# Patient Record
Sex: FEMALE | Race: WHITE | Employment: PART TIME | URBAN - METROPOLITAN AREA
[De-identification: names, ages, dates, MRNs, and addresses within clinical notes are randomized per-mention and may not be internally consistent; named-entity substitution may affect disease eponyms.]

---

## 2018-05-22 ENCOUNTER — HOSPITAL ENCOUNTER (OUTPATIENT)
Dept: CT IMAGING | Age: 53
Discharge: HOME | End: 2018-05-24
Payer: COMMERCIAL

## 2018-05-22 DIAGNOSIS — K43.9 VENTRAL HERNIA WITHOUT OBSTRUCTION OR GANGRENE: ICD-10-CM

## 2018-05-22 DIAGNOSIS — K59.00 CONSTIPATION, UNSPECIFIED CONSTIPATION TYPE: ICD-10-CM

## 2018-05-22 PROCEDURE — 6360000004 HC RX CONTRAST MEDICATION: Performed by: RADIOLOGY

## 2018-05-22 PROCEDURE — 74177 CT ABD & PELVIS W/CONTRAST: CPT

## 2018-05-22 RX ADMIN — IOPAMIDOL 110 ML: 755 INJECTION, SOLUTION INTRAVENOUS at 12:12

## 2018-05-22 RX ADMIN — IOHEXOL 50 ML: 240 INJECTION, SOLUTION INTRATHECAL; INTRAVASCULAR; INTRAVENOUS; ORAL at 12:12

## 2018-08-06 ENCOUNTER — APPOINTMENT (OUTPATIENT)
Dept: GENERAL RADIOLOGY | Age: 53
End: 2018-08-06
Payer: COMMERCIAL

## 2018-08-06 LAB
ALBUMIN SERPL-MCNC: 4.7 G/DL (ref 3.5–5.2)
ALP BLD-CCNC: 76 U/L (ref 35–104)
ALT SERPL-CCNC: 12 U/L (ref 0–32)
ANION GAP SERPL CALCULATED.3IONS-SCNC: 14 MMOL/L (ref 7–16)
AST SERPL-CCNC: 20 U/L (ref 0–31)
BASOPHILS ABSOLUTE: 0.04 E9/L (ref 0–0.2)
BASOPHILS RELATIVE PERCENT: 0.6 % (ref 0–2)
BILIRUB SERPL-MCNC: <0.2 MG/DL (ref 0–1.2)
BUN BLDV-MCNC: 13 MG/DL (ref 6–20)
CALCIUM SERPL-MCNC: 9.4 MG/DL (ref 8.6–10.2)
CHLORIDE BLD-SCNC: 105 MMOL/L (ref 98–107)
CO2: 22 MMOL/L (ref 22–29)
CREAT SERPL-MCNC: 1 MG/DL (ref 0.5–1)
D DIMER: <200 NG/ML DDU
EOSINOPHILS ABSOLUTE: 0.19 E9/L (ref 0.05–0.5)
EOSINOPHILS RELATIVE PERCENT: 2.9 % (ref 0–6)
GFR AFRICAN AMERICAN: >60
GFR NON-AFRICAN AMERICAN: 58 ML/MIN/1.73
GLUCOSE BLD-MCNC: 73 MG/DL (ref 74–109)
HCT VFR BLD CALC: 43.5 % (ref 34–48)
HEMOGLOBIN: 14.7 G/DL (ref 11.5–15.5)
IMMATURE GRANULOCYTES #: 0.02 E9/L
IMMATURE GRANULOCYTES %: 0.3 % (ref 0–5)
LYMPHOCYTES ABSOLUTE: 2.64 E9/L (ref 1.5–4)
LYMPHOCYTES RELATIVE PERCENT: 40.4 % (ref 20–42)
MCH RBC QN AUTO: 32.3 PG (ref 26–35)
MCHC RBC AUTO-ENTMCNC: 33.8 % (ref 32–34.5)
MCV RBC AUTO: 95.6 FL (ref 80–99.9)
MONOCYTES ABSOLUTE: 0.49 E9/L (ref 0.1–0.95)
MONOCYTES RELATIVE PERCENT: 7.5 % (ref 2–12)
NEUTROPHILS ABSOLUTE: 3.15 E9/L (ref 1.8–7.3)
NEUTROPHILS RELATIVE PERCENT: 48.3 % (ref 43–80)
PDW BLD-RTO: 11.6 FL (ref 11.5–15)
PLATELET # BLD: 304 E9/L (ref 130–450)
PMV BLD AUTO: 10.4 FL (ref 7–12)
POTASSIUM SERPL-SCNC: 3.6 MMOL/L (ref 3.5–5)
PRO-BNP: 45 PG/ML (ref 0–125)
RBC # BLD: 4.55 E12/L (ref 3.5–5.5)
SODIUM BLD-SCNC: 141 MMOL/L (ref 132–146)
TOTAL PROTEIN: 8.1 G/DL (ref 6.4–8.3)
TROPONIN: <0.01 NG/ML (ref 0–0.03)
WBC # BLD: 6.5 E9/L (ref 4.5–11.5)

## 2018-08-06 PROCEDURE — 85378 FIBRIN DEGRADE SEMIQUANT: CPT

## 2018-08-06 PROCEDURE — 80053 COMPREHEN METABOLIC PANEL: CPT

## 2018-08-06 PROCEDURE — 85025 COMPLETE CBC W/AUTO DIFF WBC: CPT

## 2018-08-06 PROCEDURE — 83880 ASSAY OF NATRIURETIC PEPTIDE: CPT

## 2018-08-06 PROCEDURE — 84484 ASSAY OF TROPONIN QUANT: CPT

## 2018-08-06 PROCEDURE — 36415 COLL VENOUS BLD VENIPUNCTURE: CPT

## 2018-08-06 PROCEDURE — 71046 X-RAY EXAM CHEST 2 VIEWS: CPT

## 2018-08-06 ASSESSMENT — PAIN DESCRIPTION - DESCRIPTORS: DESCRIPTORS: ACHING

## 2018-08-06 ASSESSMENT — PAIN DESCRIPTION - LOCATION: LOCATION: CHEST

## 2018-08-06 ASSESSMENT — PAIN SCALES - GENERAL: PAINLEVEL_OUTOF10: 10

## 2018-08-06 ASSESSMENT — PAIN DESCRIPTION - PAIN TYPE: TYPE: ACUTE PAIN

## 2018-08-06 ASSESSMENT — PAIN DESCRIPTION - ORIENTATION: ORIENTATION: RIGHT

## 2018-08-07 ENCOUNTER — HOSPITAL ENCOUNTER (EMERGENCY)
Age: 53
Discharge: HOME | End: 2018-08-07
Attending: EMERGENCY MEDICINE
Payer: COMMERCIAL

## 2018-08-07 VITALS
HEART RATE: 64 BPM | HEIGHT: 65 IN | DIASTOLIC BLOOD PRESSURE: 77 MMHG | TEMPERATURE: 97.9 F | SYSTOLIC BLOOD PRESSURE: 137 MMHG | RESPIRATION RATE: 16 BRPM | WEIGHT: 115 LBS | BODY MASS INDEX: 19.16 KG/M2 | OXYGEN SATURATION: 98 %

## 2018-08-07 DIAGNOSIS — R07.89 CHEST WALL PAIN: Primary | ICD-10-CM

## 2018-08-07 LAB — TROPONIN: <0.01 NG/ML (ref 0–0.03)

## 2018-08-07 PROCEDURE — 6360000002 HC RX W HCPCS: Performed by: EMERGENCY MEDICINE

## 2018-08-07 PROCEDURE — 36415 COLL VENOUS BLD VENIPUNCTURE: CPT

## 2018-08-07 PROCEDURE — 99285 EMERGENCY DEPT VISIT HI MDM: CPT

## 2018-08-07 PROCEDURE — 96372 THER/PROPH/DIAG INJ SC/IM: CPT

## 2018-08-07 PROCEDURE — 84484 ASSAY OF TROPONIN QUANT: CPT

## 2018-08-07 PROCEDURE — 6370000000 HC RX 637 (ALT 250 FOR IP): Performed by: EMERGENCY MEDICINE

## 2018-08-07 RX ORDER — PANTOPRAZOLE SODIUM 40 MG/1
40 TABLET, DELAYED RELEASE ORAL DAILY
Qty: 10 TABLET | Refills: 0 | Status: SHIPPED | OUTPATIENT
Start: 2018-08-07 | End: 2019-05-25

## 2018-08-07 RX ORDER — SUCRALFATE 1 G/1
1 TABLET ORAL 4 TIMES DAILY
Qty: 120 TABLET | Refills: 3 | Status: SHIPPED | OUTPATIENT
Start: 2018-08-07 | End: 2019-05-25

## 2018-08-07 RX ORDER — KETOROLAC TROMETHAMINE 30 MG/ML
30 INJECTION, SOLUTION INTRAMUSCULAR; INTRAVENOUS ONCE
Status: COMPLETED | OUTPATIENT
Start: 2018-08-07 | End: 2018-08-07

## 2018-08-07 RX ADMIN — KETOROLAC TROMETHAMINE 30 MG: 30 INJECTION, SOLUTION INTRAMUSCULAR; INTRAVENOUS at 01:38

## 2018-08-07 RX ADMIN — LIDOCAINE HYDROCHLORIDE: 20 SOLUTION ORAL; TOPICAL at 03:51

## 2018-08-07 ASSESSMENT — PAIN SCALES - GENERAL
PAINLEVEL_OUTOF10: 9
PAINLEVEL_OUTOF10: 8

## 2018-08-07 ASSESSMENT — PAIN DESCRIPTION - LOCATION: LOCATION: ABDOMEN

## 2018-08-07 ASSESSMENT — PAIN DESCRIPTION - PAIN TYPE: TYPE: ACUTE PAIN

## 2018-08-07 NOTE — ED PROVIDER NOTES
HPI:  8/7/18,   Time: 4:08 AM       Maikel Ash is a 48 y.o. female presenting to the ED for chest pain. Patient states she developed a right-sided chest pain earlier this evening. She says after she ate dinner. She says its worse with movement along with touch, does not radiate anywhere else. She says she was short of breath with this. She denies any cardiac risk factors. She denies any PE risk factors. She denies any fever, chills, nausea, vomiting, rash, cough, sputum, or any other symptoms or complaints. Review of Systems:   Pertinent positives and negatives are stated within HPI, all other systems reviewed and are negative.          --------------------------------------------- PAST HISTORY ---------------------------------------------  Past Medical History:  has a past medical history of Seizures ([facility] [address] Past Surgical History:  has a past surgical history that includes Hysterectomy; Breast surgery; and cervical fusion. Social History:  reports that she has quit smoking. She has a 0.12 pack-year smoking history. She has never used smokeless tobacco. She reports that she does not drink alcohol or use drugs. Family History: family history is not on file. The patients home medications have been reviewed. Allergies: Fish-derived products and Bee venom        ---------------------------------------------------PHYSICAL EXAM--------------------------------------    Constitutional/General: Alert and oriented x3, well appearing, non toxic in NAD  Head: Normocephalic and atraumatic  Eyes: PERRL, EOMI, conjunctive normal, sclera non icteric  Mouth: Oropharynx clear, handling secretions, no trismus, no asymmetry of the posterior oropharynx or uvular edema  Neck: Supple, full ROM, non tender to palpation in the midline, no stridor, no crepitus, no meningeal signs  Respiratory: Lungs clear to auscultation bilaterally, no wheezes, rales, or rhonchi.  Not in respiratory distress  Cardiovascular: Regular rate. Regular rhythm. No murmurs, gallops, or rubs. 2+ distal pulses  Chest: Right chest wall tenderness, no crepitus  GI:  Abdomen Soft, Non tender, Non distended. +BS. No organomegaly, no palpable masses,  No rebound, guarding, or rigidity. Musculoskeletal: Moves all extremities x 4. Warm and well perfused, no clubbing, cyanosis, or edema. Capillary refill <3 seconds  Integument: skin warm and dry. No rashes. Lymphatic: no lymphadenopathy noted  Neurologic: GCS 15, no focal deficits, symmetric strength 5/5 in the upper and lower extremities bilaterally  Psychiatric: Normal Affect    -------------------------------------------------- RESULTS -------------------------------------------------  I have personally reviewed all laboratory and imaging results for this patient. Results are listed below.      LABS:  Results for orders placed or performed during the hospital encounter of 08/07/18   CBC Auto Differential   Result Value Ref Range    WBC 6.5 4.5 - 11.5 E9/L    RBC 4.55 3.50 - 5.50 E12/L    Hemoglobin 14.7 11.5 - 15.5 g/dL    Hematocrit 43.5 34.0 - 48.0 %    MCV 95.6 80.0 - 99.9 fL    MCH 32.3 26.0 - 35.0 pg    MCHC 33.8 32.0 - 34.5 %    RDW 11.6 11.5 - 15.0 fL    Platelets 068 163 - 059 E9/L    MPV 10.4 7.0 - 12.0 fL    Neutrophils % 48.3 43.0 - 80.0 %    Immature Granulocytes % 0.3 0.0 - 5.0 %    Lymphocytes % 40.4 20.0 - 42.0 %    Monocytes % 7.5 2.0 - 12.0 %    Eosinophils % 2.9 0.0 - 6.0 %    Basophils % 0.6 0.0 - 2.0 %    Neutrophils # 3.15 1.80 - 7.30 E9/L    Immature Granulocytes # 0.02 E9/L    Lymphocytes # 2.64 1.50 - 4.00 E9/L    Monocytes # 0.49 0.10 - 0.95 E9/L    Eosinophils # 0.19 0.05 - 0.50 E9/L    Basophils # 0.04 0.00 - 0.20 E9/L   Comprehensive Metabolic Panel   Result Value Ref Range    Sodium 141 132 - 146 mmol/L    Potassium 3.6 3.5 - 5.0 mmol/L    Chloride 105 98 - 107 mmol/L    CO2 22 22 - 29 mmol/L    Anion Gap 14 7 - 16 mmol/L    Glucose 73 (L) 74 - 109 mg/dL    BUN 13 6 - 20 mg/dL    CREATININE 1.0 0.5 - 1.0 mg/dL    GFR Non-African American 58 >=60 mL/min/1.73    GFR African American >60     Calcium 9.4 8.6 - 10.2 mg/dL    Total Protein 8.1 6.4 - 8.3 g/dL    Alb 4.7 3.5 - 5.2 g/dL    Total Bilirubin <0.2 0.0 - 1.2 mg/dL    Alkaline Phosphatase 76 35 - 104 U/L    ALT 12 0 - 32 U/L    AST 20 0 - 31 U/L   Troponin   Result Value Ref Range    Troponin <0.01 0.00 - 0.03 ng/mL   Brain Natriuretic Peptide   Result Value Ref Range    Pro-BNP 45 0 - 125 pg/mL   D-Dimer, Quantitative   Result Value Ref Range    D-Dimer, Quant <200 ng/mL DDU   Troponin   Result Value Ref Range    Troponin <0.01 0.00 - 0.03 ng/mL   EKG 12 Lead   Result Value Ref Range    Ventricular Rate 80 BPM    Atrial Rate 80 BPM    P-R Interval 140 ms    QRS Duration 66 ms    Q-T Interval 378 ms    QTc Calculation (Bazett) 435 ms    P Axis 70 degrees    R Axis 75 degrees    T Axis 4 degrees       RADIOLOGY:  Interpreted by Radiologist.  XR CHEST STANDARD (2 VW)   Final Result   No acute cardiopulmonary process. EKG: This EKG is signed and interpreted by the EP. Sinus rhythm, rate 80, no STEMI, no ischemic change       ------------------------- NURSING NOTES AND VITALS REVIEWED ---------------------------   The nursing notes within the ED encounter and vital signs as below have been reviewed by myself. /77   Pulse 64   Temp 97.9 °F (36.6 °C)   Resp 16   Ht 5' 5\" (1.651 m)   Wt 115 lb (52.2 kg)   SpO2 98%   BMI 19.14 kg/m²   Oxygen Saturation Interpretation: Normal    The patients available past medical records and past encounters were reviewed.         ------------------------------ ED COURSE/MEDICAL DECISION MAKING----------------------  Medications   ketorolac (TORADOL) injection 30 mg (30 mg Intramuscular Given 8/7/18 0138)   aluminum & magnesium hydroxide-simethicone (MAALOX) 30 mL, lidocaine viscous (XYLOCAINE) 5 mL (GI COCKTAIL) ( Oral Given 8/7/18 6651)         ED COURSE:       Medical

## 2018-08-11 LAB
EKG ATRIAL RATE: 80 BPM
EKG P AXIS: 70 DEGREES
EKG P-R INTERVAL: 140 MS
EKG Q-T INTERVAL: 378 MS
EKG QRS DURATION: 66 MS
EKG QTC CALCULATION (BAZETT): 435 MS
EKG R AXIS: 75 DEGREES
EKG T AXIS: 4 DEGREES
EKG VENTRICULAR RATE: 80 BPM

## 2018-09-26 LAB
CHOLESTEROL, TOTAL: NORMAL MG/DL
CHOLESTEROL/HDL RATIO: NORMAL
HDLC SERPL-MCNC: NORMAL MG/DL (ref 35–70)
LDL CHOLESTEROL CALCULATED: NORMAL MG/DL (ref 0–160)
TRIGL SERPL-MCNC: NORMAL MG/DL
VLDLC SERPL CALC-MCNC: NORMAL MG/DL

## 2019-05-25 ENCOUNTER — OFFICE VISIT (OUTPATIENT)
Dept: FAMILY MEDICINE CLINIC | Age: 54
End: 2019-05-25

## 2019-05-25 VITALS
HEIGHT: 64 IN | SYSTOLIC BLOOD PRESSURE: 124 MMHG | DIASTOLIC BLOOD PRESSURE: 76 MMHG | TEMPERATURE: 97.3 F | OXYGEN SATURATION: 99 % | WEIGHT: 122.6 LBS | BODY MASS INDEX: 20.93 KG/M2 | HEART RATE: 68 BPM

## 2019-05-25 DIAGNOSIS — M54.2 NECK PAIN: Primary | ICD-10-CM

## 2019-05-25 PROCEDURE — 99213 OFFICE O/P EST LOW 20 MIN: CPT | Performed by: FAMILY MEDICINE

## 2019-05-25 RX ORDER — LAMOTRIGINE 100 MG/1
TABLET ORAL
Refills: 1 | COMMUNITY
Start: 2019-03-19 | End: 2019-09-12 | Stop reason: SDUPTHER

## 2019-05-25 RX ORDER — ZOLPIDEM TARTRATE 12.5 MG/1
TABLET, FILM COATED, EXTENDED RELEASE ORAL
Refills: 0 | COMMUNITY
Start: 2019-04-28 | End: 2019-06-21 | Stop reason: SDUPTHER

## 2019-05-25 RX ORDER — HYDROCODONE BITARTRATE AND ACETAMINOPHEN 10; 325 MG/1; MG/1
TABLET ORAL
Refills: 0 | COMMUNITY
Start: 2019-03-19 | End: 2019-09-12 | Stop reason: SDUPTHER

## 2019-05-25 RX ORDER — METHYLPREDNISOLONE 4 MG/1
TABLET ORAL
Qty: 21 TABLET | Refills: 0 | Status: SHIPPED | OUTPATIENT
Start: 2019-05-25 | End: 2019-09-03

## 2019-05-25 RX ORDER — ESTRADIOL 0.1 MG/D
FILM, EXTENDED RELEASE TRANSDERMAL
Refills: 11 | COMMUNITY
Start: 2019-05-15 | End: 2019-09-12 | Stop reason: SDUPTHER

## 2019-05-25 RX ORDER — CARISOPRODOL 350 MG/1
TABLET ORAL
Refills: 0 | COMMUNITY
Start: 2019-03-19 | End: 2019-07-01 | Stop reason: SDUPTHER

## 2019-05-25 ASSESSMENT — ENCOUNTER SYMPTOMS
RESPIRATORY NEGATIVE: 1
GASTROINTESTINAL NEGATIVE: 1
EYES NEGATIVE: 1
ALLERGIC/IMMUNOLOGIC NEGATIVE: 1

## 2019-05-25 NOTE — PROGRESS NOTES
19     Azul Mariee    : 1965 Sex: female   Age: 48 y.o. Chief Complaint   Patient presents with    Back Pain       Prior to Admission medications    Medication Sig Start Date End Date Taking? Authorizing Provider   carisoprodol (SOMA) 350 MG tablet TK 1 T PO QID PRN 3/19/19  Yes Historical Provider, MD   estradiol (Orellana Carleen) 0.1 MG/24HR HOWARD 1 PA EXT TO THE SKIN 2 TIMES A WK 5/15/19  Yes Historical Provider, MD   HYDROcodone-acetaminophen (NORCO)  MG per tablet TK 1 T PO QID PRN 3/19/19  Yes Historical Provider, MD   lamoTRIgine (LAMICTAL) 100 MG tablet TK 1 T PO QHS 3/19/19  Yes Historical Provider, MD   zolpidem (AMBIEN CR) 12.5 MG extended release tablet TK 1 T PO QHS PRN 19  Yes Historical Provider, MD   QUEtiapine Fumarate (SEROQUEL PO) Take 100 mg by mouth 2 times daily. Yes Historical Provider, MD   Sertraline HCl (ZOLOFT PO) Take 50 mg by mouth daily. Yes Historical Provider, MD   LamoTRIgine (LAMICTAL PO) Take 100 mg by mouth 2 times daily. Yes Historical Provider, MD   ALPRAZOLAM Take 0.5 mg by mouth as needed. Yes Historical Provider, MD   Topiramate (TOPAMAX PO) Take 25 mg by mouth 2 times daily. Yes Historical Provider, MD   diclofenac (VOLTAREN) 75 MG EC tablet Take 1 tablet by mouth 2 times daily for 7 days 10/28/17 11/4/17  DMEETRIS Narvaez        HPI: Patient presents today problems with neck discomfort. Known history of severe degenerative disc disease. Admits to cervical fusion. Started this past week with tenderness back of the neck and mild radicular pain into the arms. We will treat with a Medrol Dosepak. Encourage follow-up with primary medical doctor in regards to further neurosurgical evaluation if necessary. Denies any recent trauma          Review of Systems   Constitutional: Negative. HENT: Negative. Eyes: Negative. Respiratory: Negative. Gastrointestinal: Negative. Endocrine: Negative. Genitourinary: Negative. Musculoskeletal: Negative. Current complaints of neck pain as noted with radicular pain into the arms. History of cervical fusion. History of degenerative disc disease. Skin: Negative. Allergic/Immunologic: Negative. Neurological: Negative. Hematological: Negative. Psychiatric/Behavioral: Negative. Current Outpatient Medications:     carisoprodol (SOMA) 350 MG tablet, TK 1 T PO QID PRN, Disp: , Rfl: 0    estradiol (VIVELLE) 0.1 MG/24HR, HOWARD 1 PA EXT TO THE SKIN 2 TIMES A WK, Disp: , Rfl: 11    HYDROcodone-acetaminophen (NORCO)  MG per tablet, TK 1 T PO QID PRN, Disp: , Rfl: 0    lamoTRIgine (LAMICTAL) 100 MG tablet, TK 1 T PO QHS, Disp: , Rfl: 1    zolpidem (AMBIEN CR) 12.5 MG extended release tablet, TK 1 T PO QHS PRN, Disp: , Rfl: 0    QUEtiapine Fumarate (SEROQUEL PO), Take 100 mg by mouth 2 times daily. , Disp: , Rfl:     Sertraline HCl (ZOLOFT PO), Take 50 mg by mouth daily. , Disp: , Rfl:     LamoTRIgine (LAMICTAL PO), Take 100 mg by mouth 2 times daily. , Disp: , Rfl:     ALPRAZOLAM, Take 0.5 mg by mouth as needed. , Disp: , Rfl:     Topiramate (TOPAMAX PO), Take 25 mg by mouth 2 times daily. , Disp: , Rfl:     diclofenac (VOLTAREN) 75 MG EC tablet, Take 1 tablet by mouth 2 times daily for 7 days, Disp: 14 tablet, Rfl: 0    Allergies   Allergen Reactions    Fish-Derived Products Anaphylaxis and Swelling     Swordfish allergy    Bee Venom        Social History     Tobacco Use    Smoking status: Former Smoker     Packs/day: 0.25     Years: 0.50     Pack years: 0.12    Smokeless tobacco: Never Used   Substance Use Topics    Alcohol use: No    Drug use: No      Past Surgical History:   Procedure Laterality Date    BREAST SURGERY      CERVICAL FUSION      c2-c7    HYSTERECTOMY       No family history on file.   Past Medical History:   Diagnosis Date    [facility]        Vitals:    05/25/19 1152   BP: 124/76   Pulse: 68   Temp: 97.3 °F (36.3 °C)   SpO2:

## 2019-06-06 RX ORDER — ALPRAZOLAM 0.5 MG/1
TABLET ORAL
Qty: 180 TABLET | Refills: 0 | OUTPATIENT
Start: 2019-06-06

## 2019-06-17 ENCOUNTER — TELEPHONE (OUTPATIENT)
Dept: FAMILY MEDICINE CLINIC | Age: 54
End: 2019-06-17

## 2019-06-18 RX ORDER — ZOLPIDEM TARTRATE 12.5 MG/1
12.5 TABLET, FILM COATED, EXTENDED RELEASE ORAL NIGHTLY PRN
Qty: 30 TABLET | Refills: 0 | OUTPATIENT
Start: 2019-06-18 | End: 2019-07-18

## 2019-06-19 ENCOUNTER — TELEPHONE (OUTPATIENT)
Dept: FAMILY MEDICINE CLINIC | Age: 54
End: 2019-06-19

## 2019-06-19 RX ORDER — ZOLPIDEM TARTRATE 12.5 MG/1
12.5 TABLET, FILM COATED, EXTENDED RELEASE ORAL NIGHTLY PRN
Qty: 15 TABLET | Refills: 0 | Status: CANCELLED | OUTPATIENT
Start: 2019-06-19 | End: 2019-07-04

## 2019-06-19 NOTE — TELEPHONE ENCOUNTER
She is out of Ambien and has an appt on the 27th. I have pended a 15 day supply ot go to [place].

## 2019-06-21 ENCOUNTER — OFFICE VISIT (OUTPATIENT)
Dept: FAMILY MEDICINE CLINIC | Age: 54
End: 2019-06-21

## 2019-06-21 VITALS
BODY MASS INDEX: 20.94 KG/M2 | TEMPERATURE: 97.3 F | WEIGHT: 122 LBS | OXYGEN SATURATION: 99 % | HEART RATE: 76 BPM | DIASTOLIC BLOOD PRESSURE: 60 MMHG | SYSTOLIC BLOOD PRESSURE: 104 MMHG

## 2019-06-21 DIAGNOSIS — F41.9 ANXIETY: ICD-10-CM

## 2019-06-21 DIAGNOSIS — G47.00 INSOMNIA, UNSPECIFIED TYPE: Primary | ICD-10-CM

## 2019-06-21 DIAGNOSIS — M25.50 ARTHRALGIA, UNSPECIFIED JOINT: ICD-10-CM

## 2019-06-21 PROCEDURE — 99213 OFFICE O/P EST LOW 20 MIN: CPT | Performed by: FAMILY MEDICINE

## 2019-06-21 PROCEDURE — 96372 THER/PROPH/DIAG INJ SC/IM: CPT | Performed by: FAMILY MEDICINE

## 2019-06-21 RX ORDER — ALPRAZOLAM 0.5 MG/1
0.5 TABLET ORAL 2 TIMES DAILY
Qty: 180 TABLET | Refills: 0 | Status: SHIPPED | OUTPATIENT
Start: 2019-06-21 | End: 2019-09-12 | Stop reason: SDUPTHER

## 2019-06-21 RX ORDER — METHYLPREDNISOLONE ACETATE 40 MG/ML
80 INJECTION, SUSPENSION INTRA-ARTICULAR; INTRALESIONAL; INTRAMUSCULAR; SOFT TISSUE ONCE
Status: COMPLETED | OUTPATIENT
Start: 2019-06-21 | End: 2019-06-21

## 2019-06-21 RX ORDER — ALPRAZOLAM 0.5 MG/1
TABLET ORAL NIGHTLY PRN
COMMUNITY
End: 2019-06-21 | Stop reason: SDUPTHER

## 2019-06-21 RX ORDER — DICLOFENAC SODIUM 75 MG/1
75 TABLET, DELAYED RELEASE ORAL 2 TIMES DAILY
COMMUNITY
End: 2019-09-03

## 2019-06-21 RX ORDER — ZOLPIDEM TARTRATE 12.5 MG/1
12.5 TABLET, FILM COATED, EXTENDED RELEASE ORAL NIGHTLY PRN
Qty: 90 TABLET | Refills: 0 | Status: SHIPPED | OUTPATIENT
Start: 2019-06-21 | End: 2019-09-12 | Stop reason: SDUPTHER

## 2019-06-21 RX ORDER — ALPRAZOLAM 0.5 MG/1
0.5 TABLET ORAL NIGHTLY PRN
Qty: 180 TABLET | Refills: 0 | Status: SHIPPED | OUTPATIENT
Start: 2019-06-21 | End: 2019-06-21 | Stop reason: SDUPTHER

## 2019-06-21 RX ADMIN — METHYLPREDNISOLONE ACETATE 80 MG: 40 INJECTION, SUSPENSION INTRA-ARTICULAR; INTRALESIONAL; INTRAMUSCULAR; SOFT TISSUE at 15:16

## 2019-06-21 ASSESSMENT — PATIENT HEALTH QUESTIONNAIRE - PHQ9
SUM OF ALL RESPONSES TO PHQ QUESTIONS 1-9: 0
1. LITTLE INTEREST OR PLEASURE IN DOING THINGS: 0
SUM OF ALL RESPONSES TO PHQ QUESTIONS 1-9: 0
2. FEELING DOWN, DEPRESSED OR HOPELESS: 0
SUM OF ALL RESPONSES TO PHQ9 QUESTIONS 1 & 2: 0
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
## 2019-07-01 ENCOUNTER — OFFICE VISIT (OUTPATIENT)
Dept: FAMILY MEDICINE CLINIC | Age: 54
End: 2019-07-01

## 2019-07-01 VITALS
DIASTOLIC BLOOD PRESSURE: 70 MMHG | TEMPERATURE: 97.8 F | HEART RATE: 70 BPM | OXYGEN SATURATION: 97 % | SYSTOLIC BLOOD PRESSURE: 114 MMHG | BODY MASS INDEX: 20.77 KG/M2 | WEIGHT: 121 LBS

## 2019-07-01 DIAGNOSIS — M54.12 CERVICAL SPONDYLITIS WITH RADICULITIS (HCC): Primary | ICD-10-CM

## 2019-07-01 DIAGNOSIS — M46.92 CERVICAL SPONDYLITIS WITH RADICULITIS (HCC): Primary | ICD-10-CM

## 2019-07-01 PROCEDURE — 96372 THER/PROPH/DIAG INJ SC/IM: CPT | Performed by: FAMILY MEDICINE

## 2019-07-01 PROCEDURE — 99213 OFFICE O/P EST LOW 20 MIN: CPT | Performed by: FAMILY MEDICINE

## 2019-07-01 RX ORDER — CARISOPRODOL 350 MG/1
TABLET ORAL
Qty: 120 TABLET | Refills: 0 | Status: SHIPPED | OUTPATIENT
Start: 2019-07-01 | End: 2019-09-12 | Stop reason: SDUPTHER

## 2019-07-01 RX ORDER — METHYLPREDNISOLONE ACETATE 40 MG/ML
80 INJECTION, SUSPENSION INTRA-ARTICULAR; INTRALESIONAL; INTRAMUSCULAR; SOFT TISSUE ONCE
Status: COMPLETED | OUTPATIENT
Start: 2019-07-01 | End: 2019-07-01

## 2019-07-01 RX ADMIN — METHYLPREDNISOLONE ACETATE 80 MG: 40 INJECTION, SUSPENSION INTRA-ARTICULAR; INTRALESIONAL; INTRAMUSCULAR; SOFT TISSUE at 16:47

## 2019-09-03 ENCOUNTER — OFFICE VISIT (OUTPATIENT)
Dept: FAMILY MEDICINE CLINIC | Age: 54
End: 2019-09-03

## 2019-09-03 ENCOUNTER — HOSPITAL ENCOUNTER (OUTPATIENT)
Age: 54
Discharge: HOME | End: 2019-09-05

## 2019-09-03 VITALS
OXYGEN SATURATION: 97 % | BODY MASS INDEX: 21.28 KG/M2 | WEIGHT: 124 LBS | SYSTOLIC BLOOD PRESSURE: 120 MMHG | HEART RATE: 78 BPM | DIASTOLIC BLOOD PRESSURE: 76 MMHG | TEMPERATURE: 97.6 F

## 2019-09-03 DIAGNOSIS — R10.11 RIGHT UPPER QUADRANT ABDOMINAL PAIN: ICD-10-CM

## 2019-09-03 DIAGNOSIS — R10.11 RIGHT UPPER QUADRANT ABDOMINAL PAIN: Primary | ICD-10-CM

## 2019-09-03 LAB
ALBUMIN SERPL-MCNC: 4.6 G/DL (ref 3.5–5.2)
ALP BLD-CCNC: 79 U/L (ref 35–104)
ALT SERPL-CCNC: 18 U/L (ref 0–32)
AMYLASE: 66 U/L (ref 20–100)
ANION GAP SERPL CALCULATED.3IONS-SCNC: 14 MMOL/L (ref 7–16)
AST SERPL-CCNC: 25 U/L (ref 0–31)
BASOPHILS ABSOLUTE: 0.05 E9/L (ref 0–0.2)
BASOPHILS RELATIVE PERCENT: 0.8 % (ref 0–2)
BILIRUB SERPL-MCNC: 0.3 MG/DL (ref 0–1.2)
BUN BLDV-MCNC: 8 MG/DL (ref 6–20)
CALCIUM SERPL-MCNC: 9.8 MG/DL (ref 8.6–10.2)
CHLORIDE BLD-SCNC: 106 MMOL/L (ref 98–107)
CO2: 21 MMOL/L (ref 22–29)
CREAT SERPL-MCNC: 1.1 MG/DL (ref 0.5–1)
EOSINOPHILS ABSOLUTE: 0.06 E9/L (ref 0.05–0.5)
EOSINOPHILS RELATIVE PERCENT: 0.9 % (ref 0–6)
GFR AFRICAN AMERICAN: >60
GFR NON-AFRICAN AMERICAN: 52 ML/MIN/1.73
GLUCOSE BLD-MCNC: 102 MG/DL (ref 74–99)
HCT VFR BLD CALC: 47.1 % (ref 34–48)
HEMOGLOBIN: 14.2 G/DL (ref 11.5–15.5)
IMMATURE GRANULOCYTES #: 0.03 E9/L
IMMATURE GRANULOCYTES %: 0.5 % (ref 0–5)
LIPASE: 47 U/L (ref 13–60)
LYMPHOCYTES ABSOLUTE: 1.28 E9/L (ref 1.5–4)
LYMPHOCYTES RELATIVE PERCENT: 19.4 % (ref 20–42)
MCH RBC QN AUTO: 32.6 PG (ref 26–35)
MCHC RBC AUTO-ENTMCNC: 30.1 % (ref 32–34.5)
MCV RBC AUTO: 108.3 FL (ref 80–99.9)
MONOCYTES ABSOLUTE: 0.49 E9/L (ref 0.1–0.95)
MONOCYTES RELATIVE PERCENT: 7.4 % (ref 2–12)
NEUTROPHILS ABSOLUTE: 4.7 E9/L (ref 1.8–7.3)
NEUTROPHILS RELATIVE PERCENT: 71 % (ref 43–80)
PDW BLD-RTO: 12.2 FL (ref 11.5–15)
PLATELET # BLD: 294 E9/L (ref 130–450)
PMV BLD AUTO: 11.8 FL (ref 7–12)
POTASSIUM SERPL-SCNC: 4.3 MMOL/L (ref 3.5–5)
RBC # BLD: 4.35 E12/L (ref 3.5–5.5)
SODIUM BLD-SCNC: 141 MMOL/L (ref 132–146)
TOTAL PROTEIN: 8.8 G/DL (ref 6.4–8.3)
WBC # BLD: 6.6 E9/L (ref 4.5–11.5)

## 2019-09-03 PROCEDURE — 82150 ASSAY OF AMYLASE: CPT

## 2019-09-03 PROCEDURE — 83690 ASSAY OF LIPASE: CPT

## 2019-09-03 PROCEDURE — 85025 COMPLETE CBC W/AUTO DIFF WBC: CPT

## 2019-09-03 PROCEDURE — 80053 COMPREHEN METABOLIC PANEL: CPT

## 2019-09-03 PROCEDURE — 36415 COLL VENOUS BLD VENIPUNCTURE: CPT

## 2019-09-03 PROCEDURE — 99213 OFFICE O/P EST LOW 20 MIN: CPT | Performed by: FAMILY MEDICINE

## 2019-09-03 RX ORDER — DICYCLOMINE HCL 20 MG
20 TABLET ORAL EVERY 6 HOURS
Qty: 20 TABLET | Refills: 1 | Status: SHIPPED | OUTPATIENT
Start: 2019-09-03 | End: 2019-12-05 | Stop reason: ALTCHOICE

## 2019-09-03 ASSESSMENT — ENCOUNTER SYMPTOMS
SORE THROAT: 0
EYE PAIN: 0
BACK PAIN: 1
SHORTNESS OF BREATH: 0
CONSTIPATION: 0
SINUS PAIN: 0
ABDOMINAL PAIN: 1
COUGH: 0
DIARRHEA: 1

## 2019-09-12 ENCOUNTER — OFFICE VISIT (OUTPATIENT)
Dept: FAMILY MEDICINE CLINIC | Age: 54
End: 2019-09-12

## 2019-09-12 VITALS
TEMPERATURE: 98.1 F | WEIGHT: 121 LBS | DIASTOLIC BLOOD PRESSURE: 76 MMHG | HEART RATE: 90 BPM | OXYGEN SATURATION: 98 % | SYSTOLIC BLOOD PRESSURE: 116 MMHG | BODY MASS INDEX: 20.77 KG/M2

## 2019-09-12 DIAGNOSIS — F41.9 ANXIETY: ICD-10-CM

## 2019-09-12 DIAGNOSIS — M50.90 CERVICAL DISC DISEASE: ICD-10-CM

## 2019-09-12 DIAGNOSIS — R51.9 NONINTRACTABLE HEADACHE, UNSPECIFIED CHRONICITY PATTERN, UNSPECIFIED HEADACHE TYPE: Primary | ICD-10-CM

## 2019-09-12 DIAGNOSIS — M54.12 CERVICAL SPONDYLITIS WITH RADICULITIS (HCC): ICD-10-CM

## 2019-09-12 DIAGNOSIS — M46.92 CERVICAL SPONDYLITIS WITH RADICULITIS (HCC): ICD-10-CM

## 2019-09-12 DIAGNOSIS — R51.9 NONINTRACTABLE HEADACHE, UNSPECIFIED CHRONICITY PATTERN, UNSPECIFIED HEADACHE TYPE: ICD-10-CM

## 2019-09-12 DIAGNOSIS — G47.00 INSOMNIA, UNSPECIFIED TYPE: ICD-10-CM

## 2019-09-12 PROCEDURE — 99213 OFFICE O/P EST LOW 20 MIN: CPT | Performed by: FAMILY MEDICINE

## 2019-09-12 PROCEDURE — 96372 THER/PROPH/DIAG INJ SC/IM: CPT | Performed by: FAMILY MEDICINE

## 2019-09-12 RX ORDER — LAMOTRIGINE 100 MG/1
100 TABLET ORAL 2 TIMES DAILY
Qty: 60 TABLET | Refills: 5 | Status: SHIPPED | OUTPATIENT
Start: 2019-09-12 | End: 2019-09-12 | Stop reason: SDUPTHER

## 2019-09-12 RX ORDER — QUETIAPINE FUMARATE 100 MG/1
100 TABLET, FILM COATED ORAL 2 TIMES DAILY
Qty: 60 TABLET | Refills: 5 | Status: SHIPPED | OUTPATIENT
Start: 2019-09-12 | End: 2019-09-12 | Stop reason: SDUPTHER

## 2019-09-12 RX ORDER — TOPIRAMATE 25 MG/1
TABLET ORAL
Qty: 180 TABLET | Refills: 5 | Status: SHIPPED
Start: 2019-09-12 | End: 2020-08-25 | Stop reason: SDUPTHER

## 2019-09-12 RX ORDER — CARISOPRODOL 350 MG/1
TABLET ORAL
Qty: 360 TABLET | Refills: 0 | Status: SHIPPED | OUTPATIENT
Start: 2019-09-12 | End: 2019-10-12

## 2019-09-12 RX ORDER — ZOLPIDEM TARTRATE 12.5 MG/1
12.5 TABLET, FILM COATED, EXTENDED RELEASE ORAL NIGHTLY PRN
Qty: 90 TABLET | Refills: 0 | Status: SHIPPED | OUTPATIENT
Start: 2019-09-12 | End: 2019-12-05 | Stop reason: SDUPTHER

## 2019-09-12 RX ORDER — ESTRADIOL 0.1 MG/D
1 FILM, EXTENDED RELEASE TRANSDERMAL
Qty: 8 PATCH | Refills: 5 | Status: SHIPPED | OUTPATIENT
Start: 2019-09-12 | End: 2019-12-05 | Stop reason: SDUPTHER

## 2019-09-12 RX ORDER — QUETIAPINE FUMARATE 100 MG/1
TABLET, FILM COATED ORAL
Qty: 180 TABLET | Refills: 5 | Status: SHIPPED
Start: 2019-09-12 | End: 2020-03-02 | Stop reason: SDUPTHER

## 2019-09-12 RX ORDER — ALPRAZOLAM 0.5 MG/1
0.5 TABLET ORAL 2 TIMES DAILY
Qty: 180 TABLET | Refills: 0 | Status: SHIPPED | OUTPATIENT
Start: 2019-09-12 | End: 2019-12-05 | Stop reason: SDUPTHER

## 2019-09-12 RX ORDER — LAMOTRIGINE 100 MG/1
TABLET ORAL
Qty: 180 TABLET | Refills: 5 | Status: SHIPPED
Start: 2019-09-12 | End: 2020-03-02 | Stop reason: SDUPTHER

## 2019-09-12 RX ORDER — TOPIRAMATE 25 MG/1
25 TABLET ORAL 2 TIMES DAILY
Qty: 60 TABLET | Refills: 5 | Status: SHIPPED | OUTPATIENT
Start: 2019-09-12 | End: 2019-09-12 | Stop reason: SDUPTHER

## 2019-09-12 RX ORDER — HYDROCODONE BITARTRATE AND ACETAMINOPHEN 10; 325 MG/1; MG/1
1 TABLET ORAL EVERY 6 HOURS PRN
Qty: 120 TABLET | Refills: 0 | Status: SHIPPED | OUTPATIENT
Start: 2019-09-12 | End: 2019-10-12

## 2019-09-12 RX ORDER — METHYLPREDNISOLONE ACETATE 40 MG/ML
80 INJECTION, SUSPENSION INTRA-ARTICULAR; INTRALESIONAL; INTRAMUSCULAR; SOFT TISSUE ONCE
Status: COMPLETED | OUTPATIENT
Start: 2019-09-12 | End: 2019-09-12

## 2019-09-12 RX ADMIN — METHYLPREDNISOLONE ACETATE 80 MG: 40 INJECTION, SUSPENSION INTRA-ARTICULAR; INTRALESIONAL; INTRAMUSCULAR; SOFT TISSUE at 15:42

## 2019-09-12 ASSESSMENT — ENCOUNTER SYMPTOMS
COUGH: 0
SHORTNESS OF BREATH: 0
SINUS PAIN: 0
CONSTIPATION: 0
BACK PAIN: 1
SORE THROAT: 0
EYE PAIN: 0

## 2019-09-12 NOTE — PROGRESS NOTES
Attends [employer] service: Not on file     Active member of club or organization: Not on file     Attends meetings of clubs or organizations: Not on file     Relationship status: Not on file    Intimate partner violence:     Fear of current or ex partner: Not on file     Emotionally abused: Not on file     Physically abused: Not on file     Forced sexual activity: Not on file   Other Topics Concern    Not on file   Social History Narrative    Not on file       Vitals:    09/12/19 1511   BP: 116/76   Pulse: 90   Temp: 98.1 °F (36.7 °C)   SpO2: 98%   Weight: 121 lb (54.9 kg)       Physical Exam   Constitutional: She is oriented to person, place, and time. She appears well-developed and well-nourished. HENT:   Head: Normocephalic. Eyes: Pupils are equal, round, and reactive to light. EOM are normal.   Neck: Neck supple. Cardiovascular: Regular rhythm and normal heart sounds. Pulmonary/Chest: Effort normal and breath sounds normal.   Abdominal: Soft. Bowel sounds are normal.   Musculoskeletal: She exhibits no tenderness. Right sided posterior thoracic/flank/rib tenderness. Neurological: She is alert and oriented to person, place, and time. Skin: Skin is warm. Psychiatric: She has a normal mood and affect. POCT Orders   No Active POCT       Assessment and Plan:  Hortensia Smith was seen today for anxiety, depression and back pain. Diagnoses and all orders for this visit:    Nonintractable headache, unspecified chronicity pattern, unspecified headache type  -     topiramate (TOPAMAX) 25 MG tablet; Take 1 tablet by mouth 2 times daily    Anxiety  -     ALPRAZolam (XANAX) 0.5 MG tablet; Take 1 tablet by mouth 2 times daily for 90 days. Insomnia, unspecified type  -     zolpidem (AMBIEN CR) 12.5 MG extended release tablet; Take 1 tablet by mouth nightly as needed for Sleep for up to 90 days. Cervical disc disease  -     HYDROcodone-acetaminophen (NORCO)  MG per tablet;  Take 1 tablet by mouth

## 2019-10-14 RX ORDER — EPINEPHRINE 0.3 MG/.3ML
0.3 INJECTION SUBCUTANEOUS PRN
COMMUNITY
End: 2020-08-25 | Stop reason: SDUPTHER

## 2019-10-14 RX ORDER — HYDROCODONE BITARTRATE AND ACETAMINOPHEN 10; 325 MG/1; MG/1
1 TABLET ORAL EVERY 6 HOURS PRN
COMMUNITY
End: 2019-12-05 | Stop reason: SDUPTHER

## 2019-10-14 RX ORDER — LIDOCAINE 50 MG/G
1 PATCH TOPICAL EVERY 12 HOURS
COMMUNITY
End: 2020-05-28

## 2019-10-14 RX ORDER — FLUCONAZOLE 150 MG/1
150 TABLET ORAL WEEKLY
COMMUNITY
End: 2019-12-05 | Stop reason: ALTCHOICE

## 2019-10-14 RX ORDER — CARISOPRODOL 350 MG/1
350 TABLET ORAL 4 TIMES DAILY PRN
COMMUNITY
End: 2019-12-05 | Stop reason: SDUPTHER

## 2019-12-05 ENCOUNTER — HOSPITAL ENCOUNTER (OUTPATIENT)
Age: 54
Discharge: HOME | End: 2019-12-07

## 2019-12-05 ENCOUNTER — OFFICE VISIT (OUTPATIENT)
Dept: FAMILY MEDICINE CLINIC | Age: 54
End: 2019-12-05

## 2019-12-05 VITALS
HEIGHT: 64 IN | TEMPERATURE: 97.9 F | DIASTOLIC BLOOD PRESSURE: 86 MMHG | SYSTOLIC BLOOD PRESSURE: 126 MMHG | WEIGHT: 125 LBS | BODY MASS INDEX: 21.34 KG/M2 | HEART RATE: 67 BPM | OXYGEN SATURATION: 98 %

## 2019-12-05 DIAGNOSIS — M50.30 DEGENERATION, INTERVERTEBRAL DISC, CERVICAL: ICD-10-CM

## 2019-12-05 DIAGNOSIS — F41.9 ANXIETY: ICD-10-CM

## 2019-12-05 DIAGNOSIS — R30.0 DYSURIA: ICD-10-CM

## 2019-12-05 DIAGNOSIS — G47.00 INSOMNIA, UNSPECIFIED TYPE: ICD-10-CM

## 2019-12-05 DIAGNOSIS — R30.0 DYSURIA: Primary | ICD-10-CM

## 2019-12-05 DIAGNOSIS — I49.9 IRREGULAR HEART BEAT: ICD-10-CM

## 2019-12-05 LAB
APPEARANCE FLUID: NORMAL
BILIRUBIN, POC: NORMAL
BLOOD URINE, POC: NORMAL
CLARITY, POC: CLEAR
COLOR, POC: NORMAL
GLUCOSE URINE, POC: NORMAL
KETONES, POC: NORMAL
LEUKOCYTE EST, POC: NORMAL
NITRITE, POC: NORMAL
PH, POC: 6.5
PROTEIN, POC: NORMAL
SPECIFIC GRAVITY, POC: 1.01
UROBILINOGEN, POC: 0.2

## 2019-12-05 PROCEDURE — 99213 OFFICE O/P EST LOW 20 MIN: CPT | Performed by: FAMILY MEDICINE

## 2019-12-05 PROCEDURE — 93000 ELECTROCARDIOGRAM COMPLETE: CPT | Performed by: FAMILY MEDICINE

## 2019-12-05 PROCEDURE — 81002 URINALYSIS NONAUTO W/O SCOPE: CPT | Performed by: FAMILY MEDICINE

## 2019-12-05 PROCEDURE — 87088 URINE BACTERIA CULTURE: CPT

## 2019-12-05 RX ORDER — ALPRAZOLAM 0.5 MG/1
0.5 TABLET ORAL 2 TIMES DAILY
Qty: 180 TABLET | Refills: 0 | Status: SHIPPED | OUTPATIENT
Start: 2019-12-05 | End: 2020-03-02 | Stop reason: SDUPTHER

## 2019-12-05 RX ORDER — CEPHALEXIN 500 MG/1
500 CAPSULE ORAL 3 TIMES DAILY
Qty: 21 CAPSULE | Refills: 1 | Status: SHIPPED
Start: 2019-12-05 | End: 2020-05-28

## 2019-12-05 RX ORDER — HYDROCODONE BITARTRATE AND ACETAMINOPHEN 10; 325 MG/1; MG/1
1 TABLET ORAL EVERY 6 HOURS PRN
Qty: 120 TABLET | Refills: 0 | Status: SHIPPED | OUTPATIENT
Start: 2019-12-05 | End: 2020-01-04

## 2019-12-05 RX ORDER — ESTRADIOL 0.1 MG/D
1 FILM, EXTENDED RELEASE TRANSDERMAL
Qty: 8 PATCH | Refills: 5 | Status: SHIPPED | OUTPATIENT
Start: 2019-12-05 | End: 2019-12-05 | Stop reason: SDUPTHER

## 2019-12-05 RX ORDER — CARISOPRODOL 350 MG/1
350 TABLET ORAL 4 TIMES DAILY PRN
Qty: 360 TABLET | Refills: 0 | Status: SHIPPED | OUTPATIENT
Start: 2019-12-05 | End: 2020-03-02 | Stop reason: SDUPTHER

## 2019-12-05 RX ORDER — METOPROLOL TARTRATE 50 MG/1
50 TABLET, FILM COATED ORAL 2 TIMES DAILY
Qty: 30 TABLET | Refills: 0 | Status: SHIPPED
Start: 2019-12-05 | End: 2020-08-25 | Stop reason: SDUPTHER

## 2019-12-05 RX ORDER — ZOLPIDEM TARTRATE 12.5 MG/1
12.5 TABLET, FILM COATED, EXTENDED RELEASE ORAL NIGHTLY PRN
Qty: 90 TABLET | Refills: 0 | Status: SHIPPED | OUTPATIENT
Start: 2019-12-05 | End: 2020-03-02 | Stop reason: SDUPTHER

## 2019-12-05 ASSESSMENT — ENCOUNTER SYMPTOMS
EYE PAIN: 0
BACK PAIN: 1
SINUS PAIN: 0
SHORTNESS OF BREATH: 0
SORE THROAT: 0
COUGH: 0
CONSTIPATION: 0

## 2019-12-06 RX ORDER — ESTRADIOL 0.1 MG/D
FILM, EXTENDED RELEASE TRANSDERMAL
Qty: 26 PATCH | Refills: 5 | Status: SHIPPED
Start: 2019-12-06 | End: 2020-08-25 | Stop reason: SDUPTHER

## 2019-12-08 LAB — URINE CULTURE, ROUTINE: NORMAL

## 2020-02-11 ENCOUNTER — OFFICE VISIT (OUTPATIENT)
Dept: FAMILY MEDICINE CLINIC | Age: 55
End: 2020-02-11

## 2020-02-11 VITALS
WEIGHT: 125 LBS | HEIGHT: 65 IN | TEMPERATURE: 98.1 F | SYSTOLIC BLOOD PRESSURE: 132 MMHG | OXYGEN SATURATION: 98 % | BODY MASS INDEX: 20.83 KG/M2 | HEART RATE: 73 BPM | DIASTOLIC BLOOD PRESSURE: 80 MMHG

## 2020-02-11 PROCEDURE — 99213 OFFICE O/P EST LOW 20 MIN: CPT | Performed by: FAMILY MEDICINE

## 2020-02-11 PROCEDURE — 96372 THER/PROPH/DIAG INJ SC/IM: CPT | Performed by: FAMILY MEDICINE

## 2020-02-11 RX ORDER — METHYLPREDNISOLONE ACETATE 40 MG/ML
80 INJECTION, SUSPENSION INTRA-ARTICULAR; INTRALESIONAL; INTRAMUSCULAR; SOFT TISSUE ONCE
Status: COMPLETED | OUTPATIENT
Start: 2020-02-11 | End: 2020-02-11

## 2020-02-11 RX ADMIN — METHYLPREDNISOLONE ACETATE 80 MG: 40 INJECTION, SUSPENSION INTRA-ARTICULAR; INTRALESIONAL; INTRAMUSCULAR; SOFT TISSUE at 12:08

## 2020-02-11 ASSESSMENT — ENCOUNTER SYMPTOMS
BACK PAIN: 1
ABDOMINAL PAIN: 0
SHORTNESS OF BREATH: 0
SORE THROAT: 0
SINUS PAIN: 0
DIARRHEA: 0
EYE PAIN: 0
CONSTIPATION: 0
COUGH: 0

## 2020-02-18 ENCOUNTER — OFFICE VISIT (OUTPATIENT)
Dept: FAMILY MEDICINE CLINIC | Age: 55
End: 2020-02-18

## 2020-02-18 VITALS
WEIGHT: 125 LBS | OXYGEN SATURATION: 98 % | BODY MASS INDEX: 20.83 KG/M2 | TEMPERATURE: 96.7 F | DIASTOLIC BLOOD PRESSURE: 82 MMHG | SYSTOLIC BLOOD PRESSURE: 120 MMHG | HEART RATE: 77 BPM | HEIGHT: 65 IN

## 2020-02-18 PROCEDURE — 96372 THER/PROPH/DIAG INJ SC/IM: CPT | Performed by: FAMILY MEDICINE

## 2020-02-18 PROCEDURE — 99213 OFFICE O/P EST LOW 20 MIN: CPT | Performed by: FAMILY MEDICINE

## 2020-02-18 RX ORDER — KETOROLAC TROMETHAMINE 30 MG/ML
60 INJECTION, SOLUTION INTRAMUSCULAR; INTRAVENOUS ONCE
Status: COMPLETED | OUTPATIENT
Start: 2020-02-18 | End: 2020-02-18

## 2020-02-18 RX ORDER — KETOROLAC TROMETHAMINE 30 MG/ML
60 INJECTION, SOLUTION INTRAMUSCULAR; INTRAVENOUS ONCE
Qty: 2 ML | Refills: 0
Start: 2020-02-18 | End: 2020-02-18 | Stop reason: CLARIF

## 2020-02-18 RX ORDER — HYDROCODONE BITARTRATE AND ACETAMINOPHEN 10; 325 MG/1; MG/1
1 TABLET ORAL EVERY 6 HOURS PRN
Qty: 120 TABLET | Refills: 0 | Status: SHIPPED | OUTPATIENT
Start: 2020-02-18 | End: 2020-03-19

## 2020-02-18 RX ORDER — HYDROCODONE BITARTRATE AND ACETAMINOPHEN 7.5; 325 MG/1; MG/1
1 TABLET ORAL EVERY 6 HOURS PRN
Qty: 120 TABLET | Refills: 0 | Status: SHIPPED | OUTPATIENT
Start: 2020-02-18 | End: 2020-03-19

## 2020-02-18 RX ADMIN — KETOROLAC TROMETHAMINE 60 MG: 30 INJECTION, SOLUTION INTRAMUSCULAR; INTRAVENOUS at 14:34

## 2020-02-18 ASSESSMENT — ENCOUNTER SYMPTOMS
SORE THROAT: 0
BACK PAIN: 1
EYE PAIN: 0
COUGH: 0
DIARRHEA: 0
ABDOMINAL PAIN: 0
SHORTNESS OF BREATH: 0
SINUS PAIN: 0
CONSTIPATION: 0

## 2020-02-18 NOTE — PROGRESS NOTES
20  Maria Elena Vora : 1965 Sex: female  Age: 48 y.o. Chief Complaint   Patient presents with    Hip Pain       HPI:  48 y.o. female here with persist back/hip pain. Depo/decadron only helped a little. Has been taking pain meds that help for about 4 hours. Wants xrays. Review of Systems   Constitutional: Negative for diaphoresis and fever. HENT: Negative for congestion, ear pain, sinus pain and sore throat. Eyes: Negative for pain and visual disturbance. Respiratory: Negative for cough and shortness of breath. Cardiovascular: Negative for chest pain and palpitations. Gastrointestinal: Negative for abdominal pain, constipation and diarrhea. Endocrine: Negative for polydipsia. Genitourinary: Negative for dysuria, frequency and vaginal discharge. Musculoskeletal: Positive for arthralgias, back pain, myalgias and neck pain. Skin: Negative for rash. Allergic/Immunologic: Negative for environmental allergies. Neurological: Negative for dizziness, seizures, syncope and headaches. Hematological: Negative for adenopathy. Psychiatric/Behavioral: Negative for confusion. The patient is not nervous/anxious. Current Outpatient Medications:     HYDROcodone-acetaminophen (NORCO) 7.5-325 MG per tablet, Take 1 tablet by mouth every 6 hours as needed for Pain for up to 30 days. Intended supply: 30 days, Disp: 120 tablet, Rfl: 0    HYDROcodone-acetaminophen (NORCO)  MG per tablet, Take 1 tablet by mouth every 6 hours as needed for Pain for up to 30 days. Intended supply: 30 days, Disp: 120 tablet, Rfl: 0    estradiol (VIVELLE) 0.1 MG/24HR, APPLY 1 PATCH EXTERNALLY TO THE SKIN 2 TIMES A WEEK, Disp: 26 patch, Rfl: 5    ALPRAZolam (XANAX) 0.5 MG tablet, Take 1 tablet by mouth 2 times daily for 90 days. , Disp: 180 tablet, Rfl: 0    sertraline (ZOLOFT) 50 MG tablet, Take 1 tablet by mouth daily, Disp: 90 tablet, Rfl: 0    zolpidem (AMBIEN CR) 12.5 MG extended release tablet, Take 1 tablet by mouth nightly as needed for Sleep for up to 90 days. , Disp: 90 tablet, Rfl: 0    carisoprodol (SOMA) 350 MG tablet, Take 1 tablet by mouth 4 times daily as needed for Muscle spasms for up to 90 days. , Disp: 360 tablet, Rfl: 0    metoprolol tartrate (LOPRESSOR) 50 MG tablet, Take 1 tablet by mouth 2 times daily, Disp: 30 tablet, Rfl: 0    cephALEXin (KEFLEX) 500 MG capsule, Take 1 capsule by mouth 3 times daily, Disp: 21 capsule, Rfl: 1    EPINEPHrine (EPIPEN 2-QUANG) 0.3 MG/0.3ML SOAJ injection, Inject 0.3 mg into the muscle as needed Use as directed for allergic reaction, Disp: , Rfl:     lidocaine (LIDODERM) 5 %, 1 patch by Other route every 12 hours Apply patch 12 hours on and 12 hours off, Disp: , Rfl:     lamoTRIgine (LAMICTAL) 100 MG tablet, TAKE 1 TABLET BY MOUTH TWICE DAILY, Disp: 180 tablet, Rfl: 5    QUEtiapine (SEROQUEL) 100 MG tablet, TAKE 1 TABLET BY MOUTH TWICE DAILY, Disp: 180 tablet, Rfl: 5    topiramate (TOPAMAX) 25 MG tablet, TAKE 1 TABLET BY MOUTH TWICE DAILY, Disp: 180 tablet, Rfl: 5  Allergies   Allergen Reactions    Fish-Derived Products Anaphylaxis and Swelling     Swordfish allergy    Bee Venom      Wasps    Fish Allergy     Hornet Venom        Past Medical History:   Diagnosis Date    Anxiety     Bipolar disorder (HCC)     Bipolar 1 disorder    Cervical disc disease     Cervical spondylitis with radiculitis (HCC)     C2, C7    Facet hypertrophy     L4/5 - S1    Headache     Migraine    Headache     Nonintractable eadav=toan, Unspecified chronicity pattern, unspecified type    Lumbar disc disease     Osteoarthritis     DJD/DDD C-Spine S/P Fusion CHEERLEADER DROPPED ON HEAD/NECK    Panic disorder without agoraphobia     Counselor Destiny Marroquin. [facility])      Past Surgical History:   Procedure Laterality Date    BREAST REDUCTION SURGERY Bilateral     Implants Bilaterally    BREAST SURGERY      CERVICAL FUSION hours as needed for Pain for up to 30 days. Intended supply: 30 days    Other orders  -     Discontinue: ketorolac (TORADOL) 60 MG/2ML injection; Inject 2 mLs into the muscle once for 1 dose  -     ketorolac (TORADOL) injection 60 mg    Reviewed Pmhx, meds, xrays, Oarrs. Toradol given. Steroids only minimal help. Norco 10 written. Recheck one week. Return in about 1 week (around 2/25/2020).      Seen By:  Fabio Pederson DO

## 2020-03-02 ENCOUNTER — OFFICE VISIT (OUTPATIENT)
Dept: FAMILY MEDICINE CLINIC | Age: 55
End: 2020-03-02

## 2020-03-02 VITALS
WEIGHT: 126 LBS | OXYGEN SATURATION: 97 % | DIASTOLIC BLOOD PRESSURE: 80 MMHG | BODY MASS INDEX: 20.97 KG/M2 | TEMPERATURE: 98.7 F | HEART RATE: 78 BPM | SYSTOLIC BLOOD PRESSURE: 130 MMHG

## 2020-03-02 PROCEDURE — 99213 OFFICE O/P EST LOW 20 MIN: CPT | Performed by: FAMILY MEDICINE

## 2020-03-02 PROCEDURE — 96372 THER/PROPH/DIAG INJ SC/IM: CPT | Performed by: FAMILY MEDICINE

## 2020-03-02 RX ORDER — LAMOTRIGINE 100 MG/1
TABLET ORAL
Qty: 180 TABLET | Refills: 5 | Status: SHIPPED
Start: 2020-03-02 | End: 2021-04-01 | Stop reason: SDUPTHER

## 2020-03-02 RX ORDER — ZOLPIDEM TARTRATE 12.5 MG/1
12.5 TABLET, FILM COATED, EXTENDED RELEASE ORAL NIGHTLY PRN
Qty: 90 TABLET | Refills: 0 | Status: SHIPPED
Start: 2020-03-02 | End: 2020-05-28 | Stop reason: SDUPTHER

## 2020-03-02 RX ORDER — CARISOPRODOL 350 MG/1
350 TABLET ORAL 4 TIMES DAILY PRN
Qty: 360 TABLET | Refills: 0 | Status: SHIPPED
Start: 2020-03-02 | End: 2020-05-28 | Stop reason: SDUPTHER

## 2020-03-02 RX ORDER — ALPRAZOLAM 0.5 MG/1
0.5 TABLET ORAL 2 TIMES DAILY
Qty: 180 TABLET | Refills: 0 | Status: SHIPPED
Start: 2020-03-02 | End: 2020-03-02 | Stop reason: SDUPTHER

## 2020-03-02 RX ORDER — QUETIAPINE FUMARATE 100 MG/1
TABLET, FILM COATED ORAL
Qty: 180 TABLET | Refills: 5 | Status: SHIPPED
Start: 2020-03-02 | End: 2021-03-15 | Stop reason: SDUPTHER

## 2020-03-02 RX ORDER — ALPRAZOLAM 0.5 MG/1
0.5 TABLET ORAL 3 TIMES DAILY PRN
Qty: 270 TABLET | Refills: 0 | Status: SHIPPED
Start: 2020-03-02 | End: 2020-05-28 | Stop reason: SDUPTHER

## 2020-03-02 RX ORDER — METHYLPREDNISOLONE ACETATE 40 MG/ML
80 INJECTION, SUSPENSION INTRA-ARTICULAR; INTRALESIONAL; INTRAMUSCULAR; SOFT TISSUE ONCE
Status: COMPLETED | OUTPATIENT
Start: 2020-03-02 | End: 2020-03-02

## 2020-03-02 RX ADMIN — METHYLPREDNISOLONE ACETATE 80 MG: 40 INJECTION, SUSPENSION INTRA-ARTICULAR; INTRALESIONAL; INTRAMUSCULAR; SOFT TISSUE at 11:17

## 2020-03-02 ASSESSMENT — ENCOUNTER SYMPTOMS
SHORTNESS OF BREATH: 0
EYE PAIN: 0
SORE THROAT: 0
BACK PAIN: 1
SINUS PAIN: 0
COUGH: 0
ABDOMINAL PAIN: 0
DIARRHEA: 0
CONSTIPATION: 0

## 2020-03-02 ASSESSMENT — PATIENT HEALTH QUESTIONNAIRE - PHQ9: DEPRESSION UNABLE TO ASSESS: PT REFUSES

## 2020-03-02 NOTE — PROGRESS NOTES
3/2/20  [place] [facility] 1965 Sex: female  Age: 48 y.o. Chief Complaint   Patient presents with    Medication Refill     question about xanax wants increased    Back Pain       HPI:  48 y.o. female here with persist back/hip pain. Review of Systems   Constitutional: Negative for diaphoresis and fever. HENT: Negative for congestion, ear pain, sinus pain and sore throat. Eyes: Negative for pain and visual disturbance. Respiratory: Negative for cough and shortness of breath. Cardiovascular: Negative for chest pain and palpitations. Gastrointestinal: Negative for abdominal pain, constipation and diarrhea. Endocrine: Negative for polydipsia. Genitourinary: Negative for dysuria, frequency and vaginal discharge. Musculoskeletal: Positive for arthralgias, back pain, myalgias and neck pain. Skin: Negative for rash. Allergic/Immunologic: Negative for environmental allergies. Neurological: Negative for dizziness, seizures, syncope and headaches. Hematological: Negative for adenopathy. Psychiatric/Behavioral: Negative for confusion. The patient is not nervous/anxious. Current Outpatient Medications:     lamoTRIgine (LAMICTAL) 100 MG tablet, TAKE 1 TABLET BY MOUTH TWICE DAILY, Disp: 180 tablet, Rfl: 5    carisoprodol (SOMA) 350 MG tablet, Take 1 tablet by mouth 4 times daily as needed for Muscle spasms for up to 90 days. , Disp: 360 tablet, Rfl: 0    QUEtiapine (SEROQUEL) 100 MG tablet, TAKE 1 TABLET BY MOUTH TWICE DAILY, Disp: 180 tablet, Rfl: 5    sertraline (ZOLOFT) 50 MG tablet, Take 1 tablet by mouth daily, Disp: 90 tablet, Rfl: 0    zolpidem (AMBIEN CR) 12.5 MG extended release tablet, Take 1 tablet by mouth nightly as needed for Sleep for up to 90 days. , Disp: 90 tablet, Rfl: 0    ALPRAZolam (XANAX) 0.5 MG tablet, Take 1 tablet by mouth 3 times daily as needed for Sleep for up to 90 days. , Disp: 270 tablet, Rfl: 0    HYDROcodone-acetaminophen (NORCO) 7.5-325 MG per tablet, Take 1 tablet by mouth every 6 hours as needed for Pain for up to 30 days. Intended supply: 30 days, Disp: 120 tablet, Rfl: 0    HYDROcodone-acetaminophen (NORCO)  MG per tablet, Take 1 tablet by mouth every 6 hours as needed for Pain for up to 30 days.  Intended supply: 30 days, Disp: 120 tablet, Rfl: 0    estradiol (VIVELLE) 0.1 MG/24HR, APPLY 1 PATCH EXTERNALLY TO THE SKIN 2 TIMES A WEEK, Disp: 26 patch, Rfl: 5    EPINEPHrine (EPIPEN 2-QUANG) 0.3 MG/0.3ML SOAJ injection, Inject 0.3 mg into the muscle as needed Use as directed for allergic reaction, Disp: , Rfl:     topiramate (TOPAMAX) 25 MG tablet, TAKE 1 TABLET BY MOUTH TWICE DAILY, Disp: 180 tablet, Rfl: 5    metoprolol tartrate (LOPRESSOR) 50 MG tablet, Take 1 tablet by mouth 2 times daily (Patient not taking: Reported on 3/2/2020), Disp: 30 tablet, Rfl: 0    cephALEXin (KEFLEX) 500 MG capsule, Take 1 capsule by mouth 3 times daily (Patient not taking: Reported on 3/2/2020), Disp: 21 capsule, Rfl: 1    lidocaine (LIDODERM) 5 %, 1 patch by Other route every 12 hours Apply patch 12 hours on and 12 hours off, Disp: , Rfl:   Allergies   Allergen Reactions    Fish-Derived Products Anaphylaxis and Swelling     Swordfish allergy    Bee Venom      Wasps    Fish Allergy     Hornet Venom        Past Medical History:   Diagnosis Date    Anxiety     Bipolar disorder ([facility] [address]     Bipolar 1 disorder    Cervical disc disease     Cervical spondylitis with radiculitis (HCC)     C2, C7    Facet hypertrophy     L4/5 - S1    Headache     Migraine    Headache     Nonintractable eadav=toan, Unspecified chronicity pattern, unspecified type    Lumbar disc disease     Osteoarthritis     DJD/DDD C-Spine S/P Fusion CHEERLEADER DROPPED ON HEAD/NECK    Panic disorder without agoraphobia     Counselor Destiny Marroquin. [facility])      Past Surgical History:   Procedure Laterality Date    BREAST REDUCTION SURGERY Bilateral to 90 days. Insomnia, unspecified type  -     zolpidem (AMBIEN CR) 12.5 MG extended release tablet; Take 1 tablet by mouth nightly as needed for Sleep for up to 90 days. Spinal stenosis of lumbar region with neurogenic claudication    Other orders  -     lamoTRIgine (LAMICTAL) 100 MG tablet; TAKE 1 TABLET BY MOUTH TWICE DAILY  -     QUEtiapine (SEROQUEL) 100 MG tablet; TAKE 1 TABLET BY MOUTH TWICE DAILY  -     sertraline (ZOLOFT) 50 MG tablet; Take 1 tablet by mouth daily  -     methylPREDNISolone acetate (DEPO-MEDROL) injection 80 mg    Reviewed Pmhx, meds, xrays, Oarrs. Depo given. Will see if this help more than toradol. Return if symptoms worsen or fail to improve.      Seen By:  Marcelo Kang, DO

## 2020-03-23 RX ORDER — HYDROCODONE BITARTRATE AND ACETAMINOPHEN 7.5; 325 MG/1; MG/1
1 TABLET ORAL EVERY 6 HOURS PRN
Qty: 60 TABLET | Refills: 0 | Status: SHIPPED
Start: 2020-03-23 | End: 2020-03-24 | Stop reason: SDUPTHER

## 2020-03-23 NOTE — TELEPHONE ENCOUNTER
Last Appointment:  3/2/2020  No future appointments. Patient calling in requesting refill on pended medication, please advise.

## 2020-03-24 RX ORDER — HYDROCODONE BITARTRATE AND ACETAMINOPHEN 7.5; 325 MG/1; MG/1
1 TABLET ORAL EVERY 6 HOURS PRN
Qty: 60 TABLET | Refills: 0 | Status: SHIPPED
Start: 2020-03-24 | End: 2020-05-28 | Stop reason: SDUPTHER

## 2020-03-24 NOTE — TELEPHONE ENCOUNTER
Last Appointment:  3/2/2020  No future appointments. Patient called back the script needs to go to [facility] in [address] [place] [address] as to the [facility] one is shut down, she stated the [facility] in [facility] could not see it I did call them myself and pharmacist stated they did not receive this please advise.

## 2020-03-27 RX ORDER — HYDROCODONE BITARTRATE AND ACETAMINOPHEN 7.5; 325 MG/1; MG/1
1 TABLET ORAL EVERY 6 HOURS PRN
Qty: 60 TABLET | Refills: 0 | OUTPATIENT
Start: 2020-03-27 | End: 2020-04-26

## 2020-05-26 ENCOUNTER — TELEPHONE (OUTPATIENT)
Dept: ADMINISTRATIVE | Age: 55
End: 2020-05-26

## 2020-05-26 RX ORDER — CARISOPRODOL 350 MG/1
350 TABLET ORAL 4 TIMES DAILY PRN
Qty: 360 TABLET | Refills: 0 | Status: CANCELLED | OUTPATIENT
Start: 2020-05-26 | End: 2020-08-24

## 2020-05-26 RX ORDER — ALPRAZOLAM 0.5 MG/1
0.5 TABLET ORAL 3 TIMES DAILY PRN
Qty: 270 TABLET | Refills: 0 | Status: CANCELLED | OUTPATIENT
Start: 2020-05-26 | End: 2020-08-24

## 2020-05-26 RX ORDER — HYDROCODONE BITARTRATE AND ACETAMINOPHEN 7.5; 325 MG/1; MG/1
1 TABLET ORAL EVERY 6 HOURS PRN
Qty: 60 TABLET | Refills: 0 | Status: CANCELLED | OUTPATIENT
Start: 2020-05-26 | End: 2020-06-25

## 2020-05-26 RX ORDER — ZOLPIDEM TARTRATE 12.5 MG/1
12.5 TABLET, FILM COATED, EXTENDED RELEASE ORAL NIGHTLY PRN
Qty: 90 TABLET | Refills: 0 | Status: CANCELLED | OUTPATIENT
Start: 2020-05-26 | End: 2020-08-24

## 2020-05-28 ENCOUNTER — VIRTUAL VISIT (OUTPATIENT)
Dept: PRIMARY CARE CLINIC | Age: 55
End: 2020-05-28

## 2020-05-28 PROCEDURE — 99214 OFFICE O/P EST MOD 30 MIN: CPT | Performed by: FAMILY MEDICINE

## 2020-05-28 RX ORDER — CARISOPRODOL 350 MG/1
350 TABLET ORAL 4 TIMES DAILY PRN
Qty: 360 TABLET | Refills: 0 | Status: SHIPPED
Start: 2020-05-28 | End: 2020-08-25 | Stop reason: SDUPTHER

## 2020-05-28 RX ORDER — HYDROCODONE BITARTRATE AND ACETAMINOPHEN 7.5; 325 MG/1; MG/1
1 TABLET ORAL 2 TIMES DAILY
Qty: 60 TABLET | Refills: 0 | Status: SHIPPED | OUTPATIENT
Start: 2020-06-28 | End: 2020-07-28

## 2020-05-28 RX ORDER — HYDROCODONE BITARTRATE AND ACETAMINOPHEN 7.5; 325 MG/1; MG/1
1 TABLET ORAL 2 TIMES DAILY
Qty: 60 TABLET | Refills: 0 | Status: SHIPPED
Start: 2020-07-28 | End: 2020-08-25 | Stop reason: SDUPTHER

## 2020-05-28 RX ORDER — HYDROCODONE BITARTRATE AND ACETAMINOPHEN 7.5; 325 MG/1; MG/1
1 TABLET ORAL EVERY 12 HOURS PRN
Qty: 60 TABLET | Refills: 0 | Status: SHIPPED | OUTPATIENT
Start: 2020-05-28 | End: 2020-06-27

## 2020-05-28 RX ORDER — ALPRAZOLAM 0.5 MG/1
0.5 TABLET ORAL 3 TIMES DAILY PRN
Qty: 270 TABLET | Refills: 0 | Status: SHIPPED
Start: 2020-05-28 | End: 2020-08-25 | Stop reason: SDUPTHER

## 2020-05-28 RX ORDER — ZOLPIDEM TARTRATE 12.5 MG/1
12.5 TABLET, FILM COATED, EXTENDED RELEASE ORAL NIGHTLY PRN
Qty: 90 TABLET | Refills: 0 | Status: SHIPPED
Start: 2020-05-28 | End: 2020-08-25 | Stop reason: SDUPTHER

## 2020-05-28 ASSESSMENT — PATIENT HEALTH QUESTIONNAIRE - PHQ9
2. FEELING DOWN, DEPRESSED OR HOPELESS: 0
SUM OF ALL RESPONSES TO PHQ QUESTIONS 1-9: 1
SUM OF ALL RESPONSES TO PHQ9 QUESTIONS 1 & 2: 1
SUM OF ALL RESPONSES TO PHQ QUESTIONS 1-9: 1
1. LITTLE INTEREST OR PLEASURE IN DOING THINGS: 1

## 2020-06-02 ASSESSMENT — ENCOUNTER SYMPTOMS
NAUSEA: 0
CONSTIPATION: 0
SHORTNESS OF BREATH: 0
VOMITING: 0
ABDOMINAL PAIN: 0
WHEEZING: 0
COUGH: 0
DIARRHEA: 0
BACK PAIN: 1

## 2020-06-03 NOTE — PROGRESS NOTES
20  Karely Torrez : 1965 Sex: female  Age: 48 y.o. Chief Complaint   Patient presents with    Medication Refill     HPI:  48 y.o. female patient of Dr. Villalpando Persons presents today for 3 month(s) follow up for chronic medication refills. Patient's chart, medical, surgical and medication history all reviewed. TeleMedicine Patient Consent    This visit was performed as a virtual video visit using a synchronous, two-way, audio-video telehealth technology platform. Patient identification was verified at the start of the visit, including the patient's telephone number and physical location. I discussed with the patient the nature of our telehealth visits, that:     1. Due to the nature of an audio- video modality, the only components of a physical exam that could be done are the elements supported by direct observation. 2. I would evaluate the patient and recommend diagnostics and treatments based on my assessment. 3. If it was felt that the patient should be evaluated in clinic or an emergency room setting, then they would be directed there. 4. Our sessions are not being recorded and that personal health information is protected. 5. Our team would provide follow up care in person if/when the patient needs it. Patient does agree to proceed with telemedicine consultation. Patient's location: home address in [facility]. Physician location: other address in [facility]. Other people involved in call:  Sebastian Dennison, [address] [place] [address]. Time spent: Greater than Not billed by time    This visit was completed virtually using Doxy. me    Chronic pain  Patient has long standing history of spinal stenosis. She takes Norco and Soma appropriately. Current dosing is effective for patient. Anxiety  Patient complains of evaluation of anxiety disorder and post traumatic stress  disorder. She has the following anxiety symptoms: difficulty concentrating, feelings of losing control, irritable, racing thoughts.  Onset of 12.2 09/03/2019    SEGSPCT 53 09/24/2011    LYMPHOPCT 19.4 09/03/2019    MONOPCT 7.4 09/03/2019    BASOPCT 0.8 09/03/2019    MONOSABS 0.49 09/03/2019    LYMPHSABS 1.28 09/03/2019    EOSABS 0.06 09/03/2019    BASOSABS 0.05 09/03/2019     CMP:    Lab Results   Component Value Date     09/03/2019    K 4.3 09/03/2019     09/03/2019    CO2 21 09/03/2019    BUN 8 09/03/2019    CREATININE 1.1 09/03/2019    GFRAA >60 09/03/2019    LABGLOM 52 09/03/2019    GLUCOSE 102 09/03/2019    GLUCOSE 88 09/24/2011    PROT 8.8 09/03/2019    LABALBU 4.6 09/03/2019    LABALBU 4.1 09/23/2011    CALCIUM 9.8 09/03/2019    BILITOT 0.3 09/03/2019    ALKPHOS 79 09/03/2019    AST 25 09/03/2019    ALT 18 09/03/2019     HgBA1c:  No results found for: LABA1C  FLP:  No results found for: TRIG, HDL, LDLCALC, LDLDIRECT, LABVLDL  TSH:  No results found for: TSH       Assessment and Plan:  Brittney Malave was seen today for medication refill. Diagnoses and all orders for this visit:    Spinal stenosis of lumbar region with neurogenic claudication  -     carisoprodol (SOMA) 350 MG tablet; Take 1 tablet by mouth 4 times daily as needed for Muscle spasms for up to 90 days.  -     HYDROcodone-acetaminophen (NORCO) 7.5-325 MG per tablet; Take 1 tablet by mouth every 12 hours as needed for Pain for up to 30 days. Intended supply: 30 days  -     HYDROcodone-acetaminophen (NORCO) 7.5-325 MG per tablet; Take 1 tablet by mouth 2 times daily for 30 days. Intended supply: 30 days  -     HYDROcodone-acetaminophen (NORCO) 7.5-325 MG per tablet; Take 1 tablet by mouth 2 times daily for 30 days. Intended supply: 30 days    Cervical disc disease  -     HYDROcodone-acetaminophen (NORCO) 7.5-325 MG per tablet; Take 1 tablet by mouth every 12 hours as needed for Pain for up to 30 days. Intended supply: 30 days  -     HYDROcodone-acetaminophen (NORCO) 7.5-325 MG per tablet; Take 1 tablet by mouth 2 times daily for 30 days.  Intended supply: 30 days  -

## 2020-08-11 ENCOUNTER — TELEPHONE (OUTPATIENT)
Dept: ADMINISTRATIVE | Age: 55
End: 2020-08-11

## 2020-08-13 ENCOUNTER — OFFICE VISIT (OUTPATIENT)
Dept: FAMILY MEDICINE CLINIC | Age: 55
End: 2020-08-13

## 2020-08-13 VITALS
WEIGHT: 125 LBS | RESPIRATION RATE: 16 BRPM | SYSTOLIC BLOOD PRESSURE: 124 MMHG | BODY MASS INDEX: 20.8 KG/M2 | TEMPERATURE: 97.5 F | HEART RATE: 84 BPM | DIASTOLIC BLOOD PRESSURE: 70 MMHG | OXYGEN SATURATION: 99 %

## 2020-08-13 LAB
BILIRUBIN, POC: NORMAL
BLOOD URINE, POC: NORMAL
CLARITY, POC: CLEAR
COLOR, POC: YELLOW
GLUCOSE URINE, POC: NORMAL
KETONES, POC: NORMAL
LEUKOCYTE EST, POC: NORMAL
NITRITE, POC: NORMAL
PH, POC: 6
PROTEIN, POC: NORMAL
SPECIFIC GRAVITY, POC: <1.005
UROBILINOGEN, POC: 0.2

## 2020-08-13 PROCEDURE — 99212 OFFICE O/P EST SF 10 MIN: CPT | Performed by: INTERNAL MEDICINE

## 2020-08-13 PROCEDURE — 81002 URINALYSIS NONAUTO W/O SCOPE: CPT | Performed by: INTERNAL MEDICINE

## 2020-08-13 RX ORDER — SULFAMETHOXAZOLE AND TRIMETHOPRIM 800; 160 MG/1; MG/1
1 TABLET ORAL 2 TIMES DAILY
Qty: 14 TABLET | Refills: 0 | Status: SHIPPED | OUTPATIENT
Start: 2020-08-13 | End: 2020-08-20

## 2020-08-13 RX ORDER — FLUCONAZOLE 100 MG/1
100 TABLET ORAL EVERY OTHER DAY
Qty: 6 TABLET | Refills: 0 | Status: SHIPPED | OUTPATIENT
Start: 2020-08-13 | End: 2020-08-24

## 2020-08-13 NOTE — PROGRESS NOTES
2020   Express care    Viktor Partida (:  1965) is a 50 y.o. female, presents to express care with complaints of burning on urination that occurs prior during and after urination. It started with back pain 4 days ago. Last 2 days she is also had chills. Burning on urination started 2 days ago. Reviewed her medications with her today. She also has questions about her insomnia. I told her this would need to be addressed by her primary physician and this involves  Her controlled substances which will not be addressed through express care. Chief Complaint   Patient presents with    Urinary Frequency    Dysuria         Review of Systems    Prior to Visit Medications    Medication Sig Taking? Authorizing Provider   sertraline (ZOLOFT) 50 MG tablet Take 1 tablet by mouth daily Yes Ana Ferris DO   ALPRAZolam (XANAX) 0.5 MG tablet Take 1 tablet by mouth 3 times daily as needed for Sleep for up to 90 days. Yes Ana Ferris DO   zolpidem (AMBIEN CR) 12.5 MG extended release tablet Take 1 tablet by mouth nightly as needed for Sleep for up to 90 days. Yes Ana Ferris DO   lamoTRIgine (LAMICTAL) 100 MG tablet TAKE 1 TABLET BY MOUTH TWICE DAILY Yes Kian Ferris DO   QUEtiapine (SEROQUEL) 100 MG tablet TAKE 1 TABLET BY MOUTH TWICE DAILY Yes Kian Ferris DO   estradiol (VIVELLE) 0.1 MG/24HR APPLY 1 PATCH EXTERNALLY TO THE SKIN 2 TIMES A WEEK Yes Kian Ferris DO   topiramate (TOPAMAX) 25 MG tablet TAKE 1 TABLET BY MOUTH TWICE DAILY Yes Rachna Ferris DO   carisoprodol (SOMA) 350 MG tablet Take 1 tablet by mouth 4 times daily as needed for Muscle spasms for up to 90 days. Patient not taking: Reported on 2020  Michael Will, DO   HYDROcodone-acetaminophen (NORCO) 7.5-325 MG per tablet Take 1 tablet by mouth 2 times daily for 30 days.  Intended supply: 30 days  Patient not taking: Reported on 2020  Ana Ferris DO   metoprolol tartrate (LOPRESSOR) 50 MG tablet Take 1 tablet by mouth 2 times daily  Patient not taking: Reported on 2020  Alex Swift DO   EPINEPHrine (EPIPEN 2-QUANG) 0.3 MG/0.3ML SOAJ injection Inject 0.3 mg into the muscle as needed Use as directed for allergic reaction  Historical Provider, MD      Allergies   Allergen Reactions    Fish-Derived Products Anaphylaxis and Swelling     Swordfish allergy    Bee Venom      Wasps    Hornet Venom        Past Medical History:   Diagnosis Date    Anxiety     Bipolar disorder ([address]     Bipolar 1 disorder    Cervical disc disease     Cervical spondylitis with radiculitis (HCC)     C2, C7    Facet hypertrophy     L4/5 - S1    Headache     Migraine    Headache     Nonintractable eadav=toan, Unspecified chronicity pattern, unspecified type    Lumbar disc disease     Osteoarthritis     DJD/DDD C-Spine S/P Fusion CHEERLEADER DROPPED ON HEAD/NECK    Panic disorder without agoraphobia     Counselor Destiny Marroquin. [place]    [facility])      Past Surgical History:   Procedure Laterality Date    BREAST REDUCTION SURGERY Bilateral     Implants Bilaterally    BREAST SURGERY      CERVICAL FUSION      c2-c7 Bone Kevin Grafting 2-6    HYSTERECTOMY      LAPAROSCOPY      X4    LAPAROTOMY      X5 Ovarian Cysts, ZACARIAS-BSO (Non Cancerous)    OTHER SURGICAL HISTORY      Cervical Epidurals    OTHER SURGICAL HISTORY      Lumbar Epidural - [address], 17, 17      Social History     Tobacco Use    Smoking status: Former Smoker     Packs/day: 0.50     Years: 1.00     Pack years: 0.50     Last attempt to quit:      Years since quittin.6    Smokeless tobacco: Never Used   Substance Use Topics    Alcohol use: No     Comment: Consumes rarely        Vitals:    20 0932   BP: 124/70   Pulse: 84   Resp: 16   Temp: 97.5 °F (36.4 °C)   SpO2: 99%   Weight: 125 lb (56.7 kg)     Estimated body mass index is 20.8 kg/m² as calculated from the following:    Height as of 20: 5' 5\" (1.651 m).    Weight as of this encounter: 125 lb (56.7 kg). Physical Exam   Abdomen is soft with good bowel sounds and no tenderness. Tenderness is actually noted just within the pelvis above the pelvic brim. Vinny's is bilaterally negative although she has some lumbosacral discomfort on palpation. She denies any rash of the skin and I am unable to evaluate this today because of her clothing. If she develops a rash she is to give me a call. UA: Positive for trace lysed blood but negative nitrates and negative leukocytes. ASSESSMENT/PLAN:  Chapis Adler was seen today for urinary frequency and dysuria. Diagnoses and all orders for this visit:    Dysuria  -     POCT Urinalysis no Micro    Frequency of urination  -     POCT Urinalysis no Micro         Due to cost she would appreciate not having a culture performed. Due to her symptoms were going to go ahead and treat her with Bactrim DS  1 p.o. twice daily for 7 days. This is cost effective and she has been warned to stay out of the sun as she can have a photosensitivity reaction with a sulfa product and UV light. Drink plenty of water. She understands of her symptoms do not improve then she will require further work-up and of course more expense. An electronic signature was used to authenticate this note.     --Vicente Roy DO on 8/13/2020 at 9:48 AM

## 2020-08-25 ENCOUNTER — OFFICE VISIT (OUTPATIENT)
Dept: PRIMARY CARE CLINIC | Age: 55
End: 2020-08-25

## 2020-08-25 VITALS
HEART RATE: 73 BPM | OXYGEN SATURATION: 99 % | BODY MASS INDEX: 20.63 KG/M2 | TEMPERATURE: 97.4 F | WEIGHT: 124 LBS | SYSTOLIC BLOOD PRESSURE: 122 MMHG | DIASTOLIC BLOOD PRESSURE: 58 MMHG

## 2020-08-25 PROCEDURE — 99214 OFFICE O/P EST MOD 30 MIN: CPT | Performed by: FAMILY MEDICINE

## 2020-08-25 RX ORDER — ALPRAZOLAM 0.5 MG/1
0.5 TABLET ORAL 3 TIMES DAILY PRN
Qty: 270 TABLET | Refills: 0 | Status: SHIPPED
Start: 2020-08-25 | End: 2020-11-24 | Stop reason: SDUPTHER

## 2020-08-25 RX ORDER — ESTRADIOL 0.1 MG/D
1 FILM, EXTENDED RELEASE TRANSDERMAL
Qty: 24 PATCH | Refills: 3 | Status: SHIPPED
Start: 2020-08-27 | End: 2020-12-09

## 2020-08-25 RX ORDER — CARISOPRODOL 350 MG/1
350 TABLET ORAL 4 TIMES DAILY PRN
Qty: 360 TABLET | Refills: 0 | Status: SHIPPED
Start: 2020-08-25 | End: 2020-11-24 | Stop reason: SDUPTHER

## 2020-08-25 RX ORDER — TOPIRAMATE 25 MG/1
25 TABLET ORAL 2 TIMES DAILY
Qty: 180 TABLET | Refills: 3 | Status: SHIPPED
Start: 2020-08-25 | End: 2020-12-09

## 2020-08-25 RX ORDER — METOPROLOL TARTRATE 50 MG/1
50 TABLET, FILM COATED ORAL 2 TIMES DAILY
Qty: 30 TABLET | Refills: 0 | Status: SHIPPED
Start: 2020-08-25 | End: 2021-02-25

## 2020-08-25 RX ORDER — EPINEPHRINE 0.3 MG/.3ML
0.3 INJECTION SUBCUTANEOUS ONCE
Qty: 0.3 ML | Refills: 5 | Status: SHIPPED
Start: 2020-08-25 | End: 2020-12-09

## 2020-08-25 RX ORDER — LAMOTRIGINE 100 MG/1
TABLET ORAL
Qty: 180 TABLET | Refills: 5 | Status: CANCELLED | OUTPATIENT
Start: 2020-08-25

## 2020-08-25 RX ORDER — ZOLPIDEM TARTRATE 12.5 MG/1
12.5 TABLET, FILM COATED, EXTENDED RELEASE ORAL NIGHTLY PRN
Qty: 90 TABLET | Refills: 0 | Status: SHIPPED
Start: 2020-08-25 | End: 2020-11-24 | Stop reason: SDUPTHER

## 2020-08-25 RX ORDER — QUETIAPINE FUMARATE 100 MG/1
TABLET, FILM COATED ORAL
Qty: 180 TABLET | Refills: 5 | Status: CANCELLED | OUTPATIENT
Start: 2020-08-25

## 2020-08-25 RX ORDER — HYDROCODONE BITARTRATE AND ACETAMINOPHEN 7.5; 325 MG/1; MG/1
1 TABLET ORAL 2 TIMES DAILY
Qty: 60 TABLET | Refills: 0 | Status: SHIPPED
Start: 2020-08-25 | End: 2020-11-24 | Stop reason: SDUPTHER

## 2020-08-25 ASSESSMENT — ENCOUNTER SYMPTOMS
CONSTIPATION: 0
WHEEZING: 0
DIARRHEA: 0
NAUSEA: 0
ABDOMINAL PAIN: 0
VOMITING: 0
BACK PAIN: 1
COUGH: 0
SHORTNESS OF BREATH: 0

## 2020-08-25 NOTE — PROGRESS NOTES
No    Sexual activity: Not on file   Lifestyle    Physical activity     Days per week: Not on file     Minutes per session: Not on file    Stress: Not on file   Relationships    Social connections     Talks on phone: Not on file     Gets together: Not on file     Attends Religion service: Not on file     Active member of club or organization: Not on file     Attends meetings of clubs or organizations: Not on file     Relationship status: Not on file    Intimate partner violence     Fear of current or ex partner: Not on file     Emotionally abused: Not on file     Physically abused: Not on file     Forced sexual activity: Not on file   Other Topics Concern    Not on file   Social History Narrative    Not on file       Vitals:    08/25/20 1018   BP: (!) 122/58   Pulse: 73   Temp: 97.4 °F (36.3 °C)   SpO2: 99%   Weight: 124 lb (56.2 kg)       Physical Exam:  Physical Exam  Vitals signs and nursing note reviewed. Constitutional:       General: She is not in acute distress. Appearance: Normal appearance. She is well-developed and normal weight. She is not ill-appearing. HENT:      Head: Normocephalic and atraumatic. Right Ear: Hearing and external ear normal.      Left Ear: Hearing and external ear normal.      Nose:      Comments: Wearing mask  Eyes:      General: Lids are normal. No scleral icterus. Extraocular Movements: Extraocular movements intact. Conjunctiva/sclera: Conjunctivae normal.   Neck:      Musculoskeletal: Normal range of motion and neck supple. Thyroid: No thyromegaly. Cardiovascular:      Rate and Rhythm: Normal rate and regular rhythm. Heart sounds: Normal heart sounds. No murmur. Pulmonary:      Effort: Pulmonary effort is normal. No respiratory distress. Breath sounds: Normal breath sounds. No wheezing. Musculoskeletal: Normal range of motion. General: No tenderness or deformity. Right lower leg: No edema. Left lower leg: No edema. Lymphadenopathy:      Cervical: No cervical adenopathy. Skin:     General: Skin is warm and dry. Findings: No rash. Neurological:      General: No focal deficit present. Mental Status: She is alert and oriented to person, place, and time. Gait: Gait normal.   Psychiatric:         Mood and Affect: Mood and affect normal.         Speech: Speech normal.         Behavior: Behavior normal.         Thought Content: Thought content normal.         Labs:  CBC with Differential:    Lab Results   Component Value Date    WBC 6.6 09/03/2019    RBC 4.35 09/03/2019    HGB 14.2 09/03/2019    HCT 47.1 09/03/2019     09/03/2019    .3 09/03/2019    MCH 32.6 09/03/2019    MCHC 30.1 09/03/2019    RDW 12.2 09/03/2019    SEGSPCT 53 09/24/2011    LYMPHOPCT 19.4 09/03/2019    MONOPCT 7.4 09/03/2019    BASOPCT 0.8 09/03/2019    MONOSABS 0.49 09/03/2019    LYMPHSABS 1.28 09/03/2019    EOSABS 0.06 09/03/2019    BASOSABS 0.05 09/03/2019     CMP:    Lab Results   Component Value Date     09/03/2019    K 4.3 09/03/2019     09/03/2019    CO2 21 09/03/2019    BUN 8 09/03/2019    CREATININE 1.1 09/03/2019    GFRAA >60 09/03/2019    LABGLOM 52 09/03/2019    GLUCOSE 102 09/03/2019    GLUCOSE 88 09/24/2011    PROT 8.8 09/03/2019    LABALBU 4.6 09/03/2019    LABALBU 4.1 09/23/2011    CALCIUM 9.8 09/03/2019    BILITOT 0.3 09/03/2019    ALKPHOS 79 09/03/2019    AST 25 09/03/2019    ALT 18 09/03/2019     HgBA1c:  No results found for: LABA1C  FLP:  No results found for: TRIG, HDL, LDLCALC, LDLDIRECT, LABVLDL  TSH:  No results found for: TSH       Assessment and Plan:  Fozia Campbell was seen today for medication refill. Diagnoses and all orders for this visit:    Spinal stenosis of lumbar region with neurogenic claudication  -     carisoprodol (SOMA) 350 MG tablet;  Take 1 tablet by mouth 4 times daily as needed for Muscle spasms for up to 90 days.  -     HYDROcodone-acetaminophen (NORCO) 7.5-325 MG per tablet; Take 1 tablet by mouth 2 times daily for 30 days. Intended supply: 30 days    Cervical disc disease  -     HYDROcodone-acetaminophen (NORCO) 7.5-325 MG per tablet; Take 1 tablet by mouth 2 times daily for 30 days. Intended supply: 30 days    Panic disorder without agoraphobia  -     ALPRAZolam (XANAX) 0.5 MG tablet; Take 1 tablet by mouth 3 times daily as needed for Sleep for up to 90 days. -     sertraline (ZOLOFT) 50 MG tablet; Take 1 tablet by mouth daily    Chronic migraine without aura without status migrainosus, not intractable  -     topiramate (TOPAMAX) 25 MG tablet; Take 1 tablet by mouth 2 times daily    Primary insomnia  -     zolpidem (AMBIEN CR) 12.5 MG extended release tablet; Take 1 tablet by mouth nightly as needed for Sleep for up to 90 days. Essential hypertension  -     metoprolol tartrate (LOPRESSOR) 50 MG tablet; Take 1 tablet by mouth 2 times daily  -     CBC Auto Differential; Future  -     Comprehensive Metabolic Panel; Future  -     Hemoglobin A1C; Future  -     Lipid Panel; Future  -     TSH without Reflex; Future  -     Vitamin D 25 Hydroxy; Future  -     Urinalysis; Future    Vitamin D insufficiency  -     Vitamin D 25 Hydroxy; Future    Impaired fasting glucose  -     Hemoglobin A1C; Future    Other orders  -     estradiol (VIVELLE) 0.1 MG/24HR; Place 1 patch onto the skin Twice a Week  -     EPINEPHrine (EPIPEN 2-QUANG) 0.3 MG/0.3ML SOAJ injection; Inject 0.3 mLs into the muscle once for 1 dose Use as directed for allergic reaction      [place] reviewed and is appropriate. No signs of abuse or diversion. Patient will be getting insurance back at the end of the month. Would like to get labs and screening exams once she has insurance again. Return in about 3 months (around 11/25/2020) for Recheck, Chronic pain medication refills.       Seen By:  Ayaz Chatterjee DO

## 2020-10-28 ENCOUNTER — TELEPHONE (OUTPATIENT)
Dept: ADMINISTRATIVE | Age: 55
End: 2020-10-28

## 2020-10-28 NOTE — TELEPHONE ENCOUNTER
I would be happy to see her, but she is overdue for a mammogram anyways.   Would she like me to order this first?

## 2020-10-28 NOTE — TELEPHONE ENCOUNTER
Patient found 2 lumps on breast, she thought they would go away. She would like to see Dr Kiran Bullock please call her and advise.  Thanks

## 2020-11-04 ENCOUNTER — OFFICE VISIT (OUTPATIENT)
Dept: PRIMARY CARE CLINIC | Age: 55
End: 2020-11-04
Payer: COMMERCIAL

## 2020-11-04 VITALS
HEART RATE: 65 BPM | WEIGHT: 125 LBS | DIASTOLIC BLOOD PRESSURE: 74 MMHG | RESPIRATION RATE: 16 BRPM | BODY MASS INDEX: 20.83 KG/M2 | SYSTOLIC BLOOD PRESSURE: 118 MMHG | OXYGEN SATURATION: 99 % | HEIGHT: 65 IN | TEMPERATURE: 98 F

## 2020-11-04 DIAGNOSIS — J40 SINOBRONCHITIS: ICD-10-CM

## 2020-11-04 DIAGNOSIS — J32.9 SINOBRONCHITIS: ICD-10-CM

## 2020-11-04 DIAGNOSIS — Z20.822 EXPOSURE TO COVID-19 VIRUS: ICD-10-CM

## 2020-11-04 PROCEDURE — 1036F TOBACCO NON-USER: CPT | Performed by: PHYSICIAN ASSISTANT

## 2020-11-04 PROCEDURE — G8482 FLU IMMUNIZE ORDER/ADMIN: HCPCS | Performed by: PHYSICIAN ASSISTANT

## 2020-11-04 PROCEDURE — 3017F COLORECTAL CA SCREEN DOC REV: CPT | Performed by: PHYSICIAN ASSISTANT

## 2020-11-04 PROCEDURE — G8420 CALC BMI NORM PARAMETERS: HCPCS | Performed by: PHYSICIAN ASSISTANT

## 2020-11-04 PROCEDURE — G8427 DOCREV CUR MEDS BY ELIG CLIN: HCPCS | Performed by: PHYSICIAN ASSISTANT

## 2020-11-04 PROCEDURE — 99213 OFFICE O/P EST LOW 20 MIN: CPT | Performed by: PHYSICIAN ASSISTANT

## 2020-11-04 RX ORDER — DOXYCYCLINE HYCLATE 100 MG
100 TABLET ORAL 2 TIMES DAILY
Qty: 20 TABLET | Refills: 0 | Status: SHIPPED | OUTPATIENT
Start: 2020-11-04 | End: 2020-11-14

## 2020-11-04 RX ORDER — DEXTROMETHORPHAN HYDROBROMIDE AND PROMETHAZINE HYDROCHLORIDE 15; 6.25 MG/5ML; MG/5ML
5 SYRUP ORAL EVERY 6 HOURS PRN
Qty: 120 ML | Refills: 0 | Status: SHIPPED
Start: 2020-11-04 | End: 2020-12-09

## 2020-11-04 NOTE — PROGRESS NOTES
Chief Complaint   Cough; Nausea; Diarrhea; Chest Congestion; Generalized Body Aches; and Nasal Congestion (sinus pressure)      History of Present Illness   Source of history provided by: patient. Cheryle Brave is a 50 y.o. old female who has a past medical history of:   Past Medical History:   Diagnosis Date    Anxiety     Bipolar disorder ([facility] [address]     Bipolar 1 disorder    Cervical disc disease     Cervical spondylitis with radiculitis (HCC)     C2, C7    Facet hypertrophy     L4/5 - S1    Headache     Migraine    Lumbar disc disease     Osteoarthritis     DJD/DDD C-Spine S/P Fusion CHEERLEADER DROPPED ON HEAD/NECK    Panic disorder without agoraphobia     Counselor Destiny Marroquin. [facility])     Presents to the flu clinic for evaluation of sinus pressure, nasal congestion, body aches, chest congestion, productive cough with yellow sputum, mild SOB with coughing fits, nausea, fatigue, and diarrhea x 6 days. States symptoms have been persistent since onset. Denies any fever, chills, loss of taste/smell, CP, dyspnea, LE edema, abdominal pain, vomiting, rash, or lethargy. Has been taking ibuprofen without symptomatic relief. Last dose of ibuprofen was 3 hours PTA. Denies history of international travel in the past 14 days. Pt is mostly concerned because she is a  for [employer]. She had a student on the bus who is currently in quarantine as his father tested positive. Denies any hx of asthma or COPD. Pt is a previous smoker. ROS   Pertinent positives and negatives are stated within HPI, all other systems reviewed and are negative. Surgical History:  has a past surgical history that includes Hysterectomy; Breast surgery; cervical fusion; laparoscopy; laparotomy; Breast reduction surgery (Bilateral); other surgical history; and other surgical history. Social History:  reports that she quit smoking about 5 years ago.  She has a 0.50 pack-year smoking mouth every 6 hours as needed for Cough  -     COVID-19 Ambulatory; Future    Exposure to COVID-19 virus  -     COVID-19 Ambulatory; Future      COVID-19 swab obtained and pending, will call with results once available. Advised cautionary self-quarantine at home in the interim. Pt should remain out of work for at least 7 days from the start of symptoms. Pt should also be fever free for 24 hours and symptoms should be improved overall prior to returning to work. Work excuse provided to patient today. Scripts written for doxycyline and promethazine DM cough syrup, side effects discussed. Increase fluids and rest. Symptomatic relief discussed including Tylenol prn pain/fever. Schedule virtual f/u with PCP in 7-10 days if symptoms persist. ED sooner if symptoms worsen or change. ED immediately with high or refractory fever, progressive SOB, dyspnea, CP, calf pain/swelling, shaking chills, vomiting, abdominal pain, lethargy, flank pain, or decreased urinary output. Pt verbalizes understanding and is in agreement with plan of care. All questions answered. Iman Edmond PA-C    This visit was provided as a focused evaluation during the COVID -19 pandemic/national emergency. A comprehensive review of all previous patient history and testing was not conducted. Pertinent findings were elicited during the visit.

## 2020-11-04 NOTE — LETTER
[address] [place] [address]  [place], [address]   Phone: [phone]  Fax: [phone]    Lyn Maldonado. Amadou Granados PA-C      11/4/2020     Patient: Esther Contreras   YOB: 1965       To Whom It May Concern: It is my medical opinion that Esther Contreras should remain out of work while acutely ill and awaiting COVID-19 test results. Return to work with no retesting should be followed if test is negative AND meets these 3 criteria as outlined by CDC/ODH:     a. No fever without the use of fever reducers for 24 hours  b. Improvement in symptoms  c. At least 7 days since the onset of symptoms (11/5). If tests positive for COVID-19, needs minimum of 10 days strict quarantine, improvement of symptoms and 24 hours fever free without fever reducing medications. If you have any questions or concerns, please don't hesitate to call. Sincerely,        Lyn Maldonado.  CRISTINE Edmond

## 2020-11-06 LAB
SARS-COV-2: NOT DETECTED
SOURCE: NORMAL

## 2020-11-19 DIAGNOSIS — I10 ESSENTIAL HYPERTENSION: ICD-10-CM

## 2020-11-19 DIAGNOSIS — R73.01 IMPAIRED FASTING GLUCOSE: ICD-10-CM

## 2020-11-19 DIAGNOSIS — E55.9 VITAMIN D INSUFFICIENCY: ICD-10-CM

## 2020-11-19 LAB
ALBUMIN SERPL-MCNC: 4.4 G/DL (ref 3.5–5.2)
ALP BLD-CCNC: 70 U/L (ref 35–104)
ALT SERPL-CCNC: 14 U/L (ref 0–32)
ANION GAP SERPL CALCULATED.3IONS-SCNC: 17 MMOL/L (ref 7–16)
AST SERPL-CCNC: 24 U/L (ref 0–31)
BACTERIA: ABNORMAL /HPF
BASOPHILS ABSOLUTE: 0.02 E9/L (ref 0–0.2)
BASOPHILS RELATIVE PERCENT: 0.4 % (ref 0–2)
BILIRUB SERPL-MCNC: 0.4 MG/DL (ref 0–1.2)
BILIRUBIN URINE: NEGATIVE
BLOOD, URINE: ABNORMAL
BUN BLDV-MCNC: 18 MG/DL (ref 6–20)
CALCIUM SERPL-MCNC: 9.5 MG/DL (ref 8.6–10.2)
CHLORIDE BLD-SCNC: 100 MMOL/L (ref 98–107)
CHOLESTEROL, TOTAL: 200 MG/DL (ref 0–199)
CLARITY: CLEAR
CO2: 18 MMOL/L (ref 22–29)
COLOR: YELLOW
CREAT SERPL-MCNC: 1.1 MG/DL (ref 0.5–1)
EOSINOPHILS ABSOLUTE: 0.09 E9/L (ref 0.05–0.5)
EOSINOPHILS RELATIVE PERCENT: 1.8 % (ref 0–6)
EPITHELIAL CELLS, UA: ABNORMAL /HPF
GFR AFRICAN AMERICAN: >60
GFR NON-AFRICAN AMERICAN: 52 ML/MIN/1.73
GLUCOSE BLD-MCNC: 74 MG/DL (ref 74–99)
GLUCOSE URINE: NEGATIVE MG/DL
HBA1C MFR BLD: 5 % (ref 4–5.6)
HCT VFR BLD CALC: 42.2 % (ref 34–48)
HDLC SERPL-MCNC: 91 MG/DL
HEMOGLOBIN: 13.6 G/DL (ref 11.5–15.5)
IMMATURE GRANULOCYTES #: 0.01 E9/L
IMMATURE GRANULOCYTES %: 0.2 % (ref 0–5)
KETONES, URINE: NEGATIVE MG/DL
LDL CHOLESTEROL CALCULATED: 90 MG/DL (ref 0–99)
LEUKOCYTE ESTERASE, URINE: ABNORMAL
LYMPHOCYTES ABSOLUTE: 1.57 E9/L (ref 1.5–4)
LYMPHOCYTES RELATIVE PERCENT: 31.7 % (ref 20–42)
MCH RBC QN AUTO: 32.2 PG (ref 26–35)
MCHC RBC AUTO-ENTMCNC: 32.2 % (ref 32–34.5)
MCV RBC AUTO: 99.8 FL (ref 80–99.9)
MONOCYTES ABSOLUTE: 0.46 E9/L (ref 0.1–0.95)
MONOCYTES RELATIVE PERCENT: 9.3 % (ref 2–12)
NEUTROPHILS ABSOLUTE: 2.8 E9/L (ref 1.8–7.3)
NEUTROPHILS RELATIVE PERCENT: 56.6 % (ref 43–80)
NITRITE, URINE: NEGATIVE
PDW BLD-RTO: 12.4 FL (ref 11.5–15)
PH UA: 6.5 (ref 5–9)
PLATELET # BLD: 247 E9/L (ref 130–450)
PMV BLD AUTO: 11.7 FL (ref 7–12)
POTASSIUM SERPL-SCNC: 4.4 MMOL/L (ref 3.5–5)
PROTEIN UA: NEGATIVE MG/DL
RBC # BLD: 4.23 E12/L (ref 3.5–5.5)
RBC UA: ABNORMAL /HPF (ref 0–2)
SODIUM BLD-SCNC: 135 MMOL/L (ref 132–146)
SPECIFIC GRAVITY UA: <=1.005 (ref 1–1.03)
TOTAL PROTEIN: 7.4 G/DL (ref 6.4–8.3)
TRIGL SERPL-MCNC: 93 MG/DL (ref 0–149)
TSH SERPL DL<=0.05 MIU/L-ACNC: 1.65 UIU/ML (ref 0.27–4.2)
UROBILINOGEN, URINE: 0.2 E.U./DL
VITAMIN D 25-HYDROXY: 69 NG/ML (ref 30–100)
VLDLC SERPL CALC-MCNC: 19 MG/DL
WBC # BLD: 5 E9/L (ref 4.5–11.5)
WBC UA: ABNORMAL /HPF (ref 0–5)

## 2020-11-23 ENCOUNTER — TELEPHONE (OUTPATIENT)
Dept: PRIMARY CARE CLINIC | Age: 55
End: 2020-11-23

## 2020-11-23 NOTE — TELEPHONE ENCOUNTER
Patient had appt tomorrow with Dr Rebecca Pope, she needs all her meds, she states is totally out.   Please call her thanks

## 2020-11-24 RX ORDER — CARISOPRODOL 350 MG/1
350 TABLET ORAL 4 TIMES DAILY PRN
Qty: 120 TABLET | Refills: 0 | Status: SHIPPED
Start: 2020-11-24 | End: 2020-11-24 | Stop reason: SDUPTHER

## 2020-11-24 RX ORDER — ZOLPIDEM TARTRATE 12.5 MG/1
12.5 TABLET, FILM COATED, EXTENDED RELEASE ORAL NIGHTLY PRN
Qty: 30 TABLET | Refills: 0 | Status: SHIPPED
Start: 2020-11-24 | End: 2020-12-09 | Stop reason: SDUPTHER

## 2020-11-24 RX ORDER — HYDROCODONE BITARTRATE AND ACETAMINOPHEN 7.5; 325 MG/1; MG/1
1 TABLET ORAL 2 TIMES DAILY
Qty: 60 TABLET | Refills: 0 | Status: SHIPPED
Start: 2020-11-24 | End: 2020-11-24 | Stop reason: SDUPTHER

## 2020-11-24 RX ORDER — ZOLPIDEM TARTRATE 12.5 MG/1
12.5 TABLET, FILM COATED, EXTENDED RELEASE ORAL NIGHTLY PRN
Qty: 30 TABLET | Refills: 0 | Status: CANCELLED | OUTPATIENT
Start: 2020-11-24 | End: 2020-12-24

## 2020-11-24 RX ORDER — HYDROCODONE BITARTRATE AND ACETAMINOPHEN 7.5; 325 MG/1; MG/1
1 TABLET ORAL 2 TIMES DAILY
Qty: 60 TABLET | Refills: 0 | Status: SHIPPED
Start: 2020-11-24 | End: 2020-12-09 | Stop reason: SDUPTHER

## 2020-11-24 RX ORDER — CARISOPRODOL 350 MG/1
350 TABLET ORAL 4 TIMES DAILY PRN
Qty: 120 TABLET | Refills: 0 | Status: SHIPPED
Start: 2020-11-24 | End: 2020-12-18

## 2020-11-24 RX ORDER — ALPRAZOLAM 0.5 MG/1
0.5 TABLET ORAL 3 TIMES DAILY PRN
Qty: 90 TABLET | Refills: 0 | Status: SHIPPED
Start: 2020-11-24 | End: 2020-12-09 | Stop reason: SDUPTHER

## 2020-11-24 RX ORDER — ZOLPIDEM TARTRATE 12.5 MG/1
12.5 TABLET, FILM COATED, EXTENDED RELEASE ORAL NIGHTLY PRN
Qty: 30 TABLET | Refills: 0 | Status: SHIPPED
Start: 2020-11-24 | End: 2020-11-24 | Stop reason: SDUPTHER

## 2020-11-24 RX ORDER — ALPRAZOLAM 0.5 MG/1
0.5 TABLET ORAL 3 TIMES DAILY PRN
Qty: 90 TABLET | Refills: 0 | Status: CANCELLED | OUTPATIENT
Start: 2020-11-24 | End: 2020-12-24

## 2020-11-24 RX ORDER — ALPRAZOLAM 0.5 MG/1
0.5 TABLET ORAL 3 TIMES DAILY PRN
Qty: 90 TABLET | Refills: 0 | Status: SHIPPED
Start: 2020-11-24 | End: 2020-11-24 | Stop reason: SDUPTHER

## 2020-12-09 ENCOUNTER — OFFICE VISIT (OUTPATIENT)
Dept: PRIMARY CARE CLINIC | Age: 55
End: 2020-12-09
Payer: COMMERCIAL

## 2020-12-09 VITALS
DIASTOLIC BLOOD PRESSURE: 76 MMHG | SYSTOLIC BLOOD PRESSURE: 120 MMHG | HEART RATE: 86 BPM | WEIGHT: 128 LBS | TEMPERATURE: 97.3 F | HEIGHT: 65 IN | OXYGEN SATURATION: 98 % | BODY MASS INDEX: 21.33 KG/M2

## 2020-12-09 PROCEDURE — G8420 CALC BMI NORM PARAMETERS: HCPCS | Performed by: FAMILY MEDICINE

## 2020-12-09 PROCEDURE — G8482 FLU IMMUNIZE ORDER/ADMIN: HCPCS | Performed by: FAMILY MEDICINE

## 2020-12-09 PROCEDURE — G8427 DOCREV CUR MEDS BY ELIG CLIN: HCPCS | Performed by: FAMILY MEDICINE

## 2020-12-09 PROCEDURE — 4004F PT TOBACCO SCREEN RCVD TLK: CPT | Performed by: FAMILY MEDICINE

## 2020-12-09 PROCEDURE — 99214 OFFICE O/P EST MOD 30 MIN: CPT | Performed by: FAMILY MEDICINE

## 2020-12-09 PROCEDURE — 3017F COLORECTAL CA SCREEN DOC REV: CPT | Performed by: FAMILY MEDICINE

## 2020-12-09 RX ORDER — ALPRAZOLAM 0.5 MG/1
0.5 TABLET ORAL 3 TIMES DAILY PRN
Qty: 270 TABLET | Refills: 0 | Status: SHIPPED | OUTPATIENT
Start: 2020-12-24 | End: 2021-01-23

## 2020-12-09 RX ORDER — ESTRADIOL 0.1 MG/D
1 FILM, EXTENDED RELEASE TRANSDERMAL
COMMUNITY
End: 2021-08-02

## 2020-12-09 RX ORDER — HYDROCODONE BITARTRATE AND ACETAMINOPHEN 7.5; 325 MG/1; MG/1
1 TABLET ORAL EVERY 6 HOURS PRN
Qty: 60 TABLET | Refills: 0 | Status: SHIPPED
Start: 2021-02-24 | End: 2021-03-10 | Stop reason: SDUPTHER

## 2020-12-09 RX ORDER — TOPIRAMATE 25 MG/1
25 TABLET ORAL NIGHTLY
COMMUNITY
End: 2021-06-09 | Stop reason: SDUPTHER

## 2020-12-09 RX ORDER — HYDROCODONE BITARTRATE AND ACETAMINOPHEN 7.5; 325 MG/1; MG/1
1 TABLET ORAL 2 TIMES DAILY
Qty: 60 TABLET | Refills: 0 | Status: SHIPPED
Start: 2021-01-24 | End: 2021-03-10 | Stop reason: SDUPTHER

## 2020-12-09 RX ORDER — HYDROCODONE BITARTRATE AND ACETAMINOPHEN 7.5; 325 MG/1; MG/1
1 TABLET ORAL 2 TIMES DAILY
Qty: 60 TABLET | Refills: 0 | Status: SHIPPED
Start: 2020-12-24 | End: 2021-03-10 | Stop reason: SDUPTHER

## 2020-12-09 RX ORDER — ZOLPIDEM TARTRATE 12.5 MG/1
12.5 TABLET, FILM COATED, EXTENDED RELEASE ORAL NIGHTLY PRN
Qty: 90 TABLET | Refills: 0 | Status: SHIPPED | OUTPATIENT
Start: 2020-12-09 | End: 2021-01-08

## 2020-12-09 ASSESSMENT — ENCOUNTER SYMPTOMS
DIARRHEA: 0
NAUSEA: 0
WHEEZING: 0
VOMITING: 0
SHORTNESS OF BREATH: 0
COUGH: 0
ABDOMINAL PAIN: 0
BACK PAIN: 1
CONSTIPATION: 0

## 2020-12-10 NOTE — PROGRESS NOTES
20  Amilcar Schwartz : 1965 Sex: female  Age: 50 y.o. Chief Complaint   Patient presents with    Medication Refill     HPI:  50 y.o. female patient presents today for 3 month(s) follow up for chronic medical conditions, medication refills and review FBW. Patient's chart, medical, surgical and medication history all reviewed. Chronic pain  Patient has long standing history of spinal stenosis. She has a history of cervical spine DDD after accident while cheerleading. Previously had multi-level fusion of cervical spine- now having worsening pain in upper thoracics. She takes Norco and Soma appropriately. Current dosing is effective for patient. Anxiety  Patient complains of evaluation of anxiety disorder and post traumatic stress  disorder. She has the following anxiety symptoms: difficulty concentrating, feelings of losing control, irritable, racing thoughts. Onset of symptoms was approximately several years ago, stable since that time. She denies current suicidal and homicidal ideation. Previous treatment includes Ativan and no therapy. She complains of the following side effects from the treatment: none. ROS:  Review of Systems   Constitutional: Negative for chills, fatigue and fever. Respiratory: Negative for cough, shortness of breath and wheezing. Cardiovascular: Negative for chest pain and palpitations. Gastrointestinal: Negative for abdominal pain, constipation, diarrhea, nausea and vomiting. Musculoskeletal: Positive for arthralgias, back pain and gait problem. Skin: Negative for rash. Neurological: Negative for dizziness and headaches. Psychiatric/Behavioral: Negative for dysphoric mood. The patient is nervous/anxious. All other systems reviewed and are negative.      Current Outpatient Medications on File Prior to Visit   Medication Sig Dispense Refill    estradiol (VIVELLE) 0.1 MG/24HR Place 1 patch onto the skin Twice a Week      topiramate (TOPAMAX) 25 MG tablet Take 25 mg by mouth 2 times daily      carisoprodol (SOMA) 350 MG tablet Take 1 tablet by mouth 4 times daily as needed for Muscle spasms for up to 30 days. 120 tablet 0    metoprolol tartrate (LOPRESSOR) 50 MG tablet Take 1 tablet by mouth 2 times daily 30 tablet 0    sertraline (ZOLOFT) 50 MG tablet Take 1 tablet by mouth daily 90 tablet 1    lamoTRIgine (LAMICTAL) 100 MG tablet TAKE 1 TABLET BY MOUTH TWICE DAILY 180 tablet 5    QUEtiapine (SEROQUEL) 100 MG tablet TAKE 1 TABLET BY MOUTH TWICE DAILY 180 tablet 5     No current facility-administered medications on file prior to visit.         Allergies   Allergen Reactions    Fish-Derived Products Anaphylaxis and Swelling     Swordfish allergy    Bee Venom      Wasps    Hornet Venom        Past Medical History:   Diagnosis Date    Anxiety     Bipolar disorder ([facility] [address]     Bipolar 1 disorder    Cervical disc disease     Cervical spondylitis with radiculitis (HCC)     C2, C7    Facet hypertrophy     L4/5 - S1    Headache     Migraine    Lumbar disc disease     Osteoarthritis     DJD/DDD C-Spine S/P Fusion CHEERLEADER DROPPED ON HEAD/NECK    Panic disorder without agoraphobia     Counselor Destiny Marroquin. [place]    Seizures [facility])      Past Surgical History:   Procedure Laterality Date    BREAST REDUCTION SURGERY Bilateral     Implants Bilaterally    BREAST SURGERY      CERVICAL FUSION      c2-c7 Bone Kevin Grafting 2-6    HYSTERECTOMY      LAPAROSCOPY      X4    LAPAROTOMY      X5 Ovarian Cysts, ZACARIAS-BSO (Non Cancerous)    OTHER SURGICAL HISTORY      Cervical Epidurals    OTHER SURGICAL HISTORY      Lumbar Epidural - Donatelli 6/13/17, 6/27/17, 7/25/17     Family History   Problem Relation Age of Onset    Diabetes Mother     Other Mother         End Stage Cirrhosis 1/2017    Cancer Father         Prostate - Rebekah Maynard     Social History     Socioeconomic History    Marital status: Single     Spouse name: Not on file Comments: Wearing mask  Eyes:      General: Lids are normal. No scleral icterus. Extraocular Movements: Extraocular movements intact. Conjunctiva/sclera: Conjunctivae normal.   Neck:      Musculoskeletal: Normal range of motion and neck supple. Thyroid: No thyromegaly. Cardiovascular:      Rate and Rhythm: Normal rate and regular rhythm. Heart sounds: Normal heart sounds. No murmur. Pulmonary:      Effort: Pulmonary effort is normal. No respiratory distress. Breath sounds: Normal breath sounds. No wheezing. Musculoskeletal: Normal range of motion. General: No tenderness or deformity. Right lower leg: No edema. Left lower leg: No edema. Lymphadenopathy:      Cervical: No cervical adenopathy. Skin:     General: Skin is warm and dry. Findings: No rash. Neurological:      General: No focal deficit present. Mental Status: She is alert and oriented to person, place, and time. Gait: Gait normal.   Psychiatric:         Mood and Affect: Mood and affect normal.         Speech: Speech normal.         Behavior: Behavior normal.         Thought Content:  Thought content normal.         Labs:  CBC with Differential:    Lab Results   Component Value Date    WBC 5.0 11/19/2020    RBC 4.23 11/19/2020    HGB 13.6 11/19/2020    HCT 42.2 11/19/2020     11/19/2020    MCV 99.8 11/19/2020    MCH 32.2 11/19/2020    MCHC 32.2 11/19/2020    RDW 12.4 11/19/2020    SEGSPCT 53 09/24/2011    LYMPHOPCT 31.7 11/19/2020    MONOPCT 9.3 11/19/2020    BASOPCT 0.4 11/19/2020    MONOSABS 0.46 11/19/2020    LYMPHSABS 1.57 11/19/2020    EOSABS 0.09 11/19/2020    BASOSABS 0.02 11/19/2020     CMP:    Lab Results   Component Value Date     11/19/2020    K 4.4 11/19/2020     11/19/2020    CO2 18 11/19/2020    BUN 18 11/19/2020    CREATININE 1.1 11/19/2020    GFRAA >60 11/19/2020    LABGLOM 52 11/19/2020    GLUCOSE 74 11/19/2020    GLUCOSE 88 09/24/2011    PROT 7.4 11/19/2020    LABALBU 4.4 11/19/2020    LABALBU 4.1 09/23/2011    CALCIUM 9.5 11/19/2020    BILITOT 0.4 11/19/2020    ALKPHOS 70 11/19/2020    AST 24 11/19/2020    ALT 14 11/19/2020     HgBA1c:    Lab Results   Component Value Date    LABA1C 5.0 11/19/2020     FLP:    Lab Results   Component Value Date    TRIG 93 11/19/2020    HDL 91 11/19/2020    LDLCALC 90 11/19/2020    LABVLDL 19 11/19/2020     TSH:    Lab Results   Component Value Date    TSH 1.650 11/19/2020          Assessment and Plan:  Sol Garcia was seen today for medication refill. Diagnoses and all orders for this visit:    Spinal stenosis of lumbar region with neurogenic claudication  -     HYDROcodone-acetaminophen (NORCO) 7.5-325 MG per tablet; Take 1 tablet by mouth 2 times daily for 30 days. Intended supply: 30 days  -     HYDROcodone-acetaminophen (NORCO) 7.5-325 MG per tablet; Take 1 tablet by mouth 2 times daily for 30 days. Intended supply: 30 days  -     HYDROcodone-acetaminophen (NORCO) 7.5-325 MG per tablet; Take 1 tablet by mouth every 6 hours as needed for Pain for up to 30 days. Intended supply: 30 days  OARRS reviewed and is appropriate. 3 months of medication. Cervical disc disease  -     HYDROcodone-acetaminophen (NORCO) 7.5-325 MG per tablet; Take 1 tablet by mouth 2 times daily for 30 days. Intended supply: 30 days  -     HYDROcodone-acetaminophen (NORCO) 7.5-325 MG per tablet; Take 1 tablet by mouth 2 times daily for 30 days. Intended supply: 30 days  -     HYDROcodone-acetaminophen (NORCO) 7.5-325 MG per tablet; Take 1 tablet by mouth every 6 hours as needed for Pain for up to 30 days. Intended supply: 30 days  -     External Referral To Orthopedic Surgery  Patient is ready to see another spine surgeon at this time. Cervical fusion done in [place] many years ago. Primary insomnia  -     zolpidem (AMBIEN CR) 12.5 MG extended release tablet; Take 1 tablet by mouth nightly as needed for Sleep for up to 30 days.     Panic disorder without agoraphobia  -     ALPRAZolam (XANAX) 0.5 MG tablet; Take 1 tablet by mouth 3 times daily as needed for Sleep for up to 30 days. Screening for colon cancer  -     Ambulatory referral to General Surgery    Family history of colon cancer  -     Ambulatory referral to General Surgery  Maternal grandfather had colon cancer. Patient has not had colonoscopy previously. Return in about 3 months (around 3/9/2021) for Chronic pain medication refills.       Seen By:  Medhat Flores,

## 2021-01-20 ENCOUNTER — OFFICE VISIT (OUTPATIENT)
Dept: PRIMARY CARE CLINIC | Age: 56
End: 2021-01-20
Payer: COMMERCIAL

## 2021-01-20 VITALS
HEART RATE: 74 BPM | WEIGHT: 120 LBS | OXYGEN SATURATION: 95 % | RESPIRATION RATE: 18 BRPM | TEMPERATURE: 97.5 F | BODY MASS INDEX: 19.99 KG/M2 | HEIGHT: 65 IN

## 2021-01-20 DIAGNOSIS — Z20.822 ENCOUNTER FOR SCREENING LABORATORY TESTING FOR COVID-19 VIRUS: ICD-10-CM

## 2021-01-20 DIAGNOSIS — Z20.822 ENCOUNTER FOR SCREENING LABORATORY TESTING FOR COVID-19 VIRUS: Primary | ICD-10-CM

## 2021-01-20 DIAGNOSIS — J06.9 UPPER RESPIRATORY TRACT INFECTION, UNSPECIFIED TYPE: ICD-10-CM

## 2021-01-20 LAB
Lab: NORMAL
QC PASS/FAIL: NORMAL
SARS-COV-2, POC: NORMAL

## 2021-01-20 PROCEDURE — 3017F COLORECTAL CA SCREEN DOC REV: CPT | Performed by: FAMILY MEDICINE

## 2021-01-20 PROCEDURE — 99213 OFFICE O/P EST LOW 20 MIN: CPT | Performed by: FAMILY MEDICINE

## 2021-01-20 PROCEDURE — G8482 FLU IMMUNIZE ORDER/ADMIN: HCPCS | Performed by: FAMILY MEDICINE

## 2021-01-20 PROCEDURE — 4004F PT TOBACCO SCREEN RCVD TLK: CPT | Performed by: FAMILY MEDICINE

## 2021-01-20 PROCEDURE — G8427 DOCREV CUR MEDS BY ELIG CLIN: HCPCS | Performed by: FAMILY MEDICINE

## 2021-01-20 PROCEDURE — 87426 SARSCOV CORONAVIRUS AG IA: CPT | Performed by: FAMILY MEDICINE

## 2021-01-20 PROCEDURE — G8420 CALC BMI NORM PARAMETERS: HCPCS | Performed by: FAMILY MEDICINE

## 2021-01-20 RX ORDER — AZITHROMYCIN 250 MG/1
250 TABLET, FILM COATED ORAL SEE ADMIN INSTRUCTIONS
Qty: 6 TABLET | Refills: 0 | Status: SHIPPED | OUTPATIENT
Start: 2021-01-20 | End: 2021-01-25

## 2021-01-20 ASSESSMENT — ENCOUNTER SYMPTOMS
CONSTIPATION: 0
EYE PAIN: 0
BLOOD IN STOOL: 0
NAUSEA: 0
DIARRHEA: 0
EYE REDNESS: 0
COLOR CHANGE: 0
WHEEZING: 0
BACK PAIN: 0
SINUS PRESSURE: 0
APNEA: 0
ABDOMINAL PAIN: 0
EYE ITCHING: 0
SORE THROAT: 0
VOMITING: 0
CHEST TIGHTNESS: 0
SHORTNESS OF BREATH: 0
COUGH: 1
RHINORRHEA: 0

## 2021-01-20 NOTE — PROGRESS NOTES
Chief Complaint:     Chief Complaint   Patient presents with    Generalized Body Aches     sx for last 3 days.  Headache    Fatigue    Nausea    Chills         Generalized Body Aches  This is a new problem. The current episode started in the past 7 days. The problem occurs daily. The problem has been unchanged. Associated symptoms include arthralgias, congestion, coughing, fatigue, headaches and myalgias. Pertinent negatives include no abdominal pain, chest pain, fever, nausea, neck pain, numbness, rash, sore throat, vomiting or weakness. Nothing aggravates the symptoms. She has tried nothing for the symptoms. The treatment provided no relief. Fatigue  This is a new problem. The current episode started in the past 7 days. The problem occurs daily. The problem has been unchanged. Associated symptoms include arthralgias, congestion, coughing, fatigue, headaches and myalgias. Pertinent negatives include no abdominal pain, chest pain, fever, nausea, neck pain, numbness, rash, sore throat, vomiting or weakness. Nothing aggravates the symptoms. She has tried nothing for the symptoms. The treatment provided no relief.        Patient Active Problem List   Diagnosis    Convulsions/seizures ([address]    Bipolar I disorder ([address]    Panic disorder without agoraphobia       Past Medical History:   Diagnosis Date    Anxiety     Bipolar disorder ([address]     Bipolar 1 disorder    Cervical disc disease     Cervical spondylitis with radiculitis ([address]     C2, C7    Facet hypertrophy     L4/5 - S1    Headache     Migraine    Lumbar disc disease     Osteoarthritis     DJD/DDD C-Spine S/P Fusion CHEERLEADER DROPPED ON HEAD/NECK    Panic disorder without agoraphobia     Counselor Destiny Marroquin. [place]    Seizures [facility])        Past Surgical History:   Procedure Laterality Date    BREAST REDUCTION SURGERY Bilateral     Implants Bilaterally    BREAST SURGERY      CERVICAL FUSION      c2-c7 Bone Kevin Grafting 2-6    HYSTERECTOMY      LAPAROSCOPY      X4    LAPAROTOMY      X5 Ovarian Cysts, ZACARIAS-BSO (Non Cancerous)    OTHER SURGICAL HISTORY      Cervical Epidurals    OTHER SURGICAL HISTORY      Lumbar Epidural - Donatelli 6/13/17, 6/27/17, 7/25/17       Current Outpatient Medications   Medication Sig Dispense Refill    azithromycin (ZITHROMAX) 250 MG tablet Take 1 tablet by mouth See Admin Instructions for 5 days 500mg on day 1 followed by 250mg on days 2 - 5 6 tablet 0    estradiol (VIVELLE) 0.1 MG/24HR Place 1 patch onto the skin Twice a Week      topiramate (TOPAMAX) 25 MG tablet Take 25 mg by mouth 2 times daily      HYDROcodone-acetaminophen (NORCO) 7.5-325 MG per tablet Take 1 tablet by mouth 2 times daily for 30 days. Intended supply: 30 days 60 tablet 0    ALPRAZolam (XANAX) 0.5 MG tablet Take 1 tablet by mouth 3 times daily as needed for Sleep for up to 30 days. 270 tablet 0    [START ON 1/24/2021] HYDROcodone-acetaminophen (NORCO) 7.5-325 MG per tablet Take 1 tablet by mouth 2 times daily for 30 days. Intended supply: 30 days 60 tablet 0    [START ON 2/24/2021] HYDROcodone-acetaminophen (NORCO) 7.5-325 MG per tablet Take 1 tablet by mouth every 6 hours as needed for Pain for up to 30 days. Intended supply: 30 days 60 tablet 0    metoprolol tartrate (LOPRESSOR) 50 MG tablet Take 1 tablet by mouth 2 times daily 30 tablet 0    sertraline (ZOLOFT) 50 MG tablet Take 1 tablet by mouth daily 90 tablet 1    lamoTRIgine (LAMICTAL) 100 MG tablet TAKE 1 TABLET BY MOUTH TWICE DAILY 180 tablet 5    QUEtiapine (SEROQUEL) 100 MG tablet TAKE 1 TABLET BY MOUTH TWICE DAILY 180 tablet 5     No current facility-administered medications for this visit.         Allergies   Allergen Reactions    Fish-Derived Products Anaphylaxis and Swelling     Swordfish allergy    Bee Venom      Wasps    Hornet Venom        Social History     Socioeconomic History    Marital status: Single     Spouse name: None    Number of children: None    Years of education: None    Highest education level: None   Occupational History    None   Social Needs    Financial resource strain: None    Food insecurity     Worry: None     Inability: None    Transportation needs     Medical: None     Non-medical: None   Tobacco Use    Smoking status: Light Tobacco Smoker     Packs/day: 0.50     Years: 1.00     Pack years: 0.50     Last attempt to quit:      Years since quittin.0    Smokeless tobacco: Never Used   Substance and Sexual Activity    Alcohol use: No     Comment: Consumes rarely    Drug use: No    Sexual activity: None   Lifestyle    Physical activity     Days per week: None     Minutes per session: None    Stress: None   Relationships    Social connections     Talks on phone: None     Gets together: None     Attends Congregation service: None     Active member of club or organization: None     Attends meetings of clubs or organizations: None     Relationship status: None    Intimate partner violence     Fear of current or ex partner: None     Emotionally abused: None     Physically abused: None     Forced sexual activity: None   Other Topics Concern    None   Social History Narrative    None       Family History   Problem Relation Age of Onset    Diabetes Mother    Laura Sit Other Mother         End Stage Cirrhosis 2017    Cancer Father         Prostate - Chiokavita Maynard          Review of Systems   Constitutional: Positive for fatigue. Negative for activity change, appetite change and fever. HENT: Positive for congestion. Negative for ear pain, hearing loss, nosebleeds, rhinorrhea, sinus pressure and sore throat. Eyes: Negative for pain, redness, itching and visual disturbance. Respiratory: Positive for cough. Negative for apnea, chest tightness, shortness of breath and wheezing. Cardiovascular: Negative for chest pain, palpitations and leg swelling.    Gastrointestinal: Negative for abdominal pain, blood in stool, constipation, diarrhea, nausea and vomiting. Endocrine: Negative. Genitourinary: Negative for decreased urine volume, difficulty urinating, dysuria, frequency, hematuria and urgency. Musculoskeletal: Positive for arthralgias and myalgias. Negative for back pain, gait problem and neck pain. Skin: Negative for color change and rash. Allergic/Immunologic: Negative for environmental allergies and food allergies. Neurological: Positive for headaches. Negative for dizziness, weakness, light-headedness and numbness. Hematological: Negative for adenopathy. Does not bruise/bleed easily. Psychiatric/Behavioral: Negative for behavioral problems, dysphoric mood and sleep disturbance. The patient is not nervous/anxious and is not hyperactive. All other systems reviewed and are negative. Pulse 74   Temp 97.5 °F (36.4 °C)   Resp 18   Ht 5' 5\" (1.651 m)   Wt 120 lb (54.4 kg)   SpO2 95%   BMI 19.97 kg/m²     Physical Exam  Vitals signs and nursing note reviewed. Constitutional:       Appearance: She is well-developed. She is not toxic-appearing. HENT:      Head: Normocephalic and atraumatic. Right Ear: Ear canal normal. A middle ear effusion is present. Tympanic membrane is bulging. Left Ear: Ear canal normal. A middle ear effusion is present. Tympanic membrane is bulging. Nose: Mucosal edema and congestion present. No nasal deformity, septal deviation or rhinorrhea. Right Turbinates: Enlarged and swollen. Left Turbinates: Enlarged and swollen. Right Sinus: Maxillary sinus tenderness present. Left Sinus: Maxillary sinus tenderness present. Mouth/Throat:      Mouth: Mucous membranes are not pale and not dry. Pharynx: Uvula midline. No oropharyngeal exudate or posterior oropharyngeal erythema. Tonsils: No tonsillar abscesses. Eyes:      General: Lids are normal.         Right eye: No discharge. Left eye: No discharge.       Extraocular Movements: Extraocular movements intact. Right eye: Normal extraocular motion. Left eye: Normal extraocular motion. Conjunctiva/sclera: Conjunctivae normal.      Right eye: Right conjunctiva is not injected. Left eye: Left conjunctiva is not injected. Neck:      Musculoskeletal: Normal range of motion and neck supple. Cardiovascular:      Rate and Rhythm: Normal rate and regular rhythm. Heart sounds: Normal heart sounds. No murmur. Pulmonary:      Effort: Pulmonary effort is normal. No respiratory distress. Breath sounds: No wheezing or rales. Chest:      Chest wall: No tenderness. Abdominal:      General: Bowel sounds are normal.      Palpations: Abdomen is soft. Tenderness: There is no abdominal tenderness. Musculoskeletal: Normal range of motion. Lymphadenopathy:      Cervical: Cervical adenopathy present. Skin:     General: Skin is warm and dry. Findings: No erythema or rash. Neurological:      General: No focal deficit present. Mental Status: She is alert. Psychiatric:         Mood and Affect: Mood normal.                                 ASSESSMENT/PLAN:    Patient Active Problem List   Diagnosis    Convulsions/seizures ([facility] [address]    Bipolar I disorder ([facility] [address]    Panic disorder without agoraphobia       Glenn Haas was seen today for generalized body aches, headache, fatigue, nausea and chills. Diagnoses and all orders for this visit:    Encounter for screening laboratory testing for COVID-19 virus  -     POCT COVID-19, Antigen  -     COVID-19 Ambulatory; Future    Upper respiratory tract infection, unspecified type  -     azithromycin (ZITHROMAX) 250 MG tablet; Take 1 tablet by mouth See Admin Instructions for 5 days 500mg on day 1 followed by 250mg on days 2 - 5      OTC meds as needed    Return if symptoms worsen or fail to improve. I spent 15 minutes with this patient. I spent greater than 50% of the time counseling this patient. Joon Freire DO  1/20/2021  11:21 AM

## 2021-01-22 LAB
SARS-COV-2: NOT DETECTED
SOURCE: NORMAL

## 2021-02-02 ENCOUNTER — OFFICE VISIT (OUTPATIENT)
Dept: SURGERY | Age: 56
End: 2021-02-02
Payer: COMMERCIAL

## 2021-02-02 VITALS
DIASTOLIC BLOOD PRESSURE: 82 MMHG | SYSTOLIC BLOOD PRESSURE: 146 MMHG | HEIGHT: 64 IN | BODY MASS INDEX: 20.66 KG/M2 | RESPIRATION RATE: 14 BRPM | HEART RATE: 76 BPM | WEIGHT: 121 LBS | TEMPERATURE: 97.6 F

## 2021-02-02 DIAGNOSIS — Z12.11 ENCOUNTER FOR SCREENING COLONOSCOPY: ICD-10-CM

## 2021-02-02 DIAGNOSIS — R19.03 RLQ ABDOMINAL MASS: Primary | ICD-10-CM

## 2021-02-02 PROCEDURE — 99203 OFFICE O/P NEW LOW 30 MIN: CPT | Performed by: SURGERY

## 2021-02-02 PROCEDURE — 1036F TOBACCO NON-USER: CPT | Performed by: SURGERY

## 2021-02-02 PROCEDURE — G8427 DOCREV CUR MEDS BY ELIG CLIN: HCPCS | Performed by: SURGERY

## 2021-02-02 PROCEDURE — G8482 FLU IMMUNIZE ORDER/ADMIN: HCPCS | Performed by: SURGERY

## 2021-02-02 PROCEDURE — G8420 CALC BMI NORM PARAMETERS: HCPCS | Performed by: SURGERY

## 2021-02-02 PROCEDURE — 3017F COLORECTAL CA SCREEN DOC REV: CPT | Performed by: SURGERY

## 2021-02-02 NOTE — PROGRESS NOTES
[facility] Note    Assessment/Plan:      Diagnosis Orders   1. RLQ abdominal mass  US ABDOMEN LIMITED    I thought this is likely a lipoma. Although she was told it was a hernia in the past.  We will check with an ultrasound prior. 2. Encounter for screening colonoscopy      Plan for colonoscopy. Return for Colonoscopy. Chief Complaint   Patient presents with    Colonoscopy     screening        PCP: Griffin Cheney DO    HPI: Allison Gee is a 50 y.o. female who presents in consultation for colonoscopy. She does have some intermittent diarrhea and constipation based on her diet. There is no melena or hematochezia. She does have a history of ulcers and a remote EGD she states. Stools may be slightly thinner occasionally pending when she eats. Her maternal grandfather had colon cancer at age of 59 and passed at the age of 67 from this. There is no family history of inflammatory bowel disease. She also complains of right lower abdominal lump. She was told previously this was probably a hernia.         Past Medical History:   Diagnosis Date    Anxiety     Bipolar disorder ([facility] [address]     Bipolar 1 disorder    Cervical disc disease     Cervical spondylitis with radiculitis (HCC)     C2, C7    Facet hypertrophy     L4/5 - S1    Headache     Migraine    History of rectal sphincterotomy     Lumbar disc disease     Osteoarthritis     DJD/DDD C-Spine S/P Fusion CHEERLEADER DROPPED ON HEAD/NECK    Panic disorder without agoraphobia     Counselor Destiny Marroquin. [place]    Seizures [facility])        Past Surgical History:   Procedure Laterality Date    BREAST REDUCTION SURGERY Bilateral     Implants Bilaterally    BREAST SURGERY      CERVICAL FUSION      c2-c7 Bone Kevin Grafting 2-6    HYSTERECTOMY      LAPAROSCOPY      X4    LAPAROTOMY      X5 Ovarian Cysts, ZACARIAS-BSO (Non Cancerous)    OTHER SURGICAL HISTORY      Cervical Epidurals    OTHER SURGICAL HISTORY Lumbar Epidural - [address] 17, 17, 17       Prior to Admission medications    Medication Sig Start Date End Date Taking? Authorizing Provider   estradiol (VIVELLE) 0.1 MG/24HR Place 1 patch onto the skin Twice a Week   Yes Historical Provider, MD   topiramate (TOPAMAX) 25 MG tablet Take 25 mg by mouth 2 times daily   Yes Historical Provider, MD   HYDROcodone-acetaminophen (NORCO) 7.5-325 MG per tablet Take 1 tablet by mouth 2 times daily for 30 days. Intended supply: 30 days 21 Yes Ana Ferris DO   HYDROcodone-acetaminophen (NORCO) 7.5-325 MG per tablet Take 1 tablet by mouth every 6 hours as needed for Pain for up to 30 days. Intended supply: 30 days 2/24/21 3/26/21 Yes Verito Spencer DO   metoprolol tartrate (LOPRESSOR) 50 MG tablet Take 1 tablet by mouth 2 times daily 20  Yes Verito Spencer DO   sertraline (ZOLOFT) 50 MG tablet Take 1 tablet by mouth daily 20  Yes Verito Spencer DO   lamoTRIgine (LAMICTAL) 100 MG tablet TAKE 1 TABLET BY MOUTH TWICE DAILY 3/2/20  Yes Kian Ferris DO   QUEtiapine (SEROQUEL) 100 MG tablet TAKE 1 TABLET BY MOUTH TWICE DAILY 3/2/20  Yes Raul Grass Barrington, DO       Allergies   Allergen Reactions    Fish-Derived Products Anaphylaxis and Swelling     Swordfish allergy    Bee Venom      Wasps    Hornet Venom        Social History     Socioeconomic History    Marital status: Single     Spouse name: None    Number of children: None    Years of education: None    Highest education level: None   Occupational History    None   Social Needs    Financial resource strain: None    Food insecurity     Worry: None     Inability: None    Transportation needs     Medical: None     Non-medical: None   Tobacco Use    Smoking status: Former Smoker     Packs/day: 0.50     Years: 1.00     Pack years: 0.50     Quit date:      Years since quittin.0    Smokeless tobacco: Never Used   Substance and Sexual Activity    Alcohol use:  No Comment: Consumes rarely    Drug use: No    Sexual activity: None   Lifestyle    Physical activity     Days per week: None     Minutes per session: None    Stress: None   Relationships    Social connections     Talks on phone: None     Gets together: None     Attends Druze service: None     Active member of club or organization: None     Attends meetings of clubs or organizations: None     Relationship status: None    Intimate partner violence     Fear of current or ex partner: None     Emotionally abused: None     Physically abused: None     Forced sexual activity: None   Other Topics Concern    None   Social History Narrative    None       Family History   Problem Relation Age of Onset    Diabetes Mother    Irene Elias Other Mother         End Stage Cirrhosis 1/2017    Cancer Father         Prostate - Bradley Maynard       Review of Systems   All other systems reviewed and are negative. Objective:  Vitals:    02/02/21 1519   BP: (!) 146/82   Site: Right Upper Arm   Position: Sitting   Cuff Size: Small Adult   Pulse: 76   Resp: 14   Temp: 97.6 °F (36.4 °C)   TempSrc: Temporal   Weight: 121 lb (54.9 kg)   Height: 5' 4\" (1.626 m)          Physical Exam  HENT:      Head: Normocephalic and atraumatic. Eyes:      General:         Right eye: No discharge. Left eye: No discharge. Neck:      Trachea: No tracheal deviation. Cardiovascular:      Rate and Rhythm: Normal rate. Pulmonary:      Effort: Pulmonary effort is normal. No respiratory distress. Abdominal:      General: There is no distension. Palpations: Abdomen is soft. Tenderness: There is no abdominal tenderness. There is no guarding or rebound. Comments: In the right lower quadrant there is approximately 4 cm nonreducible somewhat mobile subcutaneous mass. More consistent with lipoma than hernia. Skin:     General: Skin is warm and dry.    Neurological:      Mental Status: She is alert and oriented to person, place, and time. Charles Hansen MD  2/4/2021    NOTE: This report, in part or full,may have been transcribed using voice recognition software. Every effort was made to ensure accuracy; however, inadvertent computerized transcription errors may be present. Please excuse any transcriptional grammatical or spelling errors that may have escaped my editorial review.     CC: Anette Ontiveros, DO

## 2021-02-03 ENCOUNTER — TELEPHONE (OUTPATIENT)
Dept: SURGERY | Age: 56
End: 2021-02-03

## 2021-02-03 NOTE — TELEPHONE ENCOUNTER
Prior Authorization Form:      DEMOGRAPHICS:                     Patient Name:  Teena Mcbride  Patient :  1965            Insurance:  Payor: [employer] / Plan: [employer] [address] / Product Type: *No Product type* /   Insurance ID Number:    Payor/Plan Subscr  Sex Relation Sub. Ins. ID Effective Group Num   1.  TRAVIS OLIVA 1965 Female Self [id number] 20 [id number]                                   [address]         DIAGNOSIS & PROCEDURE:                       Procedure/Operation: Colonoscopy           CPT Code: 78012    Diagnosis:  Family history of colon cancer    ICD10 Code: K62.5    Location:  [facility]    Surgeon:  Dr Cynthia Suarez INFORMATION:                          Date: 21    Time: 11:00 am              Anesthesia:  [facility]                                                       Status:  Outpatient        Special Comments:         Electronically signed by Dana Ly MA on 2/3/2021 at 3:17 PM

## 2021-02-17 ENCOUNTER — OFFICE VISIT (OUTPATIENT)
Dept: FAMILY MEDICINE CLINIC | Age: 56
End: 2021-02-17
Payer: COMMERCIAL

## 2021-02-17 VITALS
OXYGEN SATURATION: 98 % | DIASTOLIC BLOOD PRESSURE: 78 MMHG | BODY MASS INDEX: 20.83 KG/M2 | TEMPERATURE: 98 F | SYSTOLIC BLOOD PRESSURE: 124 MMHG | HEIGHT: 65 IN | WEIGHT: 125 LBS | HEART RATE: 74 BPM

## 2021-02-17 DIAGNOSIS — R51.9 NONINTRACTABLE HEADACHE, UNSPECIFIED CHRONICITY PATTERN, UNSPECIFIED HEADACHE TYPE: ICD-10-CM

## 2021-02-17 DIAGNOSIS — Z20.822 EXPOSURE TO COVID-19 VIRUS: Primary | ICD-10-CM

## 2021-02-17 LAB
Lab: NORMAL
QC PASS/FAIL: NORMAL
SARS-COV-2, POC: NORMAL

## 2021-02-17 PROCEDURE — 1036F TOBACCO NON-USER: CPT | Performed by: FAMILY MEDICINE

## 2021-02-17 PROCEDURE — 99213 OFFICE O/P EST LOW 20 MIN: CPT | Performed by: FAMILY MEDICINE

## 2021-02-17 PROCEDURE — G8420 CALC BMI NORM PARAMETERS: HCPCS | Performed by: FAMILY MEDICINE

## 2021-02-17 PROCEDURE — 87426 SARSCOV CORONAVIRUS AG IA: CPT | Performed by: FAMILY MEDICINE

## 2021-02-17 PROCEDURE — G8482 FLU IMMUNIZE ORDER/ADMIN: HCPCS | Performed by: FAMILY MEDICINE

## 2021-02-17 PROCEDURE — G8427 DOCREV CUR MEDS BY ELIG CLIN: HCPCS | Performed by: FAMILY MEDICINE

## 2021-02-17 PROCEDURE — 3017F COLORECTAL CA SCREEN DOC REV: CPT | Performed by: FAMILY MEDICINE

## 2021-02-17 RX ORDER — METHYLPREDNISOLONE 4 MG/1
TABLET ORAL
Qty: 1 KIT | Refills: 0 | Status: SHIPPED
Start: 2021-02-17 | End: 2021-02-25

## 2021-02-17 NOTE — PROGRESS NOTES
Chief Complaint   Headache      History of Present Illness   Source of history provided by: patient. Steph Becker is a 50 y.o. old female who has a past medical history of:   Past Medical History:   Diagnosis Date    Anxiety     Bipolar disorder ([facility] [address]     Bipolar 1 disorder    Cervical disc disease     Cervical spondylitis with radiculitis (HCC)     C2, C7    Facet hypertrophy     L4/5 - S1    Headache     Migraine    History of rectal sphincterotomy     Lumbar disc disease     Osteoarthritis     DJD/DDD C-Spine S/P Fusion CHEERLEADER DROPPED ON HEAD/NECK    Panic disorder without agoraphobia     Counselor Destiny Marroquin. [facility])     Presents to the flu clinic for evaluation of patient headache x 2-3 days. States symptoms have been persistent and worsening since onset. Denies any CP, dyspnea, LE edema, abdominal pain, vomiting, rash, or lethargy. Has been taking over-the-counter medications without symptomatic relief. No history of international travel in the past 14 days. Positive contact with individuals with known COVID-19 infection or under investigation for COVID-19 infection. Patient's brother tested +3 days ago and they were exposed on that day. Denies any hx of asthma or COPD. Prior hx of tobacco use. ROS   Pertinent positives and negatives are stated within HPI, all other systems reviewed and are negative. Surgical History:  has a past surgical history that includes Hysterectomy; Breast surgery; cervical fusion; laparoscopy; laparotomy; Breast reduction surgery (Bilateral); other surgical history; and other surgical history. Social History:  reports that she quit smoking about 6 years ago. She has a 0.50 pack-year smoking history. She has never used smokeless tobacco. She reports that she does not drink alcohol or use drugs. Family History: family history includes Cancer in her father; Diabetes in her mother; Other in her mother.   Allergies: Fish-derived products, Bee venom, and Hornet venom    Physical Exam      VS:  /78   Pulse 74   Temp 98 °F (36.7 °C)   Ht 5' 5\" (1.651 m)   Wt 125 lb (56.7 kg)   SpO2 98%   BMI 20.80 kg/m²    Oxygen Saturation Interpretation: Normal.    Constitutional:  Alert, development consistent with age. NAD. Head:  NC/NT. Airway patent. Nose: turbinates with mild erythema, no lesions. Mouth: Posterior pharynx with mild erythema and clear postnasal drip. no tonsillar hypertrophy or exudate. Neck:  Normal ROM. Supple. no anterior cervical adenopathy noted. Lungs: CTAB without wheezes, rales, or rhonchi. CV:  Regular rate and rhythm, normal heart sounds, without pathological murmurs, ectopy, gallops, or rubs. Abdomen: soft, nontender, NABS x 4, no firm or pulsatile masses, no organomegaly, no rebound or guarding. Skin:  Normal turgor. Warm, dry, without visible rash. Lymphatic: No lymphangitis or adenopathy noted unless otherwise specified. Neurological:  Oriented. Motor functions intact. Lab / Imaging Results   (All laboratory and radiology results have been personally reviewed by myself)  Labs:  No results found for this visit on 02/17/21. Imaging: All Radiology results interpreted by Radiologist unless otherwise noted. No results found. Medical Decision Making   Pt non-toxic, in no apparent distress and stable at time of discharge. Assessment/Plan   Kelley Curran was seen today for headache. Diagnoses and all orders for this visit:    Exposure to COVID-19 virus  -     COVID-19 Ambulatory  -     methylPREDNISolone (MEDROL DOSEPACK) 4 MG tablet; Take by mouth. Nonintractable headache, unspecified chronicity pattern, unspecified headache type  -     methylPREDNISolone (MEDROL DOSEPACK) 4 MG tablet; Take by mouth. COVID-19 swab obtained and pending, will call with results once available. Advised cautionary self-quarantine at home in the interim.  Pt should remain out of work for at least 10-14 days from the start of symptoms. Pt should also be fever free for 24 hours and symptoms should be improved overall prior to returning to work. Work excuse provided to patient today. Scripts written for as above, side effects discussed. Increase fluids and rest. Symptomatic relief discussed including Tylenol prn pain/fever. Schedule virtual f/u with PCP in 7-10 days if symptoms persist. ED sooner if symptoms worsen or change. ED immediately with high or refractory fever, progressive SOB, dyspnea, CP, calf pain/swelling, shaking chills, vomiting, abdominal pain, lethargy, flank pain, or decreased urinary output. Pt verbalizes understanding and is in agreement with plan of care. All questions answered. Taurus العراقي, DO    This visit was provided as a focused evaluation during the COVID -19 pandemic/national emergency. A comprehensive review of all previous patient history and testing was not conducted. Pertinent findings were elicited during the visit.

## 2021-02-19 DIAGNOSIS — F41.0 PANIC DISORDER WITHOUT AGORAPHOBIA: ICD-10-CM

## 2021-02-19 LAB
SARS-COV-2: NOT DETECTED
SOURCE: NORMAL

## 2021-02-25 ENCOUNTER — HOSPITAL ENCOUNTER (OUTPATIENT)
Age: 56
Discharge: HOME | End: 2021-02-27
Payer: COMMERCIAL

## 2021-02-25 DIAGNOSIS — Z01.818 PREOP TESTING: ICD-10-CM

## 2021-02-25 PROCEDURE — U0003 INFECTIOUS AGENT DETECTION BY NUCLEIC ACID (DNA OR RNA); SEVERE ACUTE RESPIRATORY SYNDROME CORONAVIRUS 2 (SARS-COV-2) (CORONAVIRUS DISEASE [COVID-19]), AMPLIFIED PROBE TECHNIQUE, MAKING USE OF HIGH THROUGHPUT TECHNOLOGIES AS DESCRIBED BY CMS-2020-01-R: HCPCS

## 2021-02-25 NOTE — PROGRESS NOTES
Have you been tested for COVID  Yes           Have you been told you were positive for COVID No  Have you had any known exposure to someone that is positive for COVID No  Do you have a cough                   No              Do you have shortness of breath No                 Do you have a sore throat            No                Are you having chills                    No                Are you having muscle aches. No                    Please come to the hospital wearing a mask and have your significant other wear a mask as well. Both of you should check your temperature before leaving to come here,  if it is 100 or higher please call [phone] for instruction. Rajat PRE-ADMISSION TESTING INSTRUCTIONS    The Preadmission Testing patient is instructed accordingly using the following criteria (check applicable):    ARRIVAL INSTRUCTIONS:  [x] Parking the day of Surgery is located in the Main Entrance lot. Upon entering the door, make an immediate right to the surgery reception desk    [x] Bring photo ID and insurance card    [] Bring in a copy of Living will or Durable Power of  papers.     [x] Please be sure to arrange for responsible adult to provide transportation to and from the hospital    [x] Please arrange for responsible adult to be with you for the 24 hour period post procedure due to having anesthesia      GENERAL INSTRUCTIONS:    [x] Nothing by mouth after midnight, including gum, candy, mints or water    [x] You may brush your teeth, but do not swallow any water    [] Take medications as instructed with 1-2 oz of water    [x] Stop herbal supplements and vitamins 5 days prior to procedure    [] Follow preop dosing of blood thinners per physician instructions    [] Take 1/2 dose of evening insulin, but no insulin after midnight    [] No oral diabetic medications after midnight [] If diabetic and have low blood sugar or feel symptomatic, take 1-2oz apple juice only    [] Bring inhalers day of surgery    [] Bring C-PAP/ Bi-Pap day of surgery    [] Bring urine specimen day of surgery    [x] Shower or bath with soap, lather and rinse well, AM of Surgery, no lotion, powders or creams to surgical site    [x] Follow bowel prep as instructed per surgeon    [x] No tobacco products within 24 hours of surgery     [x] No alcohol or illegal drug use within 24 hours of surgery.     [x] Jewelry, body piercing's, eyeglasses, contact lenses and dentures are not permitted into surgery (bring cases)      [x] Please do not wear any nail polish, make up or hair products on the day of surgery    [x] You can expect a call the business day prior to procedure to notify you if your arrival time changes    [x] If you receive a survey after surgery we would greatly appreciate your comments    [] Parent/guardian of a minor must accompany their child and remain on the premises  the entire time they are under our care     [] Pediatric patients may bring favorite toy, blanket or comfort item with them    [] A caregiver or family member must remain with the patient during their stay if they are mentally handicapped, have dementia, disoriented or unable to use a call light or would be a safety concern if left unattended    [x] Please notify surgeon if you develop any illness between now and time of surgery (cold, cough, sore throat, fever, nausea, vomiting) or any signs of infections  including skin, wounds, and dental.    []  The Outpatient Pharmacy is available to fill your prescription here on your day of surgery, ask your preop nurse for details    [] Other instructions    EDUCATIONAL MATERIALS PROVIDED:    [] PAT Preoperative Education Packet/Booklet     [] Medication List    [] Transfusion bracelet applied with instructions [] Shower with soap, lather and rinse well, and use CHG wipes provided the evening before surgery as instructed    [] Incentive spirometer with instructions

## 2021-02-27 LAB
SARS-COV-2: NOT DETECTED
SOURCE: NORMAL

## 2021-03-03 ENCOUNTER — HOSPITAL ENCOUNTER (OUTPATIENT)
Age: 56
Setting detail: OUTPATIENT SURGERY
Discharge: HOME | End: 2021-03-03
Attending: SURGERY | Admitting: SURGERY
Payer: COMMERCIAL

## 2021-03-03 ENCOUNTER — ANESTHESIA EVENT (OUTPATIENT)
Dept: ENDOSCOPY | Age: 56
End: 2021-03-03
Payer: COMMERCIAL

## 2021-03-03 ENCOUNTER — ANESTHESIA (OUTPATIENT)
Dept: ENDOSCOPY | Age: 56
End: 2021-03-03
Payer: COMMERCIAL

## 2021-03-03 VITALS
DIASTOLIC BLOOD PRESSURE: 75 MMHG | OXYGEN SATURATION: 100 % | SYSTOLIC BLOOD PRESSURE: 128 MMHG | RESPIRATION RATE: 9 BRPM

## 2021-03-03 VITALS
RESPIRATION RATE: 16 BRPM | DIASTOLIC BLOOD PRESSURE: 57 MMHG | SYSTOLIC BLOOD PRESSURE: 112 MMHG | HEIGHT: 65 IN | OXYGEN SATURATION: 100 % | HEART RATE: 72 BPM | TEMPERATURE: 97.8 F | BODY MASS INDEX: 20.83 KG/M2 | WEIGHT: 125 LBS

## 2021-03-03 DIAGNOSIS — Z01.818 PREOP TESTING: Primary | ICD-10-CM

## 2021-03-03 PROCEDURE — 2580000003 HC RX 258: Performed by: NURSE ANESTHETIST, CERTIFIED REGISTERED

## 2021-03-03 PROCEDURE — 3700000000 HC ANESTHESIA ATTENDED CARE: Performed by: SURGERY

## 2021-03-03 PROCEDURE — 3609027000 HC COLONOSCOPY: Performed by: SURGERY

## 2021-03-03 PROCEDURE — 45378 DIAGNOSTIC COLONOSCOPY: CPT | Performed by: SURGERY

## 2021-03-03 PROCEDURE — 3700000001 HC ADD 15 MINUTES (ANESTHESIA): Performed by: SURGERY

## 2021-03-03 PROCEDURE — 2709999900 HC NON-CHARGEABLE SUPPLY: Performed by: SURGERY

## 2021-03-03 PROCEDURE — 7100000011 HC PHASE II RECOVERY - ADDTL 15 MIN: Performed by: SURGERY

## 2021-03-03 PROCEDURE — 6360000002 HC RX W HCPCS: Performed by: NURSE ANESTHETIST, CERTIFIED REGISTERED

## 2021-03-03 PROCEDURE — 7100000010 HC PHASE II RECOVERY - FIRST 15 MIN: Performed by: SURGERY

## 2021-03-03 RX ORDER — SODIUM CHLORIDE 9 MG/ML
INJECTION, SOLUTION INTRAVENOUS CONTINUOUS PRN
Status: DISCONTINUED | OUTPATIENT
Start: 2021-03-03 | End: 2021-03-03 | Stop reason: SDUPTHER

## 2021-03-03 RX ORDER — PROPOFOL 10 MG/ML
INJECTION, EMULSION INTRAVENOUS PRN
Status: DISCONTINUED | OUTPATIENT
Start: 2021-03-03 | End: 2021-03-03 | Stop reason: SDUPTHER

## 2021-03-03 RX ORDER — MIDAZOLAM HYDROCHLORIDE 1 MG/ML
INJECTION INTRAMUSCULAR; INTRAVENOUS PRN
Status: DISCONTINUED | OUTPATIENT
Start: 2021-03-03 | End: 2021-03-03 | Stop reason: SDUPTHER

## 2021-03-03 RX ADMIN — PROPOFOL 350 MG: 10 INJECTION, EMULSION INTRAVENOUS at 12:55

## 2021-03-03 RX ADMIN — SODIUM CHLORIDE: 9 INJECTION, SOLUTION INTRAVENOUS at 12:51

## 2021-03-03 RX ADMIN — MIDAZOLAM 2 MG: 1 INJECTION INTRAMUSCULAR; INTRAVENOUS at 12:55

## 2021-03-03 ASSESSMENT — PAIN - FUNCTIONAL ASSESSMENT: PAIN_FUNCTIONAL_ASSESSMENT: 0-10

## 2021-03-03 ASSESSMENT — PAIN DESCRIPTION - PAIN TYPE
TYPE: SURGICAL PAIN
TYPE: SURGICAL PAIN

## 2021-03-03 ASSESSMENT — PAIN SCALES - GENERAL
PAINLEVEL_OUTOF10: 0
PAINLEVEL_OUTOF10: 0

## 2021-03-03 ASSESSMENT — LIFESTYLE VARIABLES: SMOKING_STATUS: 1

## 2021-03-03 NOTE — OP NOTE
Colonoscopy Op Note    DATE OF PROCEDURE: 3/3/2021    SURGEON: Familia Walters MD    PREOPERATIVE DIAGNOSIS: Screening colonoscopy with familial history    POSTOPERATIVE DIAGNOSIS: Same, few shallow scattered diverticula, grade 1 hemorrhoids    OPERATION: Procedure(s):  COLORECTAL CANCER SCREENING, HIGH RISK    ANESTHESIA: Local monitored anesthesia. ESTIMATED BLOOD LOSS: nil     COMPLICATIONS: None. SPECIMENS:   * No specimens in log *    HISTORY: The patient is a 50y.o. year old female with history of above preop diagnosis. I recommended colonoscopy with possible biopsy or polypectomy and I explained the risk, benefits, expected outcome, and alternatives to the procedure. Risks included but are not limited to bleeding, infection, respiratory distress, hypotension, and perforation of the colon. The patient understands and is in agreement. PROCEDURE: The patient was given IV conscious sedation per anesthesia. The patient was given supplemental oxygen by nasal cannula. The colonoscope was inserted per rectum and advanced under direct vision to the cecum without difficulty, identified by appendiceal orifice and ileocecal valve. The prep was good so exam was adequate. FINDINGS:    MOLLY: Hemorrhoids    Terminal Ileum: not examined    Cecum/Ascending colon: normal    Transverse colon: normal    Descending/Sigmoid colon: Few shallow scattered diverticula    Rectum/Anus: examined in normal and retroflexed positions and was grade 1 hemorrhoids    The colon was decompressed and the scope was removed. The withdraw time was approximately 10 minutes. The patient tolerated the procedure well. ASSESSMENT/PLAN:   1. Fiber diet  2. Colorectal Cancer Screening - recommend repeat colonoscopy in 5 years (may change pending biopsy results). Sooner if issues/concerns.     Familia Walters MD  03/03/21  1:18 PM

## 2021-03-03 NOTE — ANESTHESIA PRE PROCEDURE
Department of Anesthesiology  Preprocedure Note       Name:  Lakshmi Sullivan   Age:  50 y.o.  :  1965                                          MRN:  [id number]         Date:  3/3/2021      Surgeon: Sae Phipps):  Sabrina Ureña MD    Procedure: Procedure(s):  COLORECTAL CANCER SCREENING, HIGH RISK    Medications prior to admission:   Prior to Admission medications    Medication Sig Start Date End Date Taking? Authorizing Provider   COLLAGEN PO Take by mouth   Yes Historical Provider, MD   Cholecalciferol (VITAMIN D3) 125 MCG (5000 UT) TABS Take by mouth   Yes Historical Provider, MD   sertraline (ZOLOFT) 50 MG tablet TAKE 1 TABLET BY MOUTH DAILY  Patient taking differently: Take 50 mg by mouth nightly  21  Yes Toña Crawford DO   estradiol (VIVELLE) 0.1 MG/24HR Place 1 patch onto the skin Twice a Week   Yes Historical Provider, MD   topiramate (TOPAMAX) 25 MG tablet Take 25 mg by mouth nightly    Yes Historical Provider, MD   HYDROcodone-acetaminophen (NORCO) 7.5-325 MG per tablet Take 1 tablet by mouth every 6 hours as needed for Pain for up to 30 days. Intended supply: 30 days 2/24/21 3/26/21 Yes Toña Crawford DO   lamoTRIgine (LAMICTAL) 100 MG tablet TAKE 1 TABLET BY MOUTH TWICE DAILY  Patient taking differently: nightly TAKE 1 TABLET BY MOUTH TWICE DAILY 3/2/20  Yes Kian Ferris DO   QUEtiapine (SEROQUEL) 100 MG tablet TAKE 1 TABLET BY MOUTH TWICE DAILY  Patient taking differently: 50 mg nightly TAKE 1 TABLET BY MOUTH TWICE DAILY 3/2/20  Yes Carey Martinez DO       Current medications:    No current facility-administered medications for this encounter. Allergies:     Allergies   Allergen Reactions    Bee Venom Anaphylaxis     Wasps    Fish-Derived Products Anaphylaxis and Swelling     Swordfish allergy    Hornet Venom Anaphylaxis       Problem List:    Patient Active Problem List   Diagnosis Code    Convulsions/seizures ([facility] [address] R56.9    [facility] F31.9  Panic disorder without agoraphobia F41.0    Exposure to COVID-19 virus Z20.822       Past Medical History:        Diagnosis Date    Anxiety     Bipolar disorder ([address]     Bipolar 1 disorder    Cervical disc disease     Cervical spondylitis with radiculitis (HCC)     C2, C7    Colon cancer screening     Facet hypertrophy     L4/5 - S1    Headache     Migraine    History of rectal sphincterotomy     Lumbar disc disease     Osteoarthritis     DJD/DDD C-Spine S/P Fusion CHEERLEADER DROPPED ON HEAD/NECK    Panic disorder without agoraphobia     Counselor Dsetiny Paulino    [facility])        Past Surgical History:        Procedure Laterality Date    APPENDECTOMY      BREAST REDUCTION SURGERY Bilateral     Implants Bilaterally    BREAST SURGERY      CERVICAL FUSION      c2-c7 Bone Kevin Grafting 2-6    HYSTERECTOMY      LAPAROSCOPY      X4    LAPAROTOMY      X5 Ovarian Cysts, ZACARIAS-BSO (Non Cancerous)    OTHER SURGICAL HISTORY      Cervical Epidurals    OTHER SURGICAL HISTORY      Lumbar Epidural - [address]elli 17, 17, 17       Social History:    Social History     Tobacco Use    Smoking status: Current Some Day Smoker     Packs/day: 0.25     Years: 1.00     Pack years: 0.25     Last attempt to quit:      Years since quittin.1    Smokeless tobacco: Never Used   Substance Use Topics    Alcohol use:  No                                Ready to quit: Not Answered  Counseling given: Not Answered      Vital Signs (Current):   Vitals:    21 1018   Weight: 125 lb (56.7 kg)   Height: 5' 4.5\" (1.638 m)                                              BP Readings from Last 3 Encounters:   21 124/78   21 (!) 146/82   20 120/76       NPO Status:                                                                                 BMI:   Wt Readings from Last 3 Encounters:   21 125 lb (56.7 kg)   21 125 lb (56.7 kg)   21 121 lb (54.9 kg) Body mass index is 21.12 kg/m². CBC:   Lab Results   Component Value Date    WBC 5.0 11/19/2020    RBC 4.23 11/19/2020    HGB 13.6 11/19/2020    HCT 42.2 11/19/2020    MCV 99.8 11/19/2020    RDW 12.4 11/19/2020     11/19/2020       CMP:   Lab Results   Component Value Date     11/19/2020    K 4.4 11/19/2020     11/19/2020    CO2 18 11/19/2020    BUN 18 11/19/2020    CREATININE 1.1 11/19/2020    GFRAA >60 11/19/2020    LABGLOM 52 11/19/2020    GLUCOSE 74 11/19/2020    GLUCOSE 88 09/24/2011    PROT 7.4 11/19/2020    CALCIUM 9.5 11/19/2020    BILITOT 0.4 11/19/2020    ALKPHOS 70 11/19/2020    AST 24 11/19/2020    ALT 14 11/19/2020       POC Tests: No results for input(s): POCGLU, POCNA, POCK, POCCL, POCBUN, POCHEMO, POCHCT in the last 72 hours.     Coags:   Lab Results   Component Value Date    PROTIME 10.9 09/23/2011    INR 1.1 09/23/2011    APTT 31.0 09/23/2011       HCG (If Applicable):   Lab Results   Component Value Date    PREGSERUM NEGATIVE 09/23/2011        ABGs:   Lab Results   Component Value Date    A2FCTUMZ 99.7 09/23/2011        Type & Screen (If Applicable):  Lab Results   Component Value Date    LABABO O 09/23/2011    [address] POSITIVE 09/23/2011       Drug/Infectious Status (If Applicable):  No results found for: HIV, HEPCAB    COVID-19 Screening (If Applicable):   Lab Results   Component Value Date    COVID19 Not Detected 02/25/2021         Anesthesia Evaluation  Patient summary reviewed no history of anesthetic complications:   Airway: Mallampati: II  TM distance: >3 FB   Neck ROM: full  Mouth opening: > = 3 FB Dental: normal exam         Pulmonary: breath sounds clear to auscultation  (+) current smoker                           Cardiovascular:Negative CV ROS          ECG reviewed  Rhythm: regular  Rate: normal                    Neuro/Psych:   (+) seizures: well controlled, neuromuscular disease:, headaches: migraine headaches, psychiatric history:            GI/Hepatic/Renal: (-) liver disease and no renal disease       Endo/Other:    (+) : arthritis: OA., .    (-) diabetes mellitus, hypothyroidism               Abdominal:         (-) obese     Vascular:                                        Anesthesia Plan      MAC     ASA 3       Induction: intravenous. MIPS: Prophylactic antiemetics administered. Anesthetic plan and risks discussed with patient. Plan discussed with CRNA.                   Danielle Lang MD   3/3/2021

## 2021-03-03 NOTE — ANESTHESIA POSTPROCEDURE EVALUATION
Department of Anesthesiology  Postprocedure Note    Patient: Natividad Logan  MRN: [id number]  YOB: 1965  Date of evaluation: 3/3/2021  Time:  3:55 PM     Procedure Summary     Date: 03/03/21 Room / Location: [facility] 02 / [facility]    Anesthesia Start: 8688 Anesthesia Stop: 3960    Procedure: COLORECTAL CANCER SCREENING, HIGH RISK (N/A ) Diagnosis: (FAMILY HISTORY OF [place])    Surgeons: Juan Francisco Coley MD Responsible Provider: Alexa Leigh MD    Anesthesia Type: MAC ASA Status: 3          Anesthesia Type: MAC    Adelaida Phase I: Adelaida Score: 10    Adelaida Phase II: Adelaida Score: 10    Last vitals: Reviewed and per EMR flowsheets.        Anesthesia Post Evaluation    Patient location during evaluation: PACU  Patient participation: complete - patient participated  Level of consciousness: awake and alert  Airway patency: patent  Nausea & Vomiting: no vomiting and no nausea  Complications: no  Cardiovascular status: hemodynamically stable  Respiratory status: acceptable  Hydration status: stable

## 2021-03-03 NOTE — H&P
[facility] Note     Assessment/Plan:        Diagnosis Orders   1. RLQ abdominal mass  US ABDOMEN LIMITED     I thought this is likely a lipoma. Although she was told it was a hernia in the past.  We will check with an ultrasound prior. 2. Encounter for screening colonoscopy        Plan for colonoscopy.            Return for Colonoscopy.             Chief Complaint   Patient presents with    Colonoscopy       screening          PCP: Tran Aguirre DO     HPI: Esther Contreras is a 50 y.o. female who presents in consultation for colonoscopy. She does have some intermittent diarrhea and constipation based on her diet. There is no melena or hematochezia. She does have a history of ulcers and a remote EGD she states. Stools may be slightly thinner occasionally pending when she eats. Her maternal grandfather had colon cancer at age of 59 and passed at the age of 67 from this. There is no family history of inflammatory bowel disease. She also complains of right lower abdominal lump.   She was told previously this was probably a hernia.           Past Medical History        Past Medical History:   Diagnosis Date    Anxiety      [facility]       Bipolar 1 disorder    Cervical disc disease      Cervical spondylitis with radiculitis ([facility])       C2, C7    Facet hypertrophy       L4/5 - S1    Headache       Migraine    History of rectal sphincterotomy      Lumbar disc disease      Osteoarthritis       DJD/DDD C-Spine S/P Fusion CHEERLEADER DROPPED ON HEAD/NECK    Panic disorder without agoraphobia       Counselor Destiny Marroquin. Lora    [facility])              Past Surgical History         Past Surgical History:   Procedure Laterality Date    BREAST REDUCTION SURGERY Bilateral       Implants Bilaterally    BREAST SURGERY        CERVICAL FUSION         c2-c7 Bone Kevin Grafting 2-6    HYSTERECTOMY        LAPAROSCOPY         X4    LAPAROTOMY      Smoking status: Former Smoker       Packs/day: 0.50       Years: 1.00       Pack years: 0.50       Quit date:        Years since quittin.0    Smokeless tobacco: Never Used   Substance and Sexual Activity    Alcohol use: No       Comment: Consumes rarely    Drug use: No    Sexual activity: None   Lifestyle    Physical activity       Days per week: None       Minutes per session: None    Stress: None   Relationships    Social connections       Talks on phone: None       Gets together: None       Attends Gnosticist service: None       Active member of club or organization: None       Attends meetings of clubs or organizations: None       Relationship status: None    Intimate partner violence       Fear of current or ex partner: None       Emotionally abused: None       Physically abused: None       Forced sexual activity: None   Other Topics Concern    None   Social History Narrative    None            Family History         Family History   Problem Relation Age of Onset    Diabetes Mother      Other Mother           End Stage Cirrhosis 2017    Cancer Father           Prostate - Garrett Maynard            Review of Systems   All other systems reviewed and are negative.                  Objective:  Vitals       Vitals:     21 1519   BP: (!) 146/82   Site: Right Upper Arm   Position: Sitting   Cuff Size: Small Adult   Pulse: 76   Resp: 14   Temp: 97.6 °F (36.4 °C)   TempSrc: Temporal   Weight: 121 lb (54.9 kg)   Height: 5' 4\" (1.626 m)            Physical Exam  HENT:      Head: Normocephalic and atraumatic. Eyes:      General:         Right eye: No discharge. Left eye: No discharge. Neck:      Trachea: No tracheal deviation. Cardiovascular:      Rate and Rhythm: Normal rate. Pulmonary:      Effort: Pulmonary effort is normal. No respiratory distress. Abdominal:      General: There is no distension. Palpations: Abdomen is soft. Tenderness: There is no abdominal tenderness. There is no guarding or rebound. Comments: In the right lower quadrant there is approximately 4 cm nonreducible somewhat mobile subcutaneous mass. More consistent with lipoma than hernia. Skin:     General: Skin is warm and dry. Neurological:      Mental Status: She is alert and oriented to person, place, and time.                      Jocelyne John MD       NOTE: This report, in part or full,may have been transcribed using voice recognition software. Every effort was made to ensure accuracy; however, inadvertent computerized transcription errors may be present. Please excuse any transcriptional grammatical or spelling errors that may have escaped my editorial review.     CC:  Martinez Rosario, DO

## 2021-03-10 ENCOUNTER — OFFICE VISIT (OUTPATIENT)
Dept: PRIMARY CARE CLINIC | Age: 56
End: 2021-03-10
Payer: COMMERCIAL

## 2021-03-10 VITALS
TEMPERATURE: 97.9 F | BODY MASS INDEX: 21.33 KG/M2 | HEIGHT: 65 IN | DIASTOLIC BLOOD PRESSURE: 84 MMHG | OXYGEN SATURATION: 97 % | WEIGHT: 128 LBS | SYSTOLIC BLOOD PRESSURE: 122 MMHG | HEART RATE: 76 BPM

## 2021-03-10 DIAGNOSIS — M50.90 CERVICAL DISC DISEASE: ICD-10-CM

## 2021-03-10 DIAGNOSIS — M48.062 SPINAL STENOSIS OF LUMBAR REGION WITH NEUROGENIC CLAUDICATION: ICD-10-CM

## 2021-03-10 PROCEDURE — 99213 OFFICE O/P EST LOW 20 MIN: CPT | Performed by: FAMILY MEDICINE

## 2021-03-10 PROCEDURE — 4004F PT TOBACCO SCREEN RCVD TLK: CPT | Performed by: FAMILY MEDICINE

## 2021-03-10 PROCEDURE — G8420 CALC BMI NORM PARAMETERS: HCPCS | Performed by: FAMILY MEDICINE

## 2021-03-10 PROCEDURE — G8427 DOCREV CUR MEDS BY ELIG CLIN: HCPCS | Performed by: FAMILY MEDICINE

## 2021-03-10 PROCEDURE — 3017F COLORECTAL CA SCREEN DOC REV: CPT | Performed by: FAMILY MEDICINE

## 2021-03-10 PROCEDURE — G8482 FLU IMMUNIZE ORDER/ADMIN: HCPCS | Performed by: FAMILY MEDICINE

## 2021-03-10 RX ORDER — HYDROCODONE BITARTRATE AND ACETAMINOPHEN 7.5; 325 MG/1; MG/1
1 TABLET ORAL 2 TIMES DAILY
Qty: 60 TABLET | Refills: 0 | Status: SHIPPED | OUTPATIENT
Start: 2021-04-07 | End: 2021-05-07

## 2021-03-10 RX ORDER — CARISOPRODOL 350 MG/1
TABLET ORAL
COMMUNITY
Start: 2021-02-14 | End: 2021-03-11

## 2021-03-10 RX ORDER — ZOLPIDEM TARTRATE 12.5 MG/1
TABLET, FILM COATED, EXTENDED RELEASE ORAL
COMMUNITY
Start: 2021-02-17 | End: 2021-03-16 | Stop reason: SDUPTHER

## 2021-03-10 RX ORDER — HYDROCODONE BITARTRATE AND ACETAMINOPHEN 7.5; 325 MG/1; MG/1
1 TABLET ORAL EVERY 6 HOURS PRN
Qty: 60 TABLET | Refills: 0 | Status: SHIPPED | OUTPATIENT
Start: 2021-03-10 | End: 2021-04-09

## 2021-03-10 RX ORDER — HYDROCODONE BITARTRATE AND ACETAMINOPHEN 7.5; 325 MG/1; MG/1
1 TABLET ORAL 2 TIMES DAILY
Qty: 60 TABLET | Refills: 0 | Status: SHIPPED | OUTPATIENT
Start: 2021-05-05 | End: 2021-06-04

## 2021-03-10 RX ORDER — ALPRAZOLAM 0.5 MG/1
TABLET ORAL
COMMUNITY
Start: 2021-02-17 | End: 2021-03-16 | Stop reason: SDUPTHER

## 2021-03-10 ASSESSMENT — ENCOUNTER SYMPTOMS
VOMITING: 0
COUGH: 0
CONSTIPATION: 0
ABDOMINAL PAIN: 0
BACK PAIN: 1
WHEEZING: 0
DIARRHEA: 0
SHORTNESS OF BREATH: 0
NAUSEA: 0

## 2021-03-10 NOTE — PROGRESS NOTES
3/10/21  Laverne Duckworth : 1965 Sex: female  Age: 50 y.o. Chief Complaint   Patient presents with    Medication Refill     HPI:  50 y.o. female patient presents today for 3 month(s) follow up for chronic medical conditions, medication refills. Patient's chart, medical, surgical and medication history all reviewed. Chronic pain  Patient has long standing history of spinal stenosis. She has a history of cervical spine DDD after accident while cheerleading. Previously had multi-level fusion of cervical spine- now having worsening pain in upper thoracics. She takes Norco and Soma appropriately. Current dosing is effective for patient. She is planning a breast reduction to also try and help with pain. Anxiety  Patient complains of evaluation of anxiety disorder and post traumatic stress  disorder. She has the following anxiety symptoms: difficulty concentrating, feelings of losing control, irritable, racing thoughts. Onset of symptoms was approximately several years ago, stable since that time. She denies current suicidal and homicidal ideation. Previous treatment includes Ativan and no therapy. She complains of the following side effects from the treatment: none. ROS:  Review of Systems   Constitutional: Negative for chills, fatigue and fever. Respiratory: Negative for cough, shortness of breath and wheezing. Cardiovascular: Negative for chest pain and palpitations. Gastrointestinal: Negative for abdominal pain, constipation, diarrhea, nausea and vomiting. Musculoskeletal: Positive for arthralgias, back pain and gait problem. Skin: Negative for rash. Neurological: Negative for dizziness and headaches. Psychiatric/Behavioral: Negative for dysphoric mood. The patient is nervous/anxious. All other systems reviewed and are negative.      Current Outpatient Medications on File Prior to Visit   Medication Sig Dispense Refill    zolpidem (AMBIEN CR) 12.5 MG extended release tablet TAKE 1 TABLET BY MOUTH EVERY NIGHT AS NEEDED FOR SLEEP      carisoprodol (SOMA) 350 MG tablet TAKE 1 TABLET BY MOUTH FOUR TIMES DAILY AS NEEDED FOR MUSCLE SPASMS      ALPRAZolam (XANAX) 0.5 MG tablet TAKE 1 TABLET BY MOUTH THREE TIMES DAILY AS NEEDED FOR SLEEP      COLLAGEN PO Take by mouth      Cholecalciferol (VITAMIN D3) 125 MCG (5000 UT) TABS Take by mouth      sertraline (ZOLOFT) 50 MG tablet TAKE 1 TABLET BY MOUTH DAILY (Patient taking differently: Take 50 mg by mouth nightly ) 90 tablet 1    estradiol (VIVELLE) 0.1 MG/24HR Place 1 patch onto the skin Twice a Week      topiramate (TOPAMAX) 25 MG tablet Take 25 mg by mouth nightly       lamoTRIgine (LAMICTAL) 100 MG tablet TAKE 1 TABLET BY MOUTH TWICE DAILY (Patient taking differently: nightly TAKE 1 TABLET BY MOUTH TWICE DAILY) 180 tablet 5    QUEtiapine (SEROQUEL) 100 MG tablet TAKE 1 TABLET BY MOUTH TWICE DAILY (Patient taking differently: 50 mg nightly TAKE 1 TABLET BY MOUTH TWICE DAILY) 180 tablet 5     No current facility-administered medications on file prior to visit.         Allergies   Allergen Reactions    Bee Venom Anaphylaxis     Wasps    Fish-Derived Products Anaphylaxis and Swelling     Swordfish allergy    Hornet Venom Anaphylaxis       Past Medical History:   Diagnosis Date    Anxiety     Bipolar disorder ([facility] [address]     Bipolar 1 disorder    Cervical disc disease     Cervical spondylitis with radiculitis (HCC)     C2, C7    Colon cancer screening     Facet hypertrophy     L4/5 - S1    Headache     Migraine    History of rectal sphincterotomy     Lumbar disc disease     Osteoarthritis     DJD/DDD C-Spine S/P Fusion CHEERLEADER DROPPED ON HEAD/NECK    Panic disorder without agoraphobia     Counselor Destiny Marroquin. [place]    Seizures [facility])      Past Surgical History:   Procedure Laterality Date    APPENDECTOMY      BREAST REDUCTION SURGERY Bilateral     Implants Bilaterally    BREAST SURGERY      CERVICAL FUSION      c2-c7 Bone Kevin Grafting 2-6    COLONOSCOPY N/A 3/3/2021    COLORECTAL CANCER SCREENING, HIGH RISK performed by Maria C Orozco MD at [address]      X4    LAPAROTOMY      X5 Ovarian Cysts, ZACARIAS-BSO (Non Cancerous)    OTHER SURGICAL HISTORY      Cervical Epidurals    OTHER SURGICAL HISTORY      Lumbar Epidural - [address] 17, 17, 17     Family History   Problem Relation Age of Onset    Diabetes Mother     Other Mother         End Stage Cirrhosis 2017    Cancer Father         Prostate - Luke Maynard     Social History     Socioeconomic History    Marital status: Single     Spouse name: Not on file    Number of children: Not on file    Years of education: Not on file    Highest education level: Not on file   Occupational History    Not on file   Social Needs    Financial resource strain: Not on file    Food insecurity     Worry: Not on file     Inability: Not on file    Transportation needs     Medical: Not on file     Non-medical: Not on file   Tobacco Use    Smoking status: Current Some Day Smoker     Packs/day: 0.25     Years: 1.00     Pack years: 0.25     Last attempt to quit:      Years since quittin.1    Smokeless tobacco: Never Used   Substance and Sexual Activity    Alcohol use: No    Drug use: No    Sexual activity: Not on file   Lifestyle    Physical activity     Days per week: Not on file     Minutes per session: Not on file    Stress: Not on file   Relationships    Social connections     Talks on phone: Not on file     Gets together: Not on file     Attends Yazdanism service: Not on file     Active member of club or organization: Not on file     Attends meetings of clubs or organizations: Not on file     Relationship status: Not on file    Intimate partner violence     Fear of current or ex partner: Not on file     Emotionally abused: Not on file     Physically abused: Not on file     Forced sexual activity: Not on file   Other Topics Concern    Not on file   Social History Narrative    Not on file       Vitals:    03/10/21 1048   BP: 122/84   Pulse: 76   Temp: 97.9 °F (36.6 °C)   SpO2: 97%   Weight: 128 lb (58.1 kg)   Height: 5' 4.5\" (1.638 m)       Physical Exam:  Physical Exam  Vitals signs and nursing note reviewed. Constitutional:       General: She is not in acute distress. Appearance: Normal appearance. She is well-developed and normal weight. She is not ill-appearing. HENT:      Head: Normocephalic and atraumatic. Right Ear: Hearing and external ear normal.      Left Ear: Hearing and external ear normal.      Nose:      Comments: Wearing mask  Eyes:      General: Lids are normal. No scleral icterus. Extraocular Movements: Extraocular movements intact. Conjunctiva/sclera: Conjunctivae normal.   Neck:      Musculoskeletal: Normal range of motion and neck supple. Thyroid: No thyromegaly. Cardiovascular:      Rate and Rhythm: Normal rate and regular rhythm. Heart sounds: Normal heart sounds. No murmur. Pulmonary:      Effort: Pulmonary effort is normal. No respiratory distress. Breath sounds: Normal breath sounds. No wheezing. Musculoskeletal: Normal range of motion. General: No tenderness or deformity. Right lower leg: No edema. Left lower leg: No edema. Lymphadenopathy:      Cervical: No cervical adenopathy. Skin:     General: Skin is warm and dry. Findings: No rash. Neurological:      General: No focal deficit present. Mental Status: She is alert and oriented to person, place, and time. Gait: Gait normal.   Psychiatric:         Mood and Affect: Mood and affect normal.         Speech: Speech normal.         Behavior: Behavior normal.         Thought Content:  Thought content normal.         Labs:  CBC with Differential:    Lab Results   Component Value Date    WBC 5.0 11/19/2020    RBC 4.23 11/19/2020    HGB 13.6 11/19/2020    HCT 42.2 11/19/2020     11/19/2020    MCV 99.8 11/19/2020    MCH 32.2 11/19/2020    MCHC 32.2 11/19/2020    RDW 12.4 11/19/2020    SEGSPCT 53 09/24/2011    LYMPHOPCT 31.7 11/19/2020    MONOPCT 9.3 11/19/2020    BASOPCT 0.4 11/19/2020    MONOSABS 0.46 11/19/2020    LYMPHSABS 1.57 11/19/2020    EOSABS 0.09 11/19/2020    BASOSABS 0.02 11/19/2020     CMP:    Lab Results   Component Value Date     11/19/2020    K 4.4 11/19/2020     11/19/2020    CO2 18 11/19/2020    BUN 18 11/19/2020    CREATININE 1.1 11/19/2020    GFRAA >60 11/19/2020    LABGLOM 52 11/19/2020    GLUCOSE 74 11/19/2020    GLUCOSE 88 09/24/2011    PROT 7.4 11/19/2020    LABALBU 4.4 11/19/2020    LABALBU 4.1 09/23/2011    CALCIUM 9.5 11/19/2020    BILITOT 0.4 11/19/2020    ALKPHOS 70 11/19/2020    AST 24 11/19/2020    ALT 14 11/19/2020     HgBA1c:    Lab Results   Component Value Date    LABA1C 5.0 11/19/2020     FLP:    Lab Results   Component Value Date    TRIG 93 11/19/2020    HDL 91 11/19/2020    LDLCALC 90 11/19/2020    LABVLDL 19 11/19/2020     TSH:    Lab Results   Component Value Date    TSH 1.650 11/19/2020          Assessment and Plan:  Monique Baez was seen today for medication refill. Diagnoses and all orders for this visit:    Spinal stenosis of lumbar region with neurogenic claudication  -     HYDROcodone-acetaminophen (NORCO) 7.5-325 MG per tablet; Take 1 tablet by mouth every 6 hours as needed for Pain for up to 30 days. Intended supply: 30 days  -     HYDROcodone-acetaminophen (NORCO) 7.5-325 MG per tablet; Take 1 tablet by mouth 2 times daily for 30 days. Intended supply: 30 days  -     HYDROcodone-acetaminophen (NORCO) 7.5-325 MG per tablet; Take 1 tablet by mouth 2 times daily for 30 days. Intended supply: 30 days    Cervical disc disease  -     HYDROcodone-acetaminophen (NORCO) 7.5-325 MG per tablet; Take 1 tablet by mouth every 6 hours as needed for Pain for up to 30 days.  Intended supply: 30 days  - HYDROcodone-acetaminophen (NORCO) 7.5-325 MG per tablet; Take 1 tablet by mouth 2 times daily for 30 days. Intended supply: 30 days  -     HYDROcodone-acetaminophen (NORCO) 7.5-325 MG per tablet; Take 1 tablet by mouth 2 times daily for 30 days. Intended supply: 30 days    OARRS reviewed and is appropriate. Doing well overall. Return in about 3 months (around 6/10/2021) for Chronic pain medication refills.       Seen By:  Marguarite Schirmer, DO

## 2021-03-15 RX ORDER — QUETIAPINE FUMARATE 100 MG/1
TABLET, FILM COATED ORAL
Qty: 180 TABLET | Refills: 1 | Status: SHIPPED
Start: 2021-03-15 | End: 2021-06-09 | Stop reason: SDUPTHER

## 2021-03-16 ENCOUNTER — OFFICE VISIT (OUTPATIENT)
Dept: FAMILY MEDICINE CLINIC | Age: 56
End: 2021-03-16
Payer: COMMERCIAL

## 2021-03-16 ENCOUNTER — TELEPHONE (OUTPATIENT)
Dept: SURGERY | Age: 56
End: 2021-03-16

## 2021-03-16 VITALS
SYSTOLIC BLOOD PRESSURE: 126 MMHG | HEIGHT: 65 IN | WEIGHT: 125 LBS | TEMPERATURE: 97.3 F | RESPIRATION RATE: 18 BRPM | OXYGEN SATURATION: 97 % | BODY MASS INDEX: 20.83 KG/M2 | HEART RATE: 72 BPM | DIASTOLIC BLOOD PRESSURE: 78 MMHG

## 2021-03-16 DIAGNOSIS — F41.9 ANXIETY: ICD-10-CM

## 2021-03-16 DIAGNOSIS — F51.01 PRIMARY INSOMNIA: Primary | ICD-10-CM

## 2021-03-16 DIAGNOSIS — M54.2 NECK PAIN: Primary | ICD-10-CM

## 2021-03-16 DIAGNOSIS — M54.50 LUMBAR PAIN: ICD-10-CM

## 2021-03-16 PROCEDURE — 3017F COLORECTAL CA SCREEN DOC REV: CPT | Performed by: PHYSICIAN ASSISTANT

## 2021-03-16 PROCEDURE — G8420 CALC BMI NORM PARAMETERS: HCPCS | Performed by: PHYSICIAN ASSISTANT

## 2021-03-16 PROCEDURE — 4004F PT TOBACCO SCREEN RCVD TLK: CPT | Performed by: PHYSICIAN ASSISTANT

## 2021-03-16 PROCEDURE — 99214 OFFICE O/P EST MOD 30 MIN: CPT | Performed by: PHYSICIAN ASSISTANT

## 2021-03-16 PROCEDURE — G8482 FLU IMMUNIZE ORDER/ADMIN: HCPCS | Performed by: PHYSICIAN ASSISTANT

## 2021-03-16 PROCEDURE — 96372 THER/PROPH/DIAG INJ SC/IM: CPT | Performed by: PHYSICIAN ASSISTANT

## 2021-03-16 PROCEDURE — G8427 DOCREV CUR MEDS BY ELIG CLIN: HCPCS | Performed by: PHYSICIAN ASSISTANT

## 2021-03-16 RX ORDER — ALPRAZOLAM 0.5 MG/1
0.5 TABLET ORAL 3 TIMES DAILY PRN
Qty: 270 TABLET | Refills: 0 | Status: SHIPPED
Start: 2021-03-16 | End: 2021-06-09 | Stop reason: SDUPTHER

## 2021-03-16 RX ORDER — ZOLPIDEM TARTRATE 12.5 MG/1
TABLET, FILM COATED, EXTENDED RELEASE ORAL
Qty: 90 TABLET | OUTPATIENT
Start: 2021-03-16

## 2021-03-16 RX ORDER — METHYLPREDNISOLONE ACETATE 40 MG/ML
40 INJECTION, SUSPENSION INTRA-ARTICULAR; INTRALESIONAL; INTRAMUSCULAR; SOFT TISSUE ONCE
Status: COMPLETED | OUTPATIENT
Start: 2021-03-16 | End: 2021-03-16

## 2021-03-16 RX ORDER — KETOROLAC TROMETHAMINE 30 MG/ML
30 INJECTION, SOLUTION INTRAMUSCULAR; INTRAVENOUS ONCE
Status: COMPLETED | OUTPATIENT
Start: 2021-03-16 | End: 2021-03-16

## 2021-03-16 RX ORDER — ZOLPIDEM TARTRATE 12.5 MG/1
12.5 TABLET, FILM COATED, EXTENDED RELEASE ORAL NIGHTLY PRN
Qty: 90 TABLET | Refills: 0 | Status: SHIPPED
Start: 2021-03-16 | End: 2021-06-09 | Stop reason: SDUPTHER

## 2021-03-16 RX ORDER — PREDNISONE 10 MG/1
TABLET ORAL
Qty: 18 TABLET | Refills: 0 | Status: ON HOLD
Start: 2021-03-16 | End: 2021-04-19 | Stop reason: ALTCHOICE

## 2021-03-16 RX ORDER — ALPRAZOLAM 0.5 MG/1
TABLET ORAL
Qty: 270 TABLET | OUTPATIENT
Start: 2021-03-16

## 2021-03-16 RX ADMIN — METHYLPREDNISOLONE ACETATE 40 MG: 40 INJECTION, SUSPENSION INTRA-ARTICULAR; INTRALESIONAL; INTRAMUSCULAR; SOFT TISSUE at 09:17

## 2021-03-16 RX ADMIN — KETOROLAC TROMETHAMINE 30 MG: 30 INJECTION, SOLUTION INTRAMUSCULAR; INTRAVENOUS at 09:18
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
## 2021-03-16 NOTE — TELEPHONE ENCOUNTER
The pt is calling about the refusals and wanting to know why they were refused.  I see that it say Request already responded to by other means (e.g. phone or fax) was this sent from you because I was trying to find it in the chart to advise the pt but I don't see it

## 2021-03-16 NOTE — TELEPHONE ENCOUNTER
Because controlled substances need to be requested by the patient.   I don't typically refill the ones that come in from [facility]

## 2021-03-16 NOTE — TELEPHONE ENCOUNTER
Herber Lacey is scheduled for excision lipoma abdomen with Dr Mirela Aguirre on 04-19-21 at [facility] at 8:00 am. Patient was told to arrive at 6:00 am. Patient needs to be NPO after midnight the night before procedure. All surgery instructions were explained to the patient and a surgery letter was also mailed out. MA informed patient that PAT will also be calling to review pre-op instructions and medications. Patient verbalized understanding.   Electronically signed by Dianna Mckenna MA on 3/16/2021 at 11:14 AM

## 2021-03-16 NOTE — TELEPHONE ENCOUNTER
Prior Authorization Form:      DEMOGRAPHICS:                     Patient Name:  Issa Rasmussen  Patient :  1965            Insurance:  Payor: [employer] / Plan: [employer] PO BOX  / Product Type: *No Product type* /   Insurance ID Number:    Payor/Plan Subscr  Sex Relation Sub. Ins. ID Effective Group Num   1.  [employer]* PAPA HARMON 1965 Female Self [id number] 20 [id number]                                   [address]         DIAGNOSIS & PROCEDURE:                       Procedure/Operation: Excision lipoma abdomen           CPT Code: 09121    Diagnosis:  Abdominal lipoma    ICD10 Code: R19.3    Location:  [facility]    Surgeon:  Dr Mae Villalpando INFORMATION:                          Date: 21    Time: 7:30 am              Anesthesia:  [facility]                                                       Status:  Outpatient        Special Comments:         Electronically signed by Felecia Sarah MA on 3/16/2021 at 11:14 AM

## 2021-04-01 RX ORDER — LAMOTRIGINE 100 MG/1
TABLET ORAL
Qty: 180 TABLET | Refills: 1 | Status: SHIPPED
Start: 2021-04-01 | End: 2021-06-09 | Stop reason: SDUPTHER

## 2021-04-14 ENCOUNTER — HOSPITAL ENCOUNTER (OUTPATIENT)
Age: 56
Discharge: HOME | End: 2021-04-16
Payer: COMMERCIAL

## 2021-04-14 DIAGNOSIS — Z01.818 PREOP TESTING: ICD-10-CM

## 2021-04-14 PROCEDURE — U0005 INFEC AGEN DETEC AMPLI PROBE: HCPCS

## 2021-04-14 PROCEDURE — U0003 INFECTIOUS AGENT DETECTION BY NUCLEIC ACID (DNA OR RNA); SEVERE ACUTE RESPIRATORY SYNDROME CORONAVIRUS 2 (SARS-COV-2) (CORONAVIRUS DISEASE [COVID-19]), AMPLIFIED PROBE TECHNIQUE, MAKING USE OF HIGH THROUGHPUT TECHNOLOGIES AS DESCRIBED BY CMS-2020-01-R: HCPCS

## 2021-04-15 LAB
SARS-COV-2: NOT DETECTED
SOURCE: NORMAL

## 2021-04-16 ENCOUNTER — ANESTHESIA EVENT (OUTPATIENT)
Dept: OPERATING ROOM | Age: 56
End: 2021-04-16
Payer: COMMERCIAL

## 2021-04-16 NOTE — PROGRESS NOTES
[facility] PRE-ADMISSION TESTING INSTRUCTIONS    The Preadmission Testing patient is instructed accordingly using the following criteria (check applicable):    ARRIVAL INSTRUCTIONS:  [x] Parking the day of Surgery is located in the Main Entrance lot. Upon entering the door, make an immediate right to the surgery reception desk    [x] Bring photo ID and insurance card    [] Bring in a copy of Living will or Durable Power of  papers. [x] Please be sure to arrange transportation to and from the hospital    [x] Please arrange for someone to be with you the remainder of the day due to having anesthesia      GENERAL INSTRUCTIONS:    [x] Nothing by mouth after midnight, including gum, candy, mints or water    [x] You may brush your teeth, but do not swallow any water    [x] Take medications as instructed with 1-2 oz of water    [x] Stop herbal supplements and vitamins 5 days prior to procedure LD 4/16/21    [x] Follow preop dosing of blood thinners per physician instructions    [] Do not take insulin or oral diabetic medications    [] If diabetic and have low blood sugar or feel symptomatic, take 1-2oz apple juice or glucose tablets    [] Bring inhalers day of surgery    [] Bring C-PAP/ Bi-Pap day of surgery    [] Bring urine specimen day of surgery    [x] Antibacterial Soap shower or bath AM of Surgery, no lotion, powders or creams to surgical site    [] Follow bowel prep as instructed per surgeon    [x] No tobacco products within 24 hours of surgery     [x] No alcohol or illegal drug use within 24 hours of surgery.     [x] Jewelry, body piercing's, eyeglasses, contact lenses and dentures are not permitted into surgery (bring cases)      [x] Please do not wear any nail polish or make up on the day of surgery    [x] If not already done, you can expect a call from registration    [x] If surgeon requests a time change you will be notified the day prior to surgery    [] If you receive a survey after surgery we would greatly appreciate your comments    [] Parent/guardian of a minor must accompany their child and remain on the premises  the entire time they are under our care     [] Pediatric patients may bring favorite toy, blanket or comfort item with them    [] A caregiver or family member must remain with the patient during their stay if they are mentally handicapped, have dementia, disoriented or unable to use a call light or would be a safety concern if left unattended    [x] Please notify surgeon if you develop any illness between now and time of surgery (cold, cough, sore throat, fever, nausea, vomiting) or any signs of infections  including skin, wounds, and dental.    [] Other instructions    EDUCATIONAL MATERIALS PROVIDED:    [] PAT Preoperative Education Packet/Booklet     [] Medication List    [] Fluoroscopy Information Pamphlet    [] Transfusion bracelet applied with instructions    [] Joint replacement video reviewed    [] Shower with antibacterial soap and use CHG wipes provided the evening before surgery as instructed

## 2021-04-16 NOTE — PROGRESS NOTES
Have you been tested for COVID  Yes           Have you been told you were positive for COVID No  Have you had any known exposure to someone that is positive for COVID No  Do you have a cough                   No              Do you have shortness of breath No                 Do you have a sore throat            No                Are you having chills                    No                Are you having muscle aches. No                    Please come to the hospital wearing a mask and have your significant other wear a mask as well. Both of you should check your temperature before leaving to come here,  if it is 100 or higher please call [phone] for instruction.

## 2021-04-18 ASSESSMENT — LIFESTYLE VARIABLES: SMOKING_STATUS: 1

## 2021-04-18 NOTE — ANESTHESIA PRE PROCEDURE
extended release tablet Take 1 tablet by mouth nightly as needed for Sleep for up to 90 days. 3/16/21 6/14/21  Ana Ferris DO   HYDROcodone-acetaminophen (NORCO) 7.5-325 MG per tablet Take 1 tablet by mouth 2 times daily for 30 days. Intended supply: 30 days  Patient not taking: Reported on 4/16/2021 5/5/21 6/4/21  Sania Porter DO       Current medications:    No current facility-administered medications for this encounter. Current Outpatient Medications   Medication Sig Dispense Refill    lamoTRIgine (LAMICTAL) 100 MG tablet TAKE 1 TABLET BY MOUTH TWICE DAILY 180 tablet 1    ALPRAZolam (XANAX) 0.5 MG tablet Take 1 tablet by mouth 3 times daily as needed for Anxiety for up to 90 days. 270 tablet 0    QUEtiapine (SEROQUEL) 100 MG tablet TAKE 1 TABLET BY MOUTH TWICE DAILY (Patient taking differently: nightly TAKE 1 TABLET BY MOUTH TWICE DAILY) 180 tablet 1    carisoprodol (SOMA) 350 MG tablet TAKE 1 TABLET BY MOUTH FOUR TIMES DAILY AS NEEDED FOR MUSCLE SPASMS 360 tablet 1    HYDROcodone-acetaminophen (NORCO) 7.5-325 MG per tablet Take 1 tablet by mouth 2 times daily for 30 days. Intended supply: 30 days 60 tablet 0    COLLAGEN PO Take by mouth daily       Cholecalciferol (VITAMIN D3) 125 MCG (5000 UT) TABS Take by mouth      sertraline (ZOLOFT) 50 MG tablet TAKE 1 TABLET BY MOUTH DAILY (Patient taking differently: Take 50 mg by mouth nightly ) 90 tablet 1    estradiol (VIVELLE) 0.1 MG/24HR Place 1 patch onto the skin Twice a Week      topiramate (TOPAMAX) 25 MG tablet Take 25 mg by mouth nightly       predniSONE (DELTASONE) 10 MG tablet 3 tabs once daily for 3 days, 2 tabs once daily for 3 days, 1 tab once daily for 3 days (Patient not taking: Reported on 4/16/2021) 18 tablet 0    zolpidem (AMBIEN CR) 12.5 MG extended release tablet Take 1 tablet by mouth nightly as needed for Sleep for up to 90 days.  90 tablet 0    [START ON 5/5/2021] HYDROcodone-acetaminophen (NORCO) 7.5-325 MG per tablet Take 1 tablet by mouth 2 times daily for 30 days. Intended supply: 30 days (Patient not taking: Reported on 2021) 60 tablet 0       Allergies:     Allergies   Allergen Reactions    Bee Venom Anaphylaxis     Wasps    Fish-Derived Products Anaphylaxis and Swelling     Swordfish allergy    Hornet Venom Anaphylaxis    Iodine        Problem List:    Patient Active Problem List   Diagnosis Code    Convulsions/seizures ([facility]) R56.9    Bipolar I disorder ([address] F31.9    Panic disorder without agoraphobia F41.0    Exposure to COVID-19 virus Z20.822       Past Medical History:        Diagnosis Date    Anxiety     Bipolar disorder ([address]     Bipolar 1 disorder    Cervical disc disease     Cervical spondylitis with radiculitis ([facility] [address]     C2, C7    Facet hypertrophy     L4/5 - S1    Headache     Migraine    History of rectal sphincterotomy     Lumbar disc disease     Osteoarthritis     DJD/DDD C-Spine S/P Fusion CHEERLEADER DROPPED ON HEAD/NECK    Panic disorder without agoraphobia     Counselor Destiny Marroquin. Lora    Seizures ([facility] [address]     NONE SINCE        Past Surgical History:        Procedure Laterality Date    APPENDECTOMY      BREAST REDUCTION SURGERY Bilateral     Implants Bilaterally    BREAST SURGERY      CERVICAL FUSION      c2-c7 Bone Kevin Grafting 2-6    COLONOSCOPY N/A 3/3/2021    COLORECTAL CANCER SCREENING, HIGH RISK performed by Aren Vargas MD at [address]      X4    LAPAROTOMY      X5 Ovarian Cysts, ZACARIAS-BSO (Non Cancerous)    OTHER SURGICAL HISTORY      Cervical Epidurals    OTHER SURGICAL HISTORY      Lumbar Epidural - Donatelli 17, 17, 17       Social History:    Social History     Tobacco Use    Smoking status: Current Some Day Smoker     Packs/day: 0.25     Years: 1.00     Pack years: 0.25     Last attempt to quit:      Years since quittin.2    Smokeless tobacco: Never Used   Substance Use Topics    Alcohol use: No                                Ready to quit: Not Answered  Counseling given: Not Answered      Vital Signs (Current): There were no vitals filed for this visit. BP Readings from Last 3 Encounters:   03/16/21 126/78   03/10/21 122/84   03/03/21 (!) 112/57       NPO Status:                                                                                 BMI:   Wt Readings from Last 3 Encounters:   03/16/21 125 lb (56.7 kg)   03/10/21 128 lb (58.1 kg)   03/03/21 125 lb (56.7 kg)     There is no height or weight on file to calculate BMI.    CBC:   Lab Results   Component Value Date    WBC 5.0 11/19/2020    RBC 4.23 11/19/2020    HGB 13.6 11/19/2020    HCT 42.2 11/19/2020    MCV 99.8 11/19/2020    RDW 12.4 11/19/2020     11/19/2020       CMP:   Lab Results   Component Value Date     11/19/2020    K 4.4 11/19/2020     11/19/2020    CO2 18 11/19/2020    BUN 18 11/19/2020    CREATININE 1.1 11/19/2020    GFRAA >60 11/19/2020    LABGLOM 52 11/19/2020    GLUCOSE 74 11/19/2020    GLUCOSE 88 09/24/2011    PROT 7.4 11/19/2020    CALCIUM 9.5 11/19/2020    BILITOT 0.4 11/19/2020    ALKPHOS 70 11/19/2020    AST 24 11/19/2020    ALT 14 11/19/2020       POC Tests: No results for input(s): POCGLU, POCNA, POCK, POCCL, POCBUN, POCHEMO, POCHCT in the last 72 hours.     Coags:   Lab Results   Component Value Date    PROTIME 10.9 09/23/2011    INR 1.1 09/23/2011    APTT 31.0 09/23/2011       HCG (If Applicable):   Lab Results   Component Value Date    PREGSERUM NEGATIVE 09/23/2011        ABGs:   Lab Results   Component Value Date    P8KGLDGG 99.7 09/23/2011        Type & Screen (If Applicable):  Lab Results   Component Value Date    LABABO O 09/23/2011    [address] POSITIVE 09/23/2011       Drug/Infectious Status (If Applicable):  No results found for: HIV, HEPCAB    COVID-19 Screening (If Applicable):   Lab Results   Component Value Date    COVID19 Not Detected 04/14/2021           Anesthesia Evaluation  Patient summary reviewed no history of anesthetic complications:   Airway: Mallampati: II  TM distance: >3 FB   Neck ROM: full  Mouth opening: > = 3 FB Dental:          Pulmonary: breath sounds clear to auscultation  (+) current smoker                           Cardiovascular:Negative CV ROS          ECG reviewed  Rhythm: regular  Rate: normal                    Neuro/Psych:   (+) seizures:, headaches:, psychiatric history:depression/anxiety  (Bipolar.)             ROS comment: S/P cervical fusion. Lumbar disc disease. GI/Hepatic/Renal: Neg GI/Hepatic/Renal ROS            Endo/Other: Negative Endo/Other ROS                    Abdominal:           Vascular: negative vascular ROS. Anesthesia Plan      MAC     ASA 3     (Pt agrees to [facility]THENS and IV sedation. She agrees to GA if necessary.)  Induction: intravenous. Anesthetic plan and risks discussed with patient. Plan discussed with CRNA.                 Marley Maldonado MD   4/18/2021

## 2021-04-19 ENCOUNTER — ANESTHESIA (OUTPATIENT)
Dept: OPERATING ROOM | Age: 56
End: 2021-04-19
Payer: COMMERCIAL

## 2021-04-19 ENCOUNTER — HOSPITAL ENCOUNTER (OUTPATIENT)
Age: 56
Setting detail: OUTPATIENT SURGERY
Discharge: HOME | End: 2021-04-19
Attending: SURGERY | Admitting: SURGERY
Payer: COMMERCIAL

## 2021-04-19 VITALS
WEIGHT: 125 LBS | HEIGHT: 65 IN | HEART RATE: 64 BPM | TEMPERATURE: 96.3 F | SYSTOLIC BLOOD PRESSURE: 104 MMHG | OXYGEN SATURATION: 96 % | RESPIRATION RATE: 16 BRPM | BODY MASS INDEX: 20.83 KG/M2 | DIASTOLIC BLOOD PRESSURE: 56 MMHG

## 2021-04-19 VITALS — OXYGEN SATURATION: 99 % | DIASTOLIC BLOOD PRESSURE: 65 MMHG | SYSTOLIC BLOOD PRESSURE: 120 MMHG

## 2021-04-19 DIAGNOSIS — Z01.818 PREOP TESTING: Primary | ICD-10-CM

## 2021-04-19 PROCEDURE — 2580000003 HC RX 258: Performed by: SURGERY

## 2021-04-19 PROCEDURE — 3600000012 HC SURGERY LEVEL 2 ADDTL 15MIN: Performed by: SURGERY

## 2021-04-19 PROCEDURE — 6360000002 HC RX W HCPCS: Performed by: SURGERY

## 2021-04-19 PROCEDURE — 6360000002 HC RX W HCPCS

## 2021-04-19 PROCEDURE — 3700000000 HC ANESTHESIA ATTENDED CARE: Performed by: SURGERY

## 2021-04-19 PROCEDURE — 2500000003 HC RX 250 WO HCPCS

## 2021-04-19 PROCEDURE — 21552 EXC NECK LES SC 3 CM/>: CPT | Performed by: SURGERY

## 2021-04-19 PROCEDURE — 3700000001 HC ADD 15 MINUTES (ANESTHESIA): Performed by: SURGERY

## 2021-04-19 PROCEDURE — 7100000011 HC PHASE II RECOVERY - ADDTL 15 MIN: Performed by: SURGERY

## 2021-04-19 PROCEDURE — 2500000003 HC RX 250 WO HCPCS: Performed by: SURGERY

## 2021-04-19 PROCEDURE — 88304 TISSUE EXAM BY PATHOLOGIST: CPT

## 2021-04-19 PROCEDURE — 3600000002 HC SURGERY LEVEL 2 BASE: Performed by: SURGERY

## 2021-04-19 PROCEDURE — 2709999900 HC NON-CHARGEABLE SUPPLY: Performed by: SURGERY

## 2021-04-19 PROCEDURE — 7100000010 HC PHASE II RECOVERY - FIRST 15 MIN: Performed by: SURGERY

## 2021-04-19 RX ORDER — MIDAZOLAM HYDROCHLORIDE 1 MG/ML
INJECTION INTRAMUSCULAR; INTRAVENOUS PRN
Status: DISCONTINUED | OUTPATIENT
Start: 2021-04-19 | End: 2021-04-19 | Stop reason: SDUPTHER

## 2021-04-19 RX ORDER — BUPIVACAINE HYDROCHLORIDE AND EPINEPHRINE 2.5; 5 MG/ML; UG/ML
INJECTION, SOLUTION EPIDURAL; INFILTRATION; INTRACAUDAL; PERINEURAL PRN
Status: DISCONTINUED | OUTPATIENT
Start: 2021-04-19 | End: 2021-04-19 | Stop reason: ALTCHOICE

## 2021-04-19 RX ORDER — LIDOCAINE HYDROCHLORIDE AND EPINEPHRINE 10; 10 MG/ML; UG/ML
INJECTION, SOLUTION INFILTRATION; PERINEURAL PRN
Status: DISCONTINUED | OUTPATIENT
Start: 2021-04-19 | End: 2021-04-19 | Stop reason: ALTCHOICE

## 2021-04-19 RX ORDER — PROPOFOL 10 MG/ML
INJECTION, EMULSION INTRAVENOUS CONTINUOUS PRN
Status: DISCONTINUED | OUTPATIENT
Start: 2021-04-19 | End: 2021-04-19 | Stop reason: SDUPTHER

## 2021-04-19 RX ORDER — SODIUM CHLORIDE 9 MG/ML
25 INJECTION, SOLUTION INTRAVENOUS PRN
Status: DISCONTINUED | OUTPATIENT
Start: 2021-04-19 | End: 2021-04-19 | Stop reason: HOSPADM

## 2021-04-19 RX ORDER — SODIUM CHLORIDE 0.9 % (FLUSH) 0.9 %
10 SYRINGE (ML) INJECTION EVERY 12 HOURS SCHEDULED
Status: DISCONTINUED | OUTPATIENT
Start: 2021-04-19 | End: 2021-04-19 | Stop reason: HOSPADM

## 2021-04-19 RX ORDER — FENTANYL CITRATE 50 UG/ML
INJECTION, SOLUTION INTRAMUSCULAR; INTRAVENOUS PRN
Status: DISCONTINUED | OUTPATIENT
Start: 2021-04-19 | End: 2021-04-19 | Stop reason: SDUPTHER

## 2021-04-19 RX ORDER — SODIUM CHLORIDE 0.9 % (FLUSH) 0.9 %
10 SYRINGE (ML) INJECTION PRN
Status: DISCONTINUED | OUTPATIENT
Start: 2021-04-19 | End: 2021-04-19 | Stop reason: HOSPADM

## 2021-04-19 RX ORDER — LIDOCAINE HYDROCHLORIDE 20 MG/ML
INJECTION, SOLUTION EPIDURAL; INFILTRATION; INTRACAUDAL; PERINEURAL PRN
Status: DISCONTINUED | OUTPATIENT
Start: 2021-04-19 | End: 2021-04-19 | Stop reason: SDUPTHER

## 2021-04-19 RX ADMIN — PROPOFOL 150 MCG/KG/MIN: 10 INJECTION, EMULSION INTRAVENOUS at 07:28

## 2021-04-19 RX ADMIN — MIDAZOLAM 2 MG: 1 INJECTION INTRAMUSCULAR; INTRAVENOUS at 07:08

## 2021-04-19 RX ADMIN — SODIUM CHLORIDE: 9 INJECTION, SOLUTION INTRAVENOUS at 06:01

## 2021-04-19 RX ADMIN — Medication 2000 MG: at 07:23

## 2021-04-19 RX ADMIN — PROPOFOL 100 MCG/KG/MIN: 10 INJECTION, EMULSION INTRAVENOUS at 07:23

## 2021-04-19 RX ADMIN — FENTANYL CITRATE 50 MCG: 50 INJECTION, SOLUTION INTRAMUSCULAR; INTRAVENOUS at 07:25

## 2021-04-19 RX ADMIN — LIDOCAINE HYDROCHLORIDE 40 MG: 20 INJECTION, SOLUTION EPIDURAL; INFILTRATION; INTRACAUDAL; PERINEURAL at 07:23

## 2021-04-19 ASSESSMENT — PAIN DESCRIPTION - DESCRIPTORS: DESCRIPTORS: DISCOMFORT

## 2021-04-19 NOTE — H&P
111 [facility] Note     Assessment/Plan:        Diagnosis Orders   1. RLQ abdominal mass  US ABDOMEN LIMITED     I thought this is likely a lipoma.  Although she was told it was a hernia in the past. Duarte Munson will check with an ultrasound prior. 2. Encounter for screening colonoscopy        Plan for colonoscopy.            Return for Colonoscopy.                Chief Complaint   Patient presents with    Colonoscopy       screening          PCP: Ana Ferris DO     HPI: Kayleen Rizvi a 50 y. o. female who presents in consultation for colonoscopy.  She does have some intermittent diarrhea and constipation based on her diet. Khris Gabriel is no melena or hematochezia.  She does have a history of ulcers and a remote EGD she states.  Stools may be slightly thinner occasionally pending when she eats.  Her maternal grandfather had colon cancer at age of 59 and passed at the age of 67 from this. Khris Gabriel is no family history of inflammatory bowel disease.  She also complains of right lower abdominal lump. Kvng Sanchez was told previously this was probably a hernia.           Past Medical History           Past Medical History:   Diagnosis Date    Anxiety      Bipolar disorder (HCC)       Bipolar 1 disorder    Cervical disc disease      Cervical spondylitis with radiculitis (HCC)       C2, C7    Facet hypertrophy       L4/5 - S1    Headache       Migraine    History of rectal sphincterotomy      Lumbar disc disease      Osteoarthritis       DJD/DDD C-Spine S/P Fusion CHEERLEADER DROPPED ON HEAD/NECK    Panic disorder without agoraphobia       Counselor Destiny Marroquin. Lora Devries [facility])              Past Surgical History             Past Surgical History:   Procedure Laterality Date    BREAST REDUCTION SURGERY Bilateral       Implants Bilaterally    BREAST SURGERY        CERVICAL FUSION         c2-c7 Bone Kevin Grafting 2-6    HYSTERECTOMY        LAPAROSCOPY         X4    LAPAROTOMY     Tobacco Use    Smoking status: Former Smoker       Packs/day: 0.50       Years: 1.00       Pack years: 0.50       Quit date:        Years since quittin.0    Smokeless tobacco: Never Used   Substance and Sexual Activity    Alcohol use: No       Comment: Consumes rarely    Drug use: No    Sexual activity: None   Lifestyle    Physical activity       Days per week: None       Minutes per session: None    Stress: None   Relationships    Social connections       Talks on phone: None       Gets together: None       Attends Hindu service: None       Active member of club or organization: None       Attends meetings of clubs or organizations: None       Relationship status: None    Intimate partner violence       Fear of current or ex partner: None       Emotionally abused: None       Physically abused: None       Forced sexual activity: None   Other Topics Concern    None   Social History Narrative    None            Family History             Family History   Problem Relation Age of Onset    Diabetes Mother      Other Mother           End Stage Cirrhosis 2017    Cancer Father           Prostate - Angelo Maynard            Review of Systems   All other systems reviewed and are negative.                  Objective:  Vitals         Vitals:     21 1519   BP: (!) 146/82   Site: Right Upper Arm   Position: Sitting   Cuff Size: Small Adult   Pulse: 76   Resp: 14   Temp: 97.6 °F (36.4 °C)   TempSrc: Temporal   Weight: 121 lb (54.9 kg)   Height: 5' 4\" (1.626 m)            Physical Exam  HENT:      Head: Normocephalic and atraumatic. Eyes:      General:         Right eye: No discharge.         Left eye: No discharge. Neck:      Trachea: No tracheal deviation. Cardiovascular:      Rate and Rhythm: Normal rate. Pulmonary:      Effort: Pulmonary effort is normal. No respiratory distress. Abdominal:      General: There is no distension.      Palpations: Abdomen is soft.      Tenderness:  There is no abdominal tenderness. There is no guarding or rebound.      Comments: In the right lower quadrant there is approximately 4 cm nonreducible somewhat mobile subcutaneous mass.  More consistent with lipoma than hernia.   Skin:     General: Skin is warm and dry. Neurological:      Mental Status: She is alert and oriented to person, place, and time.                      West Felipe MD        NOTE: This report, in part or full,may have been transcribed using voice recognition software. Every effort was made to ensure accuracy; however, inadvertent computerized transcription errors may be present. Please excuse any transcriptional grammatical or spelling errors that may have escaped my editorial review.     CC: Ana Ferris DO        Addendum: Likely lipoma, as shown on US. Will plan for removal. If indeed hernia found, also discussed repair.

## 2021-04-19 NOTE — ANESTHESIA POSTPROCEDURE EVALUATION
Department of Anesthesiology  Postprocedure Note    Patient: Augusto Garvin  MRN: [id number]  YOB: 1965  Date of evaluation: 4/19/2021  Time:  1:09 PM     Procedure Summary     Date: 04/19/21 Room / Location: [facility] OR 03 / [facility]    Anesthesia Start: 6109 Anesthesia Stop: 7412    Procedure: EXCISION LIPOMA ABDOMEN   +++IODINE ALLERGY+++ (N/A ) Diagnosis: (LIPOMA ABDOMEN)    Surgeons: Glory Barillas MD Responsible Provider: Finesse Bell MD    Anesthesia Type: MAC ASA Status: 3          Anesthesia Type: MAC    Adelaida Phase I: Adelaida Score: 10    Adelaida Phase II: Adelaida Score: 10    Last vitals: Reviewed and per EMR flowsheets.        Anesthesia Post Evaluation    Patient location during evaluation: PACU  Level of consciousness: awake  Airway patency: patent  Nausea & Vomiting: no nausea and no vomiting  Complications: no  Cardiovascular status: hemodynamically stable  Respiratory status: acceptable

## 2021-04-19 NOTE — OP NOTE
Operative Note    Jenny Rodriguez  YOB: 1965  [id number]    Pre-operative Diagnosis: LIPOMA ABDOMEN    Post-operative Diagnosis: LIPOMA ABDOMEN    Procedure(s):  EXCISION LIPOMA ABDOMEN   +++IODINE ALLERGY+++    * No implants in log *      Surgeon:   Primary: Cathren Meigs, MD    Staff:  Scrub Person First: Itzel Sen    Anesthesia:   Monitor Anesthesia Care    Estimated Blood Loss: less than 50     Complications: None    Specimens:   ID Type Source Tests Collected by Time Destination   A : ABDOMINAL WALL LIPOMA Tissue Tissue SURGICAL PATHOLOGY Cathren Meigs, MD 4/19/2021 0732        Findings: 3.5 x 3 cm lipoma      Indications: Patient is a 50 y.o. female who was diagnosed with lipoma of the right lower quadrant. Risks/Benefits/Alternatives were discussed with the patient, including bleeding, infection, iatrogenic injury to other structures. The patient agreed to undergo the procedure and informed consent was obtained. Procedure: After informed consent, the patient was brought to the operating room and placed supine. MAC anesthesia was then induced which the patient tolerated well. Time out was performed to identify the correct patient and procedure. They received appropriate perioperative antibiotics. The abdomen was prepped and draped in the usual sterile fashion. Quarter percent Marcaine with epinephrine was infiltrated around the lesion. Transverse incision was made. Cautery was used down through subcutaneous tissue. The lesion was identified. It appeared to be lipomatous. It was easily dissected out from surrounding tissue. Once this was done it was removed. Measurements were as above. Wound bed was inspected. There is no connection down to the fascia and was limited to the subcutaneous tissue. Hemostasis was ensured with cautery. The wound was irrigated. 3-0 Vicryl was used to close the subcutaneous tissue and the dermal layers. Skin was closed with 4-0 Monocryl.   Skin glue was applied. Counts were correct at the end of the case. Tolerated procedure well    I was present and scrubbed for the procedure.     Elzbieta Rasmussen MD  04/19/21  7:50 AM

## 2021-05-05 ENCOUNTER — TELEPHONE (OUTPATIENT)
Dept: SURGERY | Age: 56
End: 2021-05-05

## 2021-05-10 ENCOUNTER — TELEPHONE (OUTPATIENT)
Dept: SURGERY | Age: 56
End: 2021-05-10

## 2021-05-10 NOTE — TELEPHONE ENCOUNTER
MA called and left a message for the patient to call the office back to reschedule her post op appointment from 5/4/21.     Electronically signed by Iraida Blackwell on 5/10/21 at 2:36 PM EDT

## 2021-05-14 ENCOUNTER — TELEPHONE (OUTPATIENT)
Dept: SURGERY | Age: 56
End: 2021-05-14

## 2021-05-14 NOTE — TELEPHONE ENCOUNTER
----- Message from Young Parker MD sent at 5/14/2021 11:05 AM EDT -----  Regarding: RE: Colonoscopy from 3/3/21  I have nothing in my notes or op note about diverticulitis and my diagnosis and billing code was entered as screening with preventative services. Nothing further can be done on my end. That is an issue with the billing department and coding.  ----- Message -----  From: Ronda Arauz  Sent: 5/14/2021  10:00 AM EDT  To: Young Parker MD  Subject: Colonoscopy from 3/3/21                          MA got a call from the patient stating she got a bill from her colonoscopy on 3/3/21. Patient stated she went in for a screening and family history and the colonoscopy got coded for diverticulitis. Patient was wondering if it was coded wrong? Patient talked to the billing department and they told her that you would have to fix the coding on that. I saw on the note that there was a few shallow scattered diverticula found. Can you double check this for me?

## 2021-06-01 ENCOUNTER — OFFICE VISIT (OUTPATIENT)
Dept: SURGERY | Age: 56
End: 2021-06-01

## 2021-06-01 VITALS
TEMPERATURE: 98.2 F | HEIGHT: 65 IN | RESPIRATION RATE: 16 BRPM | BODY MASS INDEX: 20.83 KG/M2 | SYSTOLIC BLOOD PRESSURE: 114 MMHG | WEIGHT: 125 LBS | HEART RATE: 77 BPM | DIASTOLIC BLOOD PRESSURE: 71 MMHG | OXYGEN SATURATION: 97 %

## 2021-06-01 DIAGNOSIS — Z86.018 S/P EXCISION OF LIPOMA: Primary | ICD-10-CM

## 2021-06-01 DIAGNOSIS — Z98.890 S/P EXCISION OF LIPOMA: Primary | ICD-10-CM

## 2021-06-01 PROCEDURE — 99024 POSTOP FOLLOW-UP VISIT: CPT | Performed by: SURGERY

## 2021-06-02 NOTE — PROGRESS NOTES
111 [facility] Surgery Clinic Note    Assessment/Plan:     Diagnosis Orders   1. S/P excision of lipoma      Doing well. No issues. Return if symptoms worsen or fail to improve. Chief Complaint   Patient presents with    Post-Op Check     doing good, no issues. PCP: Jacobo Boss DO    HPI: Lucy Maldonado is a 50 y.o. female here follow-up of lipoma excision. She is doing well. She has no pain or discomfort at the surgical site. There is no swelling or redness. There is no drainage. Pathology was reviewed and consistent with lipoma    Review of Systems   All other systems reviewed and are negative. The remainder of the past medical, past surgical, family, and psychosocial history, as well as medication and allergy review, were completed and are as documented elsewhere in the chart. Objective:  Vitals:    06/01/21 1539   BP: 114/71   Pulse: 77   Resp: 16   Temp: 98.2 °F (36.8 °C)   TempSrc: Temporal   SpO2: 97%   Weight: 125 lb (56.7 kg)   Height: 5' 4.5\" (1.638 m)          Physical Exam  Constitutional:       General: She is not in acute distress. Appearance: She is not diaphoretic. Cardiovascular:      Rate and Rhythm: Normal rate. Pulmonary:      Effort: Pulmonary effort is normal. No respiratory distress. Abdominal:      General: There is no distension. Palpations: Abdomen is soft. Tenderness: There is no abdominal tenderness. There is no guarding or rebound. Comments: Incision clean dry and intact               Diandra Puckett MD  6/2/2021    NOTE: This report, in part or full, may have been transcribed using voice recognition software. Every effort was made to ensure accuracy; however, inadvertent computerized transcription errors may be present. Please excuse any transcriptional grammatical or spelling errors that may have escaped my editorial review.       CC: Jacobo Boss DO

## 2021-06-08 PROBLEM — Z20.822 EXPOSURE TO COVID-19 VIRUS: Status: RESOLVED | Noted: 2021-02-17 | Resolved: 2021-06-08

## 2021-06-08 ASSESSMENT — ENCOUNTER SYMPTOMS
BACK PAIN: 1
COUGH: 0
ABDOMINAL PAIN: 0
SHORTNESS OF BREATH: 0
DIARRHEA: 0
VOMITING: 0
WHEEZING: 0
NAUSEA: 0
CONSTIPATION: 0

## 2021-06-09 ENCOUNTER — OFFICE VISIT (OUTPATIENT)
Dept: PRIMARY CARE CLINIC | Age: 56
End: 2021-06-09
Payer: COMMERCIAL

## 2021-06-09 VITALS
OXYGEN SATURATION: 98 % | TEMPERATURE: 97.9 F | HEART RATE: 85 BPM | WEIGHT: 128 LBS | HEIGHT: 65 IN | DIASTOLIC BLOOD PRESSURE: 80 MMHG | SYSTOLIC BLOOD PRESSURE: 124 MMHG | BODY MASS INDEX: 21.33 KG/M2

## 2021-06-09 DIAGNOSIS — F41.9 ANXIETY: ICD-10-CM

## 2021-06-09 DIAGNOSIS — F51.01 PRIMARY INSOMNIA: ICD-10-CM

## 2021-06-09 DIAGNOSIS — R20.0 NUMBNESS AND TINGLING IN BOTH HANDS: ICD-10-CM

## 2021-06-09 DIAGNOSIS — F41.0 PANIC DISORDER WITHOUT AGORAPHOBIA: ICD-10-CM

## 2021-06-09 DIAGNOSIS — M50.90 CERVICAL DISC DISEASE: ICD-10-CM

## 2021-06-09 DIAGNOSIS — G43.709 CHRONIC MIGRAINE WITHOUT AURA WITHOUT STATUS MIGRAINOSUS, NOT INTRACTABLE: ICD-10-CM

## 2021-06-09 DIAGNOSIS — F31.9 BIPOLAR I DISORDER (HCC): ICD-10-CM

## 2021-06-09 DIAGNOSIS — M48.062 SPINAL STENOSIS OF LUMBAR REGION WITH NEUROGENIC CLAUDICATION: Primary | ICD-10-CM

## 2021-06-09 DIAGNOSIS — R56.9 CONVULSIONS, UNSPECIFIED CONVULSION TYPE (HCC): Chronic | ICD-10-CM

## 2021-06-09 DIAGNOSIS — R20.2 NUMBNESS AND TINGLING IN BOTH HANDS: ICD-10-CM

## 2021-06-09 PROCEDURE — 99214 OFFICE O/P EST MOD 30 MIN: CPT | Performed by: FAMILY MEDICINE

## 2021-06-09 RX ORDER — LAMOTRIGINE 100 MG/1
TABLET ORAL
Qty: 180 TABLET | Refills: 1 | Status: SHIPPED
Start: 2021-06-09 | End: 2021-12-03

## 2021-06-09 RX ORDER — ZOLPIDEM TARTRATE 12.5 MG/1
12.5 TABLET, FILM COATED, EXTENDED RELEASE ORAL NIGHTLY PRN
Qty: 90 TABLET | Refills: 0 | Status: SHIPPED | OUTPATIENT
Start: 2021-06-09 | End: 2021-09-07

## 2021-06-09 RX ORDER — HYDROCODONE BITARTRATE AND ACETAMINOPHEN 7.5; 325 MG/1; MG/1
1 TABLET ORAL EVERY 12 HOURS PRN
Qty: 60 TABLET | Refills: 0 | Status: SHIPPED
Start: 2021-06-09 | End: 2021-09-08 | Stop reason: SDUPTHER

## 2021-06-09 RX ORDER — HYDROCODONE BITARTRATE AND ACETAMINOPHEN 7.5; 325 MG/1; MG/1
1 TABLET ORAL EVERY 12 HOURS PRN
Qty: 60 TABLET | Refills: 0 | Status: SHIPPED
Start: 2021-07-07 | End: 2021-09-08 | Stop reason: SDUPTHER

## 2021-06-09 RX ORDER — ALPRAZOLAM 0.5 MG/1
0.5 TABLET ORAL 3 TIMES DAILY PRN
Qty: 270 TABLET | Refills: 0 | Status: SHIPPED
Start: 2021-06-09 | End: 2021-09-08 | Stop reason: SDUPTHER

## 2021-06-09 RX ORDER — CARISOPRODOL 350 MG/1
TABLET ORAL
Qty: 360 TABLET | Refills: 1 | Status: SHIPPED
Start: 2021-06-09 | End: 2021-09-09 | Stop reason: SDUPTHER

## 2021-06-09 RX ORDER — QUETIAPINE FUMARATE 100 MG/1
TABLET, FILM COATED ORAL
Qty: 180 TABLET | Refills: 1 | Status: SHIPPED
Start: 2021-06-09 | End: 2021-12-03

## 2021-06-09 RX ORDER — HYDROCODONE BITARTRATE AND ACETAMINOPHEN 7.5; 325 MG/1; MG/1
1 TABLET ORAL EVERY 12 HOURS PRN
Qty: 60 TABLET | Refills: 0 | Status: SHIPPED
Start: 2021-08-04 | End: 2021-09-08 | Stop reason: SDUPTHER

## 2021-06-09 RX ORDER — TOPIRAMATE 25 MG/1
50 TABLET ORAL NIGHTLY
Qty: 180 TABLET | Refills: 1 | Status: SHIPPED
Start: 2021-06-09 | End: 2021-09-08

## 2021-06-15 ENCOUNTER — OFFICE VISIT (OUTPATIENT)
Dept: NEUROLOGY | Age: 56
End: 2021-06-15
Payer: COMMERCIAL

## 2021-06-15 VITALS
HEIGHT: 65 IN | WEIGHT: 125 LBS | RESPIRATION RATE: 16 BRPM | SYSTOLIC BLOOD PRESSURE: 126 MMHG | DIASTOLIC BLOOD PRESSURE: 76 MMHG | BODY MASS INDEX: 20.83 KG/M2 | TEMPERATURE: 97.7 F | OXYGEN SATURATION: 99 % | HEART RATE: 69 BPM

## 2021-06-15 DIAGNOSIS — R20.2 NUMBNESS AND TINGLING IN BOTH HANDS: ICD-10-CM

## 2021-06-15 DIAGNOSIS — R20.0 NUMBNESS AND TINGLING IN BOTH HANDS: ICD-10-CM

## 2021-06-15 DIAGNOSIS — M50.90 CERVICAL DISC DISEASE: ICD-10-CM

## 2021-06-15 PROCEDURE — 95886 MUSC TEST DONE W/N TEST COMP: CPT | Performed by: PSYCHIATRY & NEUROLOGY

## 2021-06-15 PROCEDURE — 95910 NRV CNDJ TEST 7-8 STUDIES: CPT | Performed by: PSYCHIATRY & NEUROLOGY

## 2021-06-15 NOTE — PROGRESS NOTES
[address] [facility]  Electrodiagnostic Laboratory  *Accredited by the [facility] with exemplary status  [address] [facility]  Phone: [phone]  Fax: [phone]    Referring Provider: Ruthie Hernandez DO  Primary Care Physician: Yokasta Cabrera DO  Patient Name: Augusto Garvin  Patient YOB: 1965  Gender: female  BMI: Body mass index is 20.8 kg/m². Blood pressure 126/76, pulse 69, temperature 97.7 °F (36.5 °C), temperature source Temporal, resp. rate 16, height 5' 5\" (1.651 m), weight 125 lb (56.7 kg), SpO2 99 %, not currently breastfeeding. 6/15/2021    Description of clinical problem: The patient is coming in with numbness tingling and pain in bilateral hands she has a past medical history of cervical spine fracture status post multilevel fusion. Chief Complaint   Patient presents with    Procedure     EMG BUE     Pain Yes   ; Numbness/tingling  Yes; Weakness  No       Brief physical exam:   Sensory deficit No; Weakness No; Atrophy  No; Reflex abnormality No  Motor NCS Upper      Nerve / Sites Onset Amp. 1-2 Dist Roge Temp. Amp. 1-2 d Lat. ms mV cm m/s °C % ms   R MEDIAN - APB      Wrist 4.11 8.4 8  32.1 100 4.11      Elbow 7.76 7.2 21 57.6 32.2 85.3 3.65   L MEDIAN - APB      Wrist 3.70 7.7 8  32.1 100 3.70      Elbow 7.81 6.5 21 51.0 32.1 84.3 4.11   R ULNAR - ADM      Wrist 3.13 14.7 8  32.4 100 3.13      B. Elbow 6.61 12.6 21 60.2 32.4 85.4 3.49      A. Elbow 8.13 11.5 10 66.2 32.2 77.9 1.51     Sensory NCS Upper      Nerve / Sites Onset Peak PP Amp Dist Roge Temp. Amp. 1-2    ms ms µV cm m/s °C %   R MEDIAN - Dig II      Mid Plam 1.41 2.24 36.4 7 49.8 32.2 100      Wrist 2.92 3.80 27.3 14 48.0 32.2 75.7   L MEDIAN - Dig II      Mid Plam 1.51 2.40 74.6 7 46.3 32.1 100      Wrist 3.13 4.01 53.6 14 44.8 32.1 67.4   R ULNAR - Dig V      Wrist 2.92 3.80 41.7 14 48.0 32.2 100   R RADIAL - Thumb      Forearm 1.88 2.50 39.1 10 53.3 32.4 100     F  Wave      Nerve Fmin % F ms %   R MEDIAN 26.98 0   R ULNAR 27.45 0   L MEDIAN 27.08 0       Needle EMG      EMG Summary Table     Spontaneous MUAP Recruitment    IA Fib PSW Fasc H.F. Amp Dur. PPP Pattern   R. FIRST D INTEROSS N None None None None N N N N   R. PRON TERES N None None None None N N N N   R. BICEPS N None None None None N N N N   R. TRICEPS N None None None None N N N N   R. DELTOID N None None None None N N N N   L. FIRST D INTEROSS N None None None None N N N N   L. PRON TERES N None None None None N N N N   L. BICEPS N None None None None N N N N   L. TRICEPS N None None None None N N N N   L. DELTOID N None None None None N N N N       Study Limitations:  None    Summary of Findings:   Nerve conduction studies:   · The following nerve conduction studies were abnormal:   · Mild ischemic velocity was seen in the bilateral median sensory nerves. · All other nerve conduction studies listed in the table above were normal in latency, amplitude and conduction velocity. Needle EMG:   · Needle EMG was performed using a concentric needle.  All the muscles tested, as listed in the table above demonstrated normal amplitude, duration, phases and recruitment and no active denervation signs were seen. Diagnostic Interpretation: This study was abnormal.     Electrodiagnosis: The electrical final study reveals evidence of demyelinating sensory median neuropathy at the bilateral wrist consistent with mild bilateral carpal tunnel syndrome. There is no evidence of an underlying radiculopathy or large fiber peripheral neuropathy. Previous Study: NA      Follow up EMG is recommended if clinically indicated. Technologist: SC    Physician: Ruddy Avalos MD    Nerve conduction studies and electromyography were performed according to our laboratory policies and procedures which can be provided upon request. All abnormal values are identified in the table.  Laboratory normal values can also be provided upon request. Cc: Phong Hunter, DO Xiong Backers, DO

## 2021-07-06 ENCOUNTER — TELEPHONE (OUTPATIENT)
Dept: PRIMARY CARE CLINIC | Age: 56
End: 2021-07-06

## 2021-08-02 RX ORDER — ESTRADIOL 0.1 MG/D
FILM, EXTENDED RELEASE TRANSDERMAL
Qty: 24 PATCH | Refills: 1 | Status: SHIPPED
Start: 2021-08-02 | End: 2021-09-03

## 2021-09-03 RX ORDER — ESTRADIOL 0.1 MG/D
FILM, EXTENDED RELEASE TRANSDERMAL
Qty: 24 PATCH | Refills: 1 | Status: SHIPPED
Start: 2021-09-03 | End: 2022-01-30

## 2021-09-08 ENCOUNTER — OFFICE VISIT (OUTPATIENT)
Dept: PRIMARY CARE CLINIC | Age: 56
End: 2021-09-08
Payer: COMMERCIAL

## 2021-09-08 VITALS
DIASTOLIC BLOOD PRESSURE: 70 MMHG | HEART RATE: 76 BPM | WEIGHT: 129 LBS | OXYGEN SATURATION: 99 % | TEMPERATURE: 96 F | BODY MASS INDEX: 22.02 KG/M2 | SYSTOLIC BLOOD PRESSURE: 100 MMHG | HEIGHT: 64 IN

## 2021-09-08 DIAGNOSIS — M48.062 SPINAL STENOSIS OF LUMBAR REGION WITH NEUROGENIC CLAUDICATION: Primary | ICD-10-CM

## 2021-09-08 DIAGNOSIS — Z13.6 SCREENING FOR CARDIOVASCULAR CONDITION: ICD-10-CM

## 2021-09-08 DIAGNOSIS — R30.0 DYSURIA: ICD-10-CM

## 2021-09-08 DIAGNOSIS — R73.01 IMPAIRED FASTING GLUCOSE: ICD-10-CM

## 2021-09-08 DIAGNOSIS — M50.90 CERVICAL DISC DISEASE: ICD-10-CM

## 2021-09-08 DIAGNOSIS — F41.1 GAD (GENERALIZED ANXIETY DISORDER): ICD-10-CM

## 2021-09-08 DIAGNOSIS — E55.9 VITAMIN D INSUFFICIENCY: ICD-10-CM

## 2021-09-08 LAB
BILIRUBIN, POC: NORMAL
BLOOD URINE, POC: NORMAL
CLARITY, POC: CLEAR
COLOR, POC: YELLOW
GLUCOSE URINE, POC: NORMAL
KETONES, POC: NORMAL
LEUKOCYTE EST, POC: NORMAL
NITRITE, POC: NORMAL
PH, POC: 5
PROTEIN, POC: NORMAL
SPECIFIC GRAVITY, POC: <=1.005
UROBILINOGEN, POC: 0.2

## 2021-09-08 PROCEDURE — 99214 OFFICE O/P EST MOD 30 MIN: CPT | Performed by: FAMILY MEDICINE

## 2021-09-08 PROCEDURE — 81003 URINALYSIS AUTO W/O SCOPE: CPT | Performed by: FAMILY MEDICINE

## 2021-09-08 PROCEDURE — 96372 THER/PROPH/DIAG INJ SC/IM: CPT | Performed by: FAMILY MEDICINE

## 2021-09-08 RX ORDER — HYDROCODONE BITARTRATE AND ACETAMINOPHEN 7.5; 325 MG/1; MG/1
1 TABLET ORAL EVERY 12 HOURS PRN
Qty: 60 TABLET | Refills: 0 | Status: SHIPPED
Start: 2021-09-08 | End: 2021-12-07 | Stop reason: SDUPTHER

## 2021-09-08 RX ORDER — METHYLPREDNISOLONE ACETATE 80 MG/ML
80 INJECTION, SUSPENSION INTRA-ARTICULAR; INTRALESIONAL; INTRAMUSCULAR; SOFT TISSUE ONCE
Status: COMPLETED | OUTPATIENT
Start: 2021-09-08 | End: 2021-09-08

## 2021-09-08 RX ORDER — HYDROCODONE BITARTRATE AND ACETAMINOPHEN 7.5; 325 MG/1; MG/1
1 TABLET ORAL EVERY 12 HOURS PRN
COMMUNITY
End: 2021-12-10

## 2021-09-08 RX ORDER — ALPRAZOLAM 0.5 MG/1
0.5 TABLET ORAL 3 TIMES DAILY PRN
COMMUNITY
End: 2021-12-10

## 2021-09-08 RX ORDER — HYDROCODONE BITARTRATE AND ACETAMINOPHEN 7.5; 325 MG/1; MG/1
1 TABLET ORAL EVERY 12 HOURS PRN
Qty: 60 TABLET | Refills: 0 | Status: SHIPPED
Start: 2021-10-06 | End: 2021-12-07 | Stop reason: SDUPTHER

## 2021-09-08 RX ORDER — HYDROCODONE BITARTRATE AND ACETAMINOPHEN 7.5; 325 MG/1; MG/1
1 TABLET ORAL EVERY 12 HOURS PRN
Qty: 60 TABLET | Refills: 0 | Status: SHIPPED
Start: 2021-11-03 | End: 2021-12-07 | Stop reason: SDUPTHER

## 2021-09-08 RX ORDER — TOPIRAMATE 25 MG/1
25 TABLET ORAL 2 TIMES DAILY
COMMUNITY
End: 2021-12-03

## 2021-09-08 RX ORDER — ALPRAZOLAM 0.5 MG/1
0.5 TABLET ORAL 3 TIMES DAILY PRN
Qty: 270 TABLET | Refills: 0 | Status: SHIPPED
Start: 2021-09-08 | End: 2021-12-07 | Stop reason: SDUPTHER

## 2021-09-08 RX ADMIN — METHYLPREDNISOLONE ACETATE 80 MG: 80 INJECTION, SUSPENSION INTRA-ARTICULAR; INTRALESIONAL; INTRAMUSCULAR; SOFT TISSUE at 10:16

## 2021-09-08 ASSESSMENT — ENCOUNTER SYMPTOMS
BACK PAIN: 1
DIARRHEA: 0
NAUSEA: 0
ABDOMINAL PAIN: 0
WHEEZING: 0
VOMITING: 0
COUGH: 0
CONSTIPATION: 0
SHORTNESS OF BREATH: 0

## 2021-09-08 NOTE — PROGRESS NOTES
21  Robert Joseph : 1965 Sex: female  Age: 49 y.o. Chief Complaint   Patient presents with    Arthritis     has got really bad in her hands and hips, alot of aching. when she was seeing rehumatology they would give her a cortisone shot     Urinary Tract Infection     x1 week - burning  back pain and frequency     HPI:  49 y.o. female patient presents today for 3 month(s) follow up for chronic medical conditions, medication refills and FBW. Patient's chart, medical, surgical and medication history all reviewed. Chronic pain  Patient has long standing history of spinal stenosis. She has a history of cervical spine DDD after accident while cheerleading. Previously had multi-level fusion of cervical spine- now having worsening pain in upper thoracics. She takes Norco and Soma appropriately. Current dosing is effective for patient. Now having worsening numbness/tingling/pain in upper extremities and hands. Pain at elbows- worse in the winter. Patient has been having increasing pain in hips and hands with change in weather. Anxiety  Patient complains of evaluation of anxiety disorder and post traumatic stress  disorder. She has the following anxiety symptoms: difficulty concentrating, feelings of losing control, irritable, racing thoughts. Onset of symptoms was approximately several years ago, stable since that time. She denies current suicidal and homicidal ideation. Previous treatment includes Ativan and no therapy. She complains of the following side effects from the treatment: none. ROS:  Review of Systems   Constitutional: Negative for chills, fatigue and fever. Respiratory: Negative for cough, shortness of breath and wheezing. Cardiovascular: Negative for chest pain and palpitations. Gastrointestinal: Negative for abdominal pain, constipation, diarrhea, nausea and vomiting. Musculoskeletal: Positive for arthralgias, back pain and gait problem.    Skin: Negative for SINCE      Past Surgical History:   Procedure Laterality Date    ABDOMEN SURGERY N/A 2021    EXCISION LIPOMA ABDOMEN performed by Jo-Ann Chowdhury MD at [address] Bilateral     Implants Bilaterally    BREAST SURGERY      CERVICAL FUSION      c2-c7 Bone Kevin Grafting 2-6    COLONOSCOPY N/A 3/3/2021    COLORECTAL CANCER SCREENING, HIGH RISK performed by Jo-Ann Chowdhury MD at [address]      X4    LAPAROTOMY      X5 Ovarian Cysts, ZACARIAS-BSO (Non Cancerous)    OTHER SURGICAL HISTORY      Cervical Epidurals    OTHER SURGICAL HISTORY      Lumbar Epidural - [address], 17, 17     Family History   Problem Relation Age of Onset    Diabetes Mother     Other Mother         End Stage Cirrhosis 2017    Cancer Father         Prostate - Laura Maynard     Social History     Socioeconomic History    Marital status: Single     Spouse name: Not on file    Number of children: Not on file    Years of education: Not on file    Highest education level: Not on file   Occupational History    Not on file   Tobacco Use    Smoking status: Current Some Day Smoker     Packs/day: 0.25     Years: 1.00     Pack years: 0.25     Last attempt to quit:      Years since quittin.6    Smokeless tobacco: Never Used   Vaping Use    Vaping Use: Never used   Substance and Sexual Activity    Alcohol use: No    Drug use: No    Sexual activity: Not on file   Other Topics Concern    Not on file   Social History Narrative    Not on file     Social Determinants of Health     Financial Resource Strain:     Difficulty of Paying Living Expenses:    Food Insecurity:     Worried About Running Out of Food in the Last Year:     Ran Out of Food in the Last Year:    Transportation Needs:     Lack of Transportation (Medical):      Lack of Transportation (Non-Medical):    Physical Activity:     Days of Exercise per Week:     Minutes of Exercise per Session:    Stress:     Feeling of Stress :    Social Connections:     Frequency of Communication with Friends and Family:     Frequency of Social Gatherings with Friends and Family:     Attends L[employer] Services:     Active Member of Clubs or Organizations:     Attends Club or Organization Meetings:     Marital Status:    Intimate Partner Violence:     Fear of Current or Ex-Partner:     Emotionally Abused:     Physically Abused:     Sexually Abused:        Vitals:    09/08/21 0931   BP: 100/70   Pulse: 76   Temp: 96 °F (35.6 °C)   SpO2: 99%   Weight: 129 lb (58.5 kg)   Height: 5' 4\" (1.626 m)       Physical Exam:  Physical Exam  Vitals and nursing note reviewed. Constitutional:       General: She is not in acute distress. Appearance: Normal appearance. She is well-developed and normal weight. She is not ill-appearing. HENT:      Head: Normocephalic and atraumatic. Right Ear: Hearing and external ear normal.      Left Ear: Hearing and external ear normal.      Nose:      Comments: Wearing mask  Eyes:      General: Lids are normal. No scleral icterus. Extraocular Movements: Extraocular movements intact. Conjunctiva/sclera: Conjunctivae normal.   Neck:      Thyroid: No thyromegaly. Cardiovascular:      Rate and Rhythm: Normal rate and regular rhythm. Heart sounds: Normal heart sounds. No murmur heard. Pulmonary:      Effort: Pulmonary effort is normal. No respiratory distress. Breath sounds: Normal breath sounds. No wheezing. Musculoskeletal:         General: No tenderness. Normal range of motion. Right hand: Deformity (thickening of palmar aponeurosis at 4-5th MCP) present. Left hand: Deformity (thickening of palmar aponeurosis at 4-5th MCP) present. Cervical back: Normal range of motion and neck supple. Right lower leg: No edema. Left lower leg: No edema. Lymphadenopathy:      Cervical: No cervical adenopathy.    Skin: General: Skin is warm and dry. Findings: No rash. Neurological:      General: No focal deficit present. Mental Status: She is alert and oriented to person, place, and time. Gait: Gait normal.   Psychiatric:         Mood and Affect: Mood and affect normal.         Speech: Speech normal.         Behavior: Behavior normal.         Thought Content:  Thought content normal.         Labs:  CBC with Differential:    Lab Results   Component Value Date    WBC 5.0 11/19/2020    RBC 4.23 11/19/2020    HGB 13.6 11/19/2020    HCT 42.2 11/19/2020     11/19/2020    MCV 99.8 11/19/2020    MCH 32.2 11/19/2020    MCHC 32.2 11/19/2020    RDW 12.4 11/19/2020    SEGSPCT 53 09/24/2011    LYMPHOPCT 31.7 11/19/2020    MONOPCT 9.3 11/19/2020    BASOPCT 0.4 11/19/2020    MONOSABS 0.46 11/19/2020    LYMPHSABS 1.57 11/19/2020    EOSABS 0.09 11/19/2020    BASOSABS 0.02 11/19/2020     CMP:    Lab Results   Component Value Date     11/19/2020    K 4.4 11/19/2020     11/19/2020    CO2 18 11/19/2020    BUN 18 11/19/2020    CREATININE 1.1 11/19/2020    GFRAA >60 11/19/2020    LABGLOM 52 11/19/2020    GLUCOSE 74 11/19/2020    GLUCOSE 88 09/24/2011    PROT 7.4 11/19/2020    LABALBU 4.4 11/19/2020    LABALBU 4.1 09/23/2011    CALCIUM 9.5 11/19/2020    BILITOT 0.4 11/19/2020    ALKPHOS 70 11/19/2020    AST 24 11/19/2020    ALT 14 11/19/2020     HgBA1c:    Lab Results   Component Value Date    LABA1C 5.0 11/19/2020     FLP:    Lab Results   Component Value Date    TRIG 93 11/19/2020    HDL 91 11/19/2020    LDLCALC 90 11/19/2020    LABVLDL 19 11/19/2020     TSH:    Lab Results   Component Value Date    TSH 1.650 11/19/2020     Results for orders placed or performed in visit on 09/08/21   POCT Urinalysis No Micro (Auto)   Result Value Ref Range    Color, UA Yellow     Clarity, UA Clear     Glucose, UA POC neg     Bilirubin, UA neg     Ketones, UA neg     Spec Grav, UA <=1.005     Blood, UA POC neg     pH, UA 5.0 Protein, UA POC neg     Urobilinogen, UA 0.2     Leukocytes, UA neg     Nitrite, UA neg           Assessment and Plan:  Miranda Main was seen today for arthritis and urinary tract infection. Diagnoses and all orders for this visit:    Spinal stenosis of lumbar region with neurogenic claudication  -     HYDROcodone-acetaminophen (NORCO) 7.5-325 MG per tablet; Take 1 tablet by mouth every 12 hours as needed for Pain for up to 30 days. Intended supply: 30 days  -     HYDROcodone-acetaminophen (NORCO) 7.5-325 MG per tablet; Take 1 tablet by mouth every 12 hours as needed for Pain for up to 30 days. Intended supply: 30 days  -     HYDROcodone-acetaminophen (NORCO) 7.5-325 MG per tablet; Take 1 tablet by mouth every 12 hours as needed for Pain for up to 30 days. Intended supply: 30 days  -     methylPREDNISolone acetate (DEPO-MEDROL) injection 80 mg  OARRS appropriate. Refills provided- will give steroid injection today to try and calm flare. Cervical disc disease  -     HYDROcodone-acetaminophen (NORCO) 7.5-325 MG per tablet; Take 1 tablet by mouth every 12 hours as needed for Pain for up to 30 days. Intended supply: 30 days  -     HYDROcodone-acetaminophen (NORCO) 7.5-325 MG per tablet; Take 1 tablet by mouth every 12 hours as needed for Pain for up to 30 days. Intended supply: 30 days  -     HYDROcodone-acetaminophen (NORCO) 7.5-325 MG per tablet; Take 1 tablet by mouth every 12 hours as needed for Pain for up to 30 days. Intended supply: 30 days  -     methylPREDNISolone acetate (DEPO-MEDROL) injection 80 mg  Seeing Grace Miranda. Completed PT and will be getting possible injection. FELA (generalized anxiety disorder)  -     ALPRAZolam (XANAX) 0.5 MG tablet; Take 1 tablet by mouth 3 times daily as needed for Anxiety for up to 90 days. -     CBC Auto Differential; Future  -     Comprehensive Metabolic Panel; Future  -     TSH without Reflex; Future  -     Vitamin B12 & Folate;  Future  -     Uric Acid; Future  Stable. Due for labs. Dysuria  -     POCT Urinalysis No Micro (Auto)  -     Culture, Urine; Future  Normal UA. Will send for culture. Impaired fasting glucose  -     Hemoglobin A1C; Future    Vitamin D insufficiency  -     Vitamin D 25 Hydroxy; Future    Screening for cardiovascular condition  -     Lipid Panel; Future          Return in about 3 months (around 12/8/2021) for Chronic pain medication refills.       Seen By:  Tami Tripp DO

## 2021-09-09 ENCOUNTER — TELEPHONE (OUTPATIENT)
Dept: PRIMARY CARE CLINIC | Age: 56
End: 2021-09-09

## 2021-09-09 DIAGNOSIS — F51.01 PRIMARY INSOMNIA: Primary | ICD-10-CM

## 2021-09-09 DIAGNOSIS — M48.062 SPINAL STENOSIS OF LUMBAR REGION WITH NEUROGENIC CLAUDICATION: ICD-10-CM

## 2021-09-09 RX ORDER — ZOLPIDEM TARTRATE 12.5 MG/1
12.5 TABLET, FILM COATED, EXTENDED RELEASE ORAL NIGHTLY PRN
Qty: 90 TABLET | Refills: 1 | Status: SHIPPED
Start: 2021-09-09 | End: 2021-12-07 | Stop reason: SDUPTHER

## 2021-09-09 RX ORDER — CARISOPRODOL 350 MG/1
TABLET ORAL
Qty: 360 TABLET | Refills: 0 | Status: SHIPPED
Start: 2021-09-09 | End: 2021-12-07 | Stop reason: SDUPTHER

## 2021-09-09 RX ORDER — ZOLPIDEM TARTRATE 12.5 MG/1
12.5 TABLET, FILM COATED, EXTENDED RELEASE ORAL NIGHTLY PRN
COMMUNITY
End: 2021-09-09 | Stop reason: SDUPTHER

## 2021-09-09 NOTE — TELEPHONE ENCOUNTER
----- Message from Tolu Patrick sent at 9/8/2021  3:46 PM EDT -----  Subject: Refill Request    QUESTIONS  Name of Medication? zolpidem (AMBIEN CR) 12.5 MG extended release tablet  Patient-reported dosage and instructions? 12.5mg once per night  How many days do you have left? 0  [id number]  Pharmacy phone number (if available)? [phone]  Additional Information for Provider? Help! Patient was in for an OV   earlier today, and completely forgot to get this Rx renewed. She's   completely out of this medication and won't be sleeping without it.  ---------------------------------------------------------------------------  --------------  CALL BACK INFO  What is the best way for the office to contact you? OK to leave message on   voicemail  Preferred Call Back Phone Number?  [phone]

## 2021-09-10 ENCOUNTER — TELEPHONE (OUTPATIENT)
Dept: PRIMARY CARE CLINIC | Age: 56
End: 2021-09-10

## 2021-09-10 DIAGNOSIS — M48.062 SPINAL STENOSIS OF LUMBAR REGION WITH NEUROGENIC CLAUDICATION: ICD-10-CM

## 2021-09-10 RX ORDER — CARISOPRODOL 350 MG/1
TABLET ORAL
Qty: 360 TABLET | Refills: 0 | OUTPATIENT
Start: 2021-09-10 | End: 2021-12-11

## 2021-09-10 NOTE — TELEPHONE ENCOUNTER
----- Message from Marsloane Zayas sent at 9/9/2021 10:05 AM EDT -----  Subject: Medication Problem    QUESTIONS  Name of Medication? carisoprodol (SOMA) 350 MG tablet  Patient-reported dosage and instructions? 350mg  What question or problem do you have with the medication? Patient said   pharmacy said she needs a full prescription for it. She needs a 90 day   supply. Preferred Pharmacy? [facility] [address] [place] [address] [place] phone number (if available)? [phone]  Additional Information for Provider?   ---------------------------------------------------------------------------  --------------  CALL BACK INFO  What is the best way for the office to contact you? OK to leave message on   voicemail  Preferred Call Back Phone Number? [phone]  ---------------------------------------------------------------------------  --------------  SCRIPT ANSWERS  Relationship to Patient?  Self

## 2021-09-11 LAB — URINE CULTURE, ROUTINE: NORMAL

## 2021-10-18 ENCOUNTER — APPOINTMENT (OUTPATIENT)
Dept: CT IMAGING | Age: 56
End: 2021-10-18
Payer: COMMERCIAL

## 2021-10-18 ENCOUNTER — HOSPITAL ENCOUNTER (EMERGENCY)
Age: 56
Discharge: HOME | End: 2021-10-18
Attending: STUDENT IN AN ORGANIZED HEALTH CARE EDUCATION/TRAINING PROGRAM
Payer: COMMERCIAL

## 2021-10-18 VITALS
DIASTOLIC BLOOD PRESSURE: 74 MMHG | SYSTOLIC BLOOD PRESSURE: 132 MMHG | BODY MASS INDEX: 19.99 KG/M2 | RESPIRATION RATE: 14 BRPM | HEART RATE: 76 BPM | WEIGHT: 120 LBS | HEIGHT: 65 IN | OXYGEN SATURATION: 98 % | TEMPERATURE: 98.1 F

## 2021-10-18 DIAGNOSIS — K52.9 COLITIS: ICD-10-CM

## 2021-10-18 DIAGNOSIS — K62.5 HEMORRHAGE OF RECTUM AND ANUS: Primary | ICD-10-CM

## 2021-10-18 LAB
ALBUMIN SERPL-MCNC: 4.5 G/DL (ref 3.5–5.2)
ALP BLD-CCNC: 81 U/L (ref 35–104)
ALT SERPL-CCNC: 15 U/L (ref 0–32)
ANION GAP SERPL CALCULATED.3IONS-SCNC: 10 MMOL/L (ref 7–16)
AST SERPL-CCNC: 21 U/L (ref 0–31)
BACTERIA: NORMAL /HPF
BASOPHILS ABSOLUTE: 0.03 E9/L (ref 0–0.2)
BASOPHILS RELATIVE PERCENT: 0.5 % (ref 0–2)
BILIRUB SERPL-MCNC: 0.5 MG/DL (ref 0–1.2)
BILIRUBIN URINE: NEGATIVE
BLOOD, URINE: NORMAL
BUN BLDV-MCNC: 8 MG/DL (ref 6–20)
CALCIUM SERPL-MCNC: 9.4 MG/DL (ref 8.6–10.2)
CHLORIDE BLD-SCNC: 104 MMOL/L (ref 98–107)
CLARITY: CLEAR
CO2: 25 MMOL/L (ref 22–29)
COLOR: YELLOW
CREAT SERPL-MCNC: 1 MG/DL (ref 0.5–1)
EKG ATRIAL RATE: 71 BPM
EKG P AXIS: 61 DEGREES
EKG P-R INTERVAL: 146 MS
EKG Q-T INTERVAL: 316 MS
EKG QRS DURATION: 76 MS
EKG QTC CALCULATION (BAZETT): 343 MS
EKG R AXIS: 59 DEGREES
EKG T AXIS: -53 DEGREES
EKG VENTRICULAR RATE: 71 BPM
EOSINOPHILS ABSOLUTE: 0.13 E9/L (ref 0.05–0.5)
EOSINOPHILS RELATIVE PERCENT: 2 % (ref 0–6)
GFR AFRICAN AMERICAN: >60
GFR NON-AFRICAN AMERICAN: 58 ML/MIN/1.73
GLUCOSE BLD-MCNC: 103 MG/DL (ref 74–99)
GLUCOSE URINE: NEGATIVE MG/DL
HCT VFR BLD CALC: 43.5 % (ref 34–48)
HEMOGLOBIN: 14.2 G/DL (ref 11.5–15.5)
IMMATURE GRANULOCYTES #: 0.02 E9/L
IMMATURE GRANULOCYTES %: 0.3 % (ref 0–5)
KETONES, URINE: NEGATIVE MG/DL
LACTIC ACID: 0.8 MMOL/L (ref 0.5–2.2)
LEUKOCYTE ESTERASE, URINE: NEGATIVE
LYMPHOCYTES ABSOLUTE: 1.62 E9/L (ref 1.5–4)
LYMPHOCYTES RELATIVE PERCENT: 25.1 % (ref 20–42)
MCH RBC QN AUTO: 32.7 PG (ref 26–35)
MCHC RBC AUTO-ENTMCNC: 32.6 % (ref 32–34.5)
MCV RBC AUTO: 100.2 FL (ref 80–99.9)
MONOCYTES ABSOLUTE: 0.43 E9/L (ref 0.1–0.95)
MONOCYTES RELATIVE PERCENT: 6.7 % (ref 2–12)
NEUTROPHILS ABSOLUTE: 4.22 E9/L (ref 1.8–7.3)
NEUTROPHILS RELATIVE PERCENT: 65.4 % (ref 43–80)
NITRITE, URINE: NEGATIVE
PDW BLD-RTO: 11.9 FL (ref 11.5–15)
PH UA: 5.5 (ref 5–9)
PLATELET # BLD: 267 E9/L (ref 130–450)
PMV BLD AUTO: 10.4 FL (ref 7–12)
POTASSIUM REFLEX MAGNESIUM: 3.8 MMOL/L (ref 3.5–5)
PROTEIN UA: NEGATIVE MG/DL
RBC # BLD: 4.34 E12/L (ref 3.5–5.5)
RBC UA: NORMAL /HPF (ref 0–2)
SODIUM BLD-SCNC: 139 MMOL/L (ref 132–146)
SPECIFIC GRAVITY UA: <=1.005 (ref 1–1.03)
TOTAL PROTEIN: 7.6 G/DL (ref 6.4–8.3)
TROPONIN, HIGH SENSITIVITY: <6 NG/L (ref 0–9)
UROBILINOGEN, URINE: 0.2 E.U./DL
WBC # BLD: 6.5 E9/L (ref 4.5–11.5)
WBC UA: NORMAL /HPF (ref 0–5)

## 2021-10-18 PROCEDURE — 96375 TX/PRO/DX INJ NEW DRUG ADDON: CPT

## 2021-10-18 PROCEDURE — 36415 COLL VENOUS BLD VENIPUNCTURE: CPT

## 2021-10-18 PROCEDURE — 80053 COMPREHEN METABOLIC PANEL: CPT

## 2021-10-18 PROCEDURE — 6360000002 HC RX W HCPCS: Performed by: STUDENT IN AN ORGANIZED HEALTH CARE EDUCATION/TRAINING PROGRAM

## 2021-10-18 PROCEDURE — 74177 CT ABD & PELVIS W/CONTRAST: CPT

## 2021-10-18 PROCEDURE — 83605 ASSAY OF LACTIC ACID: CPT

## 2021-10-18 PROCEDURE — 85025 COMPLETE CBC W/AUTO DIFF WBC: CPT

## 2021-10-18 PROCEDURE — 84484 ASSAY OF TROPONIN QUANT: CPT

## 2021-10-18 PROCEDURE — 6360000004 HC RX CONTRAST MEDICATION: Performed by: RADIOLOGY

## 2021-10-18 PROCEDURE — 81001 URINALYSIS AUTO W/SCOPE: CPT

## 2021-10-18 PROCEDURE — 2500000003 HC RX 250 WO HCPCS: Performed by: STUDENT IN AN ORGANIZED HEALTH CARE EDUCATION/TRAINING PROGRAM

## 2021-10-18 PROCEDURE — 2580000003 HC RX 258: Performed by: STUDENT IN AN ORGANIZED HEALTH CARE EDUCATION/TRAINING PROGRAM

## 2021-10-18 PROCEDURE — 93005 ELECTROCARDIOGRAM TRACING: CPT | Performed by: STUDENT IN AN ORGANIZED HEALTH CARE EDUCATION/TRAINING PROGRAM

## 2021-10-18 PROCEDURE — 6370000000 HC RX 637 (ALT 250 FOR IP): Performed by: STUDENT IN AN ORGANIZED HEALTH CARE EDUCATION/TRAINING PROGRAM

## 2021-10-18 PROCEDURE — 96361 HYDRATE IV INFUSION ADD-ON: CPT

## 2021-10-18 PROCEDURE — 99285 EMERGENCY DEPT VISIT HI MDM: CPT

## 2021-10-18 PROCEDURE — 96365 THER/PROPH/DIAG IV INF INIT: CPT

## 2021-10-18 RX ORDER — PREDNISONE 20 MG/1
40 TABLET ORAL DAILY
Qty: 10 TABLET | Refills: 0 | Status: SHIPPED | OUTPATIENT
Start: 2021-10-18 | End: 2021-10-23

## 2021-10-18 RX ORDER — DIPHENHYDRAMINE HYDROCHLORIDE 50 MG/ML
25 INJECTION INTRAMUSCULAR; INTRAVENOUS ONCE
Status: COMPLETED | OUTPATIENT
Start: 2021-10-18 | End: 2021-10-18

## 2021-10-18 RX ORDER — 0.9 % SODIUM CHLORIDE 0.9 %
1000 INTRAVENOUS SOLUTION INTRAVENOUS ONCE
Status: COMPLETED | OUTPATIENT
Start: 2021-10-18 | End: 2021-10-18

## 2021-10-18 RX ORDER — CEFDINIR 300 MG/1
300 CAPSULE ORAL 2 TIMES DAILY
Qty: 14 CAPSULE | Refills: 0 | Status: SHIPPED | OUTPATIENT
Start: 2021-10-18 | End: 2021-10-25

## 2021-10-18 RX ORDER — MORPHINE SULFATE 2 MG/ML
4 INJECTION, SOLUTION INTRAMUSCULAR; INTRAVENOUS ONCE
Status: COMPLETED | OUTPATIENT
Start: 2021-10-18 | End: 2021-10-18

## 2021-10-18 RX ORDER — DICYCLOMINE HYDROCHLORIDE 10 MG/1
10 CAPSULE ORAL
Qty: 120 CAPSULE | Refills: 3 | Status: SHIPPED | OUTPATIENT
Start: 2021-10-18 | End: 2021-10-27 | Stop reason: SDUPTHER

## 2021-10-18 RX ORDER — PREDNISONE 20 MG/1
60 TABLET ORAL ONCE
Status: COMPLETED | OUTPATIENT
Start: 2021-10-18 | End: 2021-10-18

## 2021-10-18 RX ORDER — METHYLPREDNISOLONE SODIUM SUCCINATE 125 MG/2ML
125 INJECTION, POWDER, LYOPHILIZED, FOR SOLUTION INTRAMUSCULAR; INTRAVENOUS ONCE
Status: COMPLETED | OUTPATIENT
Start: 2021-10-18 | End: 2021-10-18

## 2021-10-18 RX ORDER — METRONIDAZOLE 500 MG/1
500 TABLET ORAL 2 TIMES DAILY
Qty: 14 TABLET | Refills: 0 | Status: SHIPPED | OUTPATIENT
Start: 2021-10-18 | End: 2021-10-25

## 2021-10-18 RX ORDER — ONDANSETRON 4 MG/1
4 TABLET, ORALLY DISINTEGRATING ORAL EVERY 8 HOURS PRN
Qty: 10 TABLET | Refills: 0 | Status: SHIPPED | OUTPATIENT
Start: 2021-10-18 | End: 2022-09-27

## 2021-10-18 RX ORDER — ONDANSETRON 2 MG/ML
4 INJECTION INTRAMUSCULAR; INTRAVENOUS ONCE
Status: COMPLETED | OUTPATIENT
Start: 2021-10-18 | End: 2021-10-18

## 2021-10-18 RX ORDER — FENTANYL CITRATE 50 UG/ML
50 INJECTION, SOLUTION INTRAMUSCULAR; INTRAVENOUS ONCE
Status: COMPLETED | OUTPATIENT
Start: 2021-10-18 | End: 2021-10-18

## 2021-10-18 RX ADMIN — PREDNISONE 60 MG: 20 TABLET ORAL at 21:24

## 2021-10-18 RX ADMIN — DIPHENHYDRAMINE HYDROCHLORIDE 25 MG: 50 INJECTION, SOLUTION INTRAMUSCULAR; INTRAVENOUS at 15:47

## 2021-10-18 RX ADMIN — METHYLPREDNISOLONE SODIUM SUCCINATE 125 MG: 125 INJECTION, POWDER, FOR SOLUTION INTRAMUSCULAR; INTRAVENOUS at 15:57

## 2021-10-18 RX ADMIN — LIDOCAINE HYDROCHLORIDE: 20 SOLUTION ORAL; TOPICAL at 15:13

## 2021-10-18 RX ADMIN — MORPHINE SULFATE 4 MG: 2 INJECTION, SOLUTION INTRAMUSCULAR; INTRAVENOUS at 18:56

## 2021-10-18 RX ADMIN — SODIUM CHLORIDE 1000 ML: 9 INJECTION, SOLUTION INTRAVENOUS at 15:12

## 2021-10-18 RX ADMIN — METRONIDAZOLE 500 MG: 500 INJECTION, SOLUTION INTRAVENOUS at 21:25

## 2021-10-18 RX ADMIN — FENTANYL CITRATE 50 MCG: 0.05 INJECTION, SOLUTION INTRAMUSCULAR; INTRAVENOUS at 16:17

## 2021-10-18 RX ADMIN — CEFTRIAXONE 1000 MG: 1 INJECTION, POWDER, FOR SOLUTION INTRAMUSCULAR; INTRAVENOUS at 21:25

## 2021-10-18 RX ADMIN — IOPAMIDOL 75 ML: 755 INJECTION, SOLUTION INTRAVENOUS at 16:41

## 2021-10-18 RX ADMIN — FAMOTIDINE 20 MG: 10 INJECTION, SOLUTION INTRAVENOUS at 15:12

## 2021-10-18 RX ADMIN — ONDANSETRON 4 MG: 2 INJECTION INTRAMUSCULAR; INTRAVENOUS at 15:12

## 2021-10-18 ASSESSMENT — PAIN DESCRIPTION - DESCRIPTORS: DESCRIPTORS: SPASM

## 2021-10-18 ASSESSMENT — PAIN SCALES - GENERAL
PAINLEVEL_OUTOF10: 7
PAINLEVEL_OUTOF10: 0
PAINLEVEL_OUTOF10: 10

## 2021-10-18 ASSESSMENT — PAIN DESCRIPTION - ONSET: ONSET: GRADUAL

## 2021-10-18 ASSESSMENT — PAIN DESCRIPTION - LOCATION: LOCATION: ABDOMEN

## 2021-10-18 ASSESSMENT — PAIN DESCRIPTION - FREQUENCY: FREQUENCY: INTERMITTENT

## 2021-10-18 ASSESSMENT — PAIN DESCRIPTION - PAIN TYPE: TYPE: ACUTE PAIN

## 2021-10-18 ASSESSMENT — PAIN DESCRIPTION - ORIENTATION: ORIENTATION: MID

## 2021-10-18 ASSESSMENT — PAIN DESCRIPTION - PROGRESSION: CLINICAL_PROGRESSION: GRADUALLY WORSENING

## 2021-10-18 NOTE — ED NOTES
FIRST PROVIDER CONTACT ASSESSMENT NOTE      Department of Emergency Medicine   10/18/21  12:13 PM EDT    Chief Complaint: Abdominal Pain (spasms) and Rectal Bleeding (x24 hours )      History of Present Illness:   Romel Acosta is a 49 y.o. female who presents to the ED for aminal cramping and spasms along with bright red rectal bleeding for the past 24 hours. Denies any anticoagulations    Medical History:  has a past medical history of Anxiety, Bipolar disorder ([address] Cervical disc disease, Cervical spondylitis with radiculitis (HCC), Facet hypertrophy, Headache, History of rectal sphincterotomy, Lumbar disc disease, Osteoarthritis, Panic disorder without agoraphobia, and Seizures ([address] Surgical History:  has a past surgical history that includes Hysterectomy; Breast surgery; cervical fusion; laparoscopy; laparotomy; Breast reduction surgery (Bilateral); other surgical history; other surgical history; Appendectomy; Colonoscopy (N/A, 3/3/2021); and Abdomen surgery (N/A, 4/19/2021). Social History:  reports that she has been smoking. She has a 0.25 pack-year smoking history. She has never used smokeless tobacco. She reports that she does not drink alcohol and does not use drugs. Family History: family history includes Cancer in her father; Diabetes in her mother; Other in her mother.     *ALLERGIES*     Bee venom, Fish-derived products, Hornet venom, and Iodine     Physical Exam:      VS:  /76   Pulse 72   Temp 98.4 °F (36.9 °C)   Resp 16   Ht 5' 5\" (1.651 m)   Wt 120 lb (54.4 kg)   SpO2 99%   BMI 19.97 kg/m²      Initial Plan of Care:  Initiate Treatment-Testing, Proceed toTreatment Area When Bed Available for ED Attending/MLP to Continue Care    -----------------END OF FIRST PROVIDER CONTACT ASSESSMENT NOTE--------------  Electronically signed by MADHU Her CNP   DD: 10/18/21             MADHU Her CNP  10/18/21 [id number]

## 2021-10-19 NOTE — ED PROVIDER NOTES
Romel Acosta is a 49 y.o. female with a PMHx significant for Bipolar, panic anxiety who presents for evaluation of abdominal pain, bright red blood per rectum, and hematemesis, beginning prior to arrival.  The complaint has been intermittent, moderate in severity, and worsened by nothing. The patient states that she has been having abdominal cramping and spasms over the last day or so. She began to develop bright red blood with her bowel movements and while waiting in the room started to have bright red blood in her emesis. Notes two episodes while in the department. Denies any history of this in the past. Patient denies any anticoagulation use. The history is provided by the patient and medical records. Review of Systems   Constitutional: Positive for fatigue. Negative for chills and fever. HENT: Negative for ear pain, sinus pressure and sore throat. Eyes: Negative for pain, discharge and redness. Respiratory: Negative for cough, shortness of breath and wheezing. Cardiovascular: Negative for chest pain. Gastrointestinal: Positive for abdominal pain, blood in stool, nausea and vomiting. Negative for abdominal distention and diarrhea. Genitourinary: Negative for dysuria and frequency. Musculoskeletal: Negative for arthralgias and back pain. Skin: Negative for rash and wound. Neurological: Negative for weakness and headaches. Hematological: Negative for adenopathy. All other systems reviewed and are negative. Physical Exam  Vitals and nursing note reviewed. Constitutional:       General: She is not in acute distress. Appearance: She is well-developed. She is not ill-appearing. HENT:      Head: Normocephalic and atraumatic. Eyes:      Conjunctiva/sclera: Conjunctivae normal.   Cardiovascular:      Rate and Rhythm: Normal rate and regular rhythm. Heart sounds: Normal heart sounds. No murmur heard.      Pulmonary:      Effort: Pulmonary effort is normal. No respiratory distress. Breath sounds: Normal breath sounds. No wheezing or rales. Abdominal:      General: Bowel sounds are normal. There is no distension. Palpations: Abdomen is soft. There is no mass. Tenderness: There is generalized abdominal tenderness. There is no guarding or rebound. Hernia: No hernia is present. Musculoskeletal:      Cervical back: Normal range of motion and neck supple. Skin:     General: Skin is warm and dry. Neurological:      General: No focal deficit present. Mental Status: She is alert and oriented to person, place, and time. Cranial Nerves: No cranial nerve deficit. Coordination: Coordination normal.          Procedures     MDM     ED Course as of Oct 22 1229   Mon Oct 18, 2021   1549 While there is documented allergy to iodine, the patient notes she is had a CT with IV contrast in the past.  She states she just gets heated denies any swelling. We will premedicate and get the study    [BB]   2059 Patient reevaluated, lying in bed no acute distress. States the morphine did help with her discomfort and pain. Discussed today's results and reasons to return. She will follow-up with GI. She has not had any recurrent symptoms here. [BB]      ED Course User Index  [BB] Amanda TashaDO Nilam johnston presents to the ED for evaluation of bright red blood per rectum. The patient also notes some hematemesis. Workup in the ED revealed labs near normal limits. Patient's Hgb stable compared to prior. She did have one episode in the department, but remained hemodynamically stable. She was never tachycardic or hypotensive. The patient's imaging did demonstrate long segment thickening of wall of the left colon which is concerning for infectious / inflammatory colitis. Patient was treated as so and given abx as well as steroids. She was recommended to follow up with Gastroenterology. Patient continues to be non-toxic on re-evaluation.  Findings were discussed with the patient and reasons to immediately return to the ED were articulated to them. They will follow-up with their PCP and gastroenterology. --------------------------------------------- PAST HISTORY ---------------------------------------------  Past Medical History:  has a past medical history of Anxiety, Bipolar disorder ([facility] [address] Cervical disc disease, Cervical spondylitis with radiculitis ([facility] [address] Facet hypertrophy, Headache, History of rectal sphincterotomy, Lumbar disc disease, Osteoarthritis, Panic disorder without agoraphobia, and Seizures ([facility] [address] Past Surgical History:  has a past surgical history that includes Hysterectomy; Breast surgery; cervical fusion; laparoscopy; laparotomy; Breast reduction surgery (Bilateral); other surgical history; other surgical history; Appendectomy; Colonoscopy (N/A, 3/3/2021); and Abdomen surgery (N/A, 4/19/2021). Social History:  reports that she has been smoking. She has a 0.25 pack-year smoking history. She has never used smokeless tobacco. She reports that she does not drink alcohol and does not use drugs. Family History: family history includes Cancer in her father; Diabetes in her mother; Other in her mother. The patients home medications have been reviewed.     Allergies: Bee venom, Fish-derived products, Hornet venom, and Iodine    -------------------------------------------------- RESULTS -------------------------------------------------  Labs:  Results for orders placed or performed during the hospital encounter of 10/18/21   CBC Auto Differential   Result Value Ref Range    WBC 6.5 4.5 - 11.5 E9/L    RBC 4.34 3.50 - 5.50 E12/L    Hemoglobin 14.2 11.5 - 15.5 g/dL    Hematocrit 43.5 34.0 - 48.0 %    .2 (H) 80.0 - 99.9 fL    MCH 32.7 26.0 - 35.0 pg    MCHC 32.6 32.0 - 34.5 %    RDW 11.9 11.5 - 15.0 fL    Platelets 310 682 - 511 E9/L    MPV 10.4 7.0 - 12.0 fL    Neutrophils % 65.4 43.0 - 80.0 %    Immature Granulocytes % 0.3 0.0 - 5.0 %    Lymphocytes % 25.1 20.0 - 42.0 %    Monocytes % 6.7 2.0 - 12.0 %    Eosinophils % 2.0 0.0 - 6.0 %    Basophils % 0.5 0.0 - 2.0 %    Neutrophils Absolute 4.22 1.80 - 7.30 E9/L    Immature Granulocytes # 0.02 E9/L    Lymphocytes Absolute 1.62 1.50 - 4.00 E9/L    Monocytes Absolute 0.43 0.10 - 0.95 E9/L    Eosinophils Absolute 0.13 0.05 - 0.50 E9/L    Basophils Absolute 0.03 0.00 - 0.20 E9/L   Comprehensive Metabolic Panel w/ Reflex to MG   Result Value Ref Range    Sodium 139 132 - 146 mmol/L    Potassium reflex Magnesium 3.8 3.5 - 5.0 mmol/L    Chloride 104 98 - 107 mmol/L    CO2 25 22 - 29 mmol/L    Anion Gap 10 7 - 16 mmol/L    Glucose 103 (H) 74 - 99 mg/dL    BUN 8 6 - 20 mg/dL    CREATININE 1.0 0.5 - 1.0 mg/dL    GFR Non-African American 58 >=60 mL/min/1.73    GFR African American >60     Calcium 9.4 8.6 - 10.2 mg/dL    Total Protein 7.6 6.4 - 8.3 g/dL    Albumin 4.5 3.5 - 5.2 g/dL    Total Bilirubin 0.5 0.0 - 1.2 mg/dL    Alkaline Phosphatase 81 35 - 104 U/L    ALT 15 0 - 32 U/L    AST 21 0 - 31 U/L   Lactic Acid, Plasma   Result Value Ref Range    Lactic Acid 0.8 0.5 - 2.2 mmol/L   Urinalysis, reflex to microscopic   Result Value Ref Range    Color, UA Yellow Straw/Yellow    Clarity, UA Clear Clear    Glucose, Ur Negative Negative mg/dL    Bilirubin Urine Negative Negative    Ketones, Urine Negative Negative mg/dL    Specific Gravity, UA <=1.005 1.005 - 1.030    Blood, Urine TRACE-INTACT Negative    pH, UA 5.5 5.0 - 9.0    Protein, UA Negative Negative mg/dL    Urobilinogen, Urine 0.2 <2.0 E.U./dL    Nitrite, Urine Negative Negative    Leukocyte Esterase, Urine Negative Negative   Troponin   Result Value Ref Range    Troponin, High Sensitivity <6 0 - 9 ng/L   Microscopic Urinalysis   Result Value Ref Range    WBC, UA NONE 0 - 5 /HPF    RBC, UA NONE 0 - 2 /HPF    Bacteria, UA NONE SEEN None Seen /HPF   EKG 12 Lead   Result Value Ref Range    Ventricular Rate 71 BPM    Atrial Rate 71 BPM    P-R Interval 146 ms    QRS Duration 76 ms    Q-T Interval 316 ms    QTc Calculation (Bazett) 343 ms    P Axis 61 degrees    R Axis 59 degrees    T Axis -53 degrees       Radiology:  CT ABDOMEN PELVIS W IV CONTRAST Additional Contrast? None   Final Result   1. Findings above suggest infectious or inflammatory colitis involving left   colon. 2.  Small nonspecific free fluid located in the pelvis.             ------------------------- NURSING NOTES AND VITALS REVIEWED ---------------------------  Date / Time Roomed:  10/18/2021  2:27 PM  ED Bed Assignment:  01/01    The nursing notes within the ED encounter and vital signs as below have been reviewed. /74   Pulse 76   Temp 98.1 °F (36.7 °C) (Oral)   Resp 14   Ht 5' 5\" (1.651 m)   Wt 120 lb (54.4 kg)   SpO2 98%   BMI 19.97 kg/m²   Oxygen Saturation Interpretation: Normal      ------------------------------------------ PROGRESS NOTES ------------------------------------------  12:34 PM EDT  I have spoken with the patient and discussed todays results, in addition to providing specific details for the plan of care and counseling regarding the diagnosis and prognosis. Their questions are answered at this time and they are agreeable with the plan. I discussed at length with them reasons for immediate return here for re evaluation. They will followup with their gastroenterologist and primary care physician by calling their office on Monday.      --------------------------------- ADDITIONAL PROVIDER NOTES ---------------------------------  At this time the patient is without objective evidence of an acute process requiring hospitalization or inpatient management. They have remained hemodynamically stable throughout their entire ED visit and are stable for discharge with outpatient follow-up. The plan has been discussed in detail and they are aware of the specific conditions for emergent return, as well as the importance of follow-up.       Discharge Medication List as of 10/18/2021  9:04 PM      START taking these medications    Details   cefdinir (OMNICEF) 300 MG capsule Take 1 capsule by mouth 2 times daily for 7 days, Disp-14 capsule, R-0Print      metroNIDAZOLE (FLAGYL) 500 MG tablet Take 1 tablet by mouth 2 times daily for 7 days, Disp-14 tablet, R-0Print      predniSONE (DELTASONE) 20 MG tablet Take 2 tablets by mouth daily for 5 days, Disp-10 tablet, R-0Print      ondansetron (ZOFRAN ODT) 4 MG disintegrating tablet Take 1 tablet by mouth every 8 hours as needed for Nausea or Vomiting, Disp-10 tablet, R-0Print      dicyclomine (BENTYL) 10 MG capsule Take 1 capsule by mouth 4 times daily (before meals and nightly), Disp-120 capsule, R-3Print             Diagnosis:  1. Hemorrhage of rectum and anus    2. Colitis        Disposition:  Patient's disposition: Discharge to home  Patient's condition is stable.          Libia Recio,   10/22/21 [id number]

## 2021-10-21 ENCOUNTER — TELEPHONE (OUTPATIENT)
Dept: PRIMARY CARE CLINIC | Age: 56
End: 2021-10-21

## 2021-10-21 DIAGNOSIS — K52.9 COLITIS: Primary | ICD-10-CM

## 2021-10-21 DIAGNOSIS — K62.5 RECTAL BLEEDING: ICD-10-CM

## 2021-10-21 NOTE — TELEPHONE ENCOUNTER
Patient stated she called in two days ago   regarding a referral to a G. I specialist and has not heard back . Patient   stated she is still experiencing her symptoms and painful stomach cramps . Patient would like a call back with referral information as soon as   possible to discuss further.

## 2021-10-21 NOTE — TELEPHONE ENCOUNTER
I can try Dr. Margarita Henriquez instead if she would like.   I don't know how far out he is booked though

## 2021-10-22 ENCOUNTER — TELEPHONE (OUTPATIENT)
Dept: GASTROENTEROLOGY | Age: 56
End: 2021-10-22

## 2021-10-22 ASSESSMENT — ENCOUNTER SYMPTOMS
SHORTNESS OF BREATH: 0
WHEEZING: 0
EYE DISCHARGE: 0
DIARRHEA: 0
COUGH: 0
ABDOMINAL PAIN: 1
EYE PAIN: 0
SORE THROAT: 0
NAUSEA: 1
SINUS PRESSURE: 0
ABDOMINAL DISTENTION: 0
VOMITING: 1
BLOOD IN STOOL: 1
BACK PAIN: 0
EYE REDNESS: 0

## 2021-10-27 ENCOUNTER — INITIAL CONSULT (OUTPATIENT)
Dept: GASTROENTEROLOGY | Age: 56
End: 2021-10-27
Payer: COMMERCIAL

## 2021-10-27 VITALS
HEIGHT: 65 IN | WEIGHT: 124.6 LBS | TEMPERATURE: 98.1 F | OXYGEN SATURATION: 100 % | RESPIRATION RATE: 18 BRPM | HEART RATE: 74 BPM | BODY MASS INDEX: 20.76 KG/M2 | DIASTOLIC BLOOD PRESSURE: 87 MMHG | SYSTOLIC BLOOD PRESSURE: 129 MMHG

## 2021-10-27 DIAGNOSIS — R93.5 ABNORMAL FINDINGS ON DIAGNOSTIC IMAGING OF ABDOMEN: Primary | ICD-10-CM

## 2021-10-27 DIAGNOSIS — R10.11 RUQ ABDOMINAL PAIN: ICD-10-CM

## 2021-10-27 DIAGNOSIS — K52.9 COLITIS: ICD-10-CM

## 2021-10-27 DIAGNOSIS — Z87.11 HISTORY OF GASTRIC ULCER: ICD-10-CM

## 2021-10-27 DIAGNOSIS — K57.90 DIVERTICULAR DISEASE: ICD-10-CM

## 2021-10-27 LAB
ALBUMIN SERPL-MCNC: 4.4 G/DL (ref 3.5–5.2)
ALP BLD-CCNC: 81 U/L (ref 35–104)
ALT SERPL-CCNC: 34 U/L (ref 0–32)
ANION GAP SERPL CALCULATED.3IONS-SCNC: 14 MMOL/L (ref 7–16)
AST SERPL-CCNC: 32 U/L (ref 0–31)
BILIRUB SERPL-MCNC: 0.3 MG/DL (ref 0–1.2)
BUN BLDV-MCNC: 17 MG/DL (ref 6–20)
CALCIUM SERPL-MCNC: 9.8 MG/DL (ref 8.6–10.2)
CHLORIDE BLD-SCNC: 102 MMOL/L (ref 98–107)
CO2: 21 MMOL/L (ref 22–29)
CREAT SERPL-MCNC: 1.1 MG/DL (ref 0.5–1)
GFR AFRICAN AMERICAN: >60
GFR NON-AFRICAN AMERICAN: 52 ML/MIN/1.73
GLUCOSE BLD-MCNC: 70 MG/DL (ref 74–99)
POTASSIUM SERPL-SCNC: 5.1 MMOL/L (ref 3.5–5)
SODIUM BLD-SCNC: 137 MMOL/L (ref 132–146)
TOTAL PROTEIN: 7.6 G/DL (ref 6.4–8.3)

## 2021-10-27 PROCEDURE — 99204 OFFICE O/P NEW MOD 45 MIN: CPT | Performed by: STUDENT IN AN ORGANIZED HEALTH CARE EDUCATION/TRAINING PROGRAM

## 2021-10-27 RX ORDER — SODIUM, POTASSIUM,MAG SULFATES 17.5-3.13G
1 SOLUTION, RECONSTITUTED, ORAL ORAL ONCE
Qty: 1 EACH | Refills: 0 | Status: SHIPPED | OUTPATIENT
Start: 2021-10-27 | End: 2021-10-27

## 2021-10-27 RX ORDER — DICYCLOMINE HYDROCHLORIDE 10 MG/1
10 CAPSULE ORAL PRN
Qty: 120 CAPSULE | Refills: 3 | Status: SHIPPED
Start: 2021-10-27 | End: 2022-03-10

## 2021-10-27 RX ORDER — OMEPRAZOLE 40 MG/1
40 CAPSULE, DELAYED RELEASE ORAL
Qty: 60 CAPSULE | Refills: 5 | Status: SHIPPED
Start: 2021-10-27 | End: 2021-10-28

## 2021-10-27 RX ORDER — SUCRALFATE 1 G/1
1 TABLET ORAL 4 TIMES DAILY
Qty: 28 TABLET | Refills: 0 | Status: SHIPPED
Start: 2021-10-27 | End: 2021-11-01

## 2021-10-27 NOTE — PATIENT INSTRUCTIONS
Patient Education        Learning About the Low FODMAP Diet for Irritable Bowel Syndrome (IBS)  What is the low-FODMAP diet? A low-FODMAP diet is a way to find out what foods give you digestion problems. You stop eating certain high-FODMAP foods for about 2 months. Then you add them back to see how your body reacts. This is called a \"challenge diet. \" A dietitian or doctor can help you follow this diet. FODMAPs are carbohydrates. They are in many types of foods. FODMAP stands for:  · F ermentable. · O ligosaccharides. · D isaccharides. · M onosaccharides. · A nd p olyols. If you have digestive problems, some of these foods can make your symptoms worse. When you are on this diet, you can still eat certain fruits and vegetables. You can also eat certain grains, meats, fish, and lactose-free milks. What is it used for? If you have irritable bowel syndrome (IBS), you can ease your symptoms by not eating some types of foods. Some people also use this diet for inflammatory bowel disease (IBD) or some food intolerances. High-FODMAP foods can be hard to digest. They pull more fluid into your intestines. They are also easily fermented. This can lead to bloating, belly pain, gas, and diarrhea. The low-FODMAP diet can help you figure out what foods to avoid. And it can help you find foods that are easier to digest.  This diet can help with symptoms of some digestive diseases. But it's not a cure. You will still need to manage your condition. How does it work? You will work with a doctor or dietitian when you start the diet. At first, you won't eat any high-FODMAP foods for a few weeks. Go to www.[employer]. [employer] to learn more about this diet. [employer] also find links to an damian for your phone or other device. You'll find low-FODMAP cookbooks there too. After 6 to 8 weeks, you will start to try high-FODMAP foods again. You will add those foods back to your diet, one group at a time.  Your doctor or dietitian will probably have you wait a few days before you add each new group of those foods. Keep a food diary. You can write down the foods you try and note how they make you feel. After a few weeks, you may have a better idea of what foods you should avoid and what foods make you feel your best.  What are the risks? There is some risk of not getting all of the vitamins and nutrients you need on the low-FODMAP diet. These include:  · Folate. · Thiamin. · Vitamin B6.  · Calcium. · Vitamin D. Your dietitian or doctor can help you find other sources of these if needed. This diet may limit your fiber intake. Try to plan your meals to include other sources of fiber. What foods are on the low-FODMAP diet? Here is a guide to foods that you can eat, plus the foods that you should avoid, when you are on the low-FODMAP diet. Grains  Okay to eat: Foods made from grains like arrowroot, buckwheat, corn, millet, and oats. You can also eat potato, quinoa, rice, sorghum, tapioca, and teff. Cereals, pasta, breads, corn tortillas and baked goods made from these grains are also okay. (These grains may be labeled \"gluten-free. \")  Avoid: Grains like wheat, barley, and rye. Avoid ingredients such as bulgur, couscous, durum, and semolina. And avoid cereals, breads, and pastas made from these grains. Avoid chickpea, lentil, and pea flour. Proteins  Okay to eat: Most meat, fish, and eggs without high-FODMAP sauces. You can have small amounts of almonds or hazelnuts (10 nuts). Macadamia nuts, peanuts, pecans, pine nuts, and walnuts are also okay. You can also eat jose carlos and pumpkin seeds, tofu, and tempeh. Avoid: Beans, chickpeas, lentils, and soybeans. Avoid pistachio and cashew nuts. And some sausages may have high-FODMAP ingredients. Dairy  Okay to eat: Lactose-free dairy milks. Rice milk and almond milk are okay. So are lactose-free yogurts, kefirs, ice creams, and sorbet from low-FODMAP fruits and sweeteners.  (These are often labeled \"lactose-free. \") You can have small amounts (2 Tbsp) of cottage, cream, or ricotta cheese. Hard cheeses like cheddar, [place], ROSS, and Swiss are okay. So are small amounts (1 oz) of aged or ripened cheeses like Brie, blue, and feta. Avoid: Milk, including cow, goat, and sheep. Avoid condensed or evaporated milk, buttermilk, custard, cream, sour cream, yogurt, and ice cream. Avoid soy milk. (Check sauces for dairy ingredients.)  Vegetables  Okay to eat: Bamboo shoots, bell peppers, bok skylar, up to ½ cup of broccoli or cabbage (red or white), and cucumbers. Eggplant, green beans, lettuce, olives, parsnips, and potatoes are okay to eat. So are pumpkin, rutabaga, seaweed, sprouts, Swiss chard, and spinach. You can eat scallions (green part only) and squash (not butternut). You can eat tomatoes, turnips, watercress, yams, and zucchini. You can also have small amounts of artichoke hearts (from can, 1 oz), carrots, corn (½ cob), and sweet potato (½ cup). Avoid: Artichokes, asparagus, [place] sprouts, syl cabbage, cauliflower, and celery. And avoid garlic, leeks, mushrooms, okra, onions, scallions (white part), shallots, and peas. Fruits  Okay to eat: Bananas, blueberries, cantaloupe, coconut, grapes, and honeydew. Kiwi, isaias, limes, oranges, passion fruit, papaya, and pineapple are also okay. You can eat plantain, raspberries, rhubarb, star fruit, strawberries, tangelo, and tangerine. You can also have small amounts of dried banana chips (up to 10 chips), dried cranberries (1 Tbsp), and shredded coconut (up to ¼ cup). Avoid: Apples, applesauce, apricots, avocados, blackberries, boysenberries, and cherries. Also avoid dates, figs, grapefruit, guava, lychee, and mangoes. Don't eat nectarines, peaches, pears, persimmon, plums, prunes, tamarillo, or watermelon. And limit most canned and dried fruits.   Oils, spices, condiments, and sweeteners  Okay to eat: Vegetable oils (including garlic infused), butter, ghee, lard, and margarine (no trans fat). You can have most fresh herbs like basil, chives, coriander, rayshawn, parsley, rosemary and thyme. You can have salt, jams made from low-FODMAP fruits, mayonnaise, and mustard. Soy sauce, hot sauce (no garlic), tamari, and vinegar are also okay. Sweeteners that are okay include sugar (sucrose), powdered (confectioner's) sugar, brown sugar, glucose, and maple syrup. You can also have some artificial sweeteners like aspartame, saccharine, and stevia. Avoid: Chutneys, hummus, jellies, garlic sauces, and gravies made with onion or garlic. Avoid pickles, relish, some salad dressings and soup stocks, salsa, and tomato paste. And avoid sauces and other foods with high fructose corn syrup, honey, molasses, and agave. Avoid artificial sweeteners (isomalt, mannitol, malitol, sorbitol, and xylitol). Avoid corn syrup solids, fructose, fruit juice concentrate, and polydextrose. Other foods and drinks  Okay to have: Water, soda water, tonic, soft drinks sweetened with sugar, ½ cup of low-FODMAP fruit juice, and most teas and alcohols. You can also eat foods made with baking powder and soda, cocoa, and gelatin. Avoid: Juices from high-FODMAP fruits and vegetables. And avoid fortified alfredo, chamomile and fennel teas, chicory-based drinks and coffee substitutes, and bouillon cubes. Follow-up care is a key part of your treatment and safety. Be sure to make and go to all appointments, and call your doctor if you are having problems. It's also a good idea to know your test results and keep a list of the medicines you take. Where can you learn more? Go to https://jose.[employer]. org and sign in to your [employer] account. Enter L235 in the [employer] box to learn more about \"Learning About the Low FODMAP Diet for Irritable Bowel Syndrome (IBS). \"     If you do not have an account, please click on the \"Sign Up Now\" link.   Current as of: December 17, 2020               Content Version: 13.0  © 3256-5534 Healthwise, Incorporated. Care instructions adapted under license by [facility] ([facility]). If you have questions about a medical condition or this instruction, always ask your healthcare professional. [address] any warranty or liability for your use of this information.        [facility]

## 2021-10-27 NOTE — PROGRESS NOTES
History and Physical      ASSESSMENT AND PLAN:    56y/F w/ history of persistent bloating, abdominal pain, diarrhea sometimes bloody, dyspepsia and GERD who presents for further evaluation and management. She also had a CT scan showing colonic wall thickening and inflammatory stranding possibly consistent with colitis. PLAN:  1. GERD/Dyspepsia:  -The patient mentions having a history of ulcers.  -I will schedule the patient for EGD. -The patient will begin PPI therapy daily. I will also start her on Bentyl and Carafate PRN for symptoms. 2. Diarrhea/Abnormal CT Imaging:  -I will schedule the patient for Colonoscopy for endoscopic assessment.   -She should also take Bentyl PRN     3. RUQ Discomfort, + Marti's Sign:  -I will schedule the patient to have a RUQ u/s for better assessment.   -I will also order a CMP to ensure LFTs stay wnl. I will see the patient in clinic following her endoscopic assessment. Thank you for including us in the care of this patient. Please do not hesitate to contact us with any additional questions or concerns. Edmundo Rea MD  Gastroenterology/Hepatology  [facility]        HISTORY OF PRESENT ILLNESS:      Ms. Linda Verdugo is a 56y/F who presents to clinic today for bloating, abdominal pain, and bloody diarrhea. The patient also mentions that she will have episodes of non-bloody, non-bilious emesis when she passes stool and these episodes tend to happen when is passing the bloody stool. The patient mentions that she has a history of gastric ulcers from a young age and feels that she has had a lifelong history of epigastric discomfort. The patient had a CT A/P performed in October 2021 due to these symptoms. I personally reviewed and interpreted these images. The final read mentions a long segment of left colonic wall thickening with subtle fat stranding suggestive of a non-specific colitis.  There is slight contrast enhancement of the walls of the distal colon, but otherwise largely unremarkable study. There was an overall normal stool burden. The patient mentions that she had a colonoscopy in March 2021 w/ Dr. Kei Grant. A few scattered diverticula were appreciated with internal hemorrhoids but the exam was otherwise normal.     The patient remains with persistent diarrhea though mentions that she is no longer seeing any blood. She reports 4-6 bowel movements daily which are mostly liquid in consistency. She also mentions that she has frequent GERD and dyspepsia. Past Medical History:        Diagnosis Date    Anxiety     Bipolar disorder ([address]     Bipolar 1 disorder    Cervical disc disease     Cervical spondylitis with radiculitis (HCC)     C2, C7    Facet hypertrophy     L4/5 - S1    Headache     Migraine    History of rectal sphincterotomy     Lumbar disc disease     Osteoarthritis     DJD/DDD C-Spine S/P Fusion CHEERLEADER DROPPED ON HEAD/NECK    Panic disorder without agoraphobia     Counselor Destiny Marroquin. Lora    Seizures ([facility] [address]     NONE SINCE 2018     Past Surgical History:        Procedure Laterality Date    ABDOMEN SURGERY N/A 4/19/2021    EXCISION LIPOMA ABDOMEN performed by Boaz Tripp MD at [address] Bilateral     Implants Bilaterally    BREAST SURGERY      CERVICAL FUSION      c2-c7 Bone Kevin Grafting 2-6    COLONOSCOPY N/A 3/3/2021    COLORECTAL CANCER SCREENING, HIGH RISK performed by Boaz Tripp MD at [address]      X4    LAPAROTOMY      X5 Ovarian Cysts, ZACARIAS-BSO (Non Cancerous)    OTHER SURGICAL HISTORY      Cervical Epidurals    OTHER SURGICAL HISTORY      Lumbar Epidural - Donatefrancesi 6/13/17, 6/27/17, 7/25/17     Social History:    TOBACCO:   reports that she has been smoking. She has a 0.25 pack-year smoking history. She has never used smokeless tobacco.  ETOH:   reports no history of alcohol use.   DRUGS:   reports no history of drug use. Family History:       Problem Relation Age of Onset    Diabetes Mother     Other Mother         End Stage Cirrhosis 1/2017    Cancer Father         Prostate - Nidhi Maynard         Current Outpatient Medications:     ondansetron (ZOFRAN ODT) 4 MG disintegrating tablet, Take 1 tablet by mouth every 8 hours as needed for Nausea or Vomiting, Disp: 10 tablet, Rfl: 0    dicyclomine (BENTYL) 10 MG capsule, Take 1 capsule by mouth 4 times daily (before meals and nightly), Disp: 120 capsule, Rfl: 3    zolpidem (AMBIEN CR) 12.5 MG extended release tablet, Take 1 tablet by mouth nightly as needed for Sleep for up to 90 days. , Disp: 90 tablet, Rfl: 1    carisoprodol (SOMA) 350 MG tablet, TAKE 1 TABLET BY MOUTH FOUR TIMES DAILY AS NEEDED FOR MUSCLE SPASMS, Disp: 360 tablet, Rfl: 0    topiramate (TOPAMAX) 25 MG tablet, Take 25 mg by mouth 2 times daily, Disp: , Rfl:     ALPRAZolam (XANAX) 0.5 MG tablet, Take 0.5 mg by mouth 3 times daily as needed for Sleep or Anxiety. , Disp: , Rfl:     HYDROcodone-acetaminophen (NORCO) 7.5-325 MG per tablet, Take 1 tablet by mouth every 12 hours as needed for Pain., Disp: , Rfl:     ALPRAZolam (XANAX) 0.5 MG tablet, Take 1 tablet by mouth 3 times daily as needed for Anxiety for up to 90 days. , Disp: 270 tablet, Rfl: 0    [START ON 11/3/2021] HYDROcodone-acetaminophen (NORCO) 7.5-325 MG per tablet, Take 1 tablet by mouth every 12 hours as needed for Pain for up to 30 days. Intended supply: 30 days, Disp: 60 tablet, Rfl: 0    HYDROcodone-acetaminophen (NORCO) 7.5-325 MG per tablet, Take 1 tablet by mouth every 12 hours as needed for Pain for up to 30 days.  Intended supply: 30 days, Disp: 60 tablet, Rfl: 0    estradiol (VIVELLE) 0.1 MG/24HR, APPLY 1 PATCH EXTERNALLY TO THE SKIN 2 TIMES A WEEK, Disp: 24 patch, Rfl: 1    lamoTRIgine (LAMICTAL) 100 MG tablet, TAKE 1 TABLET BY MOUTH TWICE DAILY, Disp: 180 tablet, Rfl: 1    QUEtiapine (SEROQUEL) 100 MG tablet, TAKE 1 TABLET BY MOUTH TWICE DAILY, Disp: 180 tablet, Rfl: 1    sertraline (ZOLOFT) 50 MG tablet, Take 1 tablet by mouth nightly, Disp: 90 tablet, Rfl: 1    COLLAGEN PO, Take by mouth daily , Disp: , Rfl:     Cholecalciferol (VITAMIN D3) 125 MCG (5000 UT) TABS, Take by mouth, Disp: , Rfl:      Allergies:  Bee venom, Fish-derived products, Hornet venom, and Iodine    ROS:  General: Patient denies f/c or weight loss. HEENT: Patient denies persistent postnasal drip, scleral icterus, drooling, persistent bleeding from nose/mouth. Resp: Patient denies SOB, wheezing, productive cough. Cards: Patient denies CP, palpitations, significant edema  GI: As above. Derm: Patient denies jaundice/rashes. Musc: Patient denies diffuse/irregular joint swelling or myalgias. PHYSICAL EXAM:  /87 (Site: Left Upper Arm, Position: Sitting, Cuff Size: Medium Adult)   Pulse 74   Temp 98.1 °F (36.7 °C) (Infrared)   Resp 18   Ht 5' 5\" (1.651 m)   Wt 124 lb 9.6 oz (56.5 kg)   SpO2 100%   BMI 20.73 kg/m²   Physical Exam:  General: Overall well-appearing, NAD  HEENT: PERRLA, EOMI, Anicteric sclera, MMM, no rhinorrhea  Cards: RRR, no LE edema  Resp: Breathing comfortably on room air, good air movement, no use of accessory muscles, no audible wheezing  Abdomen: Soft, ND. Tenderness in RUQ w/ associated Marti's sign. Extremities: Moves all extremities, no effusions or bruising.   Skin: No rashes or jaundice  Neuro: A&O x 3, CN grossly intact, non-focal exam          Electronically signed by Herman Negro MD on 10/27/2021 at 9:11 AM

## 2021-10-28 RX ORDER — OMEPRAZOLE 40 MG/1
CAPSULE, DELAYED RELEASE ORAL
Qty: 180 CAPSULE | Refills: 5 | Status: ON HOLD
Start: 2021-10-28 | End: 2021-12-13 | Stop reason: DRUGHIGH

## 2021-11-01 RX ORDER — SUCRALFATE 1 G/1
TABLET ORAL
Qty: 28 TABLET | Refills: 0 | Status: SHIPPED
Start: 2021-11-01 | End: 2021-11-15

## 2021-11-02 ENCOUNTER — TELEPHONE (OUTPATIENT)
Dept: GASTROENTEROLOGY | Age: 56
End: 2021-11-02

## 2021-11-02 NOTE — TELEPHONE ENCOUNTER
Called and spoke with patient's insurance Barry Wu to see if prior authorization is required for outpatient EGD/Colonoscopy. Spoke to [facility] and determined that no [place] is required for either procedure. Call ref # [id number].    Electronically signed by Mirela Park MA on 11/2/2021 at 4:02 PM

## 2021-11-15 ENCOUNTER — TELEPHONE (OUTPATIENT)
Dept: PRIMARY CARE CLINIC | Age: 56
End: 2021-11-15

## 2021-11-15 RX ORDER — SUCRALFATE 1 G/1
TABLET ORAL
Qty: 28 TABLET | Refills: 0 | Status: SHIPPED
Start: 2021-11-15 | End: 2022-04-21

## 2021-11-15 RX ORDER — SUCRALFATE 1 G/1
TABLET ORAL
Qty: 28 TABLET | Refills: 0 | Status: SHIPPED
Start: 2021-11-15 | End: 2021-12-07

## 2021-11-15 NOTE — TELEPHONE ENCOUNTER
Spoke to patient. She was in the hospital 4 weeks ago with dx of ulcerative colitis. She met with gastroenterologist who will be performing a scope after the holidays. She is having a hard time going to work because she cannot stop vomiting and states she is still experiencing rectal bleeding. She is requesting an appt.

## 2021-11-15 NOTE — TELEPHONE ENCOUNTER
----- Message from Shanelle Nickerson sent at 11/15/2021  9:02 AM EST -----  Subject: Appointment Request    Reason for Call: Urgent (Patient Request) Existing Condition Follow    QUESTIONS  Type of Appointment? Established Patient  Reason for appointment request? No appointments available during search  Additional Information for Provider? Patient needs to schedule an   appointment as soon as possible for her condition.   ---------------------------------------------------------------------------  --------------  CALL BACK INFO  What is the best way for the office to contact you? OK to leave message on   voicemail  Preferred Call Back Phone Number? [phone]  ---------------------------------------------------------------------------  --------------  SCRIPT ANSWERS  Relationship to Patient? Self  (Is the patient requesting to be seen urgently for their symptoms?)? Yes  Is this follow up request related to routine Diabetes Management? No  Are you having any new concerns about your existing condition? No  Have you been diagnosed with, awaiting test results for, or told that you   are suspected of having COVID-19 (Coronavirus)? (If patient has tested   negative or was tested as a requirement for work, school, or travel and   not based on symptoms, answer no)? No  Within the past two weeks have you developed any of the following symptoms   (answer no if symptoms have been present longer than 2 weeks or began   more than 2 weeks ago)? Fever or Chills, Cough, Shortness of breath or   difficulty breathing, Loss of taste or smell, Sore throat, Nasal   congestion, Sneezing or runny nose, Fatigue or generalized body aches   (answer no if pain is specific to a body part e.g. back pain), Diarrhea,   Headache? No  Have you had close contact with someone with COVID-19 in the last 14 days? No  (Service Expert  click yes below to proceed with [employer] As Usual   Scheduling)?  Yes

## 2021-11-22 ENCOUNTER — OFFICE VISIT (OUTPATIENT)
Dept: PRIMARY CARE CLINIC | Age: 56
End: 2021-11-22
Payer: COMMERCIAL

## 2021-11-22 VITALS
OXYGEN SATURATION: 98 % | BODY MASS INDEX: 21.16 KG/M2 | HEIGHT: 65 IN | HEART RATE: 70 BPM | DIASTOLIC BLOOD PRESSURE: 80 MMHG | SYSTOLIC BLOOD PRESSURE: 122 MMHG | WEIGHT: 127 LBS | TEMPERATURE: 97.2 F

## 2021-11-22 DIAGNOSIS — K62.5 RECTAL BLEEDING: ICD-10-CM

## 2021-11-22 DIAGNOSIS — K52.9 COLITIS: Primary | ICD-10-CM

## 2021-11-22 DIAGNOSIS — F51.01 PRIMARY INSOMNIA: ICD-10-CM

## 2021-11-22 PROCEDURE — 99214 OFFICE O/P EST MOD 30 MIN: CPT | Performed by: FAMILY MEDICINE

## 2021-11-22 RX ORDER — PREDNISONE 10 MG/1
TABLET ORAL
Qty: 30 TABLET | Refills: 0 | Status: SHIPPED | OUTPATIENT
Start: 2021-11-22 | End: 2021-12-04

## 2021-11-22 RX ORDER — ZALEPLON 5 MG/1
5 CAPSULE ORAL NIGHTLY
Qty: 30 CAPSULE | Refills: 0 | Status: SHIPPED
Start: 2021-11-22 | End: 2021-12-07

## 2021-11-22 RX ORDER — ONDANSETRON 4 MG/1
4 TABLET, FILM COATED ORAL EVERY 8 HOURS PRN
Qty: 90 TABLET | Refills: 0 | Status: SHIPPED
Start: 2021-11-22 | End: 2021-12-07

## 2021-11-22 ASSESSMENT — ENCOUNTER SYMPTOMS
WHEEZING: 0
COUGH: 0
SHORTNESS OF BREATH: 0
DIARRHEA: 1
BACK PAIN: 1
CONSTIPATION: 0
ABDOMINAL PAIN: 1
NAUSEA: 1
VOMITING: 0

## 2021-11-22 NOTE — PROGRESS NOTES
21  Daxa White : 1965 Sex: female  Age: 49 y.o. Chief Complaint   Patient presents with    Abdominal Pain     stomach cramping, rectal bleeding    ED Follow-up     ulcerative colitis, will have scope in December     HPI:  49 y.o. female presents today for ER follow up. Patient's chart, medical, surgical and medication history all reviewed. ER follow up  Date seen in the ER: 10/18/21  Symptoms: Vomiting, diarrhea, rectal bleeding  Labs/Imaging: CBC, CMP, LA, Trop, UA all normal; CT A/P demonstrating colitis  Diagnosis: Colitis, Hemorrhage of rectum and anus  Treatments: Omnicef, Flagyl, Prednisone, Zofran and Bentyl    Patient notes that she is feeling somewhat better, but bowels are still hyperactive. No further diarrhea. Using Bentyl/PPI and Carafate as directed. No bleeding. No fever. Saw GI in follow up. Planning scope in December. Patient not sleeping well, even with Ambien. ROS:  Review of Systems   Constitutional: Negative for chills, fatigue and fever. Respiratory: Negative for cough, shortness of breath and wheezing. Cardiovascular: Negative for chest pain and palpitations. Gastrointestinal: Positive for abdominal pain, diarrhea and nausea. Negative for constipation and vomiting. Musculoskeletal: Positive for arthralgias, back pain and gait problem. Skin: Negative for rash. Neurological: Negative for dizziness and headaches. Psychiatric/Behavioral: Negative for dysphoric mood. The patient is nervous/anxious. All other systems reviewed and are negative.      Current Outpatient Medications on File Prior to Visit   Medication Sig Dispense Refill    sucralfate (CARAFATE) 1 GM tablet TAKE 1 TABLET BY MOUTH FOUR TIMES DAILY 28 tablet 0    sucralfate (CARAFATE) 1 GM tablet TAKE 1 TABLET BY MOUTH FOUR TIMES DAILY 28 tablet 0    omeprazole (PRILOSEC) 40 MG delayed release capsule TAKE 1 CAPSULE BY MOUTH TWICE DAILY BEFORE MEALS 180 capsule 5    dicyclomine (BENTYL) 10 MG capsule Take 1 capsule by mouth as needed (abdominal pain/bloating) 120 capsule 3    ondansetron (ZOFRAN ODT) 4 MG disintegrating tablet Take 1 tablet by mouth every 8 hours as needed for Nausea or Vomiting 10 tablet 0    zolpidem (AMBIEN CR) 12.5 MG extended release tablet Take 1 tablet by mouth nightly as needed for Sleep for up to 90 days. 90 tablet 1    carisoprodol (SOMA) 350 MG tablet TAKE 1 TABLET BY MOUTH FOUR TIMES DAILY AS NEEDED FOR MUSCLE SPASMS 360 tablet 0    topiramate (TOPAMAX) 25 MG tablet Take 25 mg by mouth 2 times daily      ALPRAZolam (XANAX) 0.5 MG tablet Take 0.5 mg by mouth 3 times daily as needed for Sleep or Anxiety.  HYDROcodone-acetaminophen (NORCO) 7.5-325 MG per tablet Take 1 tablet by mouth every 12 hours as needed for Pain.  ALPRAZolam (XANAX) 0.5 MG tablet Take 1 tablet by mouth 3 times daily as needed for Anxiety for up to 90 days. 270 tablet 0    HYDROcodone-acetaminophen (NORCO) 7.5-325 MG per tablet Take 1 tablet by mouth every 12 hours as needed for Pain for up to 30 days. Intended supply: 30 days 60 tablet 0    estradiol (VIVELLE) 0.1 MG/24HR APPLY 1 PATCH EXTERNALLY TO THE SKIN 2 TIMES A WEEK 24 patch 1    lamoTRIgine (LAMICTAL) 100 MG tablet TAKE 1 TABLET BY MOUTH TWICE DAILY 180 tablet 1    QUEtiapine (SEROQUEL) 100 MG tablet TAKE 1 TABLET BY MOUTH TWICE DAILY 180 tablet 1    sertraline (ZOLOFT) 50 MG tablet Take 1 tablet by mouth nightly 90 tablet 1    COLLAGEN PO Take by mouth daily       Cholecalciferol (VITAMIN D3) 125 MCG (5000 UT) TABS Take by mouth       No current facility-administered medications on file prior to visit.        Allergies   Allergen Reactions    Bee Venom Anaphylaxis     Wasps    Fish-Derived Products Anaphylaxis and Swelling     Swordfish allergy    Hornet Venom Anaphylaxis    Iodine        Past Medical History:   Diagnosis Date    Anxiety     Bipolar disorder ([facility] [address]     Bipolar 1 disorder    Cervical disc disease     Cervical spondylitis with radiculitis (HCC)     C2, C7    Facet hypertrophy     L4/5 - S1    Headache     Migraine    History of rectal sphincterotomy     Lumbar disc disease     Osteoarthritis     DJD/DDD C-Spine S/P Fusion CHEERLEADER DROPPED ON HEAD/NECK    Panic disorder without agoraphobia     Counselor Destiny Devries [facility])     NONE SINCE      Past Surgical History:   Procedure Laterality Date    ABDOMEN SURGERY N/A 2021    EXCISION LIPOMA ABDOMEN performed by Fer Gallardo MD at [address] Bilateral     Implants Bilaterally    BREAST SURGERY      CERVICAL FUSION      c2-c7 Bone Kevin Grafting 2-6    COLONOSCOPY N/A 3/3/2021    COLORECTAL CANCER SCREENING, HIGH RISK performed by Fer Gallardo MD at [address]      X4    LAPAROTOMY      X5 Ovarian Cysts, ZACARIAS-BSO (Non Cancerous)    OTHER SURGICAL HISTORY      Cervical Epidurals    OTHER SURGICAL HISTORY      Lumbar Epidural - [address], 17, 17     Family History   Problem Relation Age of Onset    Diabetes Mother     Other Mother         End Stage Cirrhosis 2017    Cancer Father         Prostate - Shandra Maynard     Social History     Socioeconomic History    Marital status:      Spouse name: Not on file    Number of children: Not on file    Years of education: Not on file    Highest education level: Not on file   Occupational History    Not on file   Tobacco Use    Smoking status: Current Some Day Smoker     Packs/day: 0.25     Years: 1.00     Pack years: 0.25     Last attempt to quit:      Years since quittin.8    Smokeless tobacco: Never Used   Vaping Use    Vaping Use: Never used   Substance and Sexual Activity    Alcohol use: No    Drug use: No    Sexual activity: Not on file   Other Topics Concern    Not on file   Social History Narrative    Not on file     Social Determinants of Health     Financial Resource Strain:     Difficulty of Paying Living Expenses: Not on file   Food Insecurity:     Worried About Running Out of Food in the Last Year: Not on file    Chavo of Food in the Last Year: Not on file   Transportation Needs:     Lack of Transportation (Medical): Not on file    Lack of Transportation (Non-Medical): Not on file   Physical Activity:     Days of Exercise per Week: Not on file    Minutes of Exercise per Session: Not on file   Stress:     Feeling of Stress : Not on file   Social Connections:     Frequency of Communication with Friends and Family: Not on file    Frequency of Social Gatherings with Friends and Family: Not on file    Attends [employer] Services: Not on file    Active Member of [address] [place] [address] Historic Futures or Organizations: Not on file    Attends Club or Organization Meetings: Not on file    Marital Status: Not on file   Intimate Partner Violence:     Fear of Current or Ex-Partner: Not on file    Emotionally Abused: Not on file    Physically Abused: Not on file    Sexually Abused: Not on file   Housing Stability:     Unable to Pay for Housing in the Last Year: Not on file    Number of [place] in the Last Year: Not on file    Unstable Housing in the Last Year: Not on file       Vitals:    11/22/21 1258   BP: 122/80   Pulse: 70   Temp: 97.2 °F (36.2 °C)   SpO2: 98%   Weight: 127 lb (57.6 kg)   Height: 5' 5\" (1.651 m)       Physical Exam:  Physical Exam  Vitals and nursing note reviewed. Constitutional:       General: She is not in acute distress. Appearance: Normal appearance. She is well-developed and normal weight. She is not ill-appearing. HENT:      Head: Normocephalic and atraumatic. Right Ear: Hearing and external ear normal.      Left Ear: Hearing and external ear normal.      Nose:      Comments: Wearing mask  Eyes:      General: Lids are normal. No scleral icterus.      Extraocular Movements: Extraocular movements intact. Conjunctiva/sclera: Conjunctivae normal.   Neck:      Thyroid: No thyromegaly. Cardiovascular:      Rate and Rhythm: Normal rate and regular rhythm. Heart sounds: Normal heart sounds. No murmur heard. Pulmonary:      Effort: Pulmonary effort is normal. No respiratory distress. Breath sounds: Normal breath sounds. No wheezing. Abdominal:      General: Abdomen is flat. Bowel sounds are increased. There is no distension. Palpations: Abdomen is soft. There is no mass. Tenderness: There is no abdominal tenderness. Musculoskeletal:         General: No tenderness. Normal range of motion. Right hand: Deformity (thickening of palmar aponeurosis at 4-5th MCP) present. Left hand: Deformity (thickening of palmar aponeurosis at 4-5th MCP) present. Cervical back: Normal range of motion and neck supple. Right lower leg: No edema. Left lower leg: No edema. Lymphadenopathy:      Cervical: No cervical adenopathy. Skin:     General: Skin is warm and dry. Findings: No rash. Neurological:      General: No focal deficit present. Mental Status: She is alert and oriented to person, place, and time. Gait: Gait normal.   Psychiatric:         Mood and Affect: Mood and affect normal.         Speech: Speech normal.         Behavior: Behavior normal.         Thought Content:  Thought content normal.         Labs:  CBC with Differential:    Lab Results   Component Value Date    WBC 6.5 10/18/2021    RBC 4.34 10/18/2021    HGB 14.2 10/18/2021    HCT 43.5 10/18/2021     10/18/2021    .2 10/18/2021    MCH 32.7 10/18/2021    MCHC 32.6 10/18/2021    RDW 11.9 10/18/2021    SEGSPCT 53 09/24/2011    LYMPHOPCT 25.1 10/18/2021    MONOPCT 6.7 10/18/2021    BASOPCT 0.5 10/18/2021    MONOSABS 0.43 10/18/2021    LYMPHSABS 1.62 10/18/2021    EOSABS 0.13 10/18/2021    BASOSABS 0.03 10/18/2021     CMP:    Lab Results   Component Value Date     10/27/2021    K 5.1 10/27/2021    K 3.8 10/18/2021     10/27/2021    CO2 21 10/27/2021    BUN 17 10/27/2021    CREATININE 1.1 10/27/2021    GFRAA >60 10/27/2021    LABGLOM 52 10/27/2021    GLUCOSE 70 10/27/2021    GLUCOSE 88 09/24/2011    PROT 7.6 10/27/2021    LABALBU 4.4 10/27/2021    LABALBU 4.1 09/23/2011    CALCIUM 9.8 10/27/2021    BILITOT 0.3 10/27/2021    ALKPHOS 81 10/27/2021    AST 32 10/27/2021    ALT 34 10/27/2021     U/A:    Lab Results   Component Value Date    NITRITE neg 09/08/2021    COLORU Yellow 10/18/2021    PROTEINU Negative 10/18/2021    PHUR 5.5 10/18/2021    WBCUA NONE 10/18/2021    RBCUA NONE 10/18/2021    BACTERIA NONE SEEN 10/18/2021    CLARITYU Clear 10/18/2021    SPECGRAV <=1.005 10/18/2021    LEUKOCYTESUR Negative 10/18/2021    UROBILINOGEN 0.2 10/18/2021    BILIRUBINUR Negative 10/18/2021    BILIRUBINUR neg 09/08/2021    BLOODU TRACE-INTACT 10/18/2021    GLUCOSEU Negative 10/18/2021     HgBA1c:    Lab Results   Component Value Date    LABA1C 5.0 11/19/2020     FLP:    Lab Results   Component Value Date    TRIG 93 11/19/2020    HDL 91 11/19/2020    [address] 90 11/19/2020    LABVLDL 19 11/19/2020     TSH:    Lab Results   Component Value Date    TSH 1.650 11/19/2020        Assessment and Plan:  Eldon Winters was seen today for abdominal pain and ed follow-up. Diagnoses and all orders for this visit:    Colitis  -     predniSONE (DELTASONE) 10 MG tablet; Take 4 tablets by mouth daily for 3 days, THEN 3 tablets daily for 3 days, THEN 2 tablets daily for 3 days, THEN 1 tablet daily for 3 days. -     ondansetron (ZOFRAN) 4 MG tablet; Take 1 tablet by mouth every 8 hours as needed for Nausea or Vomiting    Rectal bleeding    Primary insomnia  -     zaleplon (SONATA) 5 MG capsule; Take 1 capsule by mouth nightly for 30 days. Reviewed hospital records and GI consults. Will repeat round of Prednisone and continue Zofran PRN. Change Ambien to Sonata and recheck in 2 weeks.        Return in about 2 weeks (around 12/6/2021) for Recheck.       Seen By:  Evy Kaur DO

## 2021-12-03 DIAGNOSIS — F41.0 PANIC DISORDER WITHOUT AGORAPHOBIA: ICD-10-CM

## 2021-12-03 DIAGNOSIS — F31.9 BIPOLAR I DISORDER (HCC): ICD-10-CM

## 2021-12-03 DIAGNOSIS — R56.9 CONVULSIONS, UNSPECIFIED CONVULSION TYPE (HCC): Chronic | ICD-10-CM

## 2021-12-03 RX ORDER — TOPIRAMATE 25 MG/1
TABLET ORAL
Qty: 180 TABLET | Refills: 1 | Status: SHIPPED
Start: 2021-12-03 | End: 2022-06-06

## 2021-12-03 RX ORDER — LAMOTRIGINE 100 MG/1
TABLET ORAL
Qty: 180 TABLET | Refills: 1 | Status: SHIPPED
Start: 2021-12-03 | End: 2022-06-06

## 2021-12-03 RX ORDER — QUETIAPINE FUMARATE 100 MG/1
TABLET, FILM COATED ORAL
Qty: 180 TABLET | Refills: 1 | Status: SHIPPED
Start: 2021-12-03 | End: 2022-06-06

## 2021-12-07 ENCOUNTER — OFFICE VISIT (OUTPATIENT)
Dept: PRIMARY CARE CLINIC | Age: 56
End: 2021-12-07
Payer: COMMERCIAL

## 2021-12-07 VITALS
HEIGHT: 65 IN | HEART RATE: 70 BPM | OXYGEN SATURATION: 98 % | TEMPERATURE: 97.4 F | WEIGHT: 129 LBS | BODY MASS INDEX: 21.49 KG/M2 | SYSTOLIC BLOOD PRESSURE: 126 MMHG | DIASTOLIC BLOOD PRESSURE: 82 MMHG

## 2021-12-07 DIAGNOSIS — M50.90 CERVICAL DISC DISEASE: ICD-10-CM

## 2021-12-07 DIAGNOSIS — F51.01 PRIMARY INSOMNIA: ICD-10-CM

## 2021-12-07 DIAGNOSIS — F31.9 BIPOLAR I DISORDER (HCC): ICD-10-CM

## 2021-12-07 DIAGNOSIS — M48.062 SPINAL STENOSIS OF LUMBAR REGION WITH NEUROGENIC CLAUDICATION: ICD-10-CM

## 2021-12-07 DIAGNOSIS — F41.1 GAD (GENERALIZED ANXIETY DISORDER): Primary | ICD-10-CM

## 2021-12-07 PROCEDURE — 99214 OFFICE O/P EST MOD 30 MIN: CPT | Performed by: FAMILY MEDICINE

## 2021-12-07 RX ORDER — ALPRAZOLAM 0.5 MG/1
0.5 TABLET ORAL 3 TIMES DAILY PRN
Qty: 270 TABLET | Refills: 0 | Status: SHIPPED
Start: 2021-12-07 | End: 2022-03-10 | Stop reason: SDUPTHER

## 2021-12-07 RX ORDER — CARISOPRODOL 350 MG/1
TABLET ORAL
Qty: 360 TABLET | Refills: 0 | Status: SHIPPED
Start: 2021-12-07 | End: 2022-03-10 | Stop reason: SDUPTHER

## 2021-12-07 RX ORDER — HYDROCODONE BITARTRATE AND ACETAMINOPHEN 7.5; 325 MG/1; MG/1
1 TABLET ORAL EVERY 12 HOURS PRN
Qty: 60 TABLET | Refills: 0 | Status: SHIPPED
Start: 2021-12-07 | End: 2021-12-10

## 2021-12-07 RX ORDER — ZOLPIDEM TARTRATE 12.5 MG/1
12.5 TABLET, FILM COATED, EXTENDED RELEASE ORAL NIGHTLY PRN
Qty: 90 TABLET | Refills: 1 | Status: SHIPPED
Start: 2021-12-07 | End: 2022-03-10 | Stop reason: SDUPTHER

## 2021-12-07 RX ORDER — HYDROCODONE BITARTRATE AND ACETAMINOPHEN 7.5; 325 MG/1; MG/1
1 TABLET ORAL EVERY 12 HOURS PRN
Qty: 60 TABLET | Refills: 0 | Status: SHIPPED
Start: 2022-02-01 | End: 2022-03-10 | Stop reason: SDUPTHER

## 2021-12-07 RX ORDER — HYDROCODONE BITARTRATE AND ACETAMINOPHEN 7.5; 325 MG/1; MG/1
1 TABLET ORAL EVERY 12 HOURS PRN
Qty: 60 TABLET | Refills: 0 | Status: SHIPPED
Start: 2022-01-04 | End: 2021-12-10

## 2021-12-07 ASSESSMENT — ENCOUNTER SYMPTOMS
BACK PAIN: 1
WHEEZING: 0
NAUSEA: 0
SHORTNESS OF BREATH: 0
DIARRHEA: 1
CONSTIPATION: 0
ABDOMINAL PAIN: 0
VOMITING: 0
COUGH: 0

## 2021-12-07 NOTE — PROGRESS NOTES
21  Nancy Chand : 1965 Sex: female  Age: 49 y.o. Chief Complaint   Patient presents with    Medication Refill     HPI:  49 y.o. female patient presents today for 3 month(s) follow up for chronic medical conditions, medication refills and FBW. Patient's chart, medical, surgical and medication history all reviewed. Chronic pain  Patient has long standing history of spinal stenosis. She has a history of cervical spine DDD after accident while cheerleading. Previously had multi-level fusion of cervical spine- now having worsening pain in upper thoracics. She takes Norco and Soma appropriately. Current dosing is effective for patient. Patient has been doing well overall. Pain is stable currently. Anxiety  Patient complains of evaluation of anxiety disorder and post traumatic stress  disorder. She has the following anxiety symptoms: difficulty concentrating, feelings of losing control, irritable, racing thoughts. Onset of symptoms was approximately several years ago, stable since that time. She denies current suicidal and homicidal ideation. Previous treatment includes Ativan and no therapy. She complains of the following side effects from the treatment: none. ROS:  Review of Systems   Constitutional: Negative for chills, fatigue and fever. Respiratory: Negative for cough, shortness of breath and wheezing. Cardiovascular: Negative for chest pain and palpitations. Gastrointestinal: Positive for diarrhea. Negative for abdominal pain, constipation, nausea and vomiting. Musculoskeletal: Positive for arthralgias, back pain and gait problem. Skin: Negative for rash. Neurological: Negative for dizziness and headaches. Psychiatric/Behavioral: Negative for dysphoric mood. The patient is nervous/anxious. All other systems reviewed and are negative.      Current Outpatient Medications on File Prior to Visit   Medication Sig Dispense Refill    QUEtiapine (SEROQUEL) 100 MG tablet TAKE 1 TABLET BY MOUTH TWICE DAILY 180 tablet 1    sertraline (ZOLOFT) 50 MG tablet TAKE 1 TABLET BY MOUTH EVERY NIGHT 90 tablet 1    lamoTRIgine (LAMICTAL) 100 MG tablet TAKE 1 TABLET BY MOUTH TWICE DAILY 180 tablet 1    topiramate (TOPAMAX) 25 MG tablet TAKE 2 TABLETS BY MOUTH EVERY NIGHT 180 tablet 1    sucralfate (CARAFATE) 1 GM tablet TAKE 1 TABLET BY MOUTH FOUR TIMES DAILY 28 tablet 0    omeprazole (PRILOSEC) 40 MG delayed release capsule TAKE 1 CAPSULE BY MOUTH TWICE DAILY BEFORE MEALS 180 capsule 5    dicyclomine (BENTYL) 10 MG capsule Take 1 capsule by mouth as needed (abdominal pain/bloating) 120 capsule 3    ondansetron (ZOFRAN ODT) 4 MG disintegrating tablet Take 1 tablet by mouth every 8 hours as needed for Nausea or Vomiting 10 tablet 0    ALPRAZolam (XANAX) 0.5 MG tablet Take 0.5 mg by mouth 3 times daily as needed for Sleep or Anxiety.  HYDROcodone-acetaminophen (NORCO) 7.5-325 MG per tablet Take 1 tablet by mouth every 12 hours as needed for Pain.  estradiol (VIVELLE) 0.1 MG/24HR APPLY 1 PATCH EXTERNALLY TO THE SKIN 2 TIMES A WEEK 24 patch 1    COLLAGEN PO Take by mouth daily       Cholecalciferol (VITAMIN D3) 125 MCG (5000 UT) TABS Take by mouth       No current facility-administered medications on file prior to visit.        Allergies   Allergen Reactions    Bee Venom Anaphylaxis     Wasps    Fish-Derived Products Anaphylaxis and Swelling     Swordfish allergy    Hornet Venom Anaphylaxis    Iodine        Past Medical History:   Diagnosis Date    Anxiety     Bipolar disorder ([facility]     Bipolar 1 disorder    Cervical disc disease     Cervical spondylitis with radiculitis (HCC)     C2, C7    Facet hypertrophy     L4/5 - S1    Headache     Migraine    History of rectal sphincterotomy     Lumbar disc disease     Osteoarthritis     DJD/DDD C-Spine S/P Fusion CHEERLEADER DROPPED ON HEAD/NECK    Panic disorder without agoraphobia     Counselor [facility] [place]    Seizures ([facility] [address]     NONE SINCE 2018     Past Surgical History:   Procedure Laterality Date    ABDOMEN SURGERY N/A 2021    EXCISION LIPOMA ABDOMEN performed by Shaun Delgado MD at [address] Bilateral     Implants Bilaterally    BREAST SURGERY      CERVICAL FUSION      c2-c7 Bone Kevin Grafting 2-6    COLONOSCOPY N/A 3/3/2021    COLORECTAL CANCER SCREENING, HIGH RISK performed by Shaun Delgado MD at [address]      X4    LAPAROTOMY      X5 Ovarian Cysts, ZACARIAS-BSO (Non Cancerous)    OTHER SURGICAL HISTORY      Cervical Epidurals    OTHER SURGICAL HISTORY      Lumbar Epidural - [address], 17, 17     Family History   Problem Relation Age of Onset    Diabetes Mother     Other Mother         End Stage Cirrhosis 2017    Cancer Father         [facility]     Social History     Socioeconomic History    Marital status:      Spouse name: Not on file    Number of children: Not on file    Years of education: Not on file    Highest education level: Not on file   Occupational History    Not on file   Tobacco Use    Smoking status: Current Some Day Smoker     Packs/day: 0.25     Years: 1.00     Pack years: 0.25     Last attempt to quit:      Years since quittin.9    Smokeless tobacco: Never Used   Vaping Use    Vaping Use: Never used   Substance and Sexual Activity    Alcohol use: No    Drug use: No    Sexual activity: Not on file   Other Topics Concern    Not on file   Social History Narrative    Not on file     Social Determinants of Health     Financial Resource Strain:     Difficulty of Paying Living Expenses: Not on file   Food Insecurity:     Worried About Running Out of Food in the Last Year: Not on file    Chavo of Food in the Last Year: Not on file   Transportation Needs:     Lack of Transportation (Medical):  Not on file    Lack of Transportation (Non-Medical): Not on file   Physical Activity:     Days of Exercise per Week: Not on file    Minutes of Exercise per Session: Not on file   Stress:     Feeling of Stress : Not on file   Social Connections:     Frequency of Communication with Friends and Family: Not on file    Frequency of Social Gatherings with Friends and Family: Not on file    Attends Synagogue Services: Not on file    Active Member of [address] [place] [address] or Organizations: Not on file    Attends Club or Organization Meetings: Not on file    Marital Status: Not on file   Intimate Partner Violence:     Fear of Current or Ex-Partner: Not on file    Emotionally Abused: Not on file    Physically Abused: Not on file    Sexually Abused: Not on file   Housing Stability:     Unable to Pay for Housing in the Last Year: Not on file    Number of [place] in the Last Year: Not on file    Unstable Housing in the Last Year: Not on file       Vitals:    12/07/21 1004   BP: 126/82   Pulse: 70   Temp: 97.4 °F (36.3 °C)   SpO2: 98%   Weight: 129 lb (58.5 kg)   Height: 5' 5\" (1.651 m)       Physical Exam:  Physical Exam  Vitals and nursing note reviewed. Constitutional:       General: She is not in acute distress. Appearance: Normal appearance. She is well-developed and normal weight. She is not ill-appearing. HENT:      Head: Normocephalic and atraumatic. Right Ear: Hearing and external ear normal.      Left Ear: Hearing and external ear normal.      Nose:      Comments: Wearing mask  Eyes:      General: Lids are normal. No scleral icterus. Extraocular Movements: Extraocular movements intact. Conjunctiva/sclera: Conjunctivae normal.   Neck:      Thyroid: No thyromegaly. Vascular: No carotid bruit. Cardiovascular:      Rate and Rhythm: Normal rate and regular rhythm. Heart sounds: Normal heart sounds. No murmur heard. Pulmonary:      Effort: Pulmonary effort is normal. No respiratory distress.       Breath sounds: Normal breath sounds. No wheezing. Musculoskeletal:         General: No tenderness. Normal range of motion. Right hand: Deformity (thickening of palmar aponeurosis at 4-5th MCP) present. Left hand: Deformity (thickening of palmar aponeurosis at 4-5th MCP) present. Cervical back: Normal range of motion and neck supple. Right lower leg: No edema. Left lower leg: No edema. Lymphadenopathy:      Cervical: No cervical adenopathy. Skin:     General: Skin is warm and dry. Findings: No rash. Neurological:      General: No focal deficit present. Mental Status: She is alert and oriented to person, place, and time. Gait: Gait normal.   Psychiatric:         Mood and Affect: Mood and affect normal.         Speech: Speech normal.         Behavior: Behavior normal.         Thought Content:  Thought content normal.         Labs:  CBC with Differential:    Lab Results   Component Value Date    WBC 6.5 10/18/2021    RBC 4.34 10/18/2021    HGB 14.2 10/18/2021    HCT 43.5 10/18/2021     10/18/2021    .2 10/18/2021    MCH 32.7 10/18/2021    MCHC 32.6 10/18/2021    RDW 11.9 10/18/2021    SEGSPCT 53 09/24/2011    LYMPHOPCT 25.1 10/18/2021    MONOPCT 6.7 10/18/2021    BASOPCT 0.5 10/18/2021    MONOSABS 0.43 10/18/2021    LYMPHSABS 1.62 10/18/2021    EOSABS 0.13 10/18/2021    BASOSABS 0.03 10/18/2021     CMP:    Lab Results   Component Value Date     10/27/2021    K 5.1 10/27/2021    K 3.8 10/18/2021     10/27/2021    CO2 21 10/27/2021    BUN 17 10/27/2021    CREATININE 1.1 10/27/2021    GFRAA >60 10/27/2021    LABGLOM 52 10/27/2021    GLUCOSE 70 10/27/2021    GLUCOSE 88 09/24/2011    PROT 7.6 10/27/2021    LABALBU 4.4 10/27/2021    LABALBU 4.1 09/23/2011    CALCIUM 9.8 10/27/2021    BILITOT 0.3 10/27/2021    ALKPHOS 81 10/27/2021    AST 32 10/27/2021    ALT 34 10/27/2021     HgBA1c:    Lab Results   Component Value Date    LABA1C 5.0 11/19/2020     FLP:    Lab Results   Component Value Date    TRIG 93 11/19/2020    HDL 91 11/19/2020    LDLCALC 90 11/19/2020    LABVLDL 19 11/19/2020     TSH:    Lab Results   Component Value Date    TSH 1.650 11/19/2020     No results found for this visit on 12/07/21. Assessment and Plan:  Krysten Aden was seen today for medication refill. Diagnoses and all orders for this visit:    FELA (generalized anxiety disorder)  -     ALPRAZolam (XANAX) 0.5 MG tablet; Take 1 tablet by mouth 3 times daily as needed for Anxiety for up to 90 days. Stable. Spinal stenosis of lumbar region with neurogenic claudication  -     carisoprodol (SOMA) 350 MG tablet; TAKE 1 TABLET BY MOUTH FOUR TIMES DAILY AS NEEDED FOR MUSCLE SPASMS  -     HYDROcodone-acetaminophen (NORCO) 7.5-325 MG per tablet; Take 1 tablet by mouth every 12 hours as needed for Pain for up to 30 days. Intended supply: 30 days  -     HYDROcodone-acetaminophen (NORCO) 7.5-325 MG per tablet; Take 1 tablet by mouth every 12 hours as needed for Pain for up to 30 days. Intended supply: 30 days  -     HYDROcodone-acetaminophen (NORCO) 7.5-325 MG per tablet; Take 1 tablet by mouth every 12 hours as needed for Pain for up to 30 days. Intended supply: 30 days  OARRS appropriate    Primary insomnia  -     zolpidem (AMBIEN CR) 12.5 MG extended release tablet; Take 1 tablet by mouth nightly as needed for Sleep for up to 90 days. Cervical disc disease  -     HYDROcodone-acetaminophen (NORCO) 7.5-325 MG per tablet; Take 1 tablet by mouth every 12 hours as needed for Pain for up to 30 days. Intended supply: 30 days  -     HYDROcodone-acetaminophen (NORCO) 7.5-325 MG per tablet; Take 1 tablet by mouth every 12 hours as needed for Pain for up to 30 days. Intended supply: 30 days  -     HYDROcodone-acetaminophen (NORCO) 7.5-325 MG per tablet; Take 1 tablet by mouth every 12 hours as needed for Pain for up to 30 days. Intended supply: 30 days  Patient holding off on surgery at this time.     [facility]  Stable        Return in about 3 months (around 3/7/2022), or if symptoms worsen or fail to improve, for Well visit.       Seen By:  Laury Jules DO

## 2021-12-10 NOTE — PROGRESS NOTES
Have you been tested for COVID  Yes           Have you been told you were positive for COVID No  Have you had any known exposure to someone that is positive for COVID No  Do you have a cough                   No              Do you have shortness of breath No                 Do you have a sore throat            No                Are you having chills                    No                Are you having muscle aches. No                    Please come to the hospital wearing a mask and have your significant other wear a mask as well. Both of you should check your temperature before leaving to come here,  if it is 100 or higher please call [phone] for instruction. Rajat PRE-ADMISSION TESTING INSTRUCTIONS    The Preadmission Testing patient is instructed accordingly using the following criteria (check applicable):    ARRIVAL INSTRUCTIONS:  [x] Parking the day of Surgery is located in the Main Entrance lot. Upon entering the door, make an immediate right to the surgery reception desk    [x] Bring photo ID and insurance card    [] Bring in a copy of Living will or Durable Power of  papers.     [x] Please be sure to arrange for responsible adult to provide transportation to and from the hospital    [x] Please arrange for responsible adult to be with you for the 24 hour period post procedure due to having anesthesia      GENERAL INSTRUCTIONS:    [x] Nothing by mouth after midnight, including gum, candy, mints or water    [x] You may brush your teeth, but do not swallow any water    [x] Take medications as instructed with 1-2 oz of water    [x] Stop herbal supplements and vitamins 5 days prior to procedure    [] Follow preop dosing of blood thinners per physician instructions    [] Take 1/2 dose of evening insulin, but no insulin after midnight    [] No oral diabetic medications after midnight    [] If diabetic and have low blood sugar or feel symptomatic, take 1-2oz apple juice only    [] Bring inhalers day of surgery    [] Bring C-PAP/ Bi-Pap day of surgery    [] Bring urine specimen day of surgery    [x] Shower or bath with soap, lather and rinse well, AM of Surgery, no lotion, powders or creams to surgical site    [x] Follow bowel prep as instructed per surgeon    [x] No tobacco products within 24 hours of surgery     [x] No alcohol or illegal drug use within 24 hours of surgery.     [x] Jewelry, body piercing's, eyeglasses, contact lenses and dentures are not permitted into surgery (bring cases)      [x] Please do not wear any nail polish, make up or hair products on the day of surgery    [x] You can expect a call the business day prior to procedure to notify you if your arrival time changes    [x] If you receive a survey after surgery we would greatly appreciate your comments    [] Parent/guardian of a minor must accompany their child and remain on the premises  the entire time they are under our care     [] Pediatric patients may bring favorite toy, blanket or comfort item with them    [] A caregiver or family member must remain with the patient during their stay if they are mentally handicapped, have dementia, disoriented or unable to use a call light or would be a safety concern if left unattended    [x] Please notify surgeon if you develop any illness between now and time of surgery (cold, cough, sore throat, fever, nausea, vomiting) or any signs of infections  including skin, wounds, and dental.    [x]  The Outpatient Pharmacy is available to fill your prescription here on your day of surgery, ask your preop nurse for details    [] Other instructions    EDUCATIONAL MATERIALS PROVIDED:    [] PAT Preoperative Education Packet/Booklet     [] Medication List    [] Transfusion bracelet applied with instructions    [] Shower with soap, lather and rinse well, and use CHG wipes provided the evening before surgery as instructed    [] Incentive spirometer with instructions

## 2021-12-13 ENCOUNTER — HOSPITAL ENCOUNTER (OUTPATIENT)
Age: 56
Setting detail: OUTPATIENT SURGERY
Discharge: HOME | End: 2021-12-13
Attending: STUDENT IN AN ORGANIZED HEALTH CARE EDUCATION/TRAINING PROGRAM | Admitting: STUDENT IN AN ORGANIZED HEALTH CARE EDUCATION/TRAINING PROGRAM
Payer: COMMERCIAL

## 2021-12-13 ENCOUNTER — ANESTHESIA (OUTPATIENT)
Dept: ENDOSCOPY | Age: 56
End: 2021-12-13
Payer: COMMERCIAL

## 2021-12-13 ENCOUNTER — ANESTHESIA EVENT (OUTPATIENT)
Dept: ENDOSCOPY | Age: 56
End: 2021-12-13
Payer: COMMERCIAL

## 2021-12-13 VITALS
HEIGHT: 65 IN | HEART RATE: 71 BPM | BODY MASS INDEX: 21.49 KG/M2 | TEMPERATURE: 96.8 F | WEIGHT: 129 LBS | OXYGEN SATURATION: 98 % | RESPIRATION RATE: 18 BRPM | SYSTOLIC BLOOD PRESSURE: 119 MMHG | DIASTOLIC BLOOD PRESSURE: 61 MMHG

## 2021-12-13 VITALS
SYSTOLIC BLOOD PRESSURE: 109 MMHG | RESPIRATION RATE: 27 BRPM | DIASTOLIC BLOOD PRESSURE: 64 MMHG | OXYGEN SATURATION: 100 %

## 2021-12-13 PROCEDURE — 88305 TISSUE EXAM BY PATHOLOGIST: CPT

## 2021-12-13 PROCEDURE — 3700000001 HC ADD 15 MINUTES (ANESTHESIA): Performed by: STUDENT IN AN ORGANIZED HEALTH CARE EDUCATION/TRAINING PROGRAM

## 2021-12-13 PROCEDURE — 3700000000 HC ANESTHESIA ATTENDED CARE: Performed by: STUDENT IN AN ORGANIZED HEALTH CARE EDUCATION/TRAINING PROGRAM

## 2021-12-13 PROCEDURE — 7100000011 HC PHASE II RECOVERY - ADDTL 15 MIN: Performed by: STUDENT IN AN ORGANIZED HEALTH CARE EDUCATION/TRAINING PROGRAM

## 2021-12-13 PROCEDURE — 3609012400 HC EGD TRANSORAL BIOPSY SINGLE/MULTIPLE: Performed by: STUDENT IN AN ORGANIZED HEALTH CARE EDUCATION/TRAINING PROGRAM

## 2021-12-13 PROCEDURE — 3609010300 HC COLONOSCOPY W/BIOPSY SINGLE/MULTIPLE: Performed by: STUDENT IN AN ORGANIZED HEALTH CARE EDUCATION/TRAINING PROGRAM

## 2021-12-13 PROCEDURE — 99211 OFF/OP EST MAY X REQ PHY/QHP: CPT | Performed by: STUDENT IN AN ORGANIZED HEALTH CARE EDUCATION/TRAINING PROGRAM

## 2021-12-13 PROCEDURE — 2709999900 HC NON-CHARGEABLE SUPPLY: Performed by: STUDENT IN AN ORGANIZED HEALTH CARE EDUCATION/TRAINING PROGRAM

## 2021-12-13 PROCEDURE — 6360000002 HC RX W HCPCS: Performed by: NURSE ANESTHETIST, CERTIFIED REGISTERED

## 2021-12-13 PROCEDURE — 2580000003 HC RX 258

## 2021-12-13 PROCEDURE — 7100000010 HC PHASE II RECOVERY - FIRST 15 MIN: Performed by: STUDENT IN AN ORGANIZED HEALTH CARE EDUCATION/TRAINING PROGRAM

## 2021-12-13 RX ORDER — ONDANSETRON 2 MG/ML
4 INJECTION INTRAMUSCULAR; INTRAVENOUS EVERY 6 HOURS PRN
Status: CANCELLED | OUTPATIENT
Start: 2021-12-13

## 2021-12-13 RX ORDER — SODIUM CHLORIDE 0.9 % (FLUSH) 0.9 %
5-40 SYRINGE (ML) INJECTION PRN
Status: DISCONTINUED | OUTPATIENT
Start: 2021-12-13 | End: 2021-12-13 | Stop reason: HOSPADM

## 2021-12-13 RX ORDER — SODIUM CHLORIDE 9 MG/ML
INJECTION, SOLUTION INTRAVENOUS CONTINUOUS PRN
Status: DISCONTINUED | OUTPATIENT
Start: 2021-12-13 | End: 2021-12-13 | Stop reason: SDUPTHER

## 2021-12-13 RX ORDER — PROPOFOL 10 MG/ML
INJECTION, EMULSION INTRAVENOUS CONTINUOUS PRN
Status: DISCONTINUED | OUTPATIENT
Start: 2021-12-13 | End: 2021-12-13 | Stop reason: SDUPTHER

## 2021-12-13 RX ORDER — SODIUM CHLORIDE 0.9 % (FLUSH) 0.9 %
5-40 SYRINGE (ML) INJECTION PRN
Status: CANCELLED | OUTPATIENT
Start: 2021-12-13

## 2021-12-13 RX ORDER — SODIUM CHLORIDE 9 MG/ML
25 INJECTION, SOLUTION INTRAVENOUS PRN
Status: CANCELLED | OUTPATIENT
Start: 2021-12-13

## 2021-12-13 RX ORDER — SODIUM CHLORIDE 9 MG/ML
25 INJECTION, SOLUTION INTRAVENOUS PRN
Status: DISCONTINUED | OUTPATIENT
Start: 2021-12-13 | End: 2021-12-13 | Stop reason: HOSPADM

## 2021-12-13 RX ORDER — SODIUM CHLORIDE 0.9 % (FLUSH) 0.9 %
5-40 SYRINGE (ML) INJECTION EVERY 12 HOURS SCHEDULED
Status: DISCONTINUED | OUTPATIENT
Start: 2021-12-13 | End: 2021-12-13 | Stop reason: HOSPADM

## 2021-12-13 RX ORDER — SODIUM CHLORIDE 0.9 % (FLUSH) 0.9 %
5-40 SYRINGE (ML) INJECTION EVERY 12 HOURS SCHEDULED
Status: CANCELLED | OUTPATIENT
Start: 2021-12-13

## 2021-12-13 RX ORDER — OMEPRAZOLE 40 MG/1
40 CAPSULE, DELAYED RELEASE ORAL
Qty: 60 CAPSULE | Refills: 11 | Status: SHIPPED | OUTPATIENT
Start: 2021-12-13 | End: 2021-12-13

## 2021-12-13 RX ORDER — ONDANSETRON 4 MG/1
4 TABLET, ORALLY DISINTEGRATING ORAL EVERY 8 HOURS PRN
Status: CANCELLED | OUTPATIENT
Start: 2021-12-13

## 2021-12-13 RX ADMIN — SODIUM CHLORIDE: 9 INJECTION, SOLUTION INTRAVENOUS at 07:47

## 2021-12-13 RX ADMIN — PROPOFOL 75 MCG/KG/MIN: 10 INJECTION, EMULSION INTRAVENOUS at 08:03

## 2021-12-13 ASSESSMENT — LIFESTYLE VARIABLES: SMOKING_STATUS: 0

## 2021-12-13 NOTE — ANESTHESIA PRE PROCEDURE
Department of Anesthesiology  Preprocedure Note       Name:  Yolette Cesar   Age:  49 y.o.  :  1965                                          MRN:  [id number]         Date:  2021      Surgeon: Jerri Pierre):  Cecilia Uriostegui MD    Procedure: Procedure(s):  EGD ESOPHAGOGASTRODUODENOSCOPY  COLONOSCOPY DIAGNOSTIC    ++IODINE ALLERGY++    Medications prior to admission:   Prior to Admission medications    Medication Sig Start Date End Date Taking? Authorizing Provider   zolpidem (AMBIEN CR) 12.5 MG extended release tablet Take 1 tablet by mouth nightly as needed for Sleep for up to 90 days. 12/7/21 3/7/22 Yes Ana Ferris DO   ALPRAZolam (XANAX) 0.5 MG tablet Take 1 tablet by mouth 3 times daily as needed for Anxiety for up to 90 days. 12/7/21 3/7/22 Yes Ana Ferris DO   carisoprodol (SOMA) 350 MG tablet TAKE 1 TABLET BY MOUTH FOUR TIMES DAILY AS NEEDED FOR MUSCLE SPASMS 12/7/21 3/9/22 Yes Ana Ferris DO   HYDROcodone-acetaminophen (NORCO) 7.5-325 MG per tablet Take 1 tablet by mouth every 12 hours as needed for Pain for up to 30 days.  Intended supply: 30 days 2/1/22 3/3/22 Yes Ana Ferris DO   QUEtiapine (SEROQUEL) 100 MG tablet TAKE 1 TABLET BY MOUTH TWICE DAILY 12/3/21  Yes Ana Ferris DO   sertraline (ZOLOFT) 50 MG tablet TAKE 1 TABLET BY MOUTH EVERY NIGHT 12/3/21  Yes Ana Ferris DO   lamoTRIgine (LAMICTAL) 100 MG tablet TAKE 1 TABLET BY MOUTH TWICE DAILY 12/3/21  Yes Ana Ferris DO   topiramate (TOPAMAX) 25 MG tablet TAKE 2 TABLETS BY MOUTH EVERY NIGHT 12/3/21  Yes Ana Ferris DO   sucralfate (CARAFATE) 1 GM tablet TAKE 1 TABLET BY MOUTH FOUR TIMES DAILY 11/15/21  Yes Cecilia Uriostegui MD   omeprazole (PRILOSEC) 40 MG delayed release capsule TAKE 1 CAPSULE BY MOUTH TWICE DAILY BEFORE MEALS 10/28/21  Yes Cecilia Uriostegui MD   dicyclomine (BENTYL) 10 MG capsule Take 1 capsule by mouth as needed (abdominal pain/bloating) 10/27/21  Yes Cecilia Uriostegui MD   ondansetron [facility] ODT) 4 MG disintegrating tablet Take 1 tablet by mouth every 8 hours as needed for Nausea or Vomiting 10/18/21  Yes Prince Dean,    estradiol (VIVELLE) 0.1 MG/24HR APPLY 1 PATCH EXTERNALLY TO THE SKIN 2 TIMES A WEEK 9/3/21  Yes Ana Ferris DO   COLLAGEN PO Take by mouth daily    Yes Historical Provider, MD   Cholecalciferol (VITAMIN D3) 125 MCG (5000 UT) TABS Take by mouth   Yes Historical Provider, MD       Current medications:    Current Facility-Administered Medications   Medication Dose Route Frequency Provider Last Rate Last Admin    sodium chloride flush 0.9 % injection 5-40 mL  5-40 mL IntraVENous 2 times per day Joseph Beavers MD        sodium chloride flush 0.9 % injection 5-40 mL  5-40 mL IntraVENous PRN Joseph Beavers MD        0.9 % sodium chloride infusion  25 mL IntraVENous PRN Joseph Beavers MD           Allergies:     Allergies   Allergen Reactions    Bee Venom Anaphylaxis     Wasps    Fish-Derived Products Anaphylaxis and Swelling     Swordfish allergy    Hornet Venom Anaphylaxis    Iodine        Problem List:    Patient Active Problem List   Diagnosis Code    Convulsions/seizures ([facility] [address] R56.9    Bipolar I disorder ([facility] [address] F31.9    Panic disorder without agoraphobia F41.0    Spinal stenosis of lumbar region with neurogenic claudication M48.062    Cervical disc disease M50.90    FELA (generalized anxiety disorder) F41.1       Past Medical History:        Diagnosis Date    Abdominal pain     Anxiety     Bipolar disorder ([facility] [address]     Bipolar 1 disorder    Blood in stool     Cervical disc disease     Cervical spondylitis with radiculitis (HCC)     C2, C7    Diarrhea     Facet hypertrophy     L4/5 - S1    Headache     Migraine    History of rectal sphincterotomy     Lumbar disc disease     Osteoarthritis     DJD/DDD C-Spine S/P Fusion CHEERLEADER DROPPED ON HEAD/NECK    Panic disorder without agoraphobia     Counselor Destiny Paulino    PONV (postoperative nausea and vomiting)     Seizures ([facility] [address]     NONE SINCE 2018 Psuedo       Past Surgical History:        Procedure Laterality Date    ABDOMEN SURGERY N/A 2021    EXCISION LIPOMA ABDOMEN performed by Ankit Cifuentes MD at [address] Bilateral     Implants Bilaterally    BREAST SURGERY      CERVICAL FUSION      c2-c7 Bone Kevin Grafting 2-6    COLONOSCOPY N/A 3/3/2021    COLORECTAL CANCER SCREENING, HIGH RISK performed by Ankit Cifuentes MD at [address]      X4    LAPAROTOMY      X5 Ovarian Cysts, ZACARIAS-BSO (Non Cancerous)    OTHER SURGICAL HISTORY      Cervical Epidurals    OTHER SURGICAL HISTORY      Lumbar Epidural - [address], 17, 17       Social History:    Social History     Tobacco Use    Smoking status: Former Smoker     Packs/day: 0.25     Years: 1.00     Pack years: 0.25     Quit date:      Years since quittin.9    Smokeless tobacco: Never Used   Substance Use Topics    Alcohol use: No                                Counseling given: Not Answered      Vital Signs (Current):   Vitals:    12/10/21 1002 21 0711   BP:  131/77   Pulse:  78   Resp:  18   Temp:  36.3 °C (97.4 °F)   TempSrc:  Temporal   SpO2:  100%   Weight: 129 lb (58.5 kg)    Height: 5' 5\" (1.651 m)                                               BP Readings from Last 3 Encounters:   21 131/77   21 126/82   21 122/80       NPO Status: Time of last liquid consumption: 0                        Time of last solid consumption: 1900                        Date of last liquid consumption: 21                        Date of last solid food consumption: 21    BMI:   Wt Readings from Last 3 Encounters:   12/10/21 129 lb (58.5 kg)   21 129 lb (58.5 kg)   21 127 lb (57.6 kg)     Body mass index is 21.47 kg/m².     CBC:   Lab Results   Component Value Date    WBC 6.5 10/18/2021    RBC 4.34 10/18/2021    HGB 14.2 10/18/2021    HCT 43.5 10/18/2021    .2 10/18/2021    RDW 11.9 10/18/2021     10/18/2021       CMP:   Lab Results   Component Value Date     10/27/2021    K 5.1 10/27/2021    K 3.8 10/18/2021     10/27/2021    CO2 21 10/27/2021    BUN 17 10/27/2021    CREATININE 1.1 10/27/2021    GFRAA >60 10/27/2021    LABGLOM 52 10/27/2021    GLUCOSE 70 10/27/2021    GLUCOSE 88 09/24/2011    PROT 7.6 10/27/2021    CALCIUM 9.8 10/27/2021    BILITOT 0.3 10/27/2021    ALKPHOS 81 10/27/2021    AST 32 10/27/2021    ALT 34 10/27/2021       POC Tests: No results for input(s): POCGLU, POCNA, POCK, POCCL, POCBUN, POCHEMO, POCHCT in the last 72 hours. Coags:   Lab Results   Component Value Date    PROTIME 10.9 09/23/2011    INR 1.1 09/23/2011    APTT 31.0 09/23/2011       HCG (If Applicable):   Lab Results   Component Value Date    PREGSERUM NEGATIVE 09/23/2011        ABGs:   Lab Results   Component Value Date    R5NLRSVR 99.7 09/23/2011        Type & Screen (If Applicable):  Lab Results   Component Value Date    LABABO O 09/23/2011    [address] POSITIVE 09/23/2011       Drug/Infectious Status (If Applicable):  No results found for: HIV, HEPCAB    COVID-19 Screening (If Applicable):   Lab Results   Component Value Date    COVID19 Not Detected 04/14/2021           Anesthesia Evaluation  Patient summary reviewed and Nursing notes reviewed   history of anesthetic complications: PONV.   Airway: Mallampati: I  TM distance: >3 FB   Neck ROM: full  Mouth opening: > = 3 FB Dental: normal exam         Pulmonary: breath sounds clear to auscultation      (-) not a current smoker                           Cardiovascular:Negative CV ROS            Rhythm: regular  Rate: normal                    Neuro/Psych:   (+) seizures (none since 2018):, neuromuscular disease ( Cervical spondylitis with radiculitis (HCC) C2, C7 ):, headaches:, psychiatric history:            GI/Hepatic/Renal:             Endo/Other: (+) : arthritis:., .                 Abdominal:             Vascular: negative vascular ROS. Other Findings:             Anesthesia Plan      MAC     ASA 3       Induction: intravenous. Anesthetic plan and risks discussed with patient. Use of blood products discussed with patient whom consented to blood products. Plan discussed with CRNA. [facility], [facility]   12/13/2021        Patient seen and chart reviewed. No interval change in history or exam.   Anesthesia plan discussed, risk/benefits addressed. Patient's concerns and questions answered.      MADHU Isidro - KEELEY  December 13, 2021  7:25 AM

## 2021-12-13 NOTE — OP NOTE
Operative Note      Patient: Minda Mckenna  YOB: 1965  MRN: [id number]    Date of Procedure: 12/13/2021    Pre-Op Diagnosis: History of Gastric Ulcer, Dyspepsia, GERD, Hematochezia, Abnormal CT Scan     Post-Op Diagnosis: Antral erosion and ulceration, LA Grade A esophagitis, Overall normal colon       Procedure(s):  EGD BIOPSY  COLONOSCOPY WITH BIOPSY    Surgeon(s):  Reg Marquez MD    Assistant:   * No surgical staff found *    Anesthesia: Monitor Anesthesia Care    Estimated Blood Loss (mL): Minimal    Complications: None    Specimens:   ID Type Source Tests Collected by Time Destination   A : Antral Bx Tissue Stomach SURGICAL PATHOLOGY Reg Marquez MD 12/13/2021 0809    B : Duodenal Bx Tissue Duodenum SURGICAL PATHOLOGY Reg Marquez MD 12/13/2021 9716    C : Random Colon Bxs Tissue Colon SURGICAL PATHOLOGY Reg Marquez MD 12/13/2021 2839        Implants:  * No implants in log *      Drains: * No LDAs found *    Indications:  56y/F w/ history of PUD being evaluated for persistent GERD and dyspepsia. She also has a history of diarrhea and abnormal CT scan of the distal colon. Findings:     EGD:  LA Grade A esophagitis. Antral erosions and superficial ulceration. H pylori biopsies obtained. Normal duodenal mucosa. Celiac biopsies obtained. Colon:  Normal terminal ileum. Overall normal colonic mucosa throughout. Random biopsies obtained throughout the colon to rule out microscopic colitis. No polyps. Few scattered, small-mouthed diverticula. Detailed Description of Procedure:   Pre-Anesthesia Assessment:  Prior to the procedure, a history and physical was performed and patient medications and allergies were reviewed. The risks, benefits, complications, treatment options and expected outcomes were discussed with the patient.   The possibilities of reaction to medication, pulmonary aspiration, perforation of the gastrointestinal tract, bleeding requiring transfusion or operation, respiratory failure requiring placement on a ventilator, and failure to diagnose a condition were discussed with the patient. All questions were answered and informed consent was obtained. Patient identification and proposed procedure were verified by the physician, the nurse, the anesthesiologist, the anesthetist, and the technician in the preprocedure area. Please see accompanying documentation. All staff in attendance for the performed procedure act in the role of the Chaperone. After I obtained informed consent, the scope was passed under direct vision. Throughout the procedure, the patient's blood pressure, pulse, and oxygen saturations were monitored continuously. The gastroscope was introduced through the mouth and advanced to the duodenum. The colonoscope was introduced through the anus and advanced to the terminal ileum. The procedure was performed without difficulty. The patient tolerated the procedure well. EGD:  The mucosa of the esophagus appeared overall normal.  Mild, LA Grade A esophagitis was appreciated in the distal esophagus. No associated erosion or ulceration was appreciated. No esophageal strictures were appreciated. The Z-line was slightly irregular. No hiatal hernia was present. The mucosa of the body of the stomach appeared overall normal with mild erythema and granularity appreciated. In the antrum, inflammation with erythema and edema, erosion, and superficial ulceration was appreciated. Biopsies for H pylori were obtained. Benign appearing fundic gland polyps were appreciated in the body and fundus of the stomach. Due to typical and benign appearance, the polyps were not removed. The fundus and cardia were carefully inspected in retroflexion and showed overall normal tissue with the mild erythema and granularity that was appreciated throughout the entire stomach.     The mucosa of the duodenum appeared normal.  No inflammation, erosion, ulceration, or stricture/stenosis was appreciated. Biopsies for celiac disease were obtained. Colonoscopy:  Anticoagulants: None. Primary Family Member/s with Colon Cancer: None. The quality of the bowel preparation was adequate. The terminal ileum, the ileocecal valve, the appendiceal orifice, and the rectum via retroflex were photographed. Findings: On Perianal exam, external hemorrhoids were appreciated. The terminal ileum appeared completely normal and was without inflammation or ulceration. The cecum, ascending colon, transverse colon, descending colon, sigmoid colon, and rectum were normal in appearance. No inflammation or ulceration was noted. Random biopsies were obtained throughout the colon to rule out microscopic colitis. No polyps were detected on this exam.     A few small-mouthed diverticula were appreciated scattered throughout the colon. No internal rectal hemorrhoids were present. Recommendations:  -The patient will be observed post procedure until all discharge criteria are met. -Patient has a contact number available for emergencies. The signs and symptoms of potential delayed complications were discussed with the patient.    -Return to normal activities tomorrow. -Written discharge instructions were provided to the patient.    -Await pathology results.  -Begin taking omeprazole twice daily.  -Timeframe until next colonoscopy will be discussed in clinic and based on Clinical Guidelines regarding size, number, and pathology of polyps removed as well as adequacy of bowel prep.   -Clinic appointment scheduled 12/29/21. EMR to be updated based on findings and discussion.        Electronically signed by Clayton Rich MD on 12/13/2021 at 9:01 AM

## 2021-12-13 NOTE — ANESTHESIA PRE PROCEDURE
Department of Anesthesiology  Preprocedure Note       Name:  Roney Gonzalez   Age:  49 y.o.  :  1965                                          MRN:  [id number]         Date:  2021      Surgeon: Candice Marti):  Ema Queen MD    Procedure: Procedure(s):  EGD ESOPHAGOGASTRODUODENOSCOPY  COLONOSCOPY DIAGNOSTIC    ++IODINE ALLERGY++    Medications prior to admission:   Prior to Admission medications    Medication Sig Start Date End Date Taking? Authorizing Provider   zolpidem (AMBIEN CR) 12.5 MG extended release tablet Take 1 tablet by mouth nightly as needed for Sleep for up to 90 days. 12/7/21 3/7/22 Yes Ana Ferris DO   ALPRAZolam (XANAX) 0.5 MG tablet Take 1 tablet by mouth 3 times daily as needed for Anxiety for up to 90 days. 12/7/21 3/7/22 Yes Ana Ferris DO   carisoprodol (SOMA) 350 MG tablet TAKE 1 TABLET BY MOUTH FOUR TIMES DAILY AS NEEDED FOR MUSCLE SPASMS 12/7/21 3/9/22 Yes Ana Ferris DO   HYDROcodone-acetaminophen (NORCO) 7.5-325 MG per tablet Take 1 tablet by mouth every 12 hours as needed for Pain for up to 30 days.  Intended supply: 30 days 2/1/22 3/3/22 Yes Ana Ferris DO   QUEtiapine (SEROQUEL) 100 MG tablet TAKE 1 TABLET BY MOUTH TWICE DAILY 12/3/21  Yes Ana Ferris DO   sertraline (ZOLOFT) 50 MG tablet TAKE 1 TABLET BY MOUTH EVERY NIGHT 12/3/21  Yes Ana Ferris DO   lamoTRIgine (LAMICTAL) 100 MG tablet TAKE 1 TABLET BY MOUTH TWICE DAILY 12/3/21  Yes Ana Ferris DO   topiramate (TOPAMAX) 25 MG tablet TAKE 2 TABLETS BY MOUTH EVERY NIGHT 12/3/21  Yes Ana Ferris DO   sucralfate (CARAFATE) 1 GM tablet TAKE 1 TABLET BY MOUTH FOUR TIMES DAILY 11/15/21  Yes Ema Queen MD   omeprazole (PRILOSEC) 40 MG delayed release capsule TAKE 1 CAPSULE BY MOUTH TWICE DAILY BEFORE MEALS 10/28/21  Yes Ema Queen MD   dicyclomine (BENTYL) 10 MG capsule Take 1 capsule by mouth as needed (abdominal pain/bloating) 10/27/21  Yes Ema Queen MD   ondansetron [facility] ODT) 4 MG disintegrating tablet Take 1 tablet by mouth every 8 hours as needed for Nausea or Vomiting 10/18/21  Yes Eugenia Carl, DO   estradiol (VIVELLE) 0.1 MG/24HR APPLY 1 PATCH EXTERNALLY TO THE SKIN 2 TIMES A WEEK 9/3/21  Yes Ana Ferris DO   COLLAGEN PO Take by mouth daily    Yes Historical Provider, MD   Cholecalciferol (VITAMIN D3) 125 MCG (5000 UT) TABS Take by mouth   Yes Historical Provider, MD       Current medications:    Current Facility-Administered Medications   Medication Dose Route Frequency Provider Last Rate Last Admin    sodium chloride flush 0.9 % injection 5-40 mL  5-40 mL IntraVENous 2 times per day Angely Barger MD        sodium chloride flush 0.9 % injection 5-40 mL  5-40 mL IntraVENous PRN Angely Barger MD        0.9 % sodium chloride infusion  25 mL IntraVENous PRN Angely Barger MD         Facility-Administered Medications Ordered in Other Encounters   Medication Dose Route Frequency Provider Last Rate Last Admin    0.9 % sodium chloride infusion   IntraVENous Continuous PRN Maria Del Rosario Martinez RN   New Bag at 12/13/21 6946       Allergies:     Allergies   Allergen Reactions    Bee Venom Anaphylaxis     Wasps    Fish-Derived Products Anaphylaxis and Swelling     Swordfish allergy    Hornet Venom Anaphylaxis    Iodine        Problem List:    Patient Active Problem List   Diagnosis Code    Convulsions/seizures ([facility] [address] R56.9    Bipolar I disorder ([facility] [address] F31.9    Panic disorder without agoraphobia F41.0    Spinal stenosis of lumbar region with neurogenic claudication M48.062    Cervical disc disease M50.90    FELA (generalized anxiety disorder) F41.1       Past Medical History:        Diagnosis Date    Abdominal pain     Anxiety     Bipolar disorder ([facility] [address]     Bipolar 1 disorder    Blood in stool     Cervical disc disease     Cervical spondylitis with radiculitis (HCC)     C2, C7    Diarrhea     Facet hypertrophy     L4/5 - S1    Headache     Migraine    History of rectal sphincterotomy     Lumbar disc disease     Osteoarthritis     DJD/DDD C-Spine S/P Fusion CHEERLEADER DROPPED ON HEAD/NECK    Panic disorder without agoraphobia     Counselor SARAHI Elizondoman    PONV (postoperative nausea and vomiting)     Seizures ([facility] [address]     NONE SINCE 2018 Psuedo       Past Surgical History:        Procedure Laterality Date    ABDOMEN SURGERY N/A 2021    EXCISION LIPOMA ABDOMEN performed by Alex Lee MD at [address] Bilateral     Implants Bilaterally    BREAST SURGERY      CERVICAL FUSION      c2-c7 Bone Kevin Grafting 2-6    COLONOSCOPY N/A 3/3/2021    COLORECTAL CANCER SCREENING, HIGH RISK performed by Alex Lee MD at [address]      X4    LAPAROTOMY      X5 Ovarian Cysts, ZACARIAS-BSO (Non Cancerous)    OTHER SURGICAL HISTORY      Cervical Epidurals    OTHER SURGICAL HISTORY      Lumbar Epidural - [address], 17, 17       Social History:    Social History     Tobacco Use    Smoking status: Former Smoker     Packs/day: 0.25     Years: 1.00     Pack years: 0.25     Quit date:      Years since quittin.9    Smokeless tobacco: Never Used   Substance Use Topics    Alcohol use:  No                                Counseling given: Not Answered      Vital Signs (Current):   Vitals:    12/10/21 1002 21 0711   BP:  131/77   Pulse:  78   Resp:  18   Temp:  36.3 °C (97.4 °F)   TempSrc:  Temporal   SpO2:  100%   Weight: 129 lb (58.5 kg)    Height: 5' 5\" (1.651 m)                                               BP Readings from Last 3 Encounters:   21 131/77   21 (!) 147/70   21 126/82       NPO Status: Time of last liquid consumption: 0                        Time of last solid consumption: 1900                        Date of last liquid consumption: 21                        Date of last solid food consumption: 21    BMI: Wt Readings from Last 3 Encounters:   12/10/21 129 lb (58.5 kg)   12/07/21 129 lb (58.5 kg)   11/22/21 127 lb (57.6 kg)     Body mass index is 21.47 kg/m². CBC:   Lab Results   Component Value Date    WBC 6.5 10/18/2021    RBC 4.34 10/18/2021    HGB 14.2 10/18/2021    HCT 43.5 10/18/2021    .2 10/18/2021    RDW 11.9 10/18/2021     10/18/2021       CMP:   Lab Results   Component Value Date     10/27/2021    K 5.1 10/27/2021    K 3.8 10/18/2021     10/27/2021    CO2 21 10/27/2021    BUN 17 10/27/2021    CREATININE 1.1 10/27/2021    GFRAA >60 10/27/2021    LABGLOM 52 10/27/2021    GLUCOSE 70 10/27/2021    GLUCOSE 88 09/24/2011    PROT 7.6 10/27/2021    CALCIUM 9.8 10/27/2021    BILITOT 0.3 10/27/2021    ALKPHOS 81 10/27/2021    AST 32 10/27/2021    ALT 34 10/27/2021       POC Tests: No results for input(s): POCGLU, POCNA, POCK, POCCL, POCBUN, POCHEMO, POCHCT in the last 72 hours.     Coags:   Lab Results   Component Value Date    PROTIME 10.9 09/23/2011    INR 1.1 09/23/2011    APTT 31.0 09/23/2011       HCG (If Applicable):   Lab Results   Component Value Date    PREGSERUM NEGATIVE 09/23/2011        ABGs:   Lab Results   Component Value Date    L1GLDNBK 99.7 09/23/2011        Type & Screen (If Applicable):  Lab Results   Component Value Date    LABABO O 09/23/2011    [address] POSITIVE 09/23/2011       Drug/Infectious Status (If Applicable):  No results found for: HIV, HEPCAB    COVID-19 Screening (If Applicable):   Lab Results   Component Value Date    COVID19 Not Detected 04/14/2021           Anesthesia Evaluation     Anesthesia Plan        MADHU Pierce - KEELEY   12/13/2021

## 2021-12-13 NOTE — H&P
History and Physical      ASSESSMENT AND PLAN:     56y/F w/ history of persistent bloating, abdominal pain, diarrhea sometimes bloody, dyspepsia and GERD who presents for further evaluation and management. She also had a CT scan showing colonic wall thickening and inflammatory stranding possibly consistent with colitis.     PLAN:  -EGD and Colonoscopy today        HISTORY OF PRESENT ILLNESS:       Ms. Nikki Santana is a 56y/F who presents to clinic today for bloating, abdominal pain, and bloody diarrhea. The patient also mentions that she will have episodes of non-bloody, non-bilious emesis when she passes stool and these episodes tend to happen when is passing the bloody stool. The patient mentions that she has a history of gastric ulcers from a young age and feels that she has had a lifelong history of epigastric discomfort. The patient had a CT A/P performed in October 2021 due to these symptoms. I personally reviewed and interpreted these images. The final read mentions a long segment of left colonic wall thickening with subtle fat stranding suggestive of a non-specific colitis. There is slight contrast enhancement of the walls of the distal colon, but otherwise largely unremarkable study. There was an overall normal stool burden. The patient mentions that she had a colonoscopy in March 2021 w/ Dr. Nicholas Herrera. A few scattered diverticula were appreciated with internal hemorrhoids but the exam was otherwise normal.      The patient remains with persistent diarrhea though mentions that she is no longer seeing any blood. She reports 4-6 bowel movements daily which are mostly liquid in consistency. She also mentions that she has frequent GERD and dyspepsia.      Past Medical History:        Diagnosis Date    Abdominal pain     Anxiety     Bipolar disorder (HCC)     Bipolar 1 disorder    Blood in stool     Cervical disc disease     Cervical [facility]yl[facility]iculitis ([facility])     C2, C7    Diarrhea     Facet hypertrophy     L4/5 - S1    Headache     Migraine    History of rectal sphincterotomy     Lumbar disc disease     Osteoarthritis     DJD/DDD C-Spine S/P Fusion CHEERLEADER DROPPED ON HEAD/NECK    Panic disorder without agoraphobia     Counselor Destiny Marroquin. [place]    PONV (postoperative nausea and vomiting)     Seizures ([facility] [address]     NONE SINCE 2018 Psuedo     Past Surgical History:        Procedure Laterality Date    ABDOMEN SURGERY N/A 4/19/2021    EXCISION LIPOMA ABDOMEN performed by Brittany Jc MD at [address] Bilateral     Implants Bilaterally    BREAST SURGERY      CERVICAL FUSION      c2-c7 Bone Kevin Grafting 2-6    COLONOSCOPY N/A 3/3/2021    COLORECTAL CANCER SCREENING, HIGH RISK performed by Brittany Jc MD at [address]      X4    LAPAROTOMY      X5 Ovarian Cysts, ZACARIAS-BSO (Non Cancerous)    OTHER SURGICAL HISTORY      Cervical Epidurals    OTHER SURGICAL HISTORY      Lumbar Epidural - Donatelli 6/13/17, 6/27/17, 7/25/17     Social History:    TOBACCO:   reports that she quit smoking about 6 years ago. She has a 0.25 pack-year smoking history. She has never used smokeless tobacco.  ETOH:   reports no history of alcohol use. DRUGS:   reports no history of drug use.   Family History:       Problem Relation Age of Onset    Diabetes Mother     Other Mother         End Stage Cirrhosis 1/2017    Cancer Father         Prostate - Jenifer Maynard         Current Facility-Administered Medications:     sodium chloride flush 0.9 % injection 5-40 mL, 5-40 mL, IntraVENous, 2 times per day, Leonides Clifford MD    sodium chloride flush 0.9 % injection 5-40 mL, 5-40 mL, IntraVENous, PRN, Leonides Clifford MD    0.9 % sodium chloride infusion, 25 mL, IntraVENous, PRN, Leonides Clifford MD    Facility-Administered Medications Ordered in Other Encounters:     0.9 % sodium chloride infusion, , IntraVENous, Continuous PRN, Diogenes Meadows RN, [place] at 12/13/21 5675     Allergies:  Bee venom, Fish-derived products, Hornet venom, and Iodine      PHYSICAL EXAM:  /77   Pulse 78   Temp 97.4 °F (36.3 °C) (Temporal)   Resp 18   Ht 5' 5\" (1.651 m)   Wt 129 lb (58.5 kg)   SpO2 100%   BMI 21.47 kg/m²   Physical Exam:  General: Overall well-appearing, NAD  HEENT: PERRLA, EOMI, Anicteric sclera, MMM, no rhinorrhea  Cards: RRR, no LE edema  Resp: Breathing comfortably on room air, good air movement, no use of accessory muscles, no audible wheezing  Abdomen: soft, NT, ND. Extremities: Moves all extremities, no effusions or bruising.   Skin: No rashes or jaundice  Neuro: A&O x 3, CN grossly intact, non-focal exam              Electronically signed by Lacey Meza MD on 12/13/2021 at 8:00 AM

## 2021-12-13 NOTE — ANESTHESIA POSTPROCEDURE EVALUATION
Department of Anesthesiology  Postprocedure Note    Patient: Mojgan Mak  MRN: [id number]  YOB: 1965  Date of evaluation: 12/13/2021  Time:  9:00 AM     Procedure Summary     Date: 12/13/21 Room / Location: [place] [address] / [facility]    Anesthesia Start: 4934 Anesthesia Stop: 3974    Procedures:       EGD BIOPSY (N/A )      COLONOSCOPY WITH BIOPSY (N/A ) Diagnosis: (COLITIS HISTORY OF GASTRIC ULCER)    Surgeons: Mehdi Ludwig MD Responsible Provider: Jai Shukla MD    Anesthesia Type: MAC ASA Status: 3          Anesthesia Type: MAC    Adelaida Phase I: Adelaida Score: 10    Adelaida Phase II:      Last vitals: Reviewed and per EMR flowsheets.        Anesthesia Post Evaluation    Patient location during evaluation: bedside  Patient participation: complete - patient participated  Level of consciousness: awake and alert  Pain score: 0  Airway patency: patent  Nausea & Vomiting: no nausea and no vomiting  Complications: no  Cardiovascular status: blood pressure returned to baseline  Respiratory status: acceptable  Hydration status: euvolemic

## 2022-01-03 ENCOUNTER — OFFICE VISIT (OUTPATIENT)
Dept: FAMILY MEDICINE CLINIC | Age: 57
End: 2022-01-03
Payer: COMMERCIAL

## 2022-01-03 VITALS
HEIGHT: 65 IN | HEART RATE: 67 BPM | RESPIRATION RATE: 18 BRPM | BODY MASS INDEX: 21.49 KG/M2 | WEIGHT: 129 LBS | OXYGEN SATURATION: 98 % | SYSTOLIC BLOOD PRESSURE: 134 MMHG | TEMPERATURE: 97.8 F | DIASTOLIC BLOOD PRESSURE: 78 MMHG

## 2022-01-03 DIAGNOSIS — R53.83 FATIGUE, UNSPECIFIED TYPE: Primary | ICD-10-CM

## 2022-01-03 DIAGNOSIS — J01.10 ACUTE NON-RECURRENT FRONTAL SINUSITIS: ICD-10-CM

## 2022-01-03 LAB
Lab: NORMAL
PERFORMING INSTRUMENT: NORMAL
QC PASS/FAIL: NORMAL
SARS-COV-2, POC: NORMAL

## 2022-01-03 PROCEDURE — 99213 OFFICE O/P EST LOW 20 MIN: CPT | Performed by: FAMILY MEDICINE

## 2022-01-03 PROCEDURE — 87426 SARSCOV CORONAVIRUS AG IA: CPT | Performed by: FAMILY MEDICINE

## 2022-01-03 RX ORDER — AZITHROMYCIN 250 MG/1
250 TABLET, FILM COATED ORAL SEE ADMIN INSTRUCTIONS
Qty: 6 TABLET | Refills: 0 | Status: SHIPPED | OUTPATIENT
Start: 2022-01-03 | End: 2022-01-08

## 2022-01-03 NOTE — PROGRESS NOTES
1/3/2022     Chief Complaint   Patient presents with    Concern For COVID-19     HPI  Tom Combs (:  1965) is a 49 y.o. female, here for evaluation of the following medical concerns:    Pt is currently Exposed to COVID-19. Presenting symptoms: -fever, +chills, -cough, mild sore throat, -shortness of breath, +nasal congestion, -sputum production, -chest pain, -diarrhea, +nausea and -vomiting, +headache, +muscle pain, -abdominal pain, -loss of smell, -loss of taste,+ malaise, +sinus pressure and -rhinorrhea. Max temperature in last 24 hours: NA    Symptoms began on 21. Body aches, nausea, chills, sweating, sore throat, headache. Exposure source: member of household    Better/Worse?: Better  ? Medical Hx:   ? Taking OTC cough and cold medicines. ? No CVA or MI. No bleeding disorders/hx. ? No Allergies. ? No Alcohol use Hx. ?  No Tobacco use Hx    ROS (-) unless otherwise noted. Past Medical History:   Diagnosis Date    Abdominal pain     Anxiety     Bipolar disorder ([facility] [address]     Bipolar 1 disorder    Blood in stool     Cervical disc disease     Cervical spondylitis with radiculitis (HCC)     C2, C7    Diarrhea     Facet hypertrophy     L4/5 - S1    Headache     Migraine    History of rectal sphincterotomy     Lumbar disc disease     Osteoarthritis     DJD/DDD C-Spine S/P Fusion CHEERLEADER DROPPED ON HEAD/NECK    Panic disorder without agoraphobia     Counselor Destiny Marroquin. [place]    PONV (postoperative nausea and vomiting)     Seizures ([facility] [address]     NONE SINCE 2018 Psuedo       Prior to Visit Medications    Medication Sig Taking?  Authorizing Provider   azithromycin (ZITHROMAX) 250 MG tablet Take 1 tablet by mouth See Admin Instructions for 5 days 500mg on day 1 followed by 250mg on days 2 - 5 Yes Irene Gallegos MD   omeprazole (PRILOSEC) 40 MG delayed release capsule TAKE 1 CAPSULE BY MOUTH TWICE DAILY BEFORE MEALS Yes MADHU Esquivel - CNP   zolpidem (AMBIEN CR) 12.5 MG extended release tablet Take 1 tablet by mouth nightly as needed for Sleep for up to 90 days. Yes Ana Ferris DO   ALPRAZolam (XANAX) 0.5 MG tablet Take 1 tablet by mouth 3 times daily as needed for Anxiety for up to 90 days. Yes Ana Ferris DO   carisoprodol (SOMA) 350 MG tablet TAKE 1 TABLET BY MOUTH FOUR TIMES DAILY AS NEEDED FOR MUSCLE SPASMS Yes Ruy Grant DO   HYDROcodone-acetaminophen (NORCO) 7.5-325 MG per tablet Take 1 tablet by mouth every 12 hours as needed for Pain for up to 30 days.  Intended supply: 30 days Yes Ana Ferris DO   QUEtiapine (SEROQUEL) 100 MG tablet TAKE 1 TABLET BY MOUTH TWICE DAILY Yes Ana Ferris DO   sertraline (ZOLOFT) 50 MG tablet TAKE 1 TABLET BY MOUTH EVERY NIGHT Yes Ana Ferris DO   lamoTRIgine (LAMICTAL) 100 MG tablet TAKE 1 TABLET BY MOUTH TWICE DAILY Yes Ana Ferris DO   topiramate (TOPAMAX) 25 MG tablet TAKE 2 TABLETS BY MOUTH EVERY NIGHT Yes Ana Ferris DO   sucralfate (CARAFATE) 1 GM tablet TAKE 1 TABLET BY MOUTH FOUR TIMES DAILY Yes Sarabjit Morris MD   dicyclomine (BENTYL) 10 MG capsule Take 1 capsule by mouth as needed (abdominal pain/bloating) Yes Sarabjit Morris MD   ondansetron (ZOFRAN ODT) 4 MG disintegrating tablet Take 1 tablet by mouth every 8 hours as needed for Nausea or Vomiting Yes Harshad Rdz,    estradiol (VIVELLE) 0.1 MG/24HR APPLY 1 PATCH EXTERNALLY TO THE SKIN 2 TIMES A WEEK Yes Ana Ferris DO   COLLAGEN PO Take by mouth daily  Yes Historical Provider, MD   Cholecalciferol (VITAMIN D3) 125 MCG (5000 UT) TABS Take by mouth Yes Historical Provider, MD        Allergies   Allergen Reactions    Bee Venom Anaphylaxis     Wasps    Fish-Derived Products Anaphylaxis and Swelling     Swordfish allergy    Hornet Venom Anaphylaxis    Iodine        Social History     Tobacco Use    Smoking status: Former Smoker     Packs/day: 0.25     Years: 1.00     Pack years: 0.25     Quit date: 2015     Years since quittin.0    Smokeless tobacco: Never Used   Substance Use Topics    Alcohol use: No           Vitals:    22 1144   BP: 134/78   Pulse: 67   Resp: 18   Temp: 97.8 °F (36.6 °C)   TempSrc: Temporal   SpO2: 98%   Weight: 129 lb (58.5 kg)   Height: 5' 5\" (1.651 m)     Estimated body mass index is 21.47 kg/m² as calculated from the following:    Height as of this encounter: 5' 5\" (1.651 m). Weight as of this encounter: 129 lb (58.5 kg). Physical Exam  Constitutional:       Appearance: She is well-developed. HENT:      Head: Normocephalic. Eyes:      Extraocular Movements: Extraocular movements intact. Conjunctiva/sclera: Conjunctivae normal.   Cardiovascular:      Rate and Rhythm: Normal rate and regular rhythm. Heart sounds: Normal heart sounds. No murmur heard. Pulmonary:      Effort: Pulmonary effort is normal.      Breath sounds: Normal breath sounds. No wheezing or rales. Abdominal:      General: Bowel sounds are normal.      Palpations: Abdomen is soft. Tenderness: There is no abdominal tenderness. Musculoskeletal:      Right lower leg: No edema. Left lower leg: No edema. Neurological:      General: No focal deficit present. Mental Status: She is alert. Comments: Cranial nerves grossly intact   Psychiatric:         Mood and Affect: Mood normal.         Judgment: Judgment normal.         ASSESSMENT/PLAN:  Arjun Cade was seen today for concern for covid-19. Diagnoses and all orders for this visit:    Fatigue, unspecified type  -     POCT COVID-19, Antigen  -     azithromycin (ZITHROMAX) 250 MG tablet; Take 1 tablet by mouth See Admin Instructions for 5 days 500mg on day 1 followed by 250mg on days 2 - 5    Acute non-recurrent frontal sinusitis  -     azithromycin (ZITHROMAX) 250 MG tablet; Take 1 tablet by mouth See Admin Instructions for 5 days 500mg on day 1 followed by 250mg on days 2 - 5  -     COVID-19 Ambulatory;  Future    (-) Rapid COVID    Patient was started on Azithromycin Z-Tigre for a possible bacterial infection causing the patients reported symptoms. Patient was advised on common side effects along with the risk for C. Diff. Patient was advised to proceed to the ER with any allergic reaction to the above antibiotic. Flonase and second generation antihistamine advised for nasal congestion/rhinorrhea. For nausea and heartburn patient may try famotidine 20mg 1-2x a day. If worsening COVID symptoms consider the following  Albuterol PRN for wheezing and dyspnea. Budesonide inhaler to use if patient test positive for COVID. Consider oral steroids if symptoms greater then 7-9 days. SE of above medications reviewed. All questions and concerns reviewed/answered. Advised on OTC supplementation with vitamin D, vitamin C, quercetin, zinc, melatonin. Pending Standard COVID-19 PCR  . If Positive, patient was advised to contact his PCP for additional guidance and to follow CDC guidelines for COVID-19 infections. Patient may return to work when he/she has had improvement of their symptoms x24 hours and as long as the patient has remained afebrile and their pending COVID-19 test is (-). Patient advised to schedule f/u with PCP in 7-10 days if symptoms persist. Patient should have pulse ox checked at this time and a course of methylprednisolone should be considered if + for COVID. Patient to proceed to the ED or FLU clinic sooner if symptoms worsen or change. Patient to proceed to the ED immediately with high fevers, progressive SOB, dyspnea  , CP, calf pain/swelling, signs of dehydration, (decreased urinary output or inability to tolerate PO). Patient verbalizes understanding and is in agreement with plan of care. All questions answered. This visit was provided as a focused evaluation during the COVID -19 pandemic/national emergency.  A comprehensive review of all previous patient history and testing was not conducted. Pertinent findings were elicited during the visit. This report was transcribed using voice recognition software. Every effort was made to ensure accuracy; however, inadvertent computerized transcription errors may be present. Return if symptoms worsen or fail to improve. An electronicsignature was used to authenticate this note.     --Reed Grimes MD on 1/3/22 at 11:48 AM EST

## 2022-01-05 LAB
SARS-COV-2: NOT DETECTED
SOURCE: NORMAL

## 2022-01-30 RX ORDER — ESTRADIOL 0.1 MG/D
FILM, EXTENDED RELEASE TRANSDERMAL
Qty: 24 PATCH | Refills: 1 | Status: SHIPPED
Start: 2022-01-30 | End: 2022-04-21 | Stop reason: SDUPTHER

## 2022-02-17 ENCOUNTER — OFFICE VISIT (OUTPATIENT)
Dept: FAMILY MEDICINE CLINIC | Age: 57
End: 2022-02-17
Payer: COMMERCIAL

## 2022-02-17 VITALS
WEIGHT: 131 LBS | HEART RATE: 84 BPM | SYSTOLIC BLOOD PRESSURE: 130 MMHG | BODY MASS INDEX: 21.8 KG/M2 | TEMPERATURE: 97.2 F | OXYGEN SATURATION: 97 % | DIASTOLIC BLOOD PRESSURE: 68 MMHG

## 2022-02-17 DIAGNOSIS — M25.521 RIGHT ELBOW PAIN: Primary | ICD-10-CM

## 2022-02-17 PROCEDURE — 99214 OFFICE O/P EST MOD 30 MIN: CPT | Performed by: PHYSICIAN ASSISTANT

## 2022-02-17 RX ORDER — PREDNISONE 10 MG/1
TABLET ORAL
Qty: 18 TABLET | Refills: 0 | Status: SHIPPED
Start: 2022-02-17 | End: 2022-03-10 | Stop reason: ALTCHOICE

## 2022-02-17 NOTE — PROGRESS NOTES
22  John Bridges : 1965 Sex: female  Age 49 y.o. Subjective:  Chief Complaint   Patient presents with    Elbow Pain     R [id number]/shoveling snow and fell 2w ago          HPI:   John Bridges , 49 y.o. female presents to [facility] care for evaluation of right elbow pain    HPI  62-year-old female presents to [facility] for evaluation of right elbow pain. The patient states that she had been doing some shoveling a couple of weeks ago and aggravated her right elbow. The patient states that then she slipped and fell and landed on her right elbow. The patient is right-hand dominant. The patient is having a hard time pronating, supinating, and fully extending. The patient is not any shoulder pain. The pain does radiate into her right wrist.      ROS:   Unless otherwise stated in this report the patient's positive and negative responses for review of systems for constitutional, eyes, ENT, cardiovascular, respiratory, gastrointestinal, neurological, , musculoskeletal, and integument systems and related systems to the presenting problem are either stated in the history of present illness or were not pertinent or were negative for the symptoms and/or complaints related to the presenting medical problem. Positives and pertinent negatives as per HPI. All others reviewed and are negative.       PMH:     Past Medical History:   Diagnosis Date    Abdominal pain     Anxiety     Bipolar disorder ([facility] [address]     Bipolar 1 disorder    Blood in stool     Cervical disc disease     Cervical spondylitis with radiculitis (HCC)     C2, C7    Diarrhea     Facet hypertrophy     L4/5 - S1    Headache     Migraine    History of rectal sphincterotomy     Lumbar disc disease     Osteoarthritis     DJD/DDD C-Spine S/P Fusion CHEERLEADER DROPPED ON HEAD/NECK    Panic disorder without agoraphobia     Counselor Destiny Marroquin. [place]    PONV (postoperative nausea and vomiting)     Seizures ([facility] [address]     NONE SINCE 2018 Psuedo       Past Surgical History:   Procedure Laterality Date    ABDOMEN SURGERY N/A 4/19/2021    EXCISION LIPOMA ABDOMEN performed by Adri Canales MD at [address] Bilateral     Implants Bilaterally    BREAST SURGERY      CERVICAL FUSION      c2-c7 Bone Kevin Grafting 2-6    COLONOSCOPY N/A 3/3/2021    COLORECTAL CANCER SCREENING, HIGH RISK performed by Adri Canales MD at [address] [address] COLONOSCOPY N/A 12/13/2021    COLONOSCOPY WITH BIOPSY performed by Robby Walker MD at [address]      X5 Ovarian Cysts, ZACARIAS-BSO (Non Cancerous)    OTHER SURGICAL HISTORY      Cervical Epidurals    OTHER SURGICAL HISTORY      Lumbar Epidural - Donatelli 6/13/17, 6/27/17, 7/25/17    UPPER GASTROINTESTINAL ENDOSCOPY N/A 12/13/2021    EGD BIOPSY performed by Robby Walker MD at [facility] ENDOSCOPY       Family History   Problem Relation Age of Onset    Diabetes Mother     Other Mother         End Stage Cirrhosis 1/2017    Cancer Father         Prostate - [facility]       Medications:     Current Outpatient Medications:     predniSONE (DELTASONE) 10 MG tablet, 3 tabs once daily for 3 days, 2 tabs once daily for 3 days, 1 tab once daily for 3 days, Disp: 18 tablet, Rfl: 0    estradiol (VIVELLE) 0.1 MG/24HR, APPLY 1 PATCH EXTERNALLY TO THE SKIN 2 TIMES A WEEK, Disp: 24 patch, Rfl: 1    omeprazole (PRILOSEC) 40 MG delayed release capsule, TAKE 1 CAPSULE BY MOUTH TWICE DAILY BEFORE MEALS, Disp: 180 capsule, Rfl: 1    zolpidem (AMBIEN CR) 12.5 MG extended release tablet, Take 1 tablet by mouth nightly as needed for Sleep for up to 90 days. , Disp: 90 tablet, Rfl: 1    ALPRAZolam (XANAX) 0.5 MG tablet, Take 1 tablet by mouth 3 times daily as needed for Anxiety for up to 90 days. , Disp: 270 tablet, Rfl: 0    carisoprodol (SOMA) 350 MG tablet, TAKE 1 TABLET BY MOUTH FOUR TIMES DAILY AS NEEDED FOR MUSCLE SPASMS, Disp: 360 tablet, Rfl: 0    HYDROcodone-acetaminophen (NORCO) 7.5-325 MG per tablet, Take 1 tablet by mouth every 12 hours as needed for Pain for up to 30 days. Intended supply: 30 days, Disp: 60 tablet, Rfl: 0    QUEtiapine (SEROQUEL) 100 MG tablet, TAKE 1 TABLET BY MOUTH TWICE DAILY, Disp: 180 tablet, Rfl: 1    sertraline (ZOLOFT) 50 MG tablet, TAKE 1 TABLET BY MOUTH EVERY NIGHT, Disp: 90 tablet, Rfl: 1    lamoTRIgine (LAMICTAL) 100 MG tablet, TAKE 1 TABLET BY MOUTH TWICE DAILY, Disp: 180 tablet, Rfl: 1    topiramate (TOPAMAX) 25 MG tablet, TAKE 2 TABLETS BY MOUTH EVERY NIGHT, Disp: 180 tablet, Rfl: 1    sucralfate (CARAFATE) 1 GM tablet, TAKE 1 TABLET BY MOUTH FOUR TIMES DAILY, Disp: 28 tablet, Rfl: 0    dicyclomine (BENTYL) 10 MG capsule, Take 1 capsule by mouth as needed (abdominal pain/bloating), Disp: 120 capsule, Rfl: 3    ondansetron (ZOFRAN ODT) 4 MG disintegrating tablet, Take 1 tablet by mouth every 8 hours as needed for Nausea or Vomiting, Disp: 10 tablet, Rfl: 0    COLLAGEN PO, Take by mouth daily , Disp: , Rfl:     Cholecalciferol (VITAMIN D3) 125 MCG (5000 UT) TABS, Take by mouth, Disp: , Rfl:     Allergies: Allergies   Allergen Reactions    Bee Venom Anaphylaxis     Wasps    Fish-Derived Products Anaphylaxis and Swelling     Swordfish allergy    Hornet Venom Anaphylaxis    Iodine        Social History:     Social History     Tobacco Use    Smoking status: Former Smoker     Packs/day: 0.25     Years: 1.00     Pack years: 0.25     Quit date:      Years since quittin.1    Smokeless tobacco: Never Used   Vaping Use    Vaping Use: Never used   Substance Use Topics    Alcohol use: No    Drug use: No       Patient lives at home. Physical Exam:     Vitals:    22 0839   BP: 130/68   Pulse: 84   Temp: 97.2 °F (36.2 °C)   SpO2: 97%   Weight: 131 lb (59.4 kg)       Exam:  Physical Exam  Vital signs reviewed and nurse's notes. The patient is not hypoxic. General: Alert, no acute distress, patient resting comfortably   Skin: warm, intact, no pallor noted   Head: Normocephalic, atraumatic   Eye: Normal conjunctiva   Respiratory: No acute distress   Musculoskeletal: There is no obvious deformity noted to the right elbow or to the right upper extremity. The patient was able to extend but she was not able to fully extend. She was able to flex without much difficulty. The patient really had no significant palpable tenderness. The patient was noted to have some pain with pronation, supination. No significant pain to palpation of the mid forearm, the right wrist or the right hand. No pain in the right shoulder. Normal equal  strength. Pulses intact. Neurological: alert and orient x4, normal sensory and motor observed. Psychiatric: Cooperative      Testing:     XR ELBOW RIGHT (MIN 3 VIEWS)    Result Date: 2/17/2022  EXAMINATION: THREE XRAY VIEWS OF THE RIGHT ELBOW 2/17/2022 9:00 am COMPARISON: None. HISTORY: ORDERING SYSTEM PROVIDED HISTORY: Right elbow pain TECHNOLOGIST PROVIDED HISTORY: Reason for exam:->right elbow pain FINDINGS: There is no elbow effusion. There is no acute fracture or dislocation. Alignment is normal.     No acute abnormality. Medical Decision Making:     Vital signs reviewed    Past medical history reviewed. Allergies reviewed. Medications reviewed. Patient on arrival does not appear to be in any apparent distress or discomfort. The patient has been seen and evaluated. The patient does not appear to be toxic or lethargic. The patient was sent for an x-ray of the right elbow. X-rays did not show any evidence of acute abnormality. We replaced the patient in a sling. The patient will be given a tapering dose of prednisone. If symptoms or not improving we do need the patient to follow-up with orthopedics or possibly MRI of the elbow.     The patient is to return to express care or go directly to the emergency department should any of the signs or symptoms worsen. The patient is to followup with primary care physician in 2-3 days for repeat evaluation. The patient has no other questions or concerns at this time the patient will be discharged home. Clinical Impression:   Casie Song was seen today for elbow pain. Diagnoses and all orders for this visit:    Right elbow pain  -     XR ELBOW RIGHT (MIN 3 VIEWS); Future    Other orders  -     predniSONE (DELTASONE) 10 MG tablet; 3 tabs once daily for 3 days, 2 tabs once daily for 3 days, 1 tab once daily for 3 days        The patient is to call for any concerns or return if any of the signs or symptoms worsen. The patient is to follow-up with PCP in the next 2-3 days for repeat evaluation repeat assessment or go directly to the emergency department.      SIGNATURE: Osiel Foster III, PA-C

## 2022-02-18 RX ORDER — ESTRADIOL 0.1 MG/D
1 FILM, EXTENDED RELEASE TRANSDERMAL
Qty: 24 PATCH | Refills: 1 | Status: CANCELLED | OUTPATIENT
Start: 2022-02-21

## 2022-03-03 DIAGNOSIS — Z13.6 SCREENING FOR CARDIOVASCULAR CONDITION: ICD-10-CM

## 2022-03-03 DIAGNOSIS — F41.1 GAD (GENERALIZED ANXIETY DISORDER): ICD-10-CM

## 2022-03-03 DIAGNOSIS — R73.01 IMPAIRED FASTING GLUCOSE: ICD-10-CM

## 2022-03-03 DIAGNOSIS — E55.9 VITAMIN D INSUFFICIENCY: ICD-10-CM

## 2022-03-03 LAB
ALBUMIN SERPL-MCNC: 4.6 G/DL (ref 3.5–5.2)
ALP BLD-CCNC: 79 U/L (ref 35–104)
ALT SERPL-CCNC: 12 U/L (ref 0–32)
ANION GAP SERPL CALCULATED.3IONS-SCNC: 19 MMOL/L (ref 7–16)
AST SERPL-CCNC: 21 U/L (ref 0–31)
BASOPHILS ABSOLUTE: 0.05 E9/L (ref 0–0.2)
BASOPHILS RELATIVE PERCENT: 0.8 % (ref 0–2)
BILIRUB SERPL-MCNC: 0.3 MG/DL (ref 0–1.2)
BUN BLDV-MCNC: 15 MG/DL (ref 6–20)
CALCIUM SERPL-MCNC: 9.5 MG/DL (ref 8.6–10.2)
CHLORIDE BLD-SCNC: 100 MMOL/L (ref 98–107)
CHOLESTEROL, TOTAL: 231 MG/DL (ref 0–199)
CO2: 18 MMOL/L (ref 22–29)
CREAT SERPL-MCNC: 1.1 MG/DL (ref 0.5–1)
EOSINOPHILS ABSOLUTE: 0.08 E9/L (ref 0.05–0.5)
EOSINOPHILS RELATIVE PERCENT: 1.3 % (ref 0–6)
FOLATE: 9.4 NG/ML (ref 4.8–24.2)
GFR AFRICAN AMERICAN: >60
GFR NON-AFRICAN AMERICAN: 52 ML/MIN/1.73
GLUCOSE BLD-MCNC: 92 MG/DL (ref 74–99)
HBA1C MFR BLD: 5 % (ref 4–5.6)
HCT VFR BLD CALC: 43.6 % (ref 34–48)
HDLC SERPL-MCNC: 91 MG/DL
HEMOGLOBIN: 14.2 G/DL (ref 11.5–15.5)
IMMATURE GRANULOCYTES #: 0.02 E9/L
IMMATURE GRANULOCYTES %: 0.3 % (ref 0–5)
LDL CHOLESTEROL CALCULATED: 110 MG/DL (ref 0–99)
LYMPHOCYTES ABSOLUTE: 1.84 E9/L (ref 1.5–4)
LYMPHOCYTES RELATIVE PERCENT: 29.7 % (ref 20–42)
MCH RBC QN AUTO: 32.2 PG (ref 26–35)
MCHC RBC AUTO-ENTMCNC: 32.6 % (ref 32–34.5)
MCV RBC AUTO: 98.9 FL (ref 80–99.9)
MONOCYTES ABSOLUTE: 0.51 E9/L (ref 0.1–0.95)
MONOCYTES RELATIVE PERCENT: 8.2 % (ref 2–12)
NEUTROPHILS ABSOLUTE: 3.69 E9/L (ref 1.8–7.3)
NEUTROPHILS RELATIVE PERCENT: 59.7 % (ref 43–80)
PDW BLD-RTO: 12.7 FL (ref 11.5–15)
PLATELET # BLD: 310 E9/L (ref 130–450)
PMV BLD AUTO: 10.8 FL (ref 7–12)
POTASSIUM SERPL-SCNC: 4.3 MMOL/L (ref 3.5–5)
RBC # BLD: 4.41 E12/L (ref 3.5–5.5)
SODIUM BLD-SCNC: 137 MMOL/L (ref 132–146)
TOTAL PROTEIN: 8 G/DL (ref 6.4–8.3)
TRIGL SERPL-MCNC: 149 MG/DL (ref 0–149)
TSH SERPL DL<=0.05 MIU/L-ACNC: 2.27 UIU/ML (ref 0.27–4.2)
URIC ACID, SERUM: 5.2 MG/DL (ref 2.4–5.7)
VITAMIN B-12: 305 PG/ML (ref 211–946)
VITAMIN D 25-HYDROXY: 64 NG/ML (ref 30–100)
VLDLC SERPL CALC-MCNC: 30 MG/DL
WBC # BLD: 6.2 E9/L (ref 4.5–11.5)

## 2022-03-10 ENCOUNTER — OFFICE VISIT (OUTPATIENT)
Dept: PRIMARY CARE CLINIC | Age: 57
End: 2022-03-10
Payer: COMMERCIAL

## 2022-03-10 VITALS
BODY MASS INDEX: 21.66 KG/M2 | OXYGEN SATURATION: 97 % | HEART RATE: 104 BPM | HEIGHT: 65 IN | TEMPERATURE: 97.1 F | DIASTOLIC BLOOD PRESSURE: 86 MMHG | WEIGHT: 130 LBS | SYSTOLIC BLOOD PRESSURE: 132 MMHG

## 2022-03-10 DIAGNOSIS — M48.062 SPINAL STENOSIS OF LUMBAR REGION WITH NEUROGENIC CLAUDICATION: ICD-10-CM

## 2022-03-10 DIAGNOSIS — M77.11 LATERAL EPICONDYLITIS OF RIGHT ELBOW: ICD-10-CM

## 2022-03-10 DIAGNOSIS — F41.1 GAD (GENERALIZED ANXIETY DISORDER): ICD-10-CM

## 2022-03-10 DIAGNOSIS — F51.01 PRIMARY INSOMNIA: ICD-10-CM

## 2022-03-10 DIAGNOSIS — M50.90 CERVICAL DISC DISEASE: ICD-10-CM

## 2022-03-10 DIAGNOSIS — Z00.01 ENCOUNTER FOR WELL ADULT EXAM WITH ABNORMAL FINDINGS: Primary | ICD-10-CM

## 2022-03-10 PROCEDURE — 99396 PREV VISIT EST AGE 40-64: CPT | Performed by: FAMILY MEDICINE

## 2022-03-10 PROCEDURE — 99213 OFFICE O/P EST LOW 20 MIN: CPT | Performed by: FAMILY MEDICINE

## 2022-03-10 PROCEDURE — 96372 THER/PROPH/DIAG INJ SC/IM: CPT | Performed by: FAMILY MEDICINE

## 2022-03-10 RX ORDER — ZOLPIDEM TARTRATE 12.5 MG/1
12.5 TABLET, FILM COATED, EXTENDED RELEASE ORAL NIGHTLY PRN
Qty: 90 TABLET | Refills: 1 | Status: SHIPPED | OUTPATIENT
Start: 2022-03-10 | End: 2022-06-08

## 2022-03-10 RX ORDER — CARISOPRODOL 350 MG/1
350 TABLET ORAL 4 TIMES DAILY PRN
COMMUNITY
End: 2022-04-21

## 2022-03-10 RX ORDER — HYDROCODONE BITARTRATE AND ACETAMINOPHEN 7.5; 325 MG/1; MG/1
1 TABLET ORAL EVERY 12 HOURS PRN
Qty: 60 TABLET | Refills: 0 | Status: SHIPPED | OUTPATIENT
Start: 2022-05-05 | End: 2022-04-21

## 2022-03-10 RX ORDER — HYDROCODONE BITARTRATE AND ACETAMINOPHEN 7.5; 325 MG/1; MG/1
1 TABLET ORAL EVERY 6 HOURS PRN
COMMUNITY
End: 2022-04-21

## 2022-03-10 RX ORDER — ALPRAZOLAM 0.5 MG/1
0.5 TABLET ORAL 3 TIMES DAILY PRN
COMMUNITY
End: 2022-04-21

## 2022-03-10 RX ORDER — ZOLPIDEM TARTRATE 12.5 MG/1
12.5 TABLET, FILM COATED, EXTENDED RELEASE ORAL NIGHTLY PRN
COMMUNITY
End: 2022-04-21

## 2022-03-10 RX ORDER — METHYLPREDNISOLONE ACETATE 80 MG/ML
80 INJECTION, SUSPENSION INTRA-ARTICULAR; INTRALESIONAL; INTRAMUSCULAR; SOFT TISSUE ONCE
Status: COMPLETED | OUTPATIENT
Start: 2022-03-10 | End: 2022-03-10

## 2022-03-10 RX ORDER — CARISOPRODOL 350 MG/1
TABLET ORAL
Qty: 360 TABLET | Refills: 1 | Status: SHIPPED | OUTPATIENT
Start: 2022-03-10 | End: 2022-06-10

## 2022-03-10 RX ORDER — ALPRAZOLAM 0.5 MG/1
0.5 TABLET ORAL 3 TIMES DAILY PRN
Qty: 270 TABLET | Refills: 1 | Status: SHIPPED | OUTPATIENT
Start: 2022-03-10 | End: 2022-06-08

## 2022-03-10 RX ORDER — HYDROCODONE BITARTRATE AND ACETAMINOPHEN 7.5; 325 MG/1; MG/1
1 TABLET ORAL EVERY 12 HOURS PRN
Qty: 60 TABLET | Refills: 0 | Status: SHIPPED | OUTPATIENT
Start: 2022-04-07 | End: 2022-06-09 | Stop reason: SDUPTHER

## 2022-03-10 RX ORDER — HYDROCODONE BITARTRATE AND ACETAMINOPHEN 7.5; 325 MG/1; MG/1
1 TABLET ORAL EVERY 12 HOURS PRN
Qty: 60 TABLET | Refills: 0 | Status: SHIPPED | OUTPATIENT
Start: 2022-03-10 | End: 2022-06-09 | Stop reason: SDUPTHER

## 2022-03-10 RX ADMIN — METHYLPREDNISOLONE ACETATE 80 MG: 80 INJECTION, SUSPENSION INTRA-ARTICULAR; INTRALESIONAL; INTRAMUSCULAR; SOFT TISSUE at 10:21

## 2022-03-10 ASSESSMENT — ENCOUNTER SYMPTOMS
WHEEZING: 0
ABDOMINAL PAIN: 0
COUGH: 0
NAUSEA: 0
VOMITING: 0
DIARRHEA: 0
BACK PAIN: 1
CONSTIPATION: 0
SHORTNESS OF BREATH: 0

## 2022-03-10 ASSESSMENT — PATIENT HEALTH QUESTIONNAIRE - PHQ9
SUM OF ALL RESPONSES TO PHQ QUESTIONS 1-9: 0
SUM OF ALL RESPONSES TO PHQ QUESTIONS 1-9: 0
1. LITTLE INTEREST OR PLEASURE IN DOING THINGS: 0
SUM OF ALL RESPONSES TO PHQ QUESTIONS 1-9: 0
2. FEELING DOWN, DEPRESSED OR HOPELESS: 0
SUM OF ALL RESPONSES TO PHQ QUESTIONS 1-9: 0
SUM OF ALL RESPONSES TO PHQ9 QUESTIONS 1 & 2: 0

## 2022-03-10 NOTE — PROGRESS NOTES
3/10/22  Sierra King : 1965 Sex: female  Age: 49 y.o. Chief Complaint   Patient presents with    Annual Exam     physical    Arm Pain     right arm. pt was seen through [facility]  and still has no relief. it is hard for her to lift things     HPI:  49 y.o. female presents today for yearly physical, chronic medication refills and FBW results. Patient's chart, medical, surgical and medication history all reviewed. Well Adult Physical  Patient here for a physical exam.  The patient reports problems - R elbow pain. Started after shoveling snow. Was seen through [facility] and given steroids with initial benefit, but now worsening. Needs refills of her chronic medications as well. Do you take any herbs or supplements that were not prescribed by a doctor? no   Are you taking calcium supplements? no   Are you taking aspirin daily? no    Colonoscopy: 2021  Dental visit: Within last 6 mos  Vision check:  No Problems  PAP:  S/p Hysterectomy  Mammo:  2020    Last time routine bloodwork was done: - reviewed in detail    Immunization status: up to date and documented. Smoking status: former use. Quit     Physical activity: infrequently    ROS:  Review of Systems   Constitutional: Negative for chills, fatigue and fever. Respiratory: Negative for cough, shortness of breath and wheezing. Cardiovascular: Negative for chest pain and palpitations. Gastrointestinal: Negative for abdominal pain, constipation, diarrhea, nausea and vomiting. Musculoskeletal: Positive for arthralgias, back pain and gait problem. Skin: Negative for rash. Neurological: Negative for dizziness and headaches. Psychiatric/Behavioral: Negative for dysphoric mood. The patient is nervous/anxious. All other systems reviewed and are negative.      Current Outpatient Medications on File Prior to Visit   Medication Sig Dispense Refill    ALPRAZolam (XANAX) 0.5 MG tablet Take 0.5 mg by mouth 3 times daily as needed for Sleep.  carisoprodol (SOMA) 350 MG tablet Take 350 mg by mouth 4 times daily as needed for Muscle spasms.  zolpidem (AMBIEN CR) 12.5 MG extended release tablet Take 12.5 mg by mouth nightly as needed for Sleep.  HYDROcodone-acetaminophen (NORCO) 7.5-325 MG per tablet Take 1 tablet by mouth every 6 hours as needed for Pain.  estradiol (VIVELLE) 0.1 MG/24HR APPLY 1 PATCH EXTERNALLY TO THE SKIN 2 TIMES A WEEK 24 patch 1    omeprazole (PRILOSEC) 40 MG delayed release capsule TAKE 1 CAPSULE BY MOUTH TWICE DAILY BEFORE MEALS 180 capsule 1    QUEtiapine (SEROQUEL) 100 MG tablet TAKE 1 TABLET BY MOUTH TWICE DAILY 180 tablet 1    sertraline (ZOLOFT) 50 MG tablet TAKE 1 TABLET BY MOUTH EVERY NIGHT 90 tablet 1    lamoTRIgine (LAMICTAL) 100 MG tablet TAKE 1 TABLET BY MOUTH TWICE DAILY 180 tablet 1    topiramate (TOPAMAX) 25 MG tablet TAKE 2 TABLETS BY MOUTH EVERY NIGHT 180 tablet 1    sucralfate (CARAFATE) 1 GM tablet TAKE 1 TABLET BY MOUTH FOUR TIMES DAILY 28 tablet 0    ondansetron (ZOFRAN ODT) 4 MG disintegrating tablet Take 1 tablet by mouth every 8 hours as needed for Nausea or Vomiting 10 tablet 0    COLLAGEN PO Take by mouth daily       Cholecalciferol (VITAMIN D3) 125 MCG (5000 UT) TABS Take by mouth       No current facility-administered medications on file prior to visit.        Allergies   Allergen Reactions    Bee Venom Anaphylaxis     Wasps    Fish-Derived Products Anaphylaxis and Swelling     Swordfish allergy    Hornet Venom Anaphylaxis    Iodine        Past Medical History:   Diagnosis Date    Abdominal pain     Anxiety     Bipolar disorder ([facility])     Bipolar 1 disorder    Blood in stool     Cervical disc disease     Cervical spondylitis with radiculitis ([facility])     C2, C7    Diarrhea     Facet hypertrophy     L4/5 - S1    Headache     Migraine    History of rectal sphincterotomy     Lumbar disc disease     Osteoarthritis     DJD/DDD C-Spine S/P Fusion CHEERLEADER DROPPED ON HEAD/NECK    Panic disorder without agoraphobia     Counselor [facility]. [place]    PONV (postoperative nausea and vomiting)     Seizures ([address]     NONE SINCE 2018 Psuedo     Past Surgical History:   Procedure Laterality Date    ABDOMEN SURGERY N/A 2021    EXCISION LIPOMA ABDOMEN performed by Sherron Castellanos MD at [address] Bilateral     Implants Bilaterally    BREAST SURGERY      CERVICAL FUSION      c2-c7 Bone Kevin Grafting 2-6    COLONOSCOPY N/A 3/3/2021    COLORECTAL CANCER SCREENING, HIGH RISK performed by Sherron Castellanos MD at [address] COLONOSCOPY N/A 2021    COLONOSCOPY WITH BIOPSY performed by Sarabjit Morris MD at [address]      X4    LAPAROTOMY      X5 Ovarian Cysts, ZACARIAS-BSO (Non Cancerous)    OTHER SURGICAL HISTORY      Cervical Epidurals    OTHER SURGICAL HISTORY      Lumbar Epidural - [address], 17, 17    UPPER GASTROINTESTINAL ENDOSCOPY N/A 2021    EGD BIOPSY performed by Sarabjit Morris MD at [facility] ENDOSCOPY     Family History   Problem Relation Age of Onset    Diabetes Mother    José Outhouse Other Mother         End Stage Cirrhosis 2017    Cancer Father         Prostate - Tiffanie Bonds Caesar     Social History     Socioeconomic History    Marital status:      Spouse name: Not on file    Number of children: Not on file    Years of education: Not on file    Highest education level: Not on file   Occupational History    Not on file   Tobacco Use    Smoking status: Former Smoker     Packs/day: 0.25     Years: 1.00     Pack years: 0.25     Quit date:      Years since quittin.1    Smokeless tobacco: Never Used   Vaping Use    Vaping Use: Never used   Substance and Sexual Activity    Alcohol use: No    Drug use: No    Sexual activity: Not on file   Other Topics Concern    Not on file   Social History Narrative    Not on file     Social Determinants of Health     Financial Resource Strain:     Difficulty of Paying Living Expenses: Not on file   Food Insecurity:     Worried About Running Out of Food in the Last Year: Not on file    Chavo of Food in the Last Year: Not on file   Transportation Needs:     Lack of Transportation (Medical): Not on file    Lack of Transportation (Non-Medical): Not on file   Physical Activity:     Days of Exercise per Week: Not on file    Minutes of Exercise per Session: Not on file   Stress:     Feeling of Stress : Not on file   Social Connections:     Frequency of Communication with Friends and Family: Not on file    Frequency of Social Gatherings with Friends and Family: Not on file    Attends Church Services: Not on file    Active Member of [address] [place] [address] [employer] or Organizations: Not on file    Attends Club or Organization Meetings: Not on file    Marital Status: Not on file   Intimate Partner Violence:     Fear of Current or Ex-Partner: Not on file    Emotionally Abused: Not on file    Physically Abused: Not on file    Sexually Abused: Not on file   Housing Stability:     Unable to Pay for Housing in the Last Year: Not on file    Number of [place] in the Last Year: Not on file    Unstable Housing in the Last Year: Not on file       Vitals:    03/10/22 0938   BP: 132/86   Pulse: 104   Temp: 97.1 °F (36.2 °C)   SpO2: 97%   Weight: 130 lb (59 kg)   Height: 5' 5\" (1.651 m)       Physical Exam:  Physical Exam  Vitals and nursing note reviewed. Constitutional:       General: She is not in acute distress. Appearance: Normal appearance. She is well-developed and normal weight. She is not ill-appearing. HENT:      Head: Normocephalic and atraumatic. Right Ear: Hearing and external ear normal.      Left Ear: Hearing and external ear normal.      Nose:      Comments: Wearing mask  Eyes:      General: Lids are normal. No scleral icterus.      Extraocular Movements: Extraocular movements intact. Conjunctiva/sclera: Conjunctivae normal.   Neck:      Thyroid: No thyromegaly. Vascular: No carotid bruit. Cardiovascular:      Rate and Rhythm: Normal rate and regular rhythm. Heart sounds: Normal heart sounds. No murmur heard. Pulmonary:      Effort: Pulmonary effort is normal. No respiratory distress. Breath sounds: Normal breath sounds. No wheezing. Musculoskeletal:         General: Tenderness (R lateral epicondilitis pain with palpation and ROM) present. Normal range of motion. Right hand: Deformity (thickening of palmar aponeurosis at 4-5th MCP) present. Left hand: Deformity (thickening of palmar aponeurosis at 4-5th MCP) present. Cervical back: Normal range of motion and neck supple. Right lower leg: No edema. Left lower leg: No edema. Lymphadenopathy:      Cervical: No cervical adenopathy. Skin:     General: Skin is warm and dry. Findings: No rash. Neurological:      General: No focal deficit present. Mental Status: She is alert and oriented to person, place, and time. Gait: Gait normal.   Psychiatric:         Mood and Affect: Mood and affect normal.         Speech: Speech normal.         Behavior: Behavior normal.         Thought Content:  Thought content normal.         Labs:  CBC with Differential:    Lab Results   Component Value Date    WBC 6.2 03/03/2022    RBC 4.41 03/03/2022    HGB 14.2 03/03/2022    HCT 43.6 03/03/2022     03/03/2022    MCV 98.9 03/03/2022    MCH 32.2 03/03/2022    MCHC 32.6 03/03/2022    RDW 12.7 03/03/2022    SEGSPCT 53 09/24/2011    LYMPHOPCT 29.7 03/03/2022    MONOPCT 8.2 03/03/2022    BASOPCT 0.8 03/03/2022    MONOSABS 0.51 03/03/2022    LYMPHSABS 1.84 03/03/2022    EOSABS 0.08 03/03/2022    BASOSABS 0.05 03/03/2022     CMP:    Lab Results   Component Value Date     03/03/2022    K 4.3 03/03/2022    K 3.8 10/18/2021     03/03/2022    CO2 18 03/03/2022 BUN 15 03/03/2022    CREATININE 1.1 03/03/2022    GFRAA >60 03/03/2022    LABGLOM 52 03/03/2022    GLUCOSE 92 03/03/2022    GLUCOSE 88 09/24/2011    PROT 8.0 03/03/2022    LABALBU 4.6 03/03/2022    LABALBU 4.1 09/23/2011    CALCIUM 9.5 03/03/2022    BILITOT 0.3 03/03/2022    ALKPHOS 79 03/03/2022    AST 21 03/03/2022    ALT 12 03/03/2022     U/A:    Lab Results   Component Value Date    NITRITE neg 09/08/2021    COLORU Yellow 10/18/2021    PROTEINU Negative 10/18/2021    PHUR 5.5 10/18/2021    WBCUA NONE 10/18/2021    RBCUA NONE 10/18/2021    BACTERIA NONE SEEN 10/18/2021    CLARITYU Clear 10/18/2021    SPECGRAV <=1.005 10/18/2021    LEUKOCYTESUR Negative 10/18/2021    UROBILINOGEN 0.2 10/18/2021    BILIRUBINUR Negative 10/18/2021    BILIRUBINUR neg 09/08/2021    BLOODU TRACE-INTACT 10/18/2021    GLUCOSEU Negative 10/18/2021     HgBA1c:    Lab Results   Component Value Date    LABA1C 5.0 03/03/2022     FLP:    Lab Results   Component Value Date    TRIG 149 03/03/2022    HDL 91 03/03/2022    LDLCALC 110 03/03/2022    LABVLDL 30 03/03/2022     TSH:    Lab Results   Component Value Date    TSH 2.270 03/03/2022        Assessment and Plan:  Asiya Lai was seen today for annual exam and arm pain. Diagnoses and all orders for this visit:    Encounter for well adult exam with abnormal findings  Overall healthy 50 yo female. UTD on . Labs all reviewed in detail. Spinal stenosis of lumbar region with neurogenic claudication  -     carisoprodol (SOMA) 350 MG tablet; TAKE 1 TABLET BY MOUTH FOUR TIMES DAILY AS NEEDED FOR MUSCLE SPASMS  -     HYDROcodone-acetaminophen (NORCO) 7.5-325 MG per tablet; Take 1 tablet by mouth every 12 hours as needed for Pain for up to 30 days. Intended supply: 30 days  -     HYDROcodone-acetaminophen (NORCO) 7.5-325 MG per tablet; Take 1 tablet by mouth every 12 hours as needed for Pain for up to 30 days.  Intended supply: 30 days  -     HYDROcodone-acetaminophen (NORCO) 7.5-325 MG per tablet; Take 1 tablet by mouth every 12 hours as needed for Pain for up to 30 days. Intended supply: 30 days  OARRS reviewed and is appropriate. No signs of diversion or abuse    Cervical disc disease  -     HYDROcodone-acetaminophen (NORCO) 7.5-325 MG per tablet; Take 1 tablet by mouth every 12 hours as needed for Pain for up to 30 days. Intended supply: 30 days  -     HYDROcodone-acetaminophen (NORCO) 7.5-325 MG per tablet; Take 1 tablet by mouth every 12 hours as needed for Pain for up to 30 days. Intended supply: 30 days  -     HYDROcodone-acetaminophen (NORCO) 7.5-325 MG per tablet; Take 1 tablet by mouth every 12 hours as needed for Pain for up to 30 days. Intended supply: 30 days    FELA (generalized anxiety disorder)  -     ALPRAZolam (XANAX) 0.5 MG tablet; Take 1 tablet by mouth 3 times daily as needed for Anxiety for up to 90 days. Primary insomnia  -     zolpidem (AMBIEN CR) 12.5 MG extended release tablet; Take 1 tablet by mouth nightly as needed for Sleep for up to 90 days. Lateral epicondylitis of right elbow  -     Cancel: Amb External Referral To Sports Medicine  -     methylPREDNISolone acetate (DEPO-MEDROL) injection 80 mg  -     Amb External Referral To Sports Medicine  Symptoms most consistent with tennis elbow. Patient would like to see a specialist at this point. Return in about 3 months (around 6/10/2022), or if symptoms worsen or fail to improve, for Chronic pain medication refills.       Seen By:  Shannon Troy DO

## 2022-04-09 ENCOUNTER — HOSPITAL ENCOUNTER (EMERGENCY)
Age: 57
Discharge: ANOTHER HOSPITAL | End: 2022-04-10
Attending: EMERGENCY MEDICINE
Payer: COMMERCIAL

## 2022-04-09 ENCOUNTER — APPOINTMENT (OUTPATIENT)
Dept: CT IMAGING | Age: 57
End: 2022-04-09
Payer: COMMERCIAL

## 2022-04-09 DIAGNOSIS — F10.920 ACUTE ALCOHOLIC INTOXICATION WITHOUT COMPLICATION (HCC): ICD-10-CM

## 2022-04-09 DIAGNOSIS — R45.851 SUICIDAL IDEATION: Primary | ICD-10-CM

## 2022-04-09 LAB
ACETAMINOPHEN LEVEL: <5 MCG/ML (ref 10–30)
ALBUMIN SERPL-MCNC: 4.6 G/DL (ref 3.5–5.2)
ALP BLD-CCNC: 85 U/L (ref 35–104)
ALT SERPL-CCNC: 17 U/L (ref 0–32)
AMPHETAMINE SCREEN, URINE: NOT DETECTED
ANION GAP SERPL CALCULATED.3IONS-SCNC: 17 MMOL/L (ref 7–16)
AST SERPL-CCNC: 24 U/L (ref 0–31)
BARBITURATE SCREEN URINE: NOT DETECTED
BASOPHILS ABSOLUTE: 0.05 E9/L (ref 0–0.2)
BASOPHILS RELATIVE PERCENT: 0.4 % (ref 0–2)
BENZODIAZEPINE SCREEN, URINE: POSITIVE
BILIRUB SERPL-MCNC: <0.2 MG/DL (ref 0–1.2)
BUN BLDV-MCNC: 21 MG/DL (ref 6–20)
CALCIUM SERPL-MCNC: 9.2 MG/DL (ref 8.6–10.2)
CANNABINOID SCREEN URINE: NOT DETECTED
CHLORIDE BLD-SCNC: 103 MMOL/L (ref 98–107)
CO2: 18 MMOL/L (ref 22–29)
COCAINE METABOLITE SCREEN URINE: NOT DETECTED
CREAT SERPL-MCNC: 1 MG/DL (ref 0.5–1)
EOSINOPHILS ABSOLUTE: 0.06 E9/L (ref 0.05–0.5)
EOSINOPHILS RELATIVE PERCENT: 0.5 % (ref 0–6)
ETHANOL: 96 MG/DL (ref 0–0.08)
FENTANYL SCREEN, URINE: NOT DETECTED
GFR AFRICAN AMERICAN: >60
GFR NON-AFRICAN AMERICAN: 58 ML/MIN/1.73
GLUCOSE BLD-MCNC: 80 MG/DL (ref 74–99)
HCT VFR BLD CALC: 42.5 % (ref 34–48)
HEMOGLOBIN: 13.9 G/DL (ref 11.5–15.5)
IMMATURE GRANULOCYTES #: 0.06 E9/L
IMMATURE GRANULOCYTES %: 0.5 % (ref 0–5)
INFLUENZA A BY PCR: NOT DETECTED
INFLUENZA B BY PCR: NOT DETECTED
LYMPHOCYTES ABSOLUTE: 1.55 E9/L (ref 1.5–4)
LYMPHOCYTES RELATIVE PERCENT: 11.7 % (ref 20–42)
Lab: ABNORMAL
MCH RBC QN AUTO: 32 PG (ref 26–35)
MCHC RBC AUTO-ENTMCNC: 32.7 % (ref 32–34.5)
MCV RBC AUTO: 97.7 FL (ref 80–99.9)
METHADONE SCREEN, URINE: NOT DETECTED
MONOCYTES ABSOLUTE: 0.5 E9/L (ref 0.1–0.95)
MONOCYTES RELATIVE PERCENT: 3.8 % (ref 2–12)
NEUTROPHILS ABSOLUTE: 11 E9/L (ref 1.8–7.3)
NEUTROPHILS RELATIVE PERCENT: 83.1 % (ref 43–80)
OPIATE SCREEN URINE: NOT DETECTED
OXYCODONE URINE: NOT DETECTED
PDW BLD-RTO: 12.3 FL (ref 11.5–15)
PHENCYCLIDINE SCREEN URINE: NOT DETECTED
PLATELET # BLD: 296 E9/L (ref 130–450)
PMV BLD AUTO: 10.3 FL (ref 7–12)
POTASSIUM REFLEX MAGNESIUM: 3.9 MMOL/L (ref 3.5–5)
RBC # BLD: 4.35 E12/L (ref 3.5–5.5)
SALICYLATE, SERUM: 2.8 MG/DL (ref 0–30)
SARS-COV-2, NAAT: NOT DETECTED
SODIUM BLD-SCNC: 138 MMOL/L (ref 132–146)
TOTAL PROTEIN: 7.9 G/DL (ref 6.4–8.3)
TRICYCLIC ANTIDEPRESSANTS SCREEN SERUM: NEGATIVE NG/ML
WBC # BLD: 13.2 E9/L (ref 4.5–11.5)

## 2022-04-09 PROCEDURE — 6370000000 HC RX 637 (ALT 250 FOR IP): Performed by: STUDENT IN AN ORGANIZED HEALTH CARE EDUCATION/TRAINING PROGRAM

## 2022-04-09 PROCEDURE — 6360000002 HC RX W HCPCS: Performed by: STUDENT IN AN ORGANIZED HEALTH CARE EDUCATION/TRAINING PROGRAM

## 2022-04-09 PROCEDURE — 80307 DRUG TEST PRSMV CHEM ANLYZR: CPT

## 2022-04-09 PROCEDURE — 2580000003 HC RX 258: Performed by: STUDENT IN AN ORGANIZED HEALTH CARE EDUCATION/TRAINING PROGRAM

## 2022-04-09 PROCEDURE — 82077 ASSAY SPEC XCP UR&BREATH IA: CPT

## 2022-04-09 PROCEDURE — 80053 COMPREHEN METABOLIC PANEL: CPT

## 2022-04-09 PROCEDURE — 93005 ELECTROCARDIOGRAM TRACING: CPT | Performed by: STUDENT IN AN ORGANIZED HEALTH CARE EDUCATION/TRAINING PROGRAM

## 2022-04-09 PROCEDURE — 96375 TX/PRO/DX INJ NEW DRUG ADDON: CPT

## 2022-04-09 PROCEDURE — 80179 DRUG ASSAY SALICYLATE: CPT

## 2022-04-09 PROCEDURE — 70450 CT HEAD/BRAIN W/O DYE: CPT

## 2022-04-09 PROCEDURE — 85025 COMPLETE CBC W/AUTO DIFF WBC: CPT

## 2022-04-09 PROCEDURE — 87502 INFLUENZA DNA AMP PROBE: CPT

## 2022-04-09 PROCEDURE — 80143 DRUG ASSAY ACETAMINOPHEN: CPT

## 2022-04-09 PROCEDURE — 96374 THER/PROPH/DIAG INJ IV PUSH: CPT

## 2022-04-09 PROCEDURE — 87635 SARS-COV-2 COVID-19 AMP PRB: CPT

## 2022-04-09 PROCEDURE — 99285 EMERGENCY DEPT VISIT HI MDM: CPT

## 2022-04-09 RX ORDER — KETOROLAC TROMETHAMINE 30 MG/ML
15 INJECTION, SOLUTION INTRAMUSCULAR; INTRAVENOUS ONCE
Status: COMPLETED | OUTPATIENT
Start: 2022-04-09 | End: 2022-04-09

## 2022-04-09 RX ORDER — LORAZEPAM 1 MG/1
1 TABLET ORAL ONCE
Status: COMPLETED | OUTPATIENT
Start: 2022-04-09 | End: 2022-04-09

## 2022-04-09 RX ORDER — 0.9 % SODIUM CHLORIDE 0.9 %
1000 INTRAVENOUS SOLUTION INTRAVENOUS ONCE
Status: COMPLETED | OUTPATIENT
Start: 2022-04-09 | End: 2022-04-09

## 2022-04-09 RX ORDER — DIPHENHYDRAMINE HYDROCHLORIDE 50 MG/ML
25 INJECTION INTRAMUSCULAR; INTRAVENOUS ONCE
Status: COMPLETED | OUTPATIENT
Start: 2022-04-09 | End: 2022-04-10

## 2022-04-09 RX ORDER — 0.9 % SODIUM CHLORIDE 0.9 %
1000 INTRAVENOUS SOLUTION INTRAVENOUS ONCE
Status: COMPLETED | OUTPATIENT
Start: 2022-04-09 | End: 2022-04-10

## 2022-04-09 RX ADMIN — SODIUM CHLORIDE 1000 ML: 9 INJECTION, SOLUTION INTRAVENOUS at 23:54

## 2022-04-09 RX ADMIN — LORAZEPAM 1 MG: 1 TABLET ORAL at 19:37

## 2022-04-09 RX ADMIN — KETOROLAC TROMETHAMINE 15 MG: 30 INJECTION, SOLUTION INTRAMUSCULAR at 23:58

## 2022-04-09 RX ADMIN — SODIUM CHLORIDE 1000 ML: 9 INJECTION, SOLUTION INTRAVENOUS at 20:34

## 2022-04-09 ASSESSMENT — PAIN SCALES - GENERAL: PAINLEVEL_OUTOF10: 6

## 2022-04-09 NOTE — ED PROVIDER NOTES
Department of Emergency Medicine   ED Provider Note  Admit Date/RoomTime: 4/9/2022  4:26 PM  ED Room: 12/12          History of Present Illness:  4/9/22, Time: 4:33 PM EDT         Baylee Mock is a 49 y.o. female presenting to the ED for stress, pseudoseizures, beginning this morning. The complaint has been intermittent, moderate in severity, and worsened by stress. Patient states she has remote history of psychogenic nonepileptic seizures, these were previously triggered by the stress of seeing her son killed. She has been through extensive therapy, has gotten over this and has not had a sensation in many years. Recently she has been under increasing stress, with custody battles for her step kids, etc.     Last night patient states that she had suicidal thoughts, thought about drowning herself in the tub but did not go through with it. She previously has attempted suicide by cutting her wrists. She has history of panic disorder, bipolar disorder, anxiety disorder per chart review. Per chart review patient is on soma, xanax, ambien, seroquel, lamictal, topamax, norco.  Patient states that she had 2 episodes of psychogenic seizures this morning, had another episode that I witnessed. During the episode she was hyperventilating, closing her eyes tightly and crying, verbalizing, grabbing the bedsheets, and making other purposeful movements. Per EMS she received 5 mg of IM Versed. Review of Systems:     Pertinent positives and negatives are stated within HPI.   10 point ROS otherwise negative.      --------------------------------------------- PAST HISTORY ---------------------------------------------  Past Medical History:  has a past medical history of Anxiety, Bipolar disorder ([facility] [address] Cervical disc disease, Cervical spondylitis with radiculitis (HCC), Facet hypertrophy, Headache, History of rectal sphincterotomy, Lumbar disc disease, Osteoarthritis, Panic disorder without agoraphobia, PONV bilaterally  Psychiatric: Anxious, tearful      -----------------------------------------PROCEDURES----------------------------------------------------      -------------------------------------------------- RESULTS -------------------------------------------------  I have personally reviewed all laboratory and imaging results for this patient. Results are listed below.      LABS:  Results for orders placed or performed during the hospital encounter of 04/09/22   RAPID INFLUENZA A/B ANTIGENS    Specimen: Nasopharyngeal   Result Value Ref Range    Influenza A by PCR Not Detected Not Detected    Influenza B by PCR Not Detected Not Detected   COVID-19, Rapid    Specimen: Nasopharyngeal Swab   Result Value Ref Range    SARS-CoV-2, NAAT Not Detected Not Detected   CBC with Auto Differential   Result Value Ref Range    WBC 13.2 (H) 4.5 - 11.5 E9/L    RBC 4.35 3.50 - 5.50 E12/L    Hemoglobin 13.9 11.5 - 15.5 g/dL    Hematocrit 42.5 34.0 - 48.0 %    MCV 97.7 80.0 - 99.9 fL    MCH 32.0 26.0 - 35.0 pg    MCHC 32.7 32.0 - 34.5 %    RDW 12.3 11.5 - 15.0 fL    Platelets 221 464 - 151 E9/L    MPV 10.3 7.0 - 12.0 fL    Neutrophils % 83.1 (H) 43.0 - 80.0 %    Immature Granulocytes % 0.5 0.0 - 5.0 %    Lymphocytes % 11.7 (L) 20.0 - 42.0 %    Monocytes % 3.8 2.0 - 12.0 %    Eosinophils % 0.5 0.0 - 6.0 %    Basophils % 0.4 0.0 - 2.0 %    Neutrophils Absolute 11.00 (H) 1.80 - 7.30 E9/L    Immature Granulocytes # 0.06 E9/L    Lymphocytes Absolute 1.55 1.50 - 4.00 E9/L    Monocytes Absolute 0.50 0.10 - 0.95 E9/L    Eosinophils Absolute 0.06 0.05 - 0.50 E9/L    Basophils Absolute 0.05 0.00 - 0.20 E9/L   Comprehensive Metabolic Panel w/ Reflex to MG   Result Value Ref Range    Sodium 138 132 - 146 mmol/L    Potassium reflex Magnesium 3.9 3.5 - 5.0 mmol/L    Chloride 103 98 - 107 mmol/L    CO2 18 (L) 22 - 29 mmol/L    Anion Gap 17 (H) 7 - 16 mmol/L    Glucose 80 74 - 99 mg/dL    BUN 21 (H) 6 - 20 mg/dL    CREATININE 1.0 0.5 - 1.0 mg/dL GFR Non-African American 58 >=60 mL/min/1.73    GFR African American >60     Calcium 9.2 8.6 - 10.2 mg/dL    Total Protein 7.9 6.4 - 8.3 g/dL    Albumin 4.6 3.5 - 5.2 g/dL    Total Bilirubin <0.2 0.0 - 1.2 mg/dL    Alkaline Phosphatase 85 35 - 104 U/L    ALT 17 0 - 32 U/L    AST 24 0 - 31 U/L   URINE DRUG SCREEN   Result Value Ref Range    Amphetamine Screen, Urine NOT DETECTED Negative <1000 ng/mL    Barbiturate Screen, Ur NOT DETECTED Negative < 200 ng/mL    Benzodiazepine Screen, Urine POSITIVE (A) Negative < 200 ng/mL    Cannabinoid Scrn, Ur NOT DETECTED Negative < 50ng/mL    Cocaine Metabolite Screen, Urine NOT DETECTED Negative < 300 ng/mL    Opiate Scrn, Ur NOT DETECTED Negative < 300ng/mL    PCP Screen, Urine NOT DETECTED Negative < 25 ng/mL    Methadone Screen, Urine NOT DETECTED Negative <300 ng/mL    Oxycodone Urine NOT DETECTED Negative <100 ng/mL    FENTANYL SCREEN, URINE NOT DETECTED Negative <1 ng/mL    Drug Screen Comment: see below    Serum Drug Screen   Result Value Ref Range    Ethanol Lvl 96 mg/dL    Acetaminophen Level <5.0 (L) 10.0 - 92.2 mcg/mL    Salicylate, Serum 2.8 0.0 - 30.0 mg/dL    TCA Scrn NEGATIVE Cutoff:300 ng/mL   EKG 12 Lead   Result Value Ref Range    Ventricular Rate 88 BPM    Atrial Rate 88 BPM    P-R Interval 134 ms    QRS Duration 72 ms    Q-T Interval 324 ms    QTc Calculation (Bazett) 392 ms    P Axis 59 degrees    R Axis 51 degrees    T Axis -53 degrees       RADIOLOGY:  Interpreted by Radiologist unless otherwise specified  CT HEAD WO CONTRAST   Final Result   No acute intracranial abnormality. EKG:      See ED Course for EKG documentation      ------------------------- NURSING NOTES AND VITALS REVIEWED ---------------------------   The nursing notes within the ED encounter and vital signs as below have been reviewed by myself.   BP (!) 148/88   Pulse 105   Temp 97.9 °F (36.6 °C) (Axillary)   Resp 16   Ht 5' 4\" (1.626 m)   Wt 120 lb (54.4 kg)   SpO2 96% BMI 20.60 kg/m²   Oxygen Saturation Interpretation: Normal    The patients available past medical records and past encounters were reviewed. ------------------------------ ED COURSE/MEDICAL DECISION MAKING----------------------  Medications   0.9 % sodium chloride bolus (0 mLs IntraVENous Stopped 4/9/22 2104)   LORazepam (ATIVAN) tablet 1 mg (1 mg Oral Given 4/9/22 1937)            Medical Decision Making: This is a 73-year-old female with history of multiple psychiatric issues, including bipolar disorder, anxiety, PNES. Patient presenting for pseudoseizure activity, suicidal ideation. Patient had suicidal ideation last night, pink slipped here. Patient had witnessed nonepileptic event here where she was near panic attack due to talking about her suicidal ideations last night. Patient with mild alcohol intoxication here. Positive for benzodiazepines which she is prescribed, she also received benzodiazepines from EMS prior to arrival.  Patient mildly dehydrated based on lab work, given IV fluids. Patient mildly hypertensive, afebrile, lab work otherwise unremarkable other than evidence of mild dehydration. Patient with no acute ischemic changes on EKG. Covid and influenza negative. Patient medically cleared for psychiatric admission. Re-Evaluations:             ED Course as of 04/09/22 2208   Sat Apr 09, 2022   1654 Patient pink slipped for concern for suicidal risk. [RH]   8849 Informed patient that she will need psychiatric admission/evaluation, she is agreeable at this time. [RH]   6499 EKG: This EKG is signed and interpreted by me.     Rate: 88  Rhythm: Sinus  Interpretation: no acute changes and non-specific EKG (T wave inversions in lead III, nonspecific T wave flattening)  Comparison: stable as compared to patient's most recent EKG in 2021   [RH]      ED Course User Index  [RH] [address], DO         This patient's ED course included: a personal history and physicial examination, re-evaluation prior to disposition, multiple bedside re-evaluations and IV medications    This patient has remained hemodynamically stable during their ED course. Consultations:  See ED Course    Counseling: The emergency provider has spoken with the patient and discussed todays results, in addition to providing specific details for the plan of care and counseling regarding the diagnosis and prognosis. Questions are answered at this time and they are agreeable with the plan.       --------------------------------- IMPRESSION AND DISPOSITION ---------------------------------    IMPRESSION  1. Suicidal ideation    2. Acute alcoholic intoxication without complication ([facility] [address]        DISPOSITION  Disposition: [facility]  Patient condition is stable    NOTE: This report was transcribed using voice recognition software. Every effort was made to ensure accuracy; however, inadvertent computerized transcription errors may be present. Also please note that patient was seen and examined by attending physician. Plan of care and disposition discussed with attending physician and they were immediately available or present for all procedures performed.        -- Anabell Reyes D.O. PGY-2     Resident Physician     Emergency Medicine      4/9/2022 10:08 PM        [address] DO  Resident  04/09/22 8401

## 2022-04-10 VITALS
BODY MASS INDEX: 20.49 KG/M2 | OXYGEN SATURATION: 96 % | HEIGHT: 64 IN | RESPIRATION RATE: 18 BRPM | TEMPERATURE: 98.2 F | WEIGHT: 120 LBS | SYSTOLIC BLOOD PRESSURE: 179 MMHG | HEART RATE: 80 BPM | DIASTOLIC BLOOD PRESSURE: 96 MMHG

## 2022-04-10 PROBLEM — F32.A DEPRESSIVE DISORDER: Status: ACTIVE | Noted: 2022-04-10

## 2022-04-10 LAB
EKG ATRIAL RATE: 88 BPM
EKG P AXIS: 59 DEGREES
EKG P-R INTERVAL: 134 MS
EKG Q-T INTERVAL: 324 MS
EKG QRS DURATION: 72 MS
EKG QTC CALCULATION (BAZETT): 392 MS
EKG R AXIS: 51 DEGREES
EKG T AXIS: -53 DEGREES
EKG VENTRICULAR RATE: 88 BPM

## 2022-04-10 PROCEDURE — 6370000000 HC RX 637 (ALT 250 FOR IP): Performed by: EMERGENCY MEDICINE

## 2022-04-10 PROCEDURE — 6360000002 HC RX W HCPCS: Performed by: STUDENT IN AN ORGANIZED HEALTH CARE EDUCATION/TRAINING PROGRAM

## 2022-04-10 PROCEDURE — 2580000003 HC RX 258: Performed by: STUDENT IN AN ORGANIZED HEALTH CARE EDUCATION/TRAINING PROGRAM

## 2022-04-10 PROCEDURE — A4216 STERILE WATER/SALINE, 10 ML: HCPCS | Performed by: STUDENT IN AN ORGANIZED HEALTH CARE EDUCATION/TRAINING PROGRAM

## 2022-04-10 PROCEDURE — 93010 ELECTROCARDIOGRAM REPORT: CPT | Performed by: INTERNAL MEDICINE

## 2022-04-10 RX ORDER — ALPRAZOLAM 0.25 MG/1
0.5 TABLET ORAL 3 TIMES DAILY PRN
Status: DISCONTINUED | OUTPATIENT
Start: 2022-04-10 | End: 2022-04-10 | Stop reason: HOSPADM

## 2022-04-10 RX ORDER — QUETIAPINE FUMARATE 100 MG/1
100 TABLET, FILM COATED ORAL NIGHTLY
Status: DISCONTINUED | OUTPATIENT
Start: 2022-04-10 | End: 2022-04-10 | Stop reason: HOSPADM

## 2022-04-10 RX ORDER — ACETAMINOPHEN 500 MG
1000 TABLET ORAL ONCE
Status: COMPLETED | OUTPATIENT
Start: 2022-04-10 | End: 2022-04-10

## 2022-04-10 RX ORDER — PANTOPRAZOLE SODIUM 40 MG/1
40 TABLET, DELAYED RELEASE ORAL
Status: DISCONTINUED | OUTPATIENT
Start: 2022-04-10 | End: 2022-04-10 | Stop reason: HOSPADM

## 2022-04-10 RX ORDER — LAMOTRIGINE 100 MG/1
100 TABLET ORAL 2 TIMES DAILY
Status: DISCONTINUED | OUTPATIENT
Start: 2022-04-10 | End: 2022-04-10 | Stop reason: HOSPADM

## 2022-04-10 RX ORDER — TOPIRAMATE 25 MG/1
25 CAPSULE, COATED PELLETS ORAL NIGHTLY
Status: DISCONTINUED | OUTPATIENT
Start: 2022-04-10 | End: 2022-04-10 | Stop reason: HOSPADM

## 2022-04-10 RX ORDER — ACETAMINOPHEN 325 MG/1
650 TABLET ORAL ONCE
Status: DISCONTINUED | OUTPATIENT
Start: 2022-04-10 | End: 2022-04-10

## 2022-04-10 RX ADMIN — ALPRAZOLAM 0.5 MG: 0.25 TABLET ORAL at 08:24

## 2022-04-10 RX ADMIN — LAMOTRIGINE 100 MG: 100 TABLET ORAL at 09:12

## 2022-04-10 RX ADMIN — PROCHLORPERAZINE EDISYLATE 10 MG: 5 INJECTION INTRAMUSCULAR; INTRAVENOUS at 00:04

## 2022-04-10 RX ADMIN — ACETAMINOPHEN 1000 MG: 500 TABLET ORAL at 07:41

## 2022-04-10 RX ADMIN — DIPHENHYDRAMINE HYDROCHLORIDE 25 MG: 50 INJECTION, SOLUTION INTRAMUSCULAR; INTRAVENOUS at 00:04

## 2022-04-10 RX ADMIN — PANTOPRAZOLE SODIUM 40 MG: 40 TABLET, DELAYED RELEASE ORAL at 08:24

## 2022-04-10 ASSESSMENT — PAIN SCALES - GENERAL: PAINLEVEL_OUTOF10: 6
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
## 2022-04-10 NOTE — ED NOTES
Referral to [facility] at this time for inpatient psych placement. Awaiting call back.      Sung Jarvis RN  04/10/22 9538

## 2022-04-10 NOTE — ED NOTES
NELIA updated staff at [facility] ED on Pt needing to be referred to the [facility] to assist with finding placement.       BEVERLEY Davis, [place]  04/09/22 0133

## 2022-04-10 NOTE — ED NOTES
Pt resting calm and cooperative in bed. Sitter continues to be at cartside.      Mary Ladd RN  04/10/22 8966

## 2022-04-10 NOTE — ED PROVIDER NOTES
This patient was in the department awaiting placement. Her routine medications have been ordered based on her medication list and her reported medications.      Jim Mccormick, [address] [place] [address]  04/10/22 5827

## 2022-04-10 NOTE — ED NOTES
[facility] UPDATE:     [facility] - packet faxed for review  [facility] - pack faxed for review (unable to verify insurance, working to resolve)       BEVERLEY Acosta, [place]  04/10/22 6115

## 2022-04-10 NOTE — ED NOTES
Srinivasan López [address] 5[address] 2558-[address]  [phone]  [facility] ETA [phone]     [facility]  04/10/22 1251

## 2022-04-11 PROBLEM — F33.2 SEVERE RECURRENT MAJOR DEPRESSION WITHOUT PSYCHOTIC FEATURES (HCC): Status: ACTIVE | Noted: 2022-04-11

## 2022-04-21 ENCOUNTER — OFFICE VISIT (OUTPATIENT)
Dept: PRIMARY CARE CLINIC | Age: 57
End: 2022-04-21
Payer: COMMERCIAL

## 2022-04-21 VITALS
HEIGHT: 64 IN | OXYGEN SATURATION: 97 % | BODY MASS INDEX: 21.68 KG/M2 | SYSTOLIC BLOOD PRESSURE: 154 MMHG | TEMPERATURE: 97 F | HEART RATE: 77 BPM | DIASTOLIC BLOOD PRESSURE: 94 MMHG | WEIGHT: 127 LBS

## 2022-04-21 DIAGNOSIS — Z78.0 POSTMENOPAUSAL: ICD-10-CM

## 2022-04-21 DIAGNOSIS — I10 PRIMARY HYPERTENSION: ICD-10-CM

## 2022-04-21 DIAGNOSIS — Z09 HOSPITAL DISCHARGE FOLLOW-UP: ICD-10-CM

## 2022-04-21 DIAGNOSIS — F31.9 BIPOLAR I DISORDER (HCC): ICD-10-CM

## 2022-04-21 DIAGNOSIS — R56.9 CONVULSIONS, UNSPECIFIED CONVULSION TYPE (HCC): Primary | ICD-10-CM

## 2022-04-21 PROCEDURE — 99214 OFFICE O/P EST MOD 30 MIN: CPT | Performed by: FAMILY MEDICINE

## 2022-04-21 PROCEDURE — 1111F DSCHRG MED/CURRENT MED MERGE: CPT | Performed by: FAMILY MEDICINE

## 2022-04-21 RX ORDER — AMLODIPINE BESYLATE 5 MG/1
5 TABLET ORAL DAILY
Qty: 90 TABLET | Refills: 1 | Status: SHIPPED
Start: 2022-04-21 | End: 2022-09-02

## 2022-04-21 RX ORDER — ESTRADIOL 0.1 MG/D
FILM, EXTENDED RELEASE TRANSDERMAL
Qty: 24 PATCH | Refills: 5 | Status: SHIPPED
Start: 2022-04-21 | End: 2022-08-08

## 2022-04-21 ASSESSMENT — ENCOUNTER SYMPTOMS
WHEEZING: 0
ABDOMINAL PAIN: 0
VOMITING: 0
BACK PAIN: 1
DIARRHEA: 0
COUGH: 0
CONSTIPATION: 0
SHORTNESS OF BREATH: 0
NAUSEA: 0

## 2022-04-21 NOTE — PROGRESS NOTES
Post-Discharge Transitional Care Follow Up      Justo Payment   YOB: 1965    Date of Office Visit:  4/21/2022  Date of Hospital Admission: 4/9/22  Date of Hospital Discharge: 4/10/22  Readmission Risk Score (high >=14%. Medium >=10%):No data recorded    Care management risk score Rising risk (score 2-5) and Complex Care (Scores >=6): 1     Non face to face  following discharge, date last encounter closed (first attempt may have been earlier): *No documented post hospital discharge outreach found in the last 14 days     Call initiated 2 business days of discharge: *No response recorded in the last 14 days     Convulsions, unspecified convulsion type [facility])  -     [address], CNP, Neurology, [place]  Truly unclear if PNES or actual seizure activity. Will get consult from Neuro for help between the two. Suspect a large part of symptoms are psychiatric in nature and patient is actively working with a counselor. Once neurologic work up is completed, will need psychiatrist to help determine appropriate medications the patient should be on for BPD. Patient understands that medications can interact. Hospital discharge follow-up  -     [address] [place] [address] documentation, labs, imaging studies all reviewed    Bipolar I disorder ([facility] [address]  Having more zaid than usual per . Needs psychiatry to help with medication adjustment. Working with counselor currently. Primary hypertension  -     amLODIPine (NORVASC) 5 MG tablet; Take 1 tablet by mouth daily, Disp-90 tablet, R-1Normal  Has been poorly controlled in recent visits. Will try Amlodipine first to get levels down. Postmenopausal  -     estradiol (VIVELLE) 0.1 MG/24HR; APPLY 1 PATCH EXTERNALLY TO THE SKIN 2 TIMES A WEEK, Disp-24 patch, R-5Normal  Needing refills today.       Medical Decision Making: moderate complexity  Return in about 6 weeks (around 6/2/2022), or if symptoms worsen or fail to improve, for Recheck. On this date 4/21/2022 I have spent 45 minutes reviewing previous notes, test results and face to face with the patient discussing the diagnosis and importance of compliance with the treatment plan as well as documenting on the day of the visit. Subjective:     Inpatient course: Discharge summary reviewed- see chart. Interval history/Current status: 48 yo female presents for hospital discharge follow up. Notes that this time of year is always difficult due to the death of her son 10 years ago. Additionally, dealing with custody issues of her step daughter. Patient has a history of psuedoseizures, but states that she had an episode of intense convulsions for \"over 10 minutes\" per her . She thinks episode was due to increased stress. She called 911 and was given an injection of \"something\" and then taken to the ER. Developed severe migraine and was given a migraine cocktail. Notes that all of the medications between EMT and ER caused her to be very groggy and could not remember much of the episode. States that she had a virtual visit with someone in the ER who asked her questions about depression, anxiety and suicidal thoughts and she states she told them the truth about the past- no present feelings. She was thus pink slipped. She had a CT head that was normal and basic labs, but was transferred to psych unit in [place]. No work up for seizures done. She was discharged once they discussed in detail with her. Patient still needing further investigation in the seizure episode. She has started back with a counselor for BPD and grief.       Patient Active Problem List   Diagnosis    [facility]ulsions[facility]seizures ([facility] [address]    Bipolar I disorder ([facility] [address]    Panic disorder without agoraphobia    Spinal stenosis of lumbar region with neurogenic claudication    Cervical disc disease    FELA (generalized anxiety disorder)       Medications listed as ordered at the time of discharge from hospital     Medication List          Accurate as of April 21, 2022  5:45 PM. If you have any questions, ask your nurse or doctor. START taking these medications    amLODIPine 5 MG tablet  Commonly known as: NORVASC  Take 1 tablet by mouth daily  Started by: Sylvester Galeana, DO        CONTINUE taking these medications    ALPRAZolam 0.5 MG tablet  Commonly known as: XANAX  Take 1 tablet by mouth 3 times daily as needed for Anxiety for up to 90 days. carisoprodol 350 MG tablet  Commonly known as: SOMA  TAKE 1 TABLET BY MOUTH FOUR TIMES DAILY AS NEEDED FOR MUSCLE SPASMS     COLLAGEN PO     estradiol 0.1 MG/24HR  Commonly known as: VIVELLE  APPLY 1 PATCH EXTERNALLY TO THE SKIN 2 TIMES A WEEK     HYDROcodone-acetaminophen 7.5-325 MG per tablet  Commonly known as: Norco  Take 1 tablet by mouth every 12 hours as needed for Pain for up to 30 days. Intended supply: 30 days     lamoTRIgine 100 MG tablet  Commonly known as: LAMICTAL  TAKE 1 TABLET BY MOUTH TWICE DAILY     omeprazole 40 MG delayed release capsule  Commonly known as: PRILOSEC  TAKE 1 CAPSULE BY MOUTH TWICE DAILY BEFORE MEALS     ondansetron 4 MG disintegrating tablet  Commonly known as: Zofran ODT  Take 1 tablet by mouth every 8 hours as needed for Nausea or Vomiting     QUEtiapine 100 MG tablet  Commonly known as: SEROQUEL  TAKE 1 TABLET BY MOUTH TWICE DAILY     sertraline 50 MG tablet  Commonly known as: ZOLOFT  TAKE 1 TABLET BY MOUTH EVERY NIGHT     topiramate 25 MG tablet  Commonly known as: TOPAMAX  TAKE 2 TABLETS BY MOUTH EVERY NIGHT     Vitamin D3 125 MCG (5000 UT) Tabs     zolpidem 12.5 MG extended release tablet  Commonly known as: AMBIEN CR  Take 1 tablet by mouth nightly as needed for Sleep for up to 90 days.            Where to Get Your Medications      These medications were sent to [facility] [address] [facility] [phone] - F [phone]  [address] [place] [address] [facility] [address]    Phone: [phone]   · amLODIPine 5 MG tablet  · estradiol 0.1 MG/24HR          Medications marked \"taking\" at this time  Outpatient Medications Marked as Taking for the 4/21/22 encounter (Office Visit) with Hilaria Cabezas, DO   Medication Sig Dispense Refill    estradiol (VIVELLE) 0.1 MG/24HR APPLY 1 PATCH EXTERNALLY TO THE SKIN 2 TIMES A WEEK 24 patch 5    amLODIPine (NORVASC) 5 MG tablet Take 1 tablet by mouth daily 90 tablet 1    zolpidem (AMBIEN CR) 12.5 MG extended release tablet Take 1 tablet by mouth nightly as needed for Sleep for up to 90 days. 90 tablet 1    ALPRAZolam (XANAX) 0.5 MG tablet Take 1 tablet by mouth 3 times daily as needed for Anxiety for up to 90 days. 270 tablet 1    carisoprodol (SOMA) 350 MG tablet TAKE 1 TABLET BY MOUTH FOUR TIMES DAILY AS NEEDED FOR MUSCLE SPASMS 360 tablet 1    HYDROcodone-acetaminophen (NORCO) 7.5-325 MG per tablet Take 1 tablet by mouth every 12 hours as needed for Pain for up to 30 days. Intended supply: 30 days 60 tablet 0    omeprazole (PRILOSEC) 40 MG delayed release capsule TAKE 1 CAPSULE BY MOUTH TWICE DAILY BEFORE MEALS 180 capsule 1    QUEtiapine (SEROQUEL) 100 MG tablet TAKE 1 TABLET BY MOUTH TWICE DAILY 180 tablet 1    sertraline (ZOLOFT) 50 MG tablet TAKE 1 TABLET BY MOUTH EVERY NIGHT 90 tablet 1    lamoTRIgine (LAMICTAL) 100 MG tablet TAKE 1 TABLET BY MOUTH TWICE DAILY 180 tablet 1    topiramate (TOPAMAX) 25 MG tablet TAKE 2 TABLETS BY MOUTH EVERY NIGHT 180 tablet 1    ondansetron (ZOFRAN ODT) 4 MG disintegrating tablet Take 1 tablet by mouth every 8 hours as needed for Nausea or Vomiting 10 tablet 0    COLLAGEN PO Take by mouth daily       Cholecalciferol (VITAMIN D3) 125 MCG (5000 UT) TABS Take by mouth          Medications patient taking as of now reconciled against medications ordered at time of hospital discharge: Yes    Review of Systems   Constitutional: Negative for chills, fatigue and fever.    Respiratory: Negative for cough, shortness of breath and wheezing. Cardiovascular: Negative for chest pain and palpitations. Gastrointestinal: Negative for abdominal pain, constipation, diarrhea, nausea and vomiting. Musculoskeletal: Positive for arthralgias, back pain and gait problem. Skin: Negative for rash. Neurological: Positive for seizures. Negative for dizziness and headaches. Psychiatric/Behavioral: Positive for dysphoric mood. Negative for suicidal ideas. The patient is nervous/anxious and is hyperactive. All other systems reviewed and are negative. Objective:    BP (!) 154/94   Pulse 77   Temp 97 °F (36.1 °C)   Ht 5' 4\" (1.626 m)   Wt 127 lb (57.6 kg)   SpO2 97%   BMI 21.80 kg/m²   Physical Exam  Vitals and nursing note reviewed. Constitutional:       General: She is not in acute distress. Appearance: Normal appearance. She is well-developed and normal weight. She is not ill-appearing. HENT:      Head: Normocephalic and atraumatic. Right Ear: Hearing and external ear normal.      Left Ear: Hearing and external ear normal.      Nose:      Comments: Wearing mask  Eyes:      General: Lids are normal. No scleral icterus. Extraocular Movements: Extraocular movements intact. Conjunctiva/sclera: Conjunctivae normal.   Neck:      Thyroid: No thyromegaly. Vascular: No carotid bruit. Cardiovascular:      Rate and Rhythm: Normal rate and regular rhythm. Heart sounds: Normal heart sounds. No murmur heard. Pulmonary:      Effort: Pulmonary effort is normal. No respiratory distress. Breath sounds: Normal breath sounds. No wheezing. Musculoskeletal:         General: No tenderness. Normal range of motion. Right hand: Deformity (thickening of palmar aponeurosis at 4-5th MCP) present. Left hand: Deformity (thickening of palmar aponeurosis at 4-5th MCP) present. Cervical back: Normal range of motion and neck supple. Right lower leg: No edema. Left lower leg: No edema. Lymphadenopathy:      Cervical: No cervical adenopathy. Skin:     General: Skin is warm and dry. Findings: No rash. Neurological:      General: No focal deficit present. Mental Status: She is alert and oriented to person, place, and time. Gait: Gait normal.   Psychiatric:         Mood and Affect: Mood and affect normal.         Speech: Speech normal.         Behavior: Behavior normal.         Thought Content: Thought content normal.         An electronic signature was used to authenticate this note.   --Sylvester Galeana, DO

## 2022-04-26 ENCOUNTER — TELEPHONE (OUTPATIENT)
Dept: GASTROENTEROLOGY | Age: 57
End: 2022-04-26

## 2022-04-26 NOTE — TELEPHONE ENCOUNTER
Spoke to patient about the change of appointment on 5/25 with Dr Fatoumata Hernandez to 5/24 with NP

## 2022-06-05 DIAGNOSIS — F41.0 PANIC DISORDER WITHOUT AGORAPHOBIA: ICD-10-CM

## 2022-06-05 DIAGNOSIS — R56.9 CONVULSIONS, UNSPECIFIED CONVULSION TYPE (HCC): Chronic | ICD-10-CM

## 2022-06-05 DIAGNOSIS — F31.9 BIPOLAR I DISORDER (HCC): ICD-10-CM

## 2022-06-06 RX ORDER — LAMOTRIGINE 100 MG/1
TABLET ORAL
Qty: 180 TABLET | Refills: 1 | Status: SHIPPED | OUTPATIENT
Start: 2022-06-06

## 2022-06-06 RX ORDER — TOPIRAMATE 25 MG/1
TABLET ORAL
Qty: 180 TABLET | Refills: 1 | Status: SHIPPED
Start: 2022-06-06 | End: 2022-09-06

## 2022-06-06 RX ORDER — QUETIAPINE FUMARATE 100 MG/1
TABLET, FILM COATED ORAL
Qty: 180 TABLET | Refills: 1 | Status: SHIPPED | OUTPATIENT
Start: 2022-06-06

## 2022-06-09 ENCOUNTER — OFFICE VISIT (OUTPATIENT)
Dept: PRIMARY CARE CLINIC | Age: 57
End: 2022-06-09
Payer: COMMERCIAL

## 2022-06-09 VITALS
BODY MASS INDEX: 22.02 KG/M2 | TEMPERATURE: 98.2 F | DIASTOLIC BLOOD PRESSURE: 74 MMHG | SYSTOLIC BLOOD PRESSURE: 122 MMHG | HEIGHT: 64 IN | OXYGEN SATURATION: 98 % | HEART RATE: 80 BPM | WEIGHT: 129 LBS

## 2022-06-09 DIAGNOSIS — F41.0 PANIC DISORDER WITHOUT AGORAPHOBIA: ICD-10-CM

## 2022-06-09 DIAGNOSIS — I10 PRIMARY HYPERTENSION: Primary | ICD-10-CM

## 2022-06-09 DIAGNOSIS — M50.90 CERVICAL DISC DISEASE: ICD-10-CM

## 2022-06-09 DIAGNOSIS — M48.062 SPINAL STENOSIS OF LUMBAR REGION WITH NEUROGENIC CLAUDICATION: ICD-10-CM

## 2022-06-09 DIAGNOSIS — F31.9 BIPOLAR I DISORDER (HCC): ICD-10-CM

## 2022-06-09 DIAGNOSIS — F51.01 PRIMARY INSOMNIA: ICD-10-CM

## 2022-06-09 DIAGNOSIS — R56.9 CONVULSIONS, UNSPECIFIED CONVULSION TYPE (HCC): ICD-10-CM

## 2022-06-09 PROCEDURE — 99214 OFFICE O/P EST MOD 30 MIN: CPT | Performed by: FAMILY MEDICINE

## 2022-06-09 RX ORDER — HYDROCODONE BITARTRATE AND ACETAMINOPHEN 7.5; 325 MG/1; MG/1
TABLET ORAL
COMMUNITY
Start: 2022-05-10 | End: 2022-10-17

## 2022-06-09 RX ORDER — ZOLPIDEM TARTRATE 12.5 MG/1
12.5 TABLET, FILM COATED, EXTENDED RELEASE ORAL NIGHTLY PRN
Qty: 90 TABLET | Refills: 1 | Status: SHIPPED
Start: 2022-06-09 | End: 2022-09-02 | Stop reason: SDUPTHER

## 2022-06-09 RX ORDER — HYDROCODONE BITARTRATE AND ACETAMINOPHEN 7.5; 325 MG/1; MG/1
1 TABLET ORAL EVERY 12 HOURS PRN
Qty: 60 TABLET | Refills: 0 | Status: SHIPPED
Start: 2022-08-04 | End: 2022-08-22 | Stop reason: SDUPTHER

## 2022-06-09 RX ORDER — ALPRAZOLAM 0.5 MG/1
0.5 TABLET ORAL 3 TIMES DAILY PRN
COMMUNITY
End: 2022-09-02 | Stop reason: SDUPTHER

## 2022-06-09 RX ORDER — HYDROCODONE BITARTRATE AND ACETAMINOPHEN 7.5; 325 MG/1; MG/1
1 TABLET ORAL EVERY 12 HOURS PRN
Qty: 60 TABLET | Refills: 0 | Status: SHIPPED | OUTPATIENT
Start: 2022-07-07 | End: 2022-08-06

## 2022-06-09 RX ORDER — HYDROCODONE BITARTRATE AND ACETAMINOPHEN 7.5; 325 MG/1; MG/1
1 TABLET ORAL EVERY 12 HOURS PRN
Qty: 60 TABLET | Refills: 0 | Status: SHIPPED | OUTPATIENT
Start: 2022-06-09 | End: 2022-07-09

## 2022-06-09 ASSESSMENT — ENCOUNTER SYMPTOMS
VOMITING: 0
BACK PAIN: 1
SHORTNESS OF BREATH: 0
NAUSEA: 0
COUGH: 0
CONSTIPATION: 0
DIARRHEA: 0
WHEEZING: 0
ABDOMINAL PAIN: 0

## 2022-06-09 NOTE — PROGRESS NOTES
22  Keaton Sanchez : 1965 Sex: female  Age: 49 y.o. Chief Complaint   Patient presents with    Medication Refill     HPI:  49 y.o. female patient presents today for 3 month(s) follow up for chronic medical conditions, medication refills and FBW. Patient's chart, medical, surgical and medication history all reviewed. Hypertension   The patient presents today for follow up of HTN. The problem is improved. Risk factors for coronary artery disease include Age > 30 and HTN. Current treatments include amlodipine (Norvasc). The patient is compliant all of the time. Lifestyle changes the patient has made include weight management. Today the patient is complaining of none. Chronic pain  Patient has long standing history of spinal stenosis. She has a history of cervical spine DDD after accident while cheerleading. Previously had multi-level fusion of cervical spine- now having worsening pain in upper thoracics. She takes Norco and Soma appropriately. Current dosing is effective for patient. Patient has been doing well overall. Pain is stable currently. Anxiety  Patient complains of evaluation of anxiety disorder and post traumatic stress  disorder. She has the following anxiety symptoms: difficulty concentrating, feelings of losing control, irritable, racing thoughts. Onset of symptoms was approximately several years ago, stable since that time. She denies current suicidal and homicidal ideation. Previous treatment includes Ativan and no therapy. She complains of the following side effects from the treatment: none. Patient notes that April is always difficult due to the death of her son 10 years ago. Additionally, dealing with custody issues of her step daughter. Patient has a history of psuedoseizures, but states that she had an episode of intense convulsions for \"over 10 minutes\" per her  in early April. She thinks episode was due to increased stress.   She called 911 and was given an injection of \"something\" and then taken to the ER. Developed severe migraine and was given a migraine cocktail. Notes that all of the medications between EMT and ER caused her to be very groggy and could not remember much of the episode. States that she had a virtual visit with someone in the ER who asked her questions about depression, anxiety and suicidal thoughts and she states she told them the truth about the past- no present feelings. She was thus pink slipped. She had a CT head that was normal and basic labs, but was transferred to psych unit in [place]. No work up for seizures done. She was discharged once they discussed in detail with her. No repeat episodes since that time. Has appointment with Neuro in 2 weeks. ROS:  Review of Systems   Constitutional: Negative for chills, fatigue and fever. Respiratory: Negative for cough, shortness of breath and wheezing. Cardiovascular: Negative for chest pain and palpitations. Gastrointestinal: Negative for abdominal pain, constipation, diarrhea, nausea and vomiting. Musculoskeletal: Positive for arthralgias, back pain and gait problem. Skin: Negative for rash. Neurological: Negative for dizziness and headaches. Psychiatric/Behavioral: Positive for sleep disturbance. Negative for dysphoric mood. The patient is nervous/anxious. All other systems reviewed and are negative. Current Outpatient Medications on File Prior to Visit   Medication Sig Dispense Refill    HYDROcodone-acetaminophen (NORCO) 7.5-325 MG per tablet TAKE 1 TABLET BY MOUTH EVERY 12 HOURS AS NEEDED FOR PAIN FOR UP TO 30 DAYS      ALPRAZolam (XANAX) 0.5 MG tablet Take 0.5 mg by mouth 3 times daily as needed for Sleep.        lamoTRIgine (LAMICTAL) 100 MG tablet TAKE 1 TABLET BY MOUTH TWICE DAILY 180 tablet 1    sertraline (ZOLOFT) 50 MG tablet TAKE 1 TABLET BY MOUTH EVERY NIGHT 90 tablet 1    topiramate (TOPAMAX) 25 MG tablet TAKE 2 TABLETS BY MOUTH EVERY NIGHT 180 tablet 1    QUEtiapine (SEROQUEL) 100 MG tablet TAKE 1 TABLET BY MOUTH TWICE DAILY 180 tablet 1    estradiol (VIVELLE) 0.1 MG/24HR APPLY 1 PATCH EXTERNALLY TO THE SKIN 2 TIMES A WEEK 24 patch 5    amLODIPine (NORVASC) 5 MG tablet Take 1 tablet by mouth daily 90 tablet 1    carisoprodol (SOMA) 350 MG tablet TAKE 1 TABLET BY MOUTH FOUR TIMES DAILY AS NEEDED FOR MUSCLE SPASMS 360 tablet 1    omeprazole (PRILOSEC) 40 MG delayed release capsule TAKE 1 CAPSULE BY MOUTH TWICE DAILY BEFORE MEALS 180 capsule 1    ondansetron (ZOFRAN ODT) 4 MG disintegrating tablet Take 1 tablet by mouth every 8 hours as needed for Nausea or Vomiting 10 tablet 0    COLLAGEN PO Take by mouth daily       Cholecalciferol (VITAMIN D3) 125 MCG (5000 UT) TABS Take by mouth       No current facility-administered medications on file prior to visit.        Allergies   Allergen Reactions    Bee Venom Anaphylaxis     Wasps    Fish-Derived Products Anaphylaxis and Swelling     Swordfish allergy    Hornet Venom Anaphylaxis    Iodine        Past Medical History:   Diagnosis Date    Anxiety     Bipolar disorder ([facility]     Bipolar 1 disorder    Cervical disc disease     Cervical spondylitis with radiculitis (HCC)     C2, C7    Facet hypertrophy     L4/5 - S1    Headache     Migraine    History of rectal sphincterotomy     Lumbar disc disease     Osteoarthritis     DJD/DDD C-Spine S/P Fusion CHEERLEADER DROPPED ON HEAD/NECK    Panic disorder without agoraphobia     Counselor Destiny Marroquin. [place]    PONV (postoperative nausea and vomiting)     Seizures ([facility]     NONE SINCE 2018 Psuedo     Past Surgical History:   Procedure Laterality Date    ABDOMEN SURGERY N/A 4/19/2021    EXCISION LIPOMA ABDOMEN performed by Familia Walters MD at [facility] OR    APPENDECTOMY      BREAST REDUCTION SURGERY Bilateral     Implants Bilaterally    BREAST SURGERY      CERVICAL FUSION      c2-c7 Bone Kevin Grafting 2-6    COLONOSCOPY N/A 3/3/2021    COLORECTAL CANCER SCREENING, HIGH RISK performed by Hellen Betancourt MD at [address] COLONOSCOPY N/A 2021    COLONOSCOPY WITH BIOPSY performed by Sherly Epstein MD at [address] ([facility])      LAPAROSCOPY      X4    LAPAROTOMY      X5 Ovarian Cysts, ZACARIAS-BSO (Non Cancerous)    OTHER SURGICAL HISTORY      Cervical Epidurals    OTHER SURGICAL HISTORY      Lumbar Epidural - [address] 17, 17, 17    UPPER GASTROINTESTINAL ENDOSCOPY N/A 2021    EGD BIOPSY performed by Sherly Epstein MD at [facility] ENDOSCOPY     Family History   Problem Relation Age of Onset    Diabetes Mother    Lien Serrano Other Mother         End Stage Cirrhosis 2017    Cancer Father         Prostate - Candice Maynard     Social History     Socioeconomic History    Marital status:      Spouse name: Not on file    Number of children: Not on file    Years of education: Not on file    Highest education level: Not on file   Occupational History    Not on file   Tobacco Use    Smoking status: Former Smoker     Packs/day: 0.25     Years: 1.00     Pack years: 0.25     Quit date:      Years since quittin.4    Smokeless tobacco: Never Used   Vaping Use    Vaping Use: Never used   Substance and Sexual Activity    Alcohol use: No    Drug use: No    Sexual activity: Not on file   Other Topics Concern    Not on file   Social History Narrative    Not on file     Social Determinants of Health     Financial Resource Strain:     Difficulty of Paying Living Expenses: Not on file   Food Insecurity:     Worried About [address] in the Last Year: Not on file    Chavo of Food in the Last Year: Not on file   Transportation Needs:     Lack of Transportation (Medical): Not on file    Lack of Transportation (Non-Medical):  Not on file   Physical Activity:     Days of Exercise per Week: Not on file    Minutes of Exercise per Session: Not on file   Stress:     Feeling of Stress : Not on file   Social Connections:     Frequency of Communication with Friends and Family: Not on file    Frequency of Social Gatherings with Friends and Family: Not on file    Attends Evangelical Services: Not on file    Active Member of Clubs or Organizations: Not on file    Attends Club or Organization Meetings: Not on file    Marital Status: Not on file   Intimate Partner Violence:     Fear of Current or Ex-Partner: Not on file    Emotionally Abused: Not on file    Physically Abused: Not on file    Sexually Abused: Not on file   Housing Stability:     Unable to Pay for Housing in the Last Year: Not on file    Number of [place] in the Last Year: Not on file    Unstable Housing in the Last Year: Not on file       Vitals:    06/09/22 1034   BP: 122/74   Pulse: 80   Temp: 98.2 °F (36.8 °C)   SpO2: 98%   Weight: 129 lb (58.5 kg)   Height: 5' 4\" (1.626 m)       Physical Exam:  Physical Exam  Vitals and nursing note reviewed. Constitutional:       General: She is not in acute distress. Appearance: Normal appearance. She is well-developed and normal weight. She is not ill-appearing. HENT:      Head: Normocephalic and atraumatic. Right Ear: Hearing and external ear normal.      Left Ear: Hearing and external ear normal.      Nose:      Comments: Wearing mask  Eyes:      General: Lids are normal. No scleral icterus. Extraocular Movements: Extraocular movements intact. Conjunctiva/sclera: Conjunctivae normal.   Neck:      Thyroid: No thyromegaly. Vascular: No carotid bruit. Cardiovascular:      Rate and Rhythm: Normal rate and regular rhythm. Heart sounds: Normal heart sounds. No murmur heard. Pulmonary:      Effort: Pulmonary effort is normal. No respiratory distress. Breath sounds: Normal breath sounds. No wheezing. Musculoskeletal:         General: No tenderness. Normal range of motion.       Right hand: Deformity (thickening of palmar aponeurosis at 4-5th MCP) present. Left hand: Deformity (thickening of palmar aponeurosis at 4-5th MCP) present. Cervical back: Normal range of motion and neck supple. Right lower leg: No edema. Left lower leg: No edema. Comments: R wrist in brace, R elbow wrapped in [place]   Lymphadenopathy:      Cervical: No cervical adenopathy. Skin:     General: Skin is warm and dry. Findings: No rash. Neurological:      General: No focal deficit present. Mental Status: She is alert and oriented to person, place, and time. Gait: Gait normal.   Psychiatric:         Mood and Affect: Mood and affect normal.         Speech: Speech normal.         Behavior: Behavior normal.         Thought Content:  Thought content normal.         Labs:  CBC with Differential:    Lab Results   Component Value Date    WBC 13.2 04/09/2022    RBC 4.35 04/09/2022    HGB 13.9 04/09/2022    HCT 42.5 04/09/2022     04/09/2022    MCV 97.7 04/09/2022    MCH 32.0 04/09/2022    MCHC 32.7 04/09/2022    RDW 12.3 04/09/2022    SEGSPCT 53 09/24/2011    LYMPHOPCT 11.7 04/09/2022    MONOPCT 3.8 04/09/2022    BASOPCT 0.4 04/09/2022    MONOSABS 0.50 04/09/2022    LYMPHSABS 1.55 04/09/2022    EOSABS 0.06 04/09/2022    BASOSABS 0.05 04/09/2022     CMP:    Lab Results   Component Value Date     04/09/2022    K 3.9 04/09/2022     04/09/2022    CO2 18 04/09/2022    BUN 21 04/09/2022    CREATININE 1.0 04/09/2022    GFRAA >60 04/09/2022    LABGLOM 58 04/09/2022    GLUCOSE 80 04/09/2022    GLUCOSE 88 09/24/2011    PROT 7.9 04/09/2022    LABALBU 4.6 04/09/2022    LABALBU 4.1 09/23/2011    CALCIUM 9.2 04/09/2022    BILITOT <0.2 04/09/2022    ALKPHOS 85 04/09/2022    AST 24 04/09/2022    ALT 17 04/09/2022     HgBA1c:    Lab Results   Component Value Date    LABA1C 5.0 03/03/2022     FLP:    Lab Results   Component Value Date    TRIG 149 03/03/2022    HDL 91 03/03/2022    LDLCALC 110 03/03/2022    LABVLDL 30 03/03/2022 TSH:    Lab Results   Component Value Date    TSH 2.270 03/03/2022         Assessment and Plan:  Luan Vega was seen today for medication refill. Diagnoses and all orders for this visit:    Primary hypertension  Improved control with Amlodipine. Continue the same at this time. No side effects. Spinal stenosis of lumbar region with neurogenic claudication  -     HYDROcodone-acetaminophen (NORCO) 7.5-325 MG per tablet; Take 1 tablet by mouth every 12 hours as needed for Pain for up to 30 days. Intended supply: 30 days  -     HYDROcodone-acetaminophen (NORCO) 7.5-325 MG per tablet; Take 1 tablet by mouth every 12 hours as needed for Pain for up to 30 days. Intended supply: 30 days  -     HYDROcodone-acetaminophen (NORCO) 7.5-325 MG per tablet; Take 1 tablet by mouth every 12 hours as needed for Pain for up to 30 days. Intended supply: 30 days  OARRS reviewed and is appropriate    Cervical disc disease  -     HYDROcodone-acetaminophen (NORCO) 7.5-325 MG per tablet; Take 1 tablet by mouth every 12 hours as needed for Pain for up to 30 days. Intended supply: 30 days  -     HYDROcodone-acetaminophen (NORCO) 7.5-325 MG per tablet; Take 1 tablet by mouth every 12 hours as needed for Pain for up to 30 days. Intended supply: 30 days  -     HYDROcodone-acetaminophen (NORCO) 7.5-325 MG per tablet; Take 1 tablet by mouth every 12 hours as needed for Pain for up to 30 days. Intended supply: 30 days    Convulsions, unspecified convulsion type ([facility]  Stable. Seeing Neuro in 2 weeks    Bipolar I disorder ([facility]  Following with counselor weekly    Panic disorder without agoraphobia    Primary insomnia  -     zolpidem (AMBIEN CR) 12.5 MG extended release tablet; Take 1 tablet by mouth nightly as needed for Sleep for up to 90 days. Return in about 3 months (around 9/9/2022), or if symptoms worsen or fail to improve, for Chronic medical conditions.       Seen By:  Teddy Mccloud DO

## 2022-06-16 ENCOUNTER — PATIENT MESSAGE (OUTPATIENT)
Dept: PRIMARY CARE CLINIC | Age: 57
End: 2022-06-16

## 2022-07-28 ENCOUNTER — OFFICE VISIT (OUTPATIENT)
Dept: PRIMARY CARE CLINIC | Age: 57
End: 2022-07-28
Payer: COMMERCIAL

## 2022-07-28 VITALS
OXYGEN SATURATION: 99 % | HEART RATE: 67 BPM | BODY MASS INDEX: 22.2 KG/M2 | HEIGHT: 64 IN | SYSTOLIC BLOOD PRESSURE: 134 MMHG | WEIGHT: 130 LBS | TEMPERATURE: 97.7 F | DIASTOLIC BLOOD PRESSURE: 80 MMHG

## 2022-07-28 DIAGNOSIS — M54.12 CERVICAL RADICULOPATHY: ICD-10-CM

## 2022-07-28 DIAGNOSIS — F31.9 BIPOLAR I DISORDER (HCC): Primary | ICD-10-CM

## 2022-07-28 DIAGNOSIS — M50.90 CERVICAL DISC DISEASE: ICD-10-CM

## 2022-07-28 DIAGNOSIS — R56.9 CONVULSIONS, UNSPECIFIED CONVULSION TYPE (HCC): Chronic | ICD-10-CM

## 2022-07-28 PROCEDURE — 99214 OFFICE O/P EST MOD 30 MIN: CPT | Performed by: FAMILY MEDICINE

## 2022-07-28 ASSESSMENT — ENCOUNTER SYMPTOMS
VOMITING: 0
SHORTNESS OF BREATH: 0
WHEEZING: 0
BACK PAIN: 1
ABDOMINAL PAIN: 0
NAUSEA: 0
COUGH: 0
DIARRHEA: 0
CONSTIPATION: 0

## 2022-07-28 NOTE — PROGRESS NOTES
22  Jt Weaver : 1965 Sex: female  Age: 56 y.o. Chief Complaint   Patient presents with    Depression     Started about 2 days ago      HPI:  56 y.o. female patient presents today for 1 month(s) follow up for chronic medical conditions. Patient's chart, medical, surgical and medication history all reviewed. Bipolar  Patient complains of evaluation of anxiety disorder and post traumatic stress  disorder. She has the following anxiety symptoms: difficulty concentrating, feelings of losing control, irritable, racing thoughts. Onset of symptoms was approximately several years ago, rapidly worsening since that time. She denies current suicidal and homicidal ideation. Previous treatment includes Ativan, Zoloft, and Lamictal/Seroquel  and individual therapy. She complains of the following side effects from the treatment: none. Patient notes that April is always difficult due to the death of her son 10 years ago. Patient has a history of psuedoseizures, but states that she had an episode of intense convulsions for \"over 10 minutes\" per her  in early 2022. She thinks episode was due to increased stress. She called 911 and was given an injection of \"something\" and then taken to the ER. Developed severe migraine and was given a migraine cocktail. Notes that all of the medications between EMT and ER caused her to be very groggy and could not remember much of the episode. States that she had a virtual visit with someone in the ER who asked her questions about depression, anxiety and suicidal thoughts and she states she told them the truth about the past- no present feelings. She was thus pink slipped. She had a CT head that was normal and basic labs, but was transferred to psych unit in [facility]. No work up for seizures done. She was discharged once they discussed in detail with her.       In the last week, she has been having increasing depressive symptoms that feel very heavy and dark. Similar to how she felt prior to her last episode. Had 3 days of severe symptoms and then slept for 2 days in a row. Having some improvement, but still very dark. Has been on this combination of medications for over 20 years. Chronic pain  Patient has long standing history of spinal stenosis. She has a history of cervical spine DDD after accident while cheerleading. Previously had multi-level fusion of cervical spine- now having worsening pain in upper thoracics. She takes Norco and Soma appropriately. Current dosing is effective for patient. Patient has been doing well overall. Pain is gradually worsening and feels like something is in her throat from her neck. ROS:  Review of Systems   Constitutional:  Negative for chills, fatigue and fever. Respiratory:  Negative for cough, shortness of breath and wheezing. Cardiovascular:  Negative for chest pain and palpitations. Gastrointestinal:  Negative for abdominal pain, constipation, diarrhea, nausea and vomiting. Musculoskeletal:  Positive for arthralgias, back pain, gait problem, neck pain and neck stiffness. Skin:  Negative for rash. Neurological:  Negative for dizziness and headaches. Psychiatric/Behavioral:  Positive for behavioral problems, dysphoric mood and sleep disturbance. The patient is nervous/anxious. All other systems reviewed and are negative. Current Outpatient Medications on File Prior to Visit   Medication Sig Dispense Refill    Carisoprodol (SOMA PO) Take by mouth. HYDROcodone-acetaminophen (NORCO) 7.5-325 MG per tablet TAKE 1 TABLET BY MOUTH EVERY 12 HOURS AS NEEDED FOR PAIN FOR UP TO 30 DAYS      ALPRAZolam (XANAX) 0.5 MG tablet Take 0.5 mg by mouth 3 times daily as needed for Sleep. HYDROcodone-acetaminophen (NORCO) 7.5-325 MG per tablet Take 1 tablet by mouth every 12 hours as needed for Pain for up to 30 days.  Intended supply: 30 days 60 tablet 0    [START ON 8/4/2022] Date    ABDOMEN SURGERY N/A 2021    EXCISION LIPOMA ABDOMEN performed by John Mi MD at [facility] Bilateral     Implants Bilaterally    BREAST SURGERY      CERVICAL FUSION      c2-c7 Bone Kevin Grafting 2-6    COLONOSCOPY N/A 3/3/2021    COLORECTAL CANCER SCREENING, HIGH RISK performed by John Mi MD at [facility] N/A 2021    COLONOSCOPY WITH BIOPSY performed by Anju Gonzales MD at [address] [place] [address] ([address] [place] [address])      LAPAROSCOPY      X4    LAPAROTOMY      X5 Ovarian Cysts, ZACARIAS-BSO (Non Cancerous)    OTHER SURGICAL HISTORY      Cervical Epidurals    OTHER SURGICAL HISTORY      Lumbar Epidural - [address] 17, 17, 17    UPPER GASTROINTESTINAL ENDOSCOPY N/A 2021    EGD BIOPSY performed by Anju Gonzales MD at [facility] ENDOSCOPY     Family History   Problem Relation Age of Onset    Diabetes Mother     Other Mother         End Stage Cirrhosis 2017    Cancer Father         Prostate - Imani Maynard     Social History     Socioeconomic History    Marital status:      Spouse name: Not on file    Number of children: Not on file    Years of education: Not on file    Highest education level: Not on file   Occupational History    Not on file   Tobacco Use    Smoking status: Former     Packs/day: 0.25     Years: 1.00     Pack years: 0.25     Types: Cigarettes     Quit date:      Years since quittin.5    Smokeless tobacco: Never   Vaping Use    Vaping Use: Never used   Substance and Sexual Activity    Alcohol use: No    Drug use: No    Sexual activity: Not on file   Other Topics Concern    Not on file   Social History Narrative    Not on file     Social Determinants of Health     Financial Resource Strain: Not on file   Food Insecurity: Not on file   Transportation Needs: Not on file   Physical Activity: Not on file   Stress: Not on file   Social Connections: Not on file   Intimate Partner Violence: Not on file   Housing Stability: Not on file       Vitals:    07/28/22 1324   BP: 134/80   Pulse: 67   Temp: 97.7 °F (36.5 °C)   SpO2: 99%   Weight: 130 lb (59 kg)   Height: 5' 4\" (1.626 m)       Physical Exam:  Physical Exam  Vitals and nursing note reviewed. Constitutional:       General: She is not in acute distress. Appearance: Normal appearance. She is well-developed and normal weight. She is not ill-appearing. HENT:      Head: Normocephalic and atraumatic. Right Ear: Hearing and external ear normal.      Left Ear: Hearing and external ear normal.      Nose:      Comments: Wearing mask  Eyes:      General: Lids are normal. No scleral icterus. Extraocular Movements: Extraocular movements intact. Conjunctiva/sclera: Conjunctivae normal.   Neck:      Thyroid: No thyromegaly. Vascular: No carotid bruit. Cardiovascular:      Rate and Rhythm: Normal rate and regular rhythm. Heart sounds: Normal heart sounds. No murmur heard. Pulmonary:      Effort: Pulmonary effort is normal. No respiratory distress. Breath sounds: Normal breath sounds. No wheezing. Musculoskeletal:         General: No tenderness. Normal range of motion. Right hand: Deformity (thickening of palmar aponeurosis at 4-5th MCP) present. Left hand: Deformity (thickening of palmar aponeurosis at 4-5th MCP) present. Cervical back: Normal range of motion and neck supple. Right lower leg: No edema. Left lower leg: No edema. Comments: R arm in brace   Lymphadenopathy:      Cervical: No cervical adenopathy. Skin:     General: Skin is warm and dry. Findings: No rash. Neurological:      General: No focal deficit present. Mental Status: She is alert and oriented to person, place, and time.       Gait: Gait normal.   Psychiatric:         Mood and Affect: Mood and affect normal.         Speech: Speech normal.         Behavior: Behavior normal.         Thought Content: Thought content normal.       Labs:  CBC with Differential:    Lab Results   Component Value Date/Time    WBC 13.2 04/09/2022 05:08 PM    RBC 4.35 04/09/2022 05:08 PM    HGB 13.9 04/09/2022 05:08 PM    HCT 42.5 04/09/2022 05:08 PM     04/09/2022 05:08 PM    MCV 97.7 04/09/2022 05:08 PM    MCH 32.0 04/09/2022 05:08 PM    MCHC 32.7 04/09/2022 05:08 PM    RDW 12.3 04/09/2022 05:08 PM    SEGSPCT 53 09/24/2011 05:50 AM    LYMPHOPCT 11.7 04/09/2022 05:08 PM    MONOPCT 3.8 04/09/2022 05:08 PM    BASOPCT 0.4 04/09/2022 05:08 PM    MONOSABS 0.50 04/09/2022 05:08 PM    LYMPHSABS 1.55 04/09/2022 05:08 PM    EOSABS 0.06 04/09/2022 05:08 PM    BASOSABS 0.05 04/09/2022 05:08 PM     CMP:    Lab Results   Component Value Date/Time     04/09/2022 05:08 PM    K 3.9 04/09/2022 05:08 PM     04/09/2022 05:08 PM    CO2 18 04/09/2022 05:08 PM    BUN 21 04/09/2022 05:08 PM    CREATININE 1.0 04/09/2022 05:08 PM    GFRAA >60 04/09/2022 05:08 PM    LABGLOM 58 04/09/2022 05:08 PM    GLUCOSE 80 04/09/2022 05:08 PM    GLUCOSE 88 09/24/2011 05:50 AM    PROT 7.9 04/09/2022 05:08 PM    LABALBU 4.6 04/09/2022 05:08 PM    LABALBU 4.1 09/23/2011 06:45 PM    CALCIUM 9.2 04/09/2022 05:08 PM    BILITOT <0.2 04/09/2022 05:08 PM    ALKPHOS 85 04/09/2022 05:08 PM    AST 24 04/09/2022 05:08 PM    ALT 17 04/09/2022 05:08 PM     HgBA1c:    Lab Results   Component Value Date/Time    LABA1C 5.0 03/03/2022 08:38 AM     FLP:    Lab Results   Component Value Date/Time    TRIG 149 03/03/2022 08:38 AM    HDL 91 03/03/2022 08:38 AM    LDLCALC 110 03/03/2022 08:38 AM    LABVLDL 30 03/03/2022 08:38 AM     TSH:    Lab Results   Component Value Date/Time    TSH 2.270 03/03/2022 08:38 AM         Assessment and Plan:  Roger Bee was seen today for depression. Diagnoses and all orders for this visit:    Bipolar I disorder [facility])  Had lengthy discussion about medications and treatment options.   I feel at this time she needs medications adjusted due to length of time on the regimen. Increase Zoloft to 100 mg daily. Needs to establish with psychiatry. Follows with [facility] and will ask if they have a provider there who can help     Convulsions, unspecified convulsion type [facility])  Seeing Neuro in Sept.  Had to cancel her appt in June    Cervical disc disease  -     MRI CERVICAL SPINE WO CONTRAST; Future    Cervical radiculopathy  -     MRI CERVICAL SPINE WO CONTRAST; Future  Worsening symptoms. Would like to get a new MRI to look at progression. Return in about 6 weeks (around 9/8/2022), or if symptoms worsen or fail to improve, for Recheck.       Seen By:  May Doherty DO

## 2022-08-06 DIAGNOSIS — Z78.0 POSTMENOPAUSAL: ICD-10-CM

## 2022-08-08 RX ORDER — ESTRADIOL 0.1 MG/D
FILM, EXTENDED RELEASE TRANSDERMAL
Qty: 24 PATCH | Refills: 5 | Status: SHIPPED | OUTPATIENT
Start: 2022-08-08

## 2022-08-16 ENCOUNTER — TELEPHONE (OUTPATIENT)
Dept: PRIMARY CARE CLINIC | Age: 57
End: 2022-08-16

## 2022-08-16 NOTE — LETTER
[address]  [place] [address]  Phone: [phone]  Fax: [phone] ,         August 16, 2022     Patient: Dasia Conn   YOB: 1965   Date of Visit: 8/16/2022       To Whom it May Concern:    Tanmay Dean was seen in my clinic on 07/28/22 and is physically fit for duty. If you have any questions or concerns, please don't hesitate to call.     Sincerely,         Daniele Park, DO

## 2022-08-22 ENCOUNTER — TELEPHONE (OUTPATIENT)
Dept: PRIMARY CARE CLINIC | Age: 57
End: 2022-08-22

## 2022-08-22 DIAGNOSIS — M50.90 CERVICAL DISC DISEASE: ICD-10-CM

## 2022-08-22 DIAGNOSIS — M48.062 SPINAL STENOSIS OF LUMBAR REGION WITH NEUROGENIC CLAUDICATION: ICD-10-CM

## 2022-08-22 RX ORDER — HYDROCODONE BITARTRATE AND ACETAMINOPHEN 7.5; 325 MG/1; MG/1
1 TABLET ORAL EVERY 12 HOURS PRN
Qty: 60 TABLET | Refills: 0 | Status: SHIPPED
Start: 2022-08-22 | End: 2022-09-02 | Stop reason: SDUPTHER

## 2022-08-22 NOTE — TELEPHONE ENCOUNTER
Patient sent message for [place] refill. Will be out before her appt on 9/15. It appears on OARRS that pharmacy only filled 14 tablets on last refill.   Please check to see why this was done- #60 tabs were sent

## 2022-08-22 NOTE — TELEPHONE ENCOUNTER
Spoke with pharmacy. They advised insurance would only allow 14 tablets and a new rx will need to be sent. Pt notified.

## 2022-09-02 ENCOUNTER — OFFICE VISIT (OUTPATIENT)
Dept: FAMILY MEDICINE CLINIC | Age: 57
End: 2022-09-02
Payer: COMMERCIAL

## 2022-09-02 VITALS
HEIGHT: 64 IN | SYSTOLIC BLOOD PRESSURE: 126 MMHG | BODY MASS INDEX: 22.23 KG/M2 | DIASTOLIC BLOOD PRESSURE: 78 MMHG | TEMPERATURE: 97.5 F | HEART RATE: 81 BPM | OXYGEN SATURATION: 98 % | WEIGHT: 130.2 LBS | RESPIRATION RATE: 18 BRPM

## 2022-09-02 DIAGNOSIS — M50.90 CERVICAL DISC DISEASE: ICD-10-CM

## 2022-09-02 DIAGNOSIS — R73.01 IMPAIRED FASTING GLUCOSE: ICD-10-CM

## 2022-09-02 DIAGNOSIS — R35.0 URINARY FREQUENCY: ICD-10-CM

## 2022-09-02 DIAGNOSIS — I10 PRIMARY HYPERTENSION: Primary | ICD-10-CM

## 2022-09-02 DIAGNOSIS — M48.062 SPINAL STENOSIS OF LUMBAR REGION WITH NEUROGENIC CLAUDICATION: ICD-10-CM

## 2022-09-02 DIAGNOSIS — F51.01 PRIMARY INSOMNIA: ICD-10-CM

## 2022-09-02 DIAGNOSIS — F41.1 GAD (GENERALIZED ANXIETY DISORDER): ICD-10-CM

## 2022-09-02 DIAGNOSIS — E55.9 VITAMIN D INSUFFICIENCY: ICD-10-CM

## 2022-09-02 DIAGNOSIS — F31.9 BIPOLAR I DISORDER (HCC): ICD-10-CM

## 2022-09-02 LAB
BILIRUBIN, POC: NEGATIVE
BLOOD URINE, POC: NORMAL
CLARITY, POC: CLEAR
COLOR, POC: YELLOW
GLUCOSE URINE, POC: NEGATIVE
KETONES, POC: NEGATIVE
LEUKOCYTE EST, POC: NEGATIVE
NITRITE, POC: NEGATIVE
PH, POC: 6
PROTEIN, POC: NEGATIVE
SPECIFIC GRAVITY, POC: 1.01
UROBILINOGEN, POC: 0.2

## 2022-09-02 PROCEDURE — 81002 URINALYSIS NONAUTO W/O SCOPE: CPT | Performed by: FAMILY MEDICINE

## 2022-09-02 PROCEDURE — 99214 OFFICE O/P EST MOD 30 MIN: CPT | Performed by: FAMILY MEDICINE

## 2022-09-02 RX ORDER — CARISOPRODOL 350 MG/1
350 TABLET ORAL 3 TIMES DAILY PRN
Qty: 270 TABLET | Refills: 0 | Status: SHIPPED | OUTPATIENT
Start: 2022-09-02 | End: 2022-12-01

## 2022-09-02 RX ORDER — HYDROCODONE BITARTRATE AND ACETAMINOPHEN 7.5; 325 MG/1; MG/1
1 TABLET ORAL EVERY 12 HOURS PRN
Qty: 60 TABLET | Refills: 0 | Status: SHIPPED | OUTPATIENT
Start: 2022-10-28 | End: 2022-10-17 | Stop reason: SDUPTHER

## 2022-09-02 RX ORDER — HYDROCODONE BITARTRATE AND ACETAMINOPHEN 7.5; 325 MG/1; MG/1
1 TABLET ORAL EVERY 12 HOURS PRN
Qty: 60 TABLET | Refills: 0 | Status: SHIPPED | OUTPATIENT
Start: 2022-09-30 | End: 2022-10-17 | Stop reason: SDUPTHER

## 2022-09-02 RX ORDER — CEPHALEXIN 500 MG/1
500 CAPSULE ORAL 2 TIMES DAILY
Qty: 14 CAPSULE | Refills: 0 | Status: SHIPPED | OUTPATIENT
Start: 2022-09-02 | End: 2022-09-09

## 2022-09-02 RX ORDER — ZOLPIDEM TARTRATE 12.5 MG/1
12.5 TABLET, FILM COATED, EXTENDED RELEASE ORAL NIGHTLY PRN
Qty: 90 TABLET | Refills: 0 | Status: SHIPPED | OUTPATIENT
Start: 2022-09-02 | End: 2022-12-01

## 2022-09-02 RX ORDER — HYDROCODONE BITARTRATE AND ACETAMINOPHEN 7.5; 325 MG/1; MG/1
1 TABLET ORAL EVERY 12 HOURS PRN
Qty: 60 TABLET | Refills: 0 | Status: SHIPPED | OUTPATIENT
Start: 2022-09-02 | End: 2022-09-19 | Stop reason: SDUPTHER

## 2022-09-02 RX ORDER — ALPRAZOLAM 0.5 MG/1
0.5 TABLET ORAL 3 TIMES DAILY PRN
Qty: 270 TABLET | Refills: 0 | Status: SHIPPED | OUTPATIENT
Start: 2022-09-02 | End: 2022-12-01

## 2022-09-02 ASSESSMENT — ENCOUNTER SYMPTOMS
WHEEZING: 0
VOMITING: 0
SHORTNESS OF BREATH: 0
CONSTIPATION: 0
COUGH: 0
ABDOMINAL PAIN: 0
DIARRHEA: 0
BACK PAIN: 1
NAUSEA: 0

## 2022-09-02 NOTE — PROGRESS NOTES
22  [facility] : 1965 Sex: female  Age: 56 y.o. Chief Complaint   Patient presents with    Medication Refill     Patient here today for medication refills    Follow-up     Patient here today one month follow up Bipolar Disorder      HPI:  56 y.o. female patient presents today for 6 week follow up for chronic medical conditions, medication refills and FBW. Patient's chart, medical, surgical and medication history all reviewed. Bipolar  Patient complains of evaluation of anxiety disorder and post traumatic stress  disorder. She has the following anxiety symptoms: difficulty concentrating, feelings of losing control, irritable, racing thoughts. Onset of symptoms was approximately several years ago, rapidly worsening since that time. She denies current suicidal and homicidal ideation. Previous treatment includes Ativan, Zoloft, and Lamictal/Seroquel  and individual therapy. She complains of the following side effects from the treatment: none. Patient notes that April is always difficult due to the death of her son 10 years ago. Patient has a history of psuedoseizures, but states that she had an episode of intense convulsions for \"over 10 minutes\" per her  in early 2022. She thinks episode was due to increased stress. She called 911 and was given an injection of \"something\" and then taken to the ER. Developed severe migraine and was given a migraine cocktail. Notes that all of the medications between EMT and ER caused her to be very groggy and could not remember much of the episode. States that she had a virtual visit with someone in the ER who asked her questions about depression, anxiety and suicidal thoughts and she states she told them the truth about the past- no present feelings. She was thus pink slipped. She had a CT head that was normal and basic labs, but was transferred to psych unit in [place]. No work up for seizures done.   She was discharged once they discussed in detail with her. Last visit, she had noted having increasing depressive symptoms that feel very heavy and dark. Similar to how she felt prior to her last episode. Had 3 days of severe symptoms and then slept for 2 days in a row. Having some improvement, but still very dark. Has been on this combination of medications for over 20 years. Increased Zoloft to 75 mg and feeling better overall. Chronic pain  Patient has long standing history of spinal stenosis. She has a history of cervical spine DDD after accident while cheerleading. Previously had multi-level fusion of cervical spine- now having worsening pain in upper thoracics. She takes Norco and Soma appropriately. Current dosing is effective for patient. Patient has been doing well overall. Pain is gradually worsening and feels like something is in her throat from her neck. ROS:  Review of Systems   Constitutional:  Negative for chills, fatigue and fever. Respiratory:  Negative for cough, shortness of breath and wheezing. Cardiovascular:  Negative for chest pain and palpitations. Gastrointestinal:  Negative for abdominal pain, constipation, diarrhea, nausea and vomiting. Genitourinary:  Positive for frequency. Negative for difficulty urinating, dysuria and hematuria. Musculoskeletal:  Positive for arthralgias, back pain, gait problem, neck pain and neck stiffness. Skin:  Negative for rash. Neurological:  Negative for dizziness and headaches. Psychiatric/Behavioral:  Positive for behavioral problems, dysphoric mood and sleep disturbance. The patient is nervous/anxious. All other systems reviewed and are negative.    Current Outpatient Medications on File Prior to Visit   Medication Sig Dispense Refill    estradiol (VIVELLE) 0.1 MG/24HR APPLY 1 PATCH TOPICALLY TO THE SKIN 2 TIMES A WEEK 24 patch 5    lamoTRIgine (LAMICTAL) 100 MG tablet TAKE 1 TABLET BY MOUTH TWICE DAILY 180 tablet 1    sertraline (ZOLOFT) 50 MG tablet TAKE 1 TABLET BY MOUTH EVERY NIGHT 90 tablet 1    topiramate (TOPAMAX) 25 MG tablet TAKE 2 TABLETS BY MOUTH EVERY NIGHT 180 tablet 1    QUEtiapine (SEROQUEL) 100 MG tablet TAKE 1 TABLET BY MOUTH TWICE DAILY 180 tablet 1    ondansetron (ZOFRAN ODT) 4 MG disintegrating tablet Take 1 tablet by mouth every 8 hours as needed for Nausea or Vomiting 10 tablet 0    COLLAGEN PO Take by mouth daily       Cholecalciferol (VITAMIN D3) 125 MCG (5000 UT) TABS Take by mouth      HYDROcodone-acetaminophen (NORCO) 7.5-325 MG per tablet TAKE 1 TABLET BY MOUTH EVERY 12 HOURS AS NEEDED FOR PAIN FOR UP TO 30 DAYS (Patient not taking: Reported on 9/2/2022)       No current facility-administered medications on file prior to visit.        Allergies   Allergen Reactions    Bee Venom Anaphylaxis     Wasps    Fish-Derived Products Anaphylaxis and Swelling     Swordfish allergy    Hornet Venom Anaphylaxis    Iodine        Past Medical History:   Diagnosis Date    Anxiety     Bipolar disorder ([facility] [address]     Bipolar 1 disorder    Cervical disc disease     Cervical spondylitis with radiculitis (HCC)     C2, C7    Facet hypertrophy     L4/5 - S1    Headache     Migraine    History of rectal sphincterotomy     Lumbar disc disease     Osteoarthritis     DJD/DDD C-Spine S/P Fusion CHEERLEADER DROPPED ON HEAD/NECK    Panic disorder without agoraphobia     Counselor Destiny Marroquin. Lora    PONV (postoperative nausea and vomiting)     Seizures ([facility] [address]     NONE SINCE 2018 Psuedo     Past Surgical History:   Procedure Laterality Date    ABDOMEN SURGERY N/A 4/19/2021    EXCISION LIPOMA ABDOMEN performed by Noemi Aceves MD at [facility] Bilateral     Implants Bilaterally    BREAST SURGERY      CERVICAL FUSION      c2-c7 Bone Kevin Grafting 2-6    COLONOSCOPY N/A 3/3/2021    COLORECTAL CANCER SCREENING, HIGH RISK performed by Noemi Aceves MD at [address] [place] [address] N/A 12/13/2021 COLONOSCOPY WITH BIOPSY performed by Cecilia Uriostegui MD at [address] [place] [address] (CERVIX STATUS UNKNOWN)      LAPAROSCOPY      X4    LAPAROTOMY      X5 Ovarian Cysts, ZACARIAS-BSO (Non Cancerous)    OTHER SURGICAL HISTORY      Cervical Epidurals    OTHER SURGICAL HISTORY      Lumbar Epidural - Donatelli 17, 17, 17    UPPER GASTROINTESTINAL ENDOSCOPY N/A 2021    EGD BIOPSY performed by Cecilia Uriostegui MD at [facility] ENDOSCOPY     Family History   Problem Relation Age of Onset    Diabetes Mother     Other Mother         End Stage Cirrhosis 2017    Cancer Father         Prostate - [facility]     Social History     Socioeconomic History    Marital status:      Spouse name: Not on file    Number of children: Not on file    Years of education: Not on file    Highest education level: Not on file   Occupational History    Not on file   Tobacco Use    Smoking status: Former     Packs/day: 0.25     Years: 1.00     Pack years: 0.25     Types: Cigarettes     Quit date:      Years since quittin.6    Smokeless tobacco: Never   Vaping Use    Vaping Use: Never used   Substance and Sexual Activity    Alcohol use: No    Drug use: No    Sexual activity: Not on file   Other Topics Concern    Not on file   Social History Narrative    Not on file     Social Determinants of Health     Financial Resource Strain: Not on file   Food Insecurity: Not on file   Transportation Needs: Not on file   Physical Activity: Not on file   Stress: Not on file   Social Connections: Not on file   Intimate Partner Violence: Not on file   Housing Stability: Not on file       Vitals:    22 1601   BP: 126/78   Pulse: 81   Resp: 18   Temp: 97.5 °F (36.4 °C)   SpO2: 98%   Weight: 130 lb 3.2 oz (59.1 kg)   Height: 5' 4\" (1.626 m)       Physical Exam:  Physical Exam  Vitals and nursing note reviewed. Constitutional:       General: She is not in acute distress. Appearance: Normal appearance.  She is well-developed and normal weight. She is not ill-appearing. HENT:      Head: Normocephalic and atraumatic. Right Ear: Hearing and external ear normal.      Left Ear: Hearing and external ear normal.      Nose:      Comments: Wearing mask  Eyes:      General: Lids are normal. No scleral icterus. Extraocular Movements: Extraocular movements intact. Conjunctiva/sclera: Conjunctivae normal.   Neck:      Thyroid: No thyromegaly. Vascular: No carotid bruit. Cardiovascular:      Rate and Rhythm: Normal rate and regular rhythm. Heart sounds: Normal heart sounds. No murmur heard. Pulmonary:      Effort: Pulmonary effort is normal. No respiratory distress. Breath sounds: Normal breath sounds. No wheezing. Musculoskeletal:         General: No tenderness. Normal range of motion. Right hand: Deformity (thickening of palmar aponeurosis at 4-5th MCP) present. Left hand: Deformity (thickening of palmar aponeurosis at 4-5th MCP) present. Cervical back: Normal range of motion and neck supple. Right lower leg: No edema. Left lower leg: No edema. Comments: R arm in brace   Lymphadenopathy:      Cervical: No cervical adenopathy. Skin:     General: Skin is warm and dry. Findings: No rash. Neurological:      General: No focal deficit present. Mental Status: She is alert and oriented to person, place, and time. Gait: Gait normal.   Psychiatric:         Mood and Affect: Mood and affect normal.         Speech: Speech normal.         Behavior: Behavior normal.         Thought Content:  Thought content normal.       Labs:  CBC with Differential:    Lab Results   Component Value Date/Time    WBC 13.2 04/09/2022 05:08 PM    RBC 4.35 04/09/2022 05:08 PM    HGB 13.9 04/09/2022 05:08 PM    HCT 42.5 04/09/2022 05:08 PM     04/09/2022 05:08 PM    MCV 97.7 04/09/2022 05:08 PM    MCH 32.0 04/09/2022 05:08 PM    MCHC 32.7 04/09/2022 05:08 PM    RDW 12.3 04/09/2022 05:08 PM SEGSPCT 53 09/24/2011 05:50 AM    LYMPHOPCT 11.7 04/09/2022 05:08 PM    MONOPCT 3.8 04/09/2022 05:08 PM    BASOPCT 0.4 04/09/2022 05:08 PM    MONOSABS 0.50 04/09/2022 05:08 PM    LYMPHSABS 1.55 04/09/2022 05:08 PM    EOSABS 0.06 04/09/2022 05:08 PM    BASOSABS 0.05 04/09/2022 05:08 PM     CMP:    Lab Results   Component Value Date/Time     04/09/2022 05:08 PM    K 3.9 04/09/2022 05:08 PM     04/09/2022 05:08 PM    CO2 18 04/09/2022 05:08 PM    BUN 21 04/09/2022 05:08 PM    CREATININE 1.0 04/09/2022 05:08 PM    GFRAA >60 04/09/2022 05:08 PM    LABGLOM 58 04/09/2022 05:08 PM    GLUCOSE 80 04/09/2022 05:08 PM    GLUCOSE 88 09/24/2011 05:50 AM    PROT 7.9 04/09/2022 05:08 PM    LABALBU 4.6 04/09/2022 05:08 PM    LABALBU 4.1 09/23/2011 06:45 PM    CALCIUM 9.2 04/09/2022 05:08 PM    BILITOT <0.2 04/09/2022 05:08 PM    ALKPHOS 85 04/09/2022 05:08 PM    AST 24 04/09/2022 05:08 PM    ALT 17 04/09/2022 05:08 PM     HgBA1c:    Lab Results   Component Value Date/Time    LABA1C 5.0 03/03/2022 08:38 AM     FLP:    Lab Results   Component Value Date/Time    TRIG 149 03/03/2022 08:38 AM    HDL 91 03/03/2022 08:38 AM    LDLCALC 110 03/03/2022 08:38 AM    LABVLDL 30 03/03/2022 08:38 AM     TSH:    Lab Results   Component Value Date/Time    TSH 2.270 03/03/2022 08:38 AM         Assessment and Plan:  Krysten Aden was seen today for medication refill and follow-up. Diagnoses and all orders for this visit:    Primary hypertension  -     CBC with Auto Differential; Future  -     Comprehensive Metabolic Panel; Future  -     Lipid Panel; Future  -     TSH; Future  -     Uric Acid; Future  Well controlled. Due for 6 month labs. Bipolar I disorder (HCC)    FELA (generalized anxiety disorder)  -     ALPRAZolam (XANAX) 0.5 MG tablet; Take 1 tablet by mouth 3 times daily as needed for Sleep for up to 90 days.   Stable    Spinal stenosis of lumbar region with neurogenic claudication  -     HYDROcodone-acetaminophen (NORCO) 7.5-325 MG per tablet; Take 1 tablet by mouth every 12 hours as needed for Pain for up to 30 days. Intended supply: 30 days  -     carisoprodol (SOMA) 350 MG tablet; Take 1 tablet by mouth 3 times daily as needed for Muscle spasms for up to 90 days.  -     HYDROcodone-acetaminophen (NORCO) 7.5-325 MG per tablet; Take 1 tablet by mouth every 12 hours as needed for Pain for up to 30 days. Intended supply: 30 days  -     HYDROcodone-acetaminophen (NORCO) 7.5-325 MG per tablet; Take 1 tablet by mouth every 12 hours as needed for Pain for up to 30 days. Intended supply: 30 days  OARRS reviewed and is appropriate    Cervical disc disease  -     HYDROcodone-acetaminophen (NORCO) 7.5-325 MG per tablet; Take 1 tablet by mouth every 12 hours as needed for Pain for up to 30 days. Intended supply: 30 days  -     HYDROcodone-acetaminophen (NORCO) 7.5-325 MG per tablet; Take 1 tablet by mouth every 12 hours as needed for Pain for up to 30 days. Intended supply: 30 days  -     HYDROcodone-acetaminophen (NORCO) 7.5-325 MG per tablet; Take 1 tablet by mouth every 12 hours as needed for Pain for up to 30 days. Intended supply: 30 days    Primary insomnia  -     zolpidem (AMBIEN CR) 12.5 MG extended release tablet; Take 1 tablet by mouth nightly as needed for Sleep for up to 90 days. Urinary frequency  -     POCT Urinalysis no Micro  -     Culture, Urine; Future  -     cephALEXin (KEFLEX) 500 MG capsule; Take 1 capsule by mouth 2 times daily for 7 days  Will treat empirically but send for culture. Impaired fasting glucose  -     Hemoglobin A1C; Future    Vitamin D insufficiency  -     Vitamin D 25 Hydroxy; Future        Return in about 3 months (around 12/2/2022), or if symptoms worsen or fail to improve, for Chronic medical conditions.       Seen By:  Gustavo Tomas DO

## 2022-09-05 LAB — URINE CULTURE, ROUTINE: NORMAL

## 2022-09-06 RX ORDER — TOPIRAMATE 25 MG/1
TABLET ORAL
Qty: 180 TABLET | Refills: 1 | Status: SHIPPED | OUTPATIENT
Start: 2022-09-06

## 2022-09-16 ENCOUNTER — OFFICE VISIT (OUTPATIENT)
Dept: FAMILY MEDICINE CLINIC | Age: 57
End: 2022-09-16
Payer: COMMERCIAL

## 2022-09-16 VITALS
SYSTOLIC BLOOD PRESSURE: 110 MMHG | TEMPERATURE: 98.5 F | WEIGHT: 130 LBS | OXYGEN SATURATION: 96 % | DIASTOLIC BLOOD PRESSURE: 60 MMHG | HEART RATE: 83 BPM | HEIGHT: 64 IN | BODY MASS INDEX: 22.2 KG/M2

## 2022-09-16 DIAGNOSIS — R09.81 NASAL CONGESTION: ICD-10-CM

## 2022-09-16 DIAGNOSIS — R09.82 POSTNASAL DRIP: ICD-10-CM

## 2022-09-16 DIAGNOSIS — R51.9 SINUS HEADACHE: ICD-10-CM

## 2022-09-16 DIAGNOSIS — J01.90 ACUTE NON-RECURRENT SINUSITIS, UNSPECIFIED LOCATION: Primary | ICD-10-CM

## 2022-09-16 PROCEDURE — 96372 THER/PROPH/DIAG INJ SC/IM: CPT | Performed by: PHYSICIAN ASSISTANT

## 2022-09-16 PROCEDURE — 99214 OFFICE O/P EST MOD 30 MIN: CPT | Performed by: PHYSICIAN ASSISTANT

## 2022-09-16 RX ORDER — PREDNISONE 10 MG/1
TABLET ORAL
Qty: 18 TABLET | Refills: 0 | Status: SHIPPED
Start: 2022-09-16 | End: 2022-09-27

## 2022-09-16 RX ORDER — AMOXICILLIN AND CLAVULANATE POTASSIUM 875; 125 MG/1; MG/1
1 TABLET, FILM COATED ORAL 2 TIMES DAILY
Qty: 20 TABLET | Refills: 0 | Status: SHIPPED | OUTPATIENT
Start: 2022-09-16 | End: 2022-09-26

## 2022-09-16 RX ORDER — METHYLPREDNISOLONE ACETATE 40 MG/ML
40 INJECTION, SUSPENSION INTRA-ARTICULAR; INTRALESIONAL; INTRAMUSCULAR; SOFT TISSUE ONCE
Status: COMPLETED | OUTPATIENT
Start: 2022-09-16 | End: 2022-09-16

## 2022-09-16 RX ORDER — BENZONATATE 100 MG/1
100 CAPSULE ORAL 3 TIMES DAILY PRN
Qty: 21 CAPSULE | Refills: 0 | Status: SHIPPED | OUTPATIENT
Start: 2022-09-16 | End: 2022-09-23

## 2022-09-16 RX ADMIN — METHYLPREDNISOLONE ACETATE 40 MG: 40 INJECTION, SUSPENSION INTRA-ARTICULAR; INTRALESIONAL; INTRAMUSCULAR; SOFT TISSUE at 12:08

## 2022-09-16 NOTE — PROGRESS NOTES
22  Delta Urena : 1965 Sex: female  Age 56 y.o. Subjective:  Chief Complaint   Patient presents with    Sinusitis     Started last week    Cough    Fatigue         HPI:   Delta Urena , 56 y.o. female presents to express care for evaluation of sinus congestion, drainage, fatigue    HPI  68-year-old female presents to express care for evaluation of sinus congestion, drainage, fatigue. The patient's had the symptoms ongoing for about a week. The patient states that the symptoms started last week and she was having low-grade temps between 99 and 100. The patient states that she felt a little bit better on Saturday and  and then the symptoms seem to worsen as the week has gone on. The patient is had increased sinus pressure, headache. The patient is not any fevers. The patient's not currently on any antibiotics. The patient been using over the counter medications without any significant improvement. ROS:   Unless otherwise stated in this report the patient's positive and negative responses for review of systems for constitutional, eyes, ENT, cardiovascular, respiratory, gastrointestinal, neurological, , musculoskeletal, and integument systems and related systems to the presenting problem are either stated in the history of present illness or were not pertinent or were negative for the symptoms and/or complaints related to the presenting medical problem. Positives and pertinent negatives as per HPI. All others reviewed and are negative.       PMH:     Past Medical History:   Diagnosis Date    Anxiety     Bipolar disorder ([facility] [address]     Bipolar 1 disorder    Cervical disc disease     Cervical spondylitis with radiculitis (HCC)     C2, C7    Facet hypertrophy     L4/5 - S1    Headache     Migraine    History of rectal sphincterotomy     Lumbar disc disease     Osteoarthritis     DJD/DDD C-Spine S/P Fusion CHEERLEADER DROPPED ON HEAD/NECK    Panic disorder without agoraphobia Counselor Adelina Ly    PONV (postoperative nausea and vomiting)     Seizures ([address]     NONE SINCE 2018 Psuedo       Past Surgical History:   Procedure Laterality Date    ABDOMEN SURGERY N/A 4/19/2021    EXCISION LIPOMA ABDOMEN performed by Faheem Verdin MD at [address] [place] [address] Bilateral     Implants Bilaterally    BREAST SURGERY      CERVICAL FUSION      c2-c7 Bone Kevin Grafting 2-6    COLONOSCOPY N/A 3/3/2021    COLORECTAL CANCER SCREENING, HIGH RISK performed by Faheem Verdin MD at [facility] ENDOSCOPY    COLONOSCOPY N/A 12/13/2021    COLONOSCOPY WITH BIOPSY performed by Lv Vazquez MD at [address] [place] [address] (CERVIX STATUS UNKNOWN)      LAPAROSCOPY      X4    LAPAROTOMY      X5 Ovarian Cysts, ZACARIAS-BSO (Non Cancerous)    OTHER SURGICAL HISTORY      Cervical Epidurals    OTHER SURGICAL HISTORY      Lumbar Epidural - Donatelli 6/13/17, 6/27/17, 7/25/17    UPPER GASTROINTESTINAL ENDOSCOPY N/A 12/13/2021    EGD BIOPSY performed by Lv Vazquez MD at [facility] ENDOSCOPY       Family History   Problem Relation Age of Onset    Diabetes Mother     Other Mother         End Stage Cirrhosis 1/2017    Cancer Father         Prostate - [facility]       Medications:     Current Outpatient Medications:     predniSONE (DELTASONE) 10 MG tablet, 3 tabs once daily for 3 days, 2 tabs once daily for 3 days, 1 tab once daily for 3 days, Disp: 18 tablet, Rfl: 0    amoxicillin-clavulanate (AUGMENTIN) 875-125 MG per tablet, Take 1 tablet by mouth 2 times daily for 10 days, Disp: 20 tablet, Rfl: 0    benzonatate (TESSALON) 100 MG capsule, Take 1 capsule by mouth 3 times daily as needed for Cough, Disp: 21 capsule, Rfl: 0    topiramate (TOPAMAX) 25 MG tablet, TAKE 2 TABLETS BY MOUTH EVERY NIGHT, Disp: 180 tablet, Rfl: 1    HYDROcodone-acetaminophen (NORCO) 7.5-325 MG per tablet, Take 1 tablet by mouth every 12 hours as needed for Pain for up to 30 days.  Intended supply: 30 days, Disp: 60 tablet, Rfl: 0    zolpidem (AMBIEN CR) 12.5 MG extended release tablet, Take 1 tablet by mouth nightly as needed for Sleep for up to 90 days. , Disp: 90 tablet, Rfl: 0    ALPRAZolam (XANAX) 0.5 MG tablet, Take 1 tablet by mouth 3 times daily as needed for Sleep for up to 90 days. , Disp: 270 tablet, Rfl: 0    carisoprodol (SOMA) 350 MG tablet, Take 1 tablet by mouth 3 times daily as needed for Muscle spasms for up to 90 days. , Disp: 270 tablet, Rfl: 0    [START ON 9/30/2022] HYDROcodone-acetaminophen (NORCO) 7.5-325 MG per tablet, Take 1 tablet by mouth every 12 hours as needed for Pain for up to 30 days. Intended supply: 30 days, Disp: 60 tablet, Rfl: 0    [START ON 10/28/2022] HYDROcodone-acetaminophen (NORCO) 7.5-325 MG per tablet, Take 1 tablet by mouth every 12 hours as needed for Pain for up to 30 days. Intended supply: 30 days, Disp: 60 tablet, Rfl: 0    estradiol (VIVELLE) 0.1 MG/24HR, APPLY 1 PATCH TOPICALLY TO THE SKIN 2 TIMES A WEEK, Disp: 24 patch, Rfl: 5    lamoTRIgine (LAMICTAL) 100 MG tablet, TAKE 1 TABLET BY MOUTH TWICE DAILY, Disp: 180 tablet, Rfl: 1    sertraline (ZOLOFT) 50 MG tablet, TAKE 1 TABLET BY MOUTH EVERY NIGHT, Disp: 90 tablet, Rfl: 1    QUEtiapine (SEROQUEL) 100 MG tablet, TAKE 1 TABLET BY MOUTH TWICE DAILY, Disp: 180 tablet, Rfl: 1    ondansetron (ZOFRAN ODT) 4 MG disintegrating tablet, Take 1 tablet by mouth every 8 hours as needed for Nausea or Vomiting, Disp: 10 tablet, Rfl: 0    COLLAGEN PO, Take by mouth daily , Disp: , Rfl:     Cholecalciferol (VITAMIN D3) 125 MCG (5000 UT) TABS, Take by mouth, Disp: , Rfl:     HYDROcodone-acetaminophen (NORCO) 7.5-325 MG per tablet, TAKE 1 TABLET BY MOUTH EVERY 12 HOURS AS NEEDED FOR PAIN FOR UP TO 30 DAYS (Patient not taking: Reported on 9/16/2022), Disp: , Rfl:     Allergies:      Allergies   Allergen Reactions    Bee Venom Anaphylaxis     Wasps    Fish-Derived Products Anaphylaxis and Swelling     Swordfish allergy    Hornet Venom Anaphylaxis    Iodine        Social History:     Social History     Tobacco Use    Smoking status: Former     Packs/day: 0.25     Years: 1.00     Pack years: 0.25     Types: Cigarettes     Quit date:      Years since quittin.7    Smokeless tobacco: Never   Vaping Use    Vaping Use: Never used   Substance Use Topics    Alcohol use: No    Drug use: No       Patient lives at home. Physical Exam:     Vitals:    22 1150   BP: 110/60   Pulse: 83   Temp: 98.5 °F (36.9 °C)   SpO2: 96%   Weight: 130 lb (59 kg)   Height: 5' 4\" (1.626 m)       Exam:  Physical Exam  Nurse's notes and vital signs reviewed. The patient is not hypoxic. ? General: Alert, no acute distress, patient resting comfortably Patient is not toxic or lethargic. Skin: Warm, intact, no pallor noted. There is no evidence of rash at this time. Head: Normocephalic, atraumatic  Eye: Normal conjunctiva  Ears, Nose, Throat: Right tympanic membrane clear, left tympanic membrane clear. No drainage or discharge noted. No pre- or post-auricular tenderness, erythema, or swelling noted. Nasal congestion, rhinorrhea, no epistaxis  Posterior oropharynx shows erythema but no evidence of tonsillar hypertrophy, or exudate. the uvula is midline. No trismus or drooling is noted. Moist mucous membranes. Neck: No anterior/posterior lymphadenopathy noted. No erythema, no masses, no fluctuance or induration noted. No meningeal signs. Cardiovascular: Regular Rate and Rhythm  Respiratory: No acute distress, no rhonchi, wheezing or crackles noted. No stridor or retractions are noted. Neurological: A&O x4, normal speech  Psychiatric: Cooperative       Testing:           Medical Decision Making:     Vital signs reviewed    Past medical history reviewed. Allergies reviewed. Medications reviewed. Patient on arrival does not appear to be in any apparent distress or discomfort. The patient has been seen and evaluated.   The patient does not appear to be toxic or lethargic. The patient will be treated with IM injection of methylprednisone, Augmentin, and prednisone as well as Tessalon Perles for home. The patient did not want further swabs performed. The patient was educated on the proper dosage of motrin and tylenol and the appropriate intervals of each. The patient is to increase fluid intake over the next several days. The patient is to use OTC decongestant as needed. The patient is to return to express care or go directly to the emergency department should any of the signs or symptoms worsen. The patient is to followup with primary care physician in 2-3 days for repeat evaluation. The patient has no other questions or concerns at this time the patient will be discharged home. Clinical Impression:   Delta Swift was seen today for sinusitis, cough and fatigue. Diagnoses and all orders for this visit:    Acute non-recurrent sinusitis, unspecified location    Postnasal drip    Nasal congestion    Sinus headache    Other orders  -     predniSONE (DELTASONE) 10 MG tablet; 3 tabs once daily for 3 days, 2 tabs once daily for 3 days, 1 tab once daily for 3 days  -     amoxicillin-clavulanate (AUGMENTIN) 875-125 MG per tablet; Take 1 tablet by mouth 2 times daily for 10 days  -     benzonatate (TESSALON) 100 MG capsule; Take 1 capsule by mouth 3 times daily as needed for Cough  -     methylPREDNISolone acetate (DEPO-MEDROL) injection 40 mg      The patient is to call for any concerns or return if any of the signs or symptoms worsen. The patient is to follow-up with PCP in the next 2-3 days for repeat evaluation repeat assessment or go directly to the emergency department.      SIGNATURE: Yoli Zuniga III, PA-C

## 2022-09-19 ENCOUNTER — TELEPHONE (OUTPATIENT)
Dept: FAMILY MEDICINE CLINIC | Age: 57
End: 2022-09-19

## 2022-09-19 DIAGNOSIS — M50.90 CERVICAL DISC DISEASE: ICD-10-CM

## 2022-09-19 DIAGNOSIS — M48.062 SPINAL STENOSIS OF LUMBAR REGION WITH NEUROGENIC CLAUDICATION: ICD-10-CM

## 2022-09-19 RX ORDER — HYDROCODONE BITARTRATE AND ACETAMINOPHEN 7.5; 325 MG/1; MG/1
1 TABLET ORAL EVERY 12 HOURS PRN
Qty: 60 TABLET | Refills: 0 | Status: SHIPPED
Start: 2022-09-19 | End: 2022-10-17

## 2022-09-19 NOTE — TELEPHONE ENCOUNTER
Flory is asking for permission to change the fill date on the Norco Rx from 9/2. It has been more than 14 days, okay to change fill date to today?     [phone]

## 2022-09-19 NOTE — TELEPHONE ENCOUNTER
I have tried calling the pharmacy 3 times but every time they put me on hold. I waited for over 13 mins and had to hang up. The option to leave a message is not working, it connects to the pharmacy and then you are put on hold.

## 2022-09-19 NOTE — TELEPHONE ENCOUNTER
I was able to speak to someone and the pharmacist is giving vaccines and is not available. Can you send a new Rx?
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
## 2022-09-27 ENCOUNTER — OFFICE VISIT (OUTPATIENT)
Dept: NEUROLOGY | Age: 57
End: 2022-09-27
Payer: COMMERCIAL

## 2022-09-27 VITALS
OXYGEN SATURATION: 100 % | BODY MASS INDEX: 21.33 KG/M2 | SYSTOLIC BLOOD PRESSURE: 140 MMHG | HEIGHT: 65 IN | HEART RATE: 66 BPM | DIASTOLIC BLOOD PRESSURE: 86 MMHG | WEIGHT: 128 LBS

## 2022-09-27 DIAGNOSIS — R56.9 SEIZURE-LIKE ACTIVITY (HCC): Primary | ICD-10-CM

## 2022-09-27 DIAGNOSIS — R41.3 MEMORY LOSS: ICD-10-CM

## 2022-09-27 DIAGNOSIS — F44.5 PSYCHOGENIC NONEPILEPTIC SEIZURE: ICD-10-CM

## 2022-09-27 PROCEDURE — 99204 OFFICE O/P NEW MOD 45 MIN: CPT | Performed by: NURSE PRACTITIONER

## 2022-09-30 ENCOUNTER — HOSPITAL ENCOUNTER (OUTPATIENT)
Dept: NEUROLOGY | Age: 57
Discharge: HOME | End: 2022-09-30
Payer: COMMERCIAL

## 2022-09-30 DIAGNOSIS — F44.5 PSYCHOGENIC NONEPILEPTIC SEIZURE: ICD-10-CM

## 2022-09-30 DIAGNOSIS — R56.9 SEIZURE-LIKE ACTIVITY (HCC): ICD-10-CM

## 2022-09-30 PROCEDURE — 95816 EEG AWAKE AND DROWSY: CPT

## 2022-10-10 ENCOUNTER — PROCEDURE VISIT (OUTPATIENT)
Dept: NEUROLOGY | Age: 57
End: 2022-10-10
Payer: COMMERCIAL

## 2022-10-10 DIAGNOSIS — F44.5 PSYCHOGENIC NONEPILEPTIC SEIZURE: Primary | ICD-10-CM

## 2022-10-10 PROCEDURE — 99212 OFFICE O/P EST SF 10 MIN: CPT | Performed by: PSYCHIATRY & NEUROLOGY

## 2022-10-11 NOTE — PROGRESS NOTES
EEG report    68-year-old woman, on hydrocodone, topiramate, zolpidem, alprazolam, estradiol, sertraline, quetiapine and cholecalciferol, displayed the following underlying rhythms---fairly well-organized, synchronous, 9 Hz, 30 µV alpha rhythms in both posterior regions. 18 Hz, 10 µV beta rhythms were noted in both precentral regions. There was symmetrical attenuation of the posterior alpha rhythms with eye opening. Hyperventilation was not performed. There was good driving to photic stimulation, without abnormalities. The patient did not fall asleep. Throughout this tracing, there were no focal abnormalities or epileptiform discharges.     Impression---normal awake EEG

## 2022-10-17 ENCOUNTER — TELEPHONE (OUTPATIENT)
Dept: FAMILY MEDICINE CLINIC | Age: 57
End: 2022-10-17

## 2022-10-17 DIAGNOSIS — M48.062 SPINAL STENOSIS OF LUMBAR REGION WITH NEUROGENIC CLAUDICATION: ICD-10-CM

## 2022-10-17 DIAGNOSIS — M50.90 CERVICAL DISC DISEASE: ICD-10-CM

## 2022-10-17 RX ORDER — HYDROCODONE BITARTRATE AND ACETAMINOPHEN 7.5; 325 MG/1; MG/1
1 TABLET ORAL EVERY 12 HOURS PRN
Qty: 60 TABLET | Refills: 0 | Status: SHIPPED | OUTPATIENT
Start: 2022-10-17 | End: 2022-11-16

## 2022-10-17 RX ORDER — HYDROCODONE BITARTRATE AND ACETAMINOPHEN 7.5; 325 MG/1; MG/1
1 TABLET ORAL EVERY 12 HOURS PRN
Qty: 60 TABLET | Refills: 0 | Status: SHIPPED | OUTPATIENT
Start: 2022-11-14 | End: 2022-12-14

## 2022-11-03 DIAGNOSIS — I10 PRIMARY HYPERTENSION: ICD-10-CM

## 2022-11-03 RX ORDER — AMLODIPINE BESYLATE 5 MG/1
5 TABLET ORAL DAILY
Qty: 90 TABLET | Refills: 1 | Status: SHIPPED | OUTPATIENT
Start: 2022-11-03

## 2022-12-01 DIAGNOSIS — F51.01 PRIMARY INSOMNIA: ICD-10-CM

## 2022-12-01 DIAGNOSIS — M48.062 SPINAL STENOSIS OF LUMBAR REGION WITH NEUROGENIC CLAUDICATION: ICD-10-CM

## 2022-12-01 DIAGNOSIS — F41.1 GAD (GENERALIZED ANXIETY DISORDER): ICD-10-CM

## 2022-12-01 RX ORDER — ALPRAZOLAM 0.5 MG/1
0.5 TABLET ORAL 3 TIMES DAILY PRN
Qty: 270 TABLET | Refills: 0 | OUTPATIENT
Start: 2022-12-01 | End: 2023-03-01

## 2022-12-01 RX ORDER — ZOLPIDEM TARTRATE 12.5 MG/1
12.5 TABLET, FILM COATED, EXTENDED RELEASE ORAL NIGHTLY PRN
Qty: 90 TABLET | Refills: 0 | Status: SHIPPED | OUTPATIENT
Start: 2022-12-01 | End: 2023-03-01

## 2022-12-01 RX ORDER — ALPRAZOLAM 0.5 MG/1
0.5 TABLET ORAL 3 TIMES DAILY PRN
Qty: 270 TABLET | Refills: 0 | Status: SHIPPED | OUTPATIENT
Start: 2022-12-01 | End: 2023-03-01

## 2022-12-01 RX ORDER — CARISOPRODOL 350 MG/1
350 TABLET ORAL 3 TIMES DAILY PRN
Qty: 270 TABLET | Refills: 0 | Status: SHIPPED | OUTPATIENT
Start: 2022-12-01 | End: 2023-03-01

## 2022-12-01 RX ORDER — ZOLPIDEM TARTRATE 12.5 MG/1
12.5 TABLET, FILM COATED, EXTENDED RELEASE ORAL NIGHTLY PRN
Qty: 90 TABLET | Refills: 0 | OUTPATIENT
Start: 2022-12-01 | End: 2023-03-01

## 2022-12-01 NOTE — TELEPHONE ENCOUNTER
The pt is calling because her dad is in the hospital and she isn't sure if she is going to make it to her appointment tomorrow, she is asking if she can have a refill of these meds because she doesn't have any left

## 2022-12-12 DIAGNOSIS — M50.90 CERVICAL DISC DISEASE: ICD-10-CM

## 2022-12-12 DIAGNOSIS — M48.062 SPINAL STENOSIS OF LUMBAR REGION WITH NEUROGENIC CLAUDICATION: ICD-10-CM

## 2022-12-12 DIAGNOSIS — F41.0 PANIC DISORDER WITHOUT AGORAPHOBIA: ICD-10-CM

## 2022-12-13 RX ORDER — HYDROCODONE BITARTRATE AND ACETAMINOPHEN 7.5; 325 MG/1; MG/1
1 TABLET ORAL EVERY 12 HOURS PRN
Qty: 60 TABLET | Refills: 0 | Status: SHIPPED | OUTPATIENT
Start: 2022-12-13 | End: 2023-01-12

## 2022-12-13 NOTE — TELEPHONE ENCOUNTER
Last Appointment:  9/2/2022  Future Appointments   Date Time Provider Luz Marina Castillo   12/13/2022  1:00 PM MADHU Gilliam - [facility] -   2/6/2023 11:30 AM DO KIERSTEN Manrique [facility]

## 2023-02-20 DIAGNOSIS — F51.01 PRIMARY INSOMNIA: ICD-10-CM

## 2023-02-20 DIAGNOSIS — F41.1 GAD (GENERALIZED ANXIETY DISORDER): ICD-10-CM

## 2023-02-20 DIAGNOSIS — M48.062 SPINAL STENOSIS OF LUMBAR REGION WITH NEUROGENIC CLAUDICATION: ICD-10-CM

## 2023-02-20 RX ORDER — CARISOPRODOL 350 MG/1
350 TABLET ORAL 3 TIMES DAILY PRN
Qty: 270 TABLET | Refills: 0 | Status: SHIPPED | OUTPATIENT
Start: 2023-02-20 | End: 2023-05-21

## 2023-02-20 RX ORDER — ZOLPIDEM TARTRATE 12.5 MG/1
12.5 TABLET, FILM COATED, EXTENDED RELEASE ORAL NIGHTLY PRN
Qty: 90 TABLET | Refills: 0 | Status: SHIPPED | OUTPATIENT
Start: 2023-02-20 | End: 2023-05-21

## 2023-02-20 RX ORDER — ALPRAZOLAM 0.5 MG/1
0.5 TABLET ORAL 3 TIMES DAILY PRN
Qty: 270 TABLET | Refills: 0 | Status: SHIPPED | OUTPATIENT
Start: 2023-02-20 | End: 2023-05-21

## 2023-02-20 NOTE — TELEPHONE ENCOUNTER
Last Appointment:  9/2/2022  Future Appointments   Date Time Provider Luz Marina Castillo   4/4/2023  9:30 AM DO KIERSTEN Pascual [facility]

## 2023-03-01 ENCOUNTER — OFFICE VISIT (OUTPATIENT)
Dept: FAMILY MEDICINE CLINIC | Age: 58
End: 2023-03-01

## 2023-03-01 VITALS
HEART RATE: 55 BPM | BODY MASS INDEX: 22.13 KG/M2 | DIASTOLIC BLOOD PRESSURE: 78 MMHG | OXYGEN SATURATION: 100 % | TEMPERATURE: 97.5 F | WEIGHT: 133 LBS | SYSTOLIC BLOOD PRESSURE: 120 MMHG

## 2023-03-01 DIAGNOSIS — M54.50 ACUTE LEFT-SIDED LOW BACK PAIN WITHOUT SCIATICA: Primary | ICD-10-CM

## 2023-03-01 RX ORDER — KETOROLAC TROMETHAMINE 15 MG/ML
30 INJECTION, SOLUTION INTRAMUSCULAR; INTRAVENOUS ONCE
Status: COMPLETED | OUTPATIENT
Start: 2023-03-01 | End: 2023-03-01

## 2023-03-01 RX ORDER — METHYLPREDNISOLONE ACETATE 40 MG/ML
80 INJECTION, SUSPENSION INTRA-ARTICULAR; INTRALESIONAL; INTRAMUSCULAR; SOFT TISSUE ONCE
Status: COMPLETED | OUTPATIENT
Start: 2023-03-01 | End: 2023-03-01

## 2023-03-01 RX ORDER — PREDNISONE 20 MG/1
TABLET ORAL
Qty: 18 TABLET | Refills: 0 | Status: SHIPPED | OUTPATIENT
Start: 2023-03-01

## 2023-03-01 RX ADMIN — KETOROLAC TROMETHAMINE 30 MG: 15 INJECTION, SOLUTION INTRAMUSCULAR; INTRAVENOUS at 12:31

## 2023-03-01 RX ADMIN — METHYLPREDNISOLONE ACETATE 80 MG: 40 INJECTION, SUSPENSION INTRA-ARTICULAR; INTRALESIONAL; INTRAMUSCULAR; SOFT TISSUE at 12:32

## 2023-03-01 NOTE — PROGRESS NOTES
3/1/23  Swati Altamirano : 1965 Sex: female  Age 56 y.o. Subjective:  Chief Complaint   Patient presents with    Lower Back Pain         HPI:   Swati Altamirano , 56 y.o. female presents to express care for evaluation of left low back pain    HPI  56-year-old female presents to express care for evaluation of left low back pain. The patient has had this left low back pain ongoing for the last 3 months. The patient states that her father was passing when she was doing a lot of heavy lifting. She aggravated at that time and has been somewhat nagging and ongoing pain. The patient is not having any pain that radiates down the legs. The patient states that she is not having any bladder or bowel incontinence. The patient states that 1 night she did have some stress incontinence associated but has not had any regular had loss of her bladder or bowel function. The patient is not having any abdominal pain, no dysuria, hematuria. The patient does have some chronic back issues. ROS:   Unless otherwise stated in this report the patient's positive and negative responses for review of systems for constitutional, eyes, ENT, cardiovascular, respiratory, gastrointestinal, neurological, , musculoskeletal, and integument systems and related systems to the presenting problem are either stated in the history of present illness or were not pertinent or were negative for the symptoms and/or complaints related to the presenting medical problem. Positives and pertinent negatives as per HPI. All others reviewed and are negative.       PMH:     Past Medical History:   Diagnosis Date    Anxiety     Bipolar disorder ([facility] [address]     Bipolar 1 disorder    Cervical disc disease     Cervical spondylitis with radiculitis (HCC)     C2, C7    CKD (chronic kidney disease)     Facet hypertrophy     L4/5 - S1    Lumbar canal stenosis     Migraine     Panic disorder without agoraphobia     Counselor Destiny Marroquin. Lora    Psychogenic nonepileptic seizure        Past Surgical History:   Procedure Laterality Date    ABDOMEN SURGERY N/A 04/19/2021    EXCISION LIPOMA ABDOMEN performed by Diana Saenz MD at [address]      c2-c7 Bone Kevin Grafting 2-6    COLONOSCOPY N/A 03/03/2021    COLORECTAL CANCER SCREENING, HIGH RISK performed by Diana Saenz MD at [facility] ENDOSCOPY    COLONOSCOPY N/A 12/13/2021    COLONOSCOPY WITH BIOPSY performed by Sandra Banks MD at [address] [place] [address] (CERVIX STATUS UNKNOWN)      LAPAROTOMY      X5 Ovarian Cysts, ZACARIAS-BSO (Non Cancerous)    OTHER SURGICAL HISTORY      Lumbar Epidural - [facility] 6/13/17, 6/27/17, 7/25/17    UPPER GASTROINTESTINAL ENDOSCOPY N/A 12/13/2021    EGD BIOPSY performed by Sandra Banks MD at [facility] ENDOSCOPY       Family History   Problem Relation Age of Onset    Diabetes Mother     Cirrhosis Mother         NON alcoholic    Prostate Cancer Father     Kidney Disease Sister     Cerebral Aneurysm Brother        Medications:     Current Outpatient Medications:     predniSONE (DELTASONE) 20 MG tablet, 3 tablets once daily for 3 days, 2 tablets once daily for 3 days, one tablet once daily for 3 days, Disp: 18 tablet, Rfl: 0    ALPRAZolam (XANAX) 0.5 MG tablet, Take 1 tablet by mouth 3 times daily as needed for Sleep for up to 90 days. , Disp: 270 tablet, Rfl: 0    zolpidem (AMBIEN CR) 12.5 MG extended release tablet, Take 1 tablet by mouth nightly as needed for Sleep for up to 90 days. , Disp: 90 tablet, Rfl: 0    carisoprodol (SOMA) 350 MG tablet, Take 1 tablet by mouth 3 times daily as needed for Muscle spasms for up to 90 days. , Disp: 270 tablet, Rfl: 0    sertraline (ZOLOFT) 50 MG tablet, Take 1 tablet by mouth at bedtime, Disp: 90 tablet, Rfl: 1    amLODIPine (NORVASC) 5 MG tablet, TAKE 1 TABLET BY MOUTH DAILY, Disp: 90 tablet, Rfl: 1    topiramate (TOPAMAX) 25 MG tablet, TAKE 2 TABLETS BY MOUTH EVERY NIGHT, Disp: 180 tablet, Rfl: 1    estradiol (VIVELLE) 0.1 MG/24HR, APPLY 1 PATCH TOPICALLY TO THE SKIN 2 TIMES A WEEK, Disp: 24 patch, Rfl: 5    lamoTRIgine (LAMICTAL) 100 MG tablet, TAKE 1 TABLET BY MOUTH TWICE DAILY (Patient taking differently: Take 100 mg by mouth nightly), Disp: 180 tablet, Rfl: 1    QUEtiapine (SEROQUEL) 100 MG tablet, TAKE 1 TABLET BY MOUTH TWICE DAILY (Patient taking differently: Take 100 mg by mouth nightly), Disp: 180 tablet, Rfl: 1    COLLAGEN PO, Take by mouth daily , Disp: , Rfl:     Cholecalciferol (VITAMIN D3) 125 MCG (5000 UT) TABS, Take by mouth, Disp: , Rfl:     Allergies: Allergies   Allergen Reactions    Bee Venom Anaphylaxis     Wasps    Fish-Derived Products Anaphylaxis and Swelling     Swordfish allergy    Hornet Venom Anaphylaxis    Iodine Anaphylaxis and Other (See Comments)       Social History:     Social History     Tobacco Use    Smoking status: Every Day     Packs/day: 0.25     Years: 7.00     Pack years: 1.75     Types: Cigarettes     Last attempt to quit:      Years since quittin.1    Smokeless tobacco: Never   Vaping Use    Vaping Use: Never used   Substance Use Topics    Alcohol use: No    Drug use: No       Patient lives at home. Physical Exam:     Vitals:    23 1220   BP: 120/78   Site: Right Upper Arm   Position: Sitting   Pulse: 55   Temp: 97.5 °F (36.4 °C)   TempSrc: Temporal   SpO2: 100%   Weight: 133 lb (60.3 kg)       Exam:  Physical Exam  Vital Signs reviewed and nurse's notes reviewed. The patient is not hypoxic. General: Alert, no acute distress, patient resting comfortably  Skin: warm, intact, no pallor noted  Head: Normocephalic, atraumatic  Eye: Normal conjunctiva  Respiratory: No acute distress  Abdomen: Normal bowel sounds, soft, nontender, no masses detected. No rebound, guarding, or rigidity noted.   Back: inspection of the back shows no obvious deformity, no swelling, no ecchymosis, contusion, abrasion, swelling, erythema, fluctuance or induration. Tenderness noted to diffuse left paralumbar musculature into the left SI joint and the left buttocks, no midline tenderness, no right-sided tenderness. No step offs or crepitus noted. Straight leg raise on left is positive. Straight leg raise on right is negative. DTR 2+ at patella bilaterally and symmetrically. No CVA tenderness noted bilaterally. The patient was able to stand on toes, heels, and rise from a squatting position. Pain was increased with bending and twisting at the waist.  Musculoskeletal: No obvious deformity noted to the bilateral lower extremities, there is no swelling, the patient was able to rise from a squatting position. The patient was able to ambulate. The patient had no significant weakness. Neurological: alert and oriented x4, normal sensory and motor observed. Psychiatric: Cooperative      Testing:           Medical Decision Making:     Vital signs reviewed    Past medical history reviewed. Allergies reviewed. Medications reviewed. Patient on arrival does not appear to be in any apparent distress or discomfort. The patient has been seen and evaluated. The patient does not appear to be toxic or lethargic. The patient will be treated with IM injection of methylprednisone and ketorolac. The patient will be given a tapering dose of prednisone. The patient is otherwise noted to be grossly neurologically intact. There is no evidence of incontinence. There is no abdominal pain. The patient has no red flags. The symptoms have been ongoing for 3 months. The patient is to return to express care or go directly to the emergency department should any of the signs or symptoms worsen. The patient is to followup with primary care physician in 2-3 days for repeat evaluation. The patient has no other questions or concerns at this time the patient will be discharged home.       Clinical Impression:   Melvin Huffman was seen today for lower back pain.    Diagnoses and all orders for this visit:    Acute left-sided low back pain without sciatica    Other orders  -     ketorolac (TORADOL) injection 30 mg  -     methylPREDNISolone acetate (DEPO-MEDROL) injection 80 mg  -     predniSONE (DELTASONE) 20 MG tablet; 3 tablets once daily for 3 days, 2 tablets once daily for 3 days, one tablet once daily for 3 days      The patient is to call for any concerns or return if any of the signs or symptoms worsen. The patient is to follow-up with PCP in the next 2-3 days for repeat evaluation repeat assessment or go directly to the emergency department.     SIGNATURE: Donte Jenkins III, PA-C

## 2023-03-06 ENCOUNTER — OFFICE VISIT (OUTPATIENT)
Dept: FAMILY MEDICINE CLINIC | Age: 58
End: 2023-03-06
Payer: COMMERCIAL

## 2023-03-06 ENCOUNTER — APPOINTMENT (OUTPATIENT)
Dept: MRI IMAGING | Age: 58
End: 2023-03-06
Payer: COMMERCIAL

## 2023-03-06 ENCOUNTER — HOSPITAL ENCOUNTER (EMERGENCY)
Age: 58
Discharge: HOME | End: 2023-03-06
Attending: EMERGENCY MEDICINE
Payer: COMMERCIAL

## 2023-03-06 VITALS
OXYGEN SATURATION: 96 % | HEART RATE: 72 BPM | SYSTOLIC BLOOD PRESSURE: 142 MMHG | BODY MASS INDEX: 22.53 KG/M2 | DIASTOLIC BLOOD PRESSURE: 90 MMHG | HEIGHT: 64 IN | RESPIRATION RATE: 18 BRPM | TEMPERATURE: 97.5 F | WEIGHT: 132 LBS

## 2023-03-06 VITALS
SYSTOLIC BLOOD PRESSURE: 144 MMHG | HEIGHT: 64 IN | HEART RATE: 77 BPM | DIASTOLIC BLOOD PRESSURE: 86 MMHG | RESPIRATION RATE: 14 BRPM | BODY MASS INDEX: 22.2 KG/M2 | TEMPERATURE: 99.1 F | WEIGHT: 130 LBS | OXYGEN SATURATION: 96 %

## 2023-03-06 DIAGNOSIS — M48.07 SPINAL STENOSIS OF LUMBOSACRAL REGION: Primary | ICD-10-CM

## 2023-03-06 DIAGNOSIS — M54.42 LEFT-SIDED LOW BACK PAIN WITH LEFT-SIDED SCIATICA, UNSPECIFIED CHRONICITY: Primary | ICD-10-CM

## 2023-03-06 LAB
ALBUMIN SERPL-MCNC: 4.2 G/DL (ref 3.5–5.2)
ALP BLD-CCNC: 67 U/L (ref 35–104)
ALT SERPL-CCNC: 13 U/L (ref 0–32)
ANION GAP SERPL CALCULATED.3IONS-SCNC: 10 MMOL/L (ref 7–16)
AST SERPL-CCNC: 18 U/L (ref 0–31)
BASOPHILS ABSOLUTE: 0.03 E9/L (ref 0–0.2)
BASOPHILS RELATIVE PERCENT: 0.3 % (ref 0–2)
BILIRUB SERPL-MCNC: <0.2 MG/DL (ref 0–1.2)
BUN BLDV-MCNC: 21 MG/DL (ref 6–20)
C-REACTIVE PROTEIN: <0.3 MG/DL (ref 0–0.4)
CALCIUM SERPL-MCNC: 8.9 MG/DL (ref 8.6–10.2)
CHLORIDE BLD-SCNC: 98 MMOL/L (ref 98–107)
CO2: 25 MMOL/L (ref 22–29)
CREAT SERPL-MCNC: 1 MG/DL (ref 0.5–1)
EOSINOPHILS ABSOLUTE: 0.01 E9/L (ref 0.05–0.5)
EOSINOPHILS RELATIVE PERCENT: 0.1 % (ref 0–6)
GFR SERPL CREATININE-BSD FRML MDRD: >60 ML/MIN/1.73
GLUCOSE BLD-MCNC: 89 MG/DL (ref 74–99)
HCT VFR BLD CALC: 41.6 % (ref 34–48)
HEMOGLOBIN: 13.8 G/DL (ref 11.5–15.5)
IMMATURE GRANULOCYTES #: 0.03 E9/L
IMMATURE GRANULOCYTES %: 0.3 % (ref 0–5)
LYMPHOCYTES ABSOLUTE: 0.64 E9/L (ref 1.5–4)
LYMPHOCYTES RELATIVE PERCENT: 7.1 % (ref 20–42)
MCH RBC QN AUTO: 32.6 PG (ref 26–35)
MCHC RBC AUTO-ENTMCNC: 33.2 % (ref 32–34.5)
MCV RBC AUTO: 98.3 FL (ref 80–99.9)
MONOCYTES ABSOLUTE: 0.17 E9/L (ref 0.1–0.95)
MONOCYTES RELATIVE PERCENT: 1.9 % (ref 2–12)
NEUTROPHILS ABSOLUTE: 8.17 E9/L (ref 1.8–7.3)
NEUTROPHILS RELATIVE PERCENT: 90.3 % (ref 43–80)
PDW BLD-RTO: 12 FL (ref 11.5–15)
PLATELET # BLD: 298 E9/L (ref 130–450)
PMV BLD AUTO: 10.5 FL (ref 7–12)
POTASSIUM SERPL-SCNC: 4.2 MMOL/L (ref 3.5–5)
RBC # BLD: 4.23 E12/L (ref 3.5–5.5)
SEDIMENTATION RATE, ERYTHROCYTE: 1 MM/HR (ref 0–20)
SODIUM BLD-SCNC: 133 MMOL/L (ref 132–146)
TOTAL PROTEIN: 7.2 G/DL (ref 6.4–8.3)
WBC # BLD: 9.1 E9/L (ref 4.5–11.5)

## 2023-03-06 PROCEDURE — 96374 THER/PROPH/DIAG INJ IV PUSH: CPT

## 2023-03-06 PROCEDURE — 96372 THER/PROPH/DIAG INJ SC/IM: CPT

## 2023-03-06 PROCEDURE — 6360000002 HC RX W HCPCS

## 2023-03-06 PROCEDURE — 99284 EMERGENCY DEPT VISIT MOD MDM: CPT

## 2023-03-06 PROCEDURE — 85025 COMPLETE CBC W/AUTO DIFF WBC: CPT

## 2023-03-06 PROCEDURE — 80053 COMPREHEN METABOLIC PANEL: CPT

## 2023-03-06 PROCEDURE — 3077F SYST BP >= 140 MM HG: CPT

## 2023-03-06 PROCEDURE — 99214 OFFICE O/P EST MOD 30 MIN: CPT

## 2023-03-06 PROCEDURE — 86140 C-REACTIVE PROTEIN: CPT

## 2023-03-06 PROCEDURE — 72148 MRI LUMBAR SPINE W/O DYE: CPT

## 2023-03-06 PROCEDURE — 96375 TX/PRO/DX INJ NEW DRUG ADDON: CPT

## 2023-03-06 PROCEDURE — 96376 TX/PRO/DX INJ SAME DRUG ADON: CPT

## 2023-03-06 PROCEDURE — 3080F DIAST BP >= 90 MM HG: CPT

## 2023-03-06 PROCEDURE — 85651 RBC SED RATE NONAUTOMATED: CPT

## 2023-03-06 RX ORDER — ORPHENADRINE CITRATE 30 MG/ML
60 INJECTION INTRAMUSCULAR; INTRAVENOUS ONCE
Status: COMPLETED | OUTPATIENT
Start: 2023-03-06 | End: 2023-03-06

## 2023-03-06 RX ORDER — KETOROLAC TROMETHAMINE 30 MG/ML
15 INJECTION, SOLUTION INTRAMUSCULAR; INTRAVENOUS ONCE
Status: COMPLETED | OUTPATIENT
Start: 2023-03-06 | End: 2023-03-06

## 2023-03-06 RX ADMIN — HYDROMORPHONE HYDROCHLORIDE 0.5 MG: 1 INJECTION, SOLUTION INTRAMUSCULAR; INTRAVENOUS; SUBCUTANEOUS at 19:16

## 2023-03-06 RX ADMIN — HYDROMORPHONE HYDROCHLORIDE 1 MG: 1 INJECTION, SOLUTION INTRAMUSCULAR; INTRAVENOUS; SUBCUTANEOUS at 15:51

## 2023-03-06 RX ADMIN — KETOROLAC TROMETHAMINE 15 MG: 30 INJECTION, SOLUTION INTRAMUSCULAR; INTRAVENOUS at 17:39

## 2023-03-06 RX ADMIN — ORPHENADRINE CITRATE 60 MG: 30 INJECTION INTRAMUSCULAR; INTRAVENOUS at 17:39

## 2023-03-06 ASSESSMENT — PAIN SCALES - GENERAL
PAINLEVEL_OUTOF10: 6
PAINLEVEL_OUTOF10: 8
PAINLEVEL_OUTOF10: 10

## 2023-03-06 ASSESSMENT — PAIN DESCRIPTION - LOCATION
LOCATION: BACK
LOCATION: BACK

## 2023-03-06 ASSESSMENT — PAIN DESCRIPTION - ORIENTATION: ORIENTATION: LOWER

## 2023-03-06 ASSESSMENT — PAIN - FUNCTIONAL ASSESSMENT: PAIN_FUNCTIONAL_ASSESSMENT: 0-10

## 2023-03-06 NOTE — PROGRESS NOTES
Chief Complaint   Back Pain (Thinks slipped disc)    History of Present Illness   Source of history provided by:  patient. Roseline Zelaya is a 56 y.o. old female presenting to the walk in clinic for evaluation of left sided low back pain intermittently for the past 3 months. Pt reports originally she injured the area doing some heaving moving after the death of her father. Pt was seen on office on 3/1 and placed on prednisone and given injection of steroids and Toradol. She took medications to completion and symptoms of been worsening. She now endorses urinary symptoms of difficulty controlling urination and feeling that she has to strain to get urine out. She feels at times she is not able to void fully over the past few days. She does endorse intermittent numbness in the left thigh as well as shooting pain down left leg. Pt states the pain is worsening by sitting and moving. Pt denies any abdominal pain, hematuria, N/V/D, fever, chills, HA, neck pain, recent illness, dysuria, or lethargy. ROS    Unless otherwise stated in this report or unable to obtain because of the patient's clinical or mental status as evidenced by the medical record, this patients's positive and negative responses for Review of Systems, constitutional, psych, eyes, ENT, cardiovascular, respiratory, gastrointestinal, neurological, genitourinary, musculoskeletal, integument systems and systems related to the presenting problem are either stated in the preceding or were not pertinent or were negative for the symptoms and/or complaints related to the medical problem. Physical Exam         VS:  BP (!) 142/90 (Site: Right Upper Arm, Position: Sitting, Cuff Size: Medium Adult)   Pulse 72   Temp 97.5 °F (36.4 °C) (Temporal)   Resp 18   Ht 5' 4\" (1.626 m)   Wt 132 lb (59.9 kg)   SpO2 96%   BMI 22.66 kg/m²    Oxygen Saturation Interpretation: Normal.    Constitutional:  Alert, development consistent with age.   Neck:  Normal ROM.  Supple. No TTP. Lungs:  Clear to auscultation and breath sounds equal.  Heart:  Regular rate and rhythm, normal heart sounds, without pathological murmurs, ectopy, gallops, or rubs. Abdomen:  Soft, nontender, good bowel sounds. No firm or pulsatile mass. Back: Tenderness: TTP over left lower with no appreciable midline tenderness. Swelling: No edema. Range of Motion: Decreased lateral and front to back ROM due to pain. CVA Tenderness: No CVA tenderness bilaterally. Straight leg raising:  + straight leg raise with left leg. Negative on right. Skin:  No bruising, redness, abrasions, or rashes. Distal Function:              Motor deficit: Strength 5/5 in LE's bilaterally. Sensory deficit: Distal sensation intact. Pulse deficit: Distal pulses 2+ and bounding. Calf Tenderness:  No bilateral calf tenderness. Negative Brooklynn's sign bilaterally               Reflexes: Intact at knee and achilles bilaterally. Gait:  No antalgia noted. Skin:  Normal turgor. Warm, dry, without visible rash. Neurological:  Alert and oriented. Motor functions intact. Lab / Imaging Results   (All laboratory and radiology results have been personally reviewed by myself)  Labs:  No results found for this visit on 03/06/23. Imaging: All Radiology results interpreted by Radiologist unless otherwise noted. Assessment / Plan     Impression(s):  Sandra Castrejon was seen today for back pain. Diagnoses and all orders for this visit:    Left-sided low back pain with left-sided sciatica, unspecified chronicity      Disposition:  Disposition: Discharge to ED. Pt endorsing increasing neurologic symptoms associated with the back pain. Worrisome urinary symptoms are described. Symptoms have become more frequent.  Pt advised to go to the ER for STAT imagining of her lower back with possible nerve compression which cannot be performed in the walk-in setting at this time.  agreeable to transport by private vehicle. Pt states understanding and is in agreement with this care plan. All questions answered. DEMETRIS Hilario    **This report was transcribed using voice recognition software. Every effort was made to ensure accuracy; however, inadvertent computerized transcription errors may be present.

## 2023-03-06 NOTE — ED PROVIDER NOTES
61-year-old female who denies past pertinent medical history presents emerged department for the concerns of back pain. The patient stated that she was raking leaves on Thursday with a lot of excessive bending motion. On Friday she developed excruciating low back pain that radiates down to the posterior aspect of her left leg. She denies falling or having trauma to the area. She also reports fecal/urinary continence, chills, low-grade fever. She has seen her primary care physician gave her steroids and Percocet which is not helping with the pain. She went to the urgent care and upon hearing fecal urinary continence she was told to emergency for further evaluation. He denies the following:, History of cancer, weight loss, morning back pain, worsening pain laying down flat, IV drug use, focal numbness/weakness/tingling. She denies being on blood thinners. Chief Complaint   Patient presents with    Back Pain     Previously had injured back- states that now she is having urinary and bowel accidents, hard to walk        Review of Systems   Pertinent same HPI above  Physical Exam  Vitals reviewed. Exam conducted with a chaperone present. Constitutional:       General: She is not in acute distress. Appearance: She is not ill-appearing. HENT:      Head: Normocephalic. Right Ear: External ear normal.      Left Ear: External ear normal.      Nose: Nose normal.      Mouth/Throat:      Mouth: Mucous membranes are moist.   Eyes:      General:         Right eye: No discharge. Left eye: No discharge. Conjunctiva/sclera: Conjunctivae normal.   Cardiovascular:      Rate and Rhythm: Normal rate and regular rhythm. Heart sounds: No friction rub. No gallop. Pulmonary:      Effort: No respiratory distress. Breath sounds: No stridor. Abdominal:      General: There is no distension. Tenderness: There is no abdominal tenderness. There is no guarding or rebound. Genitourinary:     Comments: No saddle anesthesia  Musculoskeletal:         General: No deformity or signs of injury. Cervical back: Normal range of motion and neck supple. No rigidity or tenderness. Skin:     Coloration: Skin is not jaundiced. Neurological:      General: No focal deficit present. Mental Status: She is alert and oriented to person, place, and time. Sensory: No sensory deficit. Motor: No weakness. Psychiatric:         Mood and Affect: Mood normal.         Behavior: Behavior normal.        Procedures       MDM     71-year-old female who denies past pertinent medical history presents emerged department for the concerns of back pain. The patient stated that she was raking leaves on Thursday with a lot of excessive bending motion. On Friday she developed excruciating low back pain that radiates down to the posterior aspect of her left leg. She denies falling or having trauma to the area. She also reports fecal/urinary continence, chills, low-grade fever. She has seen her primary care physician gave her steroids and Percocet which is not helping with the pain. She went to the urgent care and upon hearing fecal urinary continence she was told to emergency for further evaluation. He denies the following:, History of cancer, weight loss, morning back pain, worsening pain laying down flat, IV drug use, focal numbness/weakness/tingling. She denies being on blood thinners. Upon entering the patient is laying down on her back flat where she finds the most comfort with her legs bent and flexed. Muscle strength is symmetric. Sensation intact bilaterally. Saddle anesthesia is negative. On rectal exam patient did have a normal tone. I do not appreciate any lesions. Chaperone was present. Patient did have a positive straight leg test.  Heart is regular rate rhythm lungs are clear to auscultation abdomen soft nontender. No flank hematuria or Keshawn sign.   There is spinal tenderness. Due to reports of fecal and urinary incontinence patient was given an MRI which showed no fracture and no mass effect however there is a spinal stenosis at L4/L5 region. Labs were unremarkable. Labs and imaging were discussed with the patient and she does have a history of a cervical spinal stenosis. I explained to the patient to cut down on her alcohol intake and to follow-up with her new surgery and to do exercises along with keeping the pain management control with her primary care physician. I considered a spinal abscess although patient had a low-grade fever however it is not above 100 °F and no white blood cell count ESR and CRP are within normal limits. I considered cancer metastasis however the patient does not have a history of cancer and there is no reports of increased weight loss or decreased appetite. I considered fracture however it is negative on MRI and there is no trauma. I considered sciatica which may be imposed on her spinal stenosis however the treatment management is similar to spinal stenosis.       ED Course as of 03/07/23 0231   Mon Mar 06, 2023   1636 CBC with Auto Differential(!):    WBC 9.1   RBC 4.23   Hemoglobin Quant 13.8   Hematocrit 41.6   MCV 98.3   MCH 32.6   MCHC 33.2   RDW 12.0   Platelet Count 685   MPV 10.5   Neutrophils % 90.3(!)   Immature Granulocytes % 0.3   Lymphocyte % 7.1(!)   Monocytes % 1.9(!)   Eosinophils % 0.1   Basophils % 0.3   Neutrophils Absolute 8.17(!)   Immature Granulocytes # 0.03   Lymphocytes Absolute 0.64(!)   Monocytes Absolute 0.17   Eosinophils Absolute 0.01(!)   Basophils Absolute 0.03  CBC is unremarkable for a leukocytosis hemoglobin within normal limits hematocrit within normal limits platelet counts are normal limits [MN]      ED Course User Index  [MN] Chavez Castelan DO        ED Course as of 03/07/23 0231   Mon Mar 06, 2023   1636 CBC with Auto Differential(!):    WBC 9.1   RBC 4.23   Hemoglobin Quant 13.8   Hematocrit 41.6 MCV 98.3   MCH 32.6   MCHC 33.2   RDW 12.0   Platelet Count 579   MPV 10.5   Neutrophils % 90.3(!)   Immature Granulocytes % 0.3   Lymphocyte % 7.1(!)   Monocytes % 1.9(!)   Eosinophils % 0.1   Basophils % 0.3   Neutrophils Absolute 8.17(!)   Immature Granulocytes # 0.03   Lymphocytes Absolute 0.64(!)   Monocytes Absolute 0.17   Eosinophils Absolute 0.01(!)   Basophils Absolute 0.03  CBC is unremarkable for a leukocytosis hemoglobin within normal limits hematocrit within normal limits platelet counts are normal limits [MN]      ED Course User Index  [MN] Josh Natalie, DO       --------------------------------------------- PAST HISTORY ---------------------------------------------  Past Medical History:  has a past medical history of Anxiety, Bipolar disorder ([address] Cervical disc disease, Cervical spondylitis with radiculitis ([address] CKD (chronic kidney disease), Facet hypertrophy, Lumbar canal stenosis, Migraine, Panic disorder without agoraphobia, and Psychogenic nonepileptic seizure. Past Surgical History:  has a past surgical history that includes Hysterectomy; Breast reduction surgery; cervical fusion; laparotomy; other surgical history; Appendectomy; Colonoscopy (N/A, 03/03/2021); Abdomen surgery (N/A, 04/19/2021); Upper gastrointestinal endoscopy (N/A, 12/13/2021); and Colonoscopy (N/A, 12/13/2021). Social History:  reports that she has been smoking cigarettes. She has a 1.75 pack-year smoking history. She has never used smokeless tobacco. She reports that she does not drink alcohol and does not use drugs. Family History: family history includes Cerebral Aneurysm in her brother; Cirrhosis in her mother; Diabetes in her mother; Kidney Disease in her sister; Prostate Cancer in her father. The patients home medications have been reviewed.     Allergies: Bee venom, Fish-derived products, Hornet venom, and Iodine    -------------------------------------------------- RESULTS -------------------------------------------------  Labs:  Results for orders placed or performed during the hospital encounter of 03/06/23   CBC with Auto Differential   Result Value Ref Range    WBC 9.1 4.5 - 11.5 E9/L    RBC 4.23 3.50 - 5.50 E12/L    Hemoglobin 13.8 11.5 - 15.5 g/dL    Hematocrit 41.6 34.0 - 48.0 %    MCV 98.3 80.0 - 99.9 fL    MCH 32.6 26.0 - 35.0 pg    MCHC 33.2 32.0 - 34.5 %    RDW 12.0 11.5 - 15.0 fL    Platelets 309 955 - 721 E9/L    MPV 10.5 7.0 - 12.0 fL    Neutrophils % 90.3 (H) 43.0 - 80.0 %    Immature Granulocytes % 0.3 0.0 - 5.0 %    Lymphocytes % 7.1 (L) 20.0 - 42.0 %    Monocytes % 1.9 (L) 2.0 - 12.0 %    Eosinophils % 0.1 0.0 - 6.0 %    Basophils % 0.3 0.0 - 2.0 %    Neutrophils Absolute 8.17 (H) 1.80 - 7.30 E9/L    Immature Granulocytes # 0.03 E9/L    Lymphocytes Absolute 0.64 (L) 1.50 - 4.00 E9/L    Monocytes Absolute 0.17 0.10 - 0.95 E9/L    Eosinophils Absolute 0.01 (L) 0.05 - 0.50 E9/L    Basophils Absolute 0.03 0.00 - 0.20 E9/L   Comprehensive Metabolic Panel   Result Value Ref Range    Sodium 133 132 - 146 mmol/L    Potassium 4.2 3.5 - 5.0 mmol/L    Chloride 98 98 - 107 mmol/L    CO2 25 22 - 29 mmol/L    Anion Gap 10 7 - 16 mmol/L    Glucose 89 74 - 99 mg/dL    BUN 21 (H) 6 - 20 mg/dL    Creatinine 1.0 0.5 - 1.0 mg/dL    Est, Glom Filt Rate >60 >=60 mL/min/1.73    Calcium 8.9 8.6 - 10.2 mg/dL    Total Protein 7.2 6.4 - 8.3 g/dL    Albumin 4.2 3.5 - 5.2 g/dL    Total Bilirubin <0.2 0.0 - 1.2 mg/dL    Alkaline Phosphatase 67 35 - 104 U/L    ALT 13 0 - 32 U/L    AST 18 0 - 31 U/L   Sedimentation Rate   Result Value Ref Range    Sed Rate 1 0 - 20 mm/Hr   C-Reactive Protein   Result Value Ref Range    CRP <0.3 0.0 - 0.4 mg/dL       Radiology:  MRI LUMBAR SPINE WO CONTRAST   Final Result   No fracture. Degenerative changes result in mild-to-moderate spinal stenosis at L4-5. No   mass effect on the cauda equina. No significant spinal stenosis at other   levels.       No significant neural foraminal stenosis.             ------------------------- NURSING NOTES AND VITALS REVIEWED ---------------------------  Date / Time Roomed:  3/6/2023  2:26 PM  ED Bed Assignment:  17/17    The nursing notes within the ED encounter and vital signs as below have been reviewed. BP (!) 144/86   Pulse 77   Temp 99.1 °F (37.3 °C) (Oral)   Resp 14   Ht 5' 4\" (1.626 m)   Wt 130 lb (59 kg)   SpO2 96%   BMI 22.31 kg/m²   Oxygen Saturation Interpretation: Normal      ------------------------------------------ PROGRESS NOTES ------------------------------------------  I have spoken with the patient and discussed todays results, in addition to providing specific details for the plan of care and counseling regarding the diagnosis and prognosis. Their questions are answered at this time and they are agreeable with the plan. I discussed at length with them reasons for immediate return here for re evaluation. They will followup with primary care by calling their office tomorrow. --------------------------------- ADDITIONAL PROVIDER NOTES ---------------------------------  At this time the patient is without objective evidence of an acute process requiring hospitalization or inpatient management. They have remained hemodynamically stable throughout their entire ED visit and are stable for discharge with outpatient follow-up. The plan has been discussed in detail and they are aware of the specific conditions for emergent return, as well as the importance of follow-up. Discharge Medication List as of 3/6/2023  7:58 PM          Diagnosis:  1. Spinal stenosis of lumbosacral region        Disposition:  Patient's disposition: Discharge to home  Patient's condition is stable. Attending was present and available throughout encounter including all critical portions;  See Attending Note/Attestation for Final Plan      Coni Shaw DO  Resident  03/07/23 9080

## 2023-03-09 ENCOUNTER — OFFICE VISIT (OUTPATIENT)
Dept: NEUROSURGERY | Age: 58
End: 2023-03-09
Payer: COMMERCIAL

## 2023-03-09 VITALS
WEIGHT: 130 LBS | DIASTOLIC BLOOD PRESSURE: 99 MMHG | HEIGHT: 64 IN | SYSTOLIC BLOOD PRESSURE: 154 MMHG | RESPIRATION RATE: 16 BRPM | TEMPERATURE: 97.9 F | OXYGEN SATURATION: 97 % | BODY MASS INDEX: 22.2 KG/M2 | HEART RATE: 75 BPM

## 2023-03-09 DIAGNOSIS — M54.42 CHRONIC LEFT-SIDED LOW BACK PAIN WITH LEFT-SIDED SCIATICA: Primary | ICD-10-CM

## 2023-03-09 DIAGNOSIS — G89.29 CHRONIC LEFT-SIDED LOW BACK PAIN WITH LEFT-SIDED SCIATICA: Primary | ICD-10-CM

## 2023-03-09 PROCEDURE — 99203 OFFICE O/P NEW LOW 30 MIN: CPT | Performed by: PHYSICIAN ASSISTANT

## 2023-03-09 PROCEDURE — 99202 OFFICE O/P NEW SF 15 MIN: CPT

## 2023-03-09 PROCEDURE — 3080F DIAST BP >= 90 MM HG: CPT | Performed by: PHYSICIAN ASSISTANT

## 2023-03-09 PROCEDURE — 3077F SYST BP >= 140 MM HG: CPT | Performed by: PHYSICIAN ASSISTANT

## 2023-03-09 RX ORDER — HYDROCODONE BITARTRATE AND ACETAMINOPHEN 7.5; 3 MG/1; MG/1
TABLET ORAL
COMMUNITY
Start: 2023-03-01

## 2023-03-09 ASSESSMENT — ENCOUNTER SYMPTOMS
BACK PAIN: 1
ABDOMINAL PAIN: 0
PHOTOPHOBIA: 0
TROUBLE SWALLOWING: 0
SHORTNESS OF BREATH: 0

## 2023-03-09 NOTE — PROGRESS NOTES
Subjective:      Patient ID: Roseline Zelaya is a 56 y.o. female. Thalia Hinson is a 56year old female who presents to the office today as a new patient c/o low back pain with radiation down her left leg. Describes the pain as sharp, stabbing, and shooting. Patient states she has chronic back pain, but it became severe approximately 2 weeks ago. Denies injury or fall. Admits to intermittent numbness and tingling down her left leg. Walking and sitting for long periods of time makes the symptoms worse. She has tried medrol dosepack, hydrocodone, soma, and rest without significant lasting relief. Denies recent PT or MEHREEN. Denies loss of bowel or bladder, saddle anesthesia, headache, loss of dexterity, abnormal gait, fever, chills, N/V, SOB, or chest pain. MRI lumbar spine 3/6/23 demonstrates multilevel facet arthropathy with left synovial cyst at L5-S1. Moderate canal and foraminal stenosis at L4-L5. Review of Systems   Constitutional:  Negative for fever and unexpected weight change. HENT:  Negative for trouble swallowing. Eyes:  Negative for photophobia and visual disturbance. Respiratory:  Negative for shortness of breath. Cardiovascular:  Negative for chest pain. Gastrointestinal:  Negative for abdominal pain. Endocrine: Negative for heat intolerance. Genitourinary:  Negative for flank pain. Musculoskeletal:  Positive for arthralgias and back pain. Negative for myalgias and neck pain. Skin:  Negative for wound. Neurological:  Positive for weakness and numbness. Negative for headaches. Psychiatric/Behavioral:  Negative for confusion. Objective:   Physical Exam  Constitutional:       Appearance: Normal appearance. She is well-developed. HENT:      Head: Normocephalic and atraumatic. Eyes:      Extraocular Movements: Extraocular movements intact. Conjunctiva/sclera: Conjunctivae normal.      Pupils: Pupils are equal, round, and reactive to light.    Cardiovascular: Rate and Rhythm: Normal rate. Pulmonary:      Effort: Pulmonary effort is normal.   Abdominal:      General: There is no distension. Musculoskeletal:      Cervical back: Normal range of motion and neck supple. Comments: Tenderness to palpation of midline to left sided lumbar spine   Skin:     General: Skin is warm and dry. Neurological:      Mental Status: She is alert. Comments: Alert and oriented x3  CN3-12 intact  LLE 4/5 strength secondary to pain  Sensation intact to light touch     Psychiatric:         Thought Content: Thought content normal.       Assessment: This is a 56year old female presenting for acute on chronic low back pain and left leg pain/numbness. MRI demonstrates moderate stenosis at L4-L5.        Plan:      -MRI reviewed and discussed with patient in detail  -Referral placed for lumbar MEHREEN  -Continue conservative treatments at this time including medications, PT, and pain clinic  -If fail conservative treatments, follow up in neurosurgery clinic to discuss surgical options  -[facility]RS report reviewed  -Call/return sooner if symptoms worsen or new issues arise in the interim         Vira Walton PA-C

## 2023-03-14 SDOH — ECONOMIC STABILITY: INCOME INSECURITY: HOW HARD IS IT FOR YOU TO PAY FOR THE VERY BASICS LIKE FOOD, HOUSING, MEDICAL CARE, AND HEATING?: NOT VERY HARD

## 2023-03-14 SDOH — ECONOMIC STABILITY: FOOD INSECURITY: WITHIN THE PAST 12 MONTHS, YOU WORRIED THAT YOUR FOOD WOULD RUN OUT BEFORE YOU GOT MONEY TO BUY MORE.: SOMETIMES TRUE

## 2023-03-14 SDOH — ECONOMIC STABILITY: FOOD INSECURITY: WITHIN THE PAST 12 MONTHS, THE FOOD YOU BOUGHT JUST DIDN'T LAST AND YOU DIDN'T HAVE MONEY TO GET MORE.: SOMETIMES TRUE

## 2023-03-14 SDOH — ECONOMIC STABILITY: TRANSPORTATION INSECURITY
IN THE PAST 12 MONTHS, HAS LACK OF TRANSPORTATION KEPT YOU FROM MEETINGS, WORK, OR FROM GETTING THINGS NEEDED FOR DAILY LIVING?: NO

## 2023-03-14 SDOH — ECONOMIC STABILITY: HOUSING INSECURITY
IN THE LAST 12 MONTHS, WAS THERE A TIME WHEN YOU DID NOT HAVE A STEADY PLACE TO SLEEP OR SLEPT IN A SHELTER (INCLUDING NOW)?: NO

## 2023-03-16 DIAGNOSIS — R73.01 IMPAIRED FASTING GLUCOSE: ICD-10-CM

## 2023-03-16 DIAGNOSIS — E55.9 VITAMIN D INSUFFICIENCY: ICD-10-CM

## 2023-03-16 DIAGNOSIS — I10 PRIMARY HYPERTENSION: ICD-10-CM

## 2023-03-16 ASSESSMENT — ENCOUNTER SYMPTOMS
CONSTIPATION: 0
WHEEZING: 0
SHORTNESS OF BREATH: 0
COUGH: 0
VOMITING: 0
BACK PAIN: 1
NAUSEA: 0
ABDOMINAL PAIN: 0
DIARRHEA: 0

## 2023-03-17 ENCOUNTER — OFFICE VISIT (OUTPATIENT)
Dept: FAMILY MEDICINE CLINIC | Age: 58
End: 2023-03-17
Payer: COMMERCIAL

## 2023-03-17 VITALS
WEIGHT: 128 LBS | SYSTOLIC BLOOD PRESSURE: 156 MMHG | BODY MASS INDEX: 21.85 KG/M2 | TEMPERATURE: 97.8 F | HEIGHT: 64 IN | HEART RATE: 74 BPM | RESPIRATION RATE: 16 BRPM | DIASTOLIC BLOOD PRESSURE: 84 MMHG | OXYGEN SATURATION: 100 %

## 2023-03-17 DIAGNOSIS — I10 PRIMARY HYPERTENSION: Primary | ICD-10-CM

## 2023-03-17 DIAGNOSIS — E55.9 VITAMIN D INSUFFICIENCY: ICD-10-CM

## 2023-03-17 DIAGNOSIS — M50.90 CERVICAL DISC DISEASE: ICD-10-CM

## 2023-03-17 DIAGNOSIS — F41.1 GAD (GENERALIZED ANXIETY DISORDER): ICD-10-CM

## 2023-03-17 DIAGNOSIS — F31.9 BIPOLAR I DISORDER (HCC): ICD-10-CM

## 2023-03-17 DIAGNOSIS — M48.062 SPINAL STENOSIS OF LUMBAR REGION WITH NEUROGENIC CLAUDICATION: ICD-10-CM

## 2023-03-17 DIAGNOSIS — R73.01 IMPAIRED FASTING GLUCOSE: ICD-10-CM

## 2023-03-17 PROCEDURE — 99214 OFFICE O/P EST MOD 30 MIN: CPT | Performed by: FAMILY MEDICINE

## 2023-03-17 PROCEDURE — 3079F DIAST BP 80-89 MM HG: CPT | Performed by: FAMILY MEDICINE

## 2023-03-17 PROCEDURE — 3077F SYST BP >= 140 MM HG: CPT | Performed by: FAMILY MEDICINE

## 2023-03-17 RX ORDER — CYCLOBENZAPRINE HCL 10 MG
10 TABLET ORAL 3 TIMES DAILY PRN
Qty: 90 TABLET | Refills: 1 | Status: SHIPPED | OUTPATIENT
Start: 2023-03-17 | End: 2023-04-16

## 2023-03-17 ASSESSMENT — PATIENT HEALTH QUESTIONNAIRE - PHQ9
SUM OF ALL RESPONSES TO PHQ QUESTIONS 1-9: 0
4. FEELING TIRED OR HAVING LITTLE ENERGY: 0
SUM OF ALL RESPONSES TO PHQ QUESTIONS 1-9: 0
5. POOR APPETITE OR OVEREATING: 0
SUM OF ALL RESPONSES TO PHQ QUESTIONS 1-9: 0
9. THOUGHTS THAT YOU WOULD BE BETTER OFF DEAD, OR OF HURTING YOURSELF: 0
10. IF YOU CHECKED OFF ANY PROBLEMS, HOW DIFFICULT HAVE THESE PROBLEMS MADE IT FOR YOU TO DO YOUR WORK, TAKE CARE OF THINGS AT HOME, OR GET ALONG WITH OTHER PEOPLE: 0
SUM OF ALL RESPONSES TO PHQ QUESTIONS 1-9: 0
8. MOVING OR SPEAKING SO SLOWLY THAT OTHER PEOPLE COULD HAVE NOTICED. OR THE OPPOSITE, BEING SO FIGETY OR RESTLESS THAT YOU HAVE BEEN MOVING AROUND A LOT MORE THAN USUAL: 0
SUM OF ALL RESPONSES TO PHQ9 QUESTIONS 1 & 2: 0
1. LITTLE INTEREST OR PLEASURE IN DOING THINGS: 0
6. FEELING BAD ABOUT YOURSELF - OR THAT YOU ARE A FAILURE OR HAVE LET YOURSELF OR YOUR FAMILY DOWN: 0
2. FEELING DOWN, DEPRESSED OR HOPELESS: 0
7. TROUBLE CONCENTRATING ON THINGS, SUCH AS READING THE NEWSPAPER OR WATCHING TELEVISION: 0
3. TROUBLE FALLING OR STAYING ASLEEP: 0

## 2023-03-17 NOTE — PROGRESS NOTES
3/17/23  Leeann Huaser : 1965 Sex: female  Age: 56 y.o. Chief Complaint   Patient presents with    Back Pain     HPI:  56 y.o. female patient presents today for overdue follow up for chronic medical conditions, medication refills and FBW. Patient's chart, medical, surgical and medication history all reviewed. Hypertension   The patient presents today for follow up of HTN. The problem is loss of control due to intercurrent illness. Risk factors for coronary artery disease include Age > 30 and HTN. Current treatments include amlodipine (Norvase). The patient is compliant all of the time. Lifestyle changes the patient has made include  none . Today the patient is complaining of none. Bipolar  Patient complains of evaluation of anxiety disorder and post traumatic stress  disorder. She has the following anxiety symptoms: difficulty concentrating, feelings of losing control, irritable, racing thoughts. Onset of symptoms was approximately several years ago, rapidly worsening since that time. She denies current suicidal and homicidal ideation. Previous treatment includes Ativan, Zoloft, and Lamictal/Seroquel  and individual therapy. She complains of the following side effects from the treatment: none. Patient notes that April is always difficult due to the death of her son over 10 years ago. Patient has a history of psuedoseizures, but states that she had an episode of intense convulsions for \"over 10 minutes\" per her  in early 2022. She thinks episode was due to increased stress. She called 911 and was given an injection of \"something\" and then taken to the ER. Developed severe migraine and was given a migraine cocktail. Notes that all of the medications between EMT and ER caused her to be very groggy and could not remember much of the episode.   States that she had a virtual visit with someone in the ER who asked her questions about depression, anxiety and suicidal thoughts and she states she told them the truth about the past- no present feelings. She was thus pink slipped. She had a CT head that was normal and basic labs, but was transferred to psych unit in [place]. No work up for seizures done. She was discharged once they discussed in detail with her. Last visit, she had noted having increasing depressive symptoms that feel very heavy and dark. Similar to how she felt prior to her last episode. Had 3 days of severe symptoms and then slept for 2 days in a row. Having some improvement, but still very dark. Has been on this combination of medications for over 20 years. Increased Zoloft to 75 mg and feeling better overall. Chronic pain  Patient has long standing history of spinal stenosis. She has a history of cervical spine DDD after accident while cheerleading. Previously had multi-level fusion of cervical spine- now having worsening pain in upper thoracics. She takes Norco and Soma appropriately. Current dosing is effective for patient. Patient had been doing well overall, but had rapid worsening of back pain in the last few weeks. Seen in the ER and found to have mild-moderate canal stenosis at L4-5. Seen by NS and referred to PM for epidurals. Not yet scheduled. ROS:  Review of Systems   Constitutional:  Negative for chills, fatigue and fever. Respiratory:  Negative for cough, shortness of breath and wheezing. Cardiovascular:  Negative for chest pain and palpitations. Gastrointestinal:  Negative for abdominal pain, constipation, diarrhea, nausea and vomiting. Musculoskeletal:  Positive for arthralgias, back pain, gait problem, neck pain and neck stiffness. Skin:  Negative for rash. Neurological:  Negative for dizziness and headaches. Psychiatric/Behavioral:  Positive for behavioral problems, dysphoric mood and sleep disturbance. The patient is nervous/anxious. All other systems reviewed and are negative.    Current Outpatient Medications on File Prior to Visit   Medication Sig Dispense Refill    HYDROcodone-acetaminophen (NORCO) 7.5-325 MG per tablet Take 1 tablet by mouth 2 times daily for 30 days. Intended supply: 30 days Max Daily Amount: 2 tablets 60 tablet 0    predniSONE (DELTASONE) 20 MG tablet 3 tablets once daily for 3 days, 2 tablets once daily for 3 days, one tablet once daily for 3 days 18 tablet 0    ALPRAZolam (XANAX) 0.5 MG tablet Take 1 tablet by mouth 3 times daily as needed for Sleep for up to 90 days. 270 tablet 0    zolpidem (AMBIEN CR) 12.5 MG extended release tablet Take 1 tablet by mouth nightly as needed for Sleep for up to 90 days. 90 tablet 0    sertraline (ZOLOFT) 50 MG tablet Take 1 tablet by mouth at bedtime 90 tablet 1    amLODIPine (NORVASC) 5 MG tablet TAKE 1 TABLET BY MOUTH DAILY 90 tablet 1    topiramate (TOPAMAX) 25 MG tablet TAKE 2 TABLETS BY MOUTH EVERY NIGHT 180 tablet 1    estradiol (VIVELLE) 0.1 MG/24HR APPLY 1 PATCH TOPICALLY TO THE SKIN 2 TIMES A WEEK 24 patch 5    lamoTRIgine (LAMICTAL) 100 MG tablet TAKE 1 TABLET BY MOUTH TWICE DAILY (Patient taking differently: Take 100 mg by mouth nightly) 180 tablet 1    QUEtiapine (SEROQUEL) 100 MG tablet TAKE 1 TABLET BY MOUTH TWICE DAILY (Patient taking differently: Take 100 mg by mouth nightly) 180 tablet 1    COLLAGEN PO Take by mouth daily       Cholecalciferol (VITAMIN D3) 125 MCG (5000 UT) TABS Take by mouth       No current facility-administered medications on file prior to visit.        Allergies   Allergen Reactions    Bee Venom Anaphylaxis     Wasps    Fish-Derived Products Anaphylaxis and Swelling     Swordfish allergy    Hornet Venom Anaphylaxis    Iodine Anaphylaxis and Other (See Comments)       Past Medical History:   Diagnosis Date    Anxiety     Bipolar disorder ([facility] [address]     Bipolar 1 disorder    Cervical disc disease     Cervical spondylitis with radiculitis (HCC)     C2, C7    CKD (chronic kidney disease)     Facet hypertrophy     L4/5 - S1 Lumbar canal stenosis     Migraine     Panic disorder without agoraphobia     Counselor Gabriele Perez    Psychogenic nonepileptic seizure      Past Surgical History:   Procedure Laterality Date    ABDOMEN SURGERY N/A 2021    EXCISION LIPOMA ABDOMEN performed by Sara Dowling MD at [facility]      c2-c7 Bone Kevin Grafting 2-6    COLONOSCOPY N/A 2021    COLORECTAL CANCER SCREENING, HIGH RISK performed by Sara Dowling MD at [facility] N/A 2021    COLONOSCOPY WITH BIOPSY performed by Jamie Avalos MD at [address] [place] [address] ([address] [place] [address])      LAPAROTOMY      X5 Ovarian Cysts, ZACARIAS-BSO (Non Cancerous)    OTHER SURGICAL HISTORY      Lumbar Epidural - Donatelli 17, 17, 17    UPPER GASTROINTESTINAL ENDOSCOPY N/A 2021    EGD BIOPSY performed by Jamie Avalos MD at [facility] ENDOSCOPY     Family History   Problem Relation Age of Onset    Diabetes Mother     Cirrhosis Mother         NON alcoholic    Prostate Cancer Father     Kidney Disease Sister     Cerebral Aneurysm Brother      Social History     Socioeconomic History    Marital status:      Spouse name: Not on file    Number of children: Not on file    Years of education: Not on file    Highest education level: Not on file   Occupational History    Not on file   Tobacco Use    Smoking status: Former     Packs/day: 0.25     Years: 7.00     Pack years: 1.75     Types: Cigarettes     Quit date: 2023     Years since quittin.1    Smokeless tobacco: Never   Vaping Use    Vaping Use: Never used   Substance and Sexual Activity    Alcohol use: No    Drug use: No    Sexual activity: Not on file   Other Topics Concern    Not on file   Social History Narrative    Not on file     Social Determinants of Health     Financial Resource Strain: Not on file   Food Insecurity: Not on file   Transportation Needs: Not on file   Physical Activity: Not on file   Stress: Not on file   Social Connections: Not on file   Intimate Partner Violence: Not on file   Housing Stability: Not on file       Vitals:    03/17/23 0952   BP: (!) 156/84   Pulse: 74   Resp: 16   Temp: 97.8 °F (36.6 °C)   TempSrc: Temporal   SpO2: 100%   Weight: 128 lb (58.1 kg)   Height: 5' 4\" (1.626 m)       Physical Exam:  Physical Exam  Vitals and nursing note reviewed. Constitutional:       General: She is not in acute distress. Appearance: Normal appearance. She is well-developed and normal weight. She is not ill-appearing. HENT:      Head: Normocephalic and atraumatic. Right Ear: Hearing and external ear normal.      Left Ear: Hearing and external ear normal.      Nose: Nose normal.      Mouth/Throat:      Mouth: Mucous membranes are moist.   Eyes:      General: Lids are normal. No scleral icterus. Extraocular Movements: Extraocular movements intact. Conjunctiva/sclera: Conjunctivae normal.   Neck:      Thyroid: No thyromegaly. Vascular: No carotid bruit. Cardiovascular:      Rate and Rhythm: Normal rate and regular rhythm. Heart sounds: Normal heart sounds. No murmur heard. Pulmonary:      Effort: Pulmonary effort is normal. No respiratory distress. Breath sounds: Normal breath sounds. No wheezing. Musculoskeletal:         General: No tenderness. Normal range of motion. Right hand: Deformity (thickening of palmar aponeurosis at 4-5th MCP) present. Left hand: Deformity (thickening of palmar aponeurosis at 4-5th MCP) present. Cervical back: Normal range of motion and neck supple. Right lower leg: No edema. Left lower leg: No edema. Lymphadenopathy:      Cervical: No cervical adenopathy. Skin:     General: Skin is warm and dry. Findings: No rash. Neurological:      General: No focal deficit present. Mental Status: She is alert and oriented to person, place, and time.       Gait: Gait normal.   Psychiatric:         Mood and Affect: Mood and affect normal.         Speech: Speech normal.         Behavior: Behavior normal.         Thought Content: Thought content normal.       Labs:  CBC with Differential:    Lab Results   Component Value Date/Time    WBC 9.1 03/06/2023 04:10 PM    RBC 4.23 03/06/2023 04:10 PM    HGB 13.8 03/06/2023 04:10 PM    HCT 41.6 03/06/2023 04:10 PM     03/06/2023 04:10 PM    MCV 98.3 03/06/2023 04:10 PM    MCH 32.6 03/06/2023 04:10 PM    MCHC 33.2 03/06/2023 04:10 PM    RDW 12.0 03/06/2023 04:10 PM    SEGSPCT 53 09/24/2011 05:50 AM    LYMPHOPCT 7.1 03/06/2023 04:10 PM    MONOPCT 1.9 03/06/2023 04:10 PM    BASOPCT 0.3 03/06/2023 04:10 PM    MONOSABS 0.17 03/06/2023 04:10 PM    LYMPHSABS 0.64 03/06/2023 04:10 PM    EOSABS 0.01 03/06/2023 04:10 PM    BASOSABS 0.03 03/06/2023 04:10 PM     CMP:    Lab Results   Component Value Date/Time     03/06/2023 04:10 PM    K 4.2 03/06/2023 04:10 PM    K 3.9 04/09/2022 05:08 PM    CL 98 03/06/2023 04:10 PM    CO2 25 03/06/2023 04:10 PM    BUN 21 03/06/2023 04:10 PM    CREATININE 1.0 03/06/2023 04:10 PM    GFRAA >60 04/09/2022 05:08 PM    LABGLOM >60 03/06/2023 04:10 PM    GLUCOSE 89 03/06/2023 04:10 PM    GLUCOSE 88 09/24/2011 05:50 AM    PROT 7.2 03/06/2023 04:10 PM    LABALBU 4.2 03/06/2023 04:10 PM    LABALBU 4.1 09/23/2011 06:45 PM    CALCIUM 8.9 03/06/2023 04:10 PM    BILITOT <0.2 03/06/2023 04:10 PM    ALKPHOS 67 03/06/2023 04:10 PM    AST 18 03/06/2023 04:10 PM    ALT 13 03/06/2023 04:10 PM     HgBA1c:    Lab Results   Component Value Date/Time    LABA1C 5.0 03/03/2022 08:38 AM     FLP:    Lab Results   Component Value Date/Time    TRIG 149 03/03/2022 08:38 AM    HDL 91 03/03/2022 08:38 AM    LDLCALC 110 03/03/2022 08:38 AM    LABVLDL 30 03/03/2022 08:38 AM     TSH:    Lab Results   Component Value Date/Time    TSH 2.270 03/03/2022 08:38 AM         Assessment and Plan:  Mary Mayers was seen today for back pain.     Diagnoses and all orders for this visit:    Primary hypertension  -     CBC with Auto Differential; Future  -     Comprehensive Metabolic Panel; Future  -     Lipid Panel; Future  -     TSH; Future  -     Vitamin B12 & Folate; Future  -     Urinalysis; Future  Loss of control. Will monitor again in 2 weeks to see if better with pain control. Due for fasting labs. Cervical disc disease  Stable    Bipolar I disorder (HCC)  Feels like moods are worsened because of pain. Will monitor. Spinal stenosis of lumbar region with neurogenic claudication  -     Drug Screen Multi Urine With Bup; Future  -     cyclobenzaprine (FLEXERIL) 10 MG tablet; Take 1 tablet by mouth 3 times daily as needed for Muscle spasms  -     dexamethasone (DECADRON) 0.75 MG tablet; Take 1 tablet by mouth 3 times daily for 5 days    FELA (generalized anxiety disorder)  -     Drug Screen Multi Urine With Bup; Future    Impaired fasting glucose  -     Hemoglobin A1C; Future    Vitamin D insufficiency  -     Vitamin D 25 Hydroxy; Future          Return in about 18 days (around 4/4/2023), or if symptoms worsen or fail to improve, for Chronic pain medication refills.       Seen By:  Tiffany Serna DO

## 2023-03-23 ENCOUNTER — OFFICE VISIT (OUTPATIENT)
Dept: PAIN MANAGEMENT | Age: 58
End: 2023-03-23
Payer: COMMERCIAL

## 2023-03-23 ENCOUNTER — PREP FOR PROCEDURE (OUTPATIENT)
Dept: PAIN MANAGEMENT | Age: 58
End: 2023-03-23

## 2023-03-23 VITALS
HEIGHT: 64 IN | DIASTOLIC BLOOD PRESSURE: 77 MMHG | BODY MASS INDEX: 22.2 KG/M2 | OXYGEN SATURATION: 98 % | TEMPERATURE: 97.6 F | HEART RATE: 86 BPM | RESPIRATION RATE: 16 BRPM | SYSTOLIC BLOOD PRESSURE: 123 MMHG | WEIGHT: 130 LBS

## 2023-03-23 DIAGNOSIS — M51.36 DDD (DEGENERATIVE DISC DISEASE), LUMBAR: Primary | ICD-10-CM

## 2023-03-23 DIAGNOSIS — M71.38 SYNOVIAL CYST OF LUMBAR FACET JOINT: Primary | ICD-10-CM

## 2023-03-23 DIAGNOSIS — F54 PSYCHOLOGICAL FACTORS AFFECTING MEDICAL CONDITION: ICD-10-CM

## 2023-03-23 DIAGNOSIS — M47.817 LUMBOSACRAL SPONDYLOSIS WITHOUT MYELOPATHY: ICD-10-CM

## 2023-03-23 DIAGNOSIS — M54.16 LUMBAR RADICULAR PAIN: ICD-10-CM

## 2023-03-23 DIAGNOSIS — F11.90 CHRONIC, CONTINUOUS USE OF OPIOIDS: ICD-10-CM

## 2023-03-23 DIAGNOSIS — M71.38 SYNOVIAL CYST OF LUMBAR FACET JOINT: ICD-10-CM

## 2023-03-23 PROBLEM — M51.369 DDD (DEGENERATIVE DISC DISEASE), LUMBAR: Status: ACTIVE | Noted: 2023-03-23

## 2023-03-23 PROCEDURE — 99204 OFFICE O/P NEW MOD 45 MIN: CPT | Performed by: ANESTHESIOLOGY

## 2023-03-23 PROCEDURE — 3074F SYST BP LT 130 MM HG: CPT | Performed by: ANESTHESIOLOGY

## 2023-03-23 PROCEDURE — 3078F DIAST BP <80 MM HG: CPT | Performed by: ANESTHESIOLOGY

## 2023-03-23 RX ORDER — SODIUM CHLORIDE 9 MG/ML
INJECTION, SOLUTION INTRAVENOUS PRN
Status: CANCELLED | OUTPATIENT
Start: 2023-03-23

## 2023-03-23 RX ORDER — SODIUM CHLORIDE 0.9 % (FLUSH) 0.9 %
5-40 SYRINGE (ML) INJECTION EVERY 12 HOURS SCHEDULED
Status: CANCELLED | OUTPATIENT
Start: 2023-03-23

## 2023-03-23 RX ORDER — SODIUM CHLORIDE 0.9 % (FLUSH) 0.9 %
5-40 SYRINGE (ML) INJECTION PRN
Status: CANCELLED | OUTPATIENT
Start: 2023-03-23

## 2023-03-23 NOTE — H&P (VIEW-ONLY)
[facility]        [address] [place] [address]  Dept: [phone]          Consult Note      Patient:  Jim Pino,  1965    Date of Service:  23     Requesting Physician:  Anuradha Nelson    Reason for Consult:      Patient presents with complaints of low back and left LE pain    HISTORY OF PRESENT ILLNESS:      Ms. Jim Pino is a 56 y.o. female presented today to [facility] for evaluation of low back and left LE pain. On and off pain for many yrs with recent acute exacerbation needing ER visit on 3/6/2023- treated conservatively. Had MRI of LS spine. Evaluated by NSG and referred for interventions. Low back and left LE pain. Pain is constant and is described as aching, sharp, and stabbing. Pain does radiate to left leg. She  has numbness, tingling of the left leg     Patient does not have bladder or bowel dysfunction. Alleviating factors include: rest.  Aggravating factors include: movement, bending, lifting. Pain causes functional limitations/ limits Adl's : Yes    Nursing notes and details of the pain history reviewed. Please see intake notes for details. NOTE:    Has been on chronic Norco and Soma for many yrs. Prior ACDF  in  - stable. H/o Anxiety - follows with psych. Previous treatments:   Physical Therapy : yes, continues HEP- currently in significant  pain and exercise increases pain    Chiropractic treatment: yes,    Medications: - NSAID's : yes -            - Membrane stabilizers : yes - gabapentin- had side effects            - Opioids : yes, Norco            - Adjuvants or Others : yes, steroids/ muscle relaxants    Sine Surgeries: No LS spine surgery, Prior ACDF +    Interventional Pain procedures/ nerve blocks: yes, had helped significantly- last > 5 yrs ago in [place]. She has not been on anticoagulation medications.      H/O Smoking: no  H/O alcohol abuse : no  H/O Illicit

## 2023-03-23 NOTE — PROGRESS NOTES
Patient:  HARINI Davis 1965  Date of Service:  3/23/23      Patient presents to [facility] with complaints of lower back pain that started 1 years ago and has been getting worse. She states the pain began following No specific cause    Pain is constant and is described as aching, throbbing, shooting, stabbing, and numb. She rates the pain as a 10/10 on her worst day , 6/10 on her best day, and a 7/10 on average on the VAS scale. Pain does radiate to left leg. She  has numbness of the left buttocks. Alleviating factors include: heat and rest.  Aggravating factors include:  sitting, bending, pulling. She states that the pain does keep her from sleeping at night. She took her last dose of  flexeril   today and norco yesterday. She is not on NSAIDS and  is not on anticoagulation medications. Previous treatments: Physical Therapy, Epidural Steroid Injection, and medications. .      Personal Expectations from this treatment: increase activity and decrease pain    There were no vitals taken for this visit. No LMP recorded. Patient has had a hysterectomy.
drug use : no- has used CBD cream    Imaging:     MRI of LS spein: 3/6/2023:  FINDINGS:   BONES/ALIGNMENT:       Vertebral heights and alignment are maintained. No suspicious marrow edema is seen. SOFT TISSUES:       No paraspinal soft tissue abnormality is seen. SPINAL CORD:       Normal signal intensity of the conus. The conus is positioned at L1.       T12-L1: No significant spinal or neural foraminal stenosis are seen. L1-L2: No significant spinal or neural foraminal stenosis are seen. Tiny   disc bulge. Facet hypertrophy. L2-L3: No significant spinal or neural foraminal stenosis are seen. Tiny   disc bulge. Facet hypertrophy. L3-L4: No significant spinal or neural foraminal stenosis are seen. Tiny   disc bulge. Facet hypertrophy. L4-L5: Mild-to-moderate spinal stenosis secondary to disc bulge, facet   hypertrophy, ligamentum flavum thickening, and dorsal epidural fat. No   significant neural foraminal stenosis. Bilateral facet joint effusions. L5-S1: No significant spinal or neural foraminal stenosis are seen. Disc   bulge. Marked facet hypertrophy. Small synovial cyst associated with the   left facet joint, and located posterior to the joint. Impression   No fracture. Degenerative changes result in mild-to-moderate spinal stenosis at L4-5. No   mass effect on the cauda equina. No significant spinal stenosis at other   levels. No significant neural foraminal stenosis.            Past Medical History:   Diagnosis Date    Anxiety     Bipolar disorder ([address]     Bipolar 1 disorder    Cervical disc disease     Cervical spondylitis with radiculitis (HCC)     C2, C7    CKD (chronic kidney disease)     Facet hypertrophy     L4/5 - S1    Lumbar canal stenosis     Migraine     Panic disorder without agoraphobia     Counselor [address]    Psychogenic nonepileptic seizure        Past Surgical History:

## 2023-03-31 DIAGNOSIS — M48.062 SPINAL STENOSIS OF LUMBAR REGION WITH NEUROGENIC CLAUDICATION: ICD-10-CM

## 2023-03-31 DIAGNOSIS — R73.01 IMPAIRED FASTING GLUCOSE: ICD-10-CM

## 2023-03-31 DIAGNOSIS — I10 PRIMARY HYPERTENSION: ICD-10-CM

## 2023-03-31 DIAGNOSIS — E55.9 VITAMIN D INSUFFICIENCY: ICD-10-CM

## 2023-03-31 DIAGNOSIS — F41.1 GAD (GENERALIZED ANXIETY DISORDER): ICD-10-CM

## 2023-03-31 LAB
ALBUMIN SERPL-MCNC: 4.5 G/DL (ref 3.5–5.2)
ALP SERPL-CCNC: 67 U/L (ref 35–104)
ALT SERPL-CCNC: 23 U/L (ref 0–32)
AMPHET UR QL SCN: NOT DETECTED
ANION GAP SERPL CALCULATED.3IONS-SCNC: 11 MMOL/L (ref 7–16)
AST SERPL-CCNC: 27 U/L (ref 0–31)
BACTERIA URNS QL MICRO: ABNORMAL /HPF
BARBITURATES UR QL SCN: NOT DETECTED
BASOPHILS # BLD: 0.03 E9/L (ref 0–0.2)
BASOPHILS NFR BLD: 0.5 % (ref 0–2)
BENZODIAZ UR QL SCN: NOT DETECTED
BILIRUB SERPL-MCNC: 0.4 MG/DL (ref 0–1.2)
BILIRUB UR QL STRIP: NEGATIVE
BUN SERPL-MCNC: 22 MG/DL (ref 6–20)
BUPRENORPHINE URINE: NOT DETECTED
CALCIUM SERPL-MCNC: 9.5 MG/DL (ref 8.6–10.2)
CANNABINOIDS UR QL SCN: NOT DETECTED
CHLORIDE SERPL-SCNC: 102 MMOL/L (ref 98–107)
CHOLESTEROL, TOTAL: 256 MG/DL (ref 0–199)
CLARITY UR: CLEAR
CO2 SERPL-SCNC: 25 MMOL/L (ref 22–29)
COCAINE UR QL SCN: NOT DETECTED
COLOR UR: YELLOW
CREAT SERPL-MCNC: 1.1 MG/DL (ref 0.5–1)
DRUG SCREEN COMMENT UR-IMP: NORMAL
EOSINOPHIL # BLD: 0.11 E9/L (ref 0.05–0.5)
EOSINOPHIL NFR BLD: 2 % (ref 0–6)
EPI CELLS #/AREA URNS HPF: ABNORMAL /HPF
ERYTHROCYTE [DISTWIDTH] IN BLOOD BY AUTOMATED COUNT: 12.1 FL (ref 11.5–15)
FENTANYL SCREEN, URINE: NOT DETECTED
GLUCOSE SERPL-MCNC: 82 MG/DL (ref 74–99)
GLUCOSE UR STRIP-MCNC: NEGATIVE MG/DL
HBA1C MFR BLD: 5 % (ref 4–5.6)
HCT VFR BLD AUTO: 42.9 % (ref 34–48)
HDLC SERPL-MCNC: 103 MG/DL
HGB BLD-MCNC: 14.1 G/DL (ref 11.5–15.5)
HGB UR QL STRIP: NORMAL
IMM GRANULOCYTES # BLD: 0.02 E9/L
IMM GRANULOCYTES NFR BLD: 0.4 % (ref 0–5)
KETONES UR STRIP-MCNC: NEGATIVE MG/DL
LDLC SERPL CALC-MCNC: 128 MG/DL (ref 0–99)
LEUKOCYTE ESTERASE UR QL STRIP: NEGATIVE
LYMPHOCYTES # BLD: 1.59 E9/L (ref 1.5–4)
LYMPHOCYTES NFR BLD: 28.7 % (ref 20–42)
MCH RBC QN AUTO: 33.4 PG (ref 26–35)
MCHC RBC AUTO-ENTMCNC: 32.9 % (ref 32–34.5)
MCV RBC AUTO: 101.7 FL (ref 80–99.9)
METHADONE UR QL SCN: NOT DETECTED
MONOCYTES # BLD: 0.4 E9/L (ref 0.1–0.95)
MONOCYTES NFR BLD: 7.2 % (ref 2–12)
NEUTROPHILS # BLD: 3.39 E9/L (ref 1.8–7.3)
NEUTS SEG NFR BLD: 61.2 % (ref 43–80)
NITRITE UR QL STRIP: NEGATIVE
OPIATES UR QL SCN: NOT DETECTED
OXYCODONE URINE: NOT DETECTED
PCP UR QL SCN: NOT DETECTED
PH UR STRIP: 6.5 [PH] (ref 5–9)
PLATELET # BLD AUTO: 251 E9/L (ref 130–450)
PMV BLD AUTO: 10.7 FL (ref 7–12)
POTASSIUM SERPL-SCNC: 4.6 MMOL/L (ref 3.5–5)
PROT SERPL-MCNC: 7.8 G/DL (ref 6.4–8.3)
PROT UR STRIP-MCNC: NEGATIVE MG/DL
RBC # BLD AUTO: 4.22 E12/L (ref 3.5–5.5)
RBC #/AREA URNS HPF: ABNORMAL /HPF (ref 0–2)
SODIUM SERPL-SCNC: 138 MMOL/L (ref 132–146)
SP GR UR STRIP: <=1.005 (ref 1–1.03)
TRIGL SERPL-MCNC: 124 MG/DL (ref 0–149)
TSH SERPL-MCNC: 2.08 UIU/ML (ref 0.27–4.2)
UROBILINOGEN UR STRIP-ACNC: 0.2 E.U./DL
VITAMIN D 25-HYDROXY: 73 NG/ML (ref 30–100)
VLDLC SERPL CALC-MCNC: 25 MG/DL
WBC # BLD: 5.5 E9/L (ref 4.5–11.5)
WBC #/AREA URNS HPF: ABNORMAL /HPF (ref 0–5)

## 2023-04-02 LAB
FOLATE SERPL-MCNC: 10.6 NG/ML (ref 4.8–24.2)
VIT B12 SERPL-MCNC: 325 PG/ML (ref 211–946)

## 2023-04-04 ENCOUNTER — OFFICE VISIT (OUTPATIENT)
Dept: PRIMARY CARE CLINIC | Age: 58
End: 2023-04-04
Payer: COMMERCIAL

## 2023-04-04 VITALS
SYSTOLIC BLOOD PRESSURE: 110 MMHG | WEIGHT: 133.5 LBS | HEIGHT: 64 IN | DIASTOLIC BLOOD PRESSURE: 76 MMHG | BODY MASS INDEX: 22.79 KG/M2 | OXYGEN SATURATION: 100 % | HEART RATE: 83 BPM | TEMPERATURE: 97.1 F

## 2023-04-04 DIAGNOSIS — M47.817 LUMBOSACRAL SPONDYLOSIS WITHOUT MYELOPATHY: ICD-10-CM

## 2023-04-04 DIAGNOSIS — F31.9 BIPOLAR I DISORDER (HCC): ICD-10-CM

## 2023-04-04 DIAGNOSIS — M71.38 SYNOVIAL CYST OF LUMBAR FACET JOINT: ICD-10-CM

## 2023-04-04 DIAGNOSIS — M54.16 LUMBAR RADICULAR PAIN: ICD-10-CM

## 2023-04-04 DIAGNOSIS — F41.1 GAD (GENERALIZED ANXIETY DISORDER): ICD-10-CM

## 2023-04-04 DIAGNOSIS — M48.062 SPINAL STENOSIS OF LUMBAR REGION WITH NEUROGENIC CLAUDICATION: ICD-10-CM

## 2023-04-04 DIAGNOSIS — M51.36 DDD (DEGENERATIVE DISC DISEASE), LUMBAR: ICD-10-CM

## 2023-04-04 DIAGNOSIS — I10 PRIMARY HYPERTENSION: Primary | ICD-10-CM

## 2023-04-04 PROCEDURE — 3078F DIAST BP <80 MM HG: CPT | Performed by: FAMILY MEDICINE

## 2023-04-04 PROCEDURE — 3074F SYST BP LT 130 MM HG: CPT | Performed by: FAMILY MEDICINE

## 2023-04-04 PROCEDURE — 99214 OFFICE O/P EST MOD 30 MIN: CPT | Performed by: FAMILY MEDICINE

## 2023-04-04 RX ORDER — HYDROCODONE BITARTRATE AND ACETAMINOPHEN 7.5; 325 MG/1; MG/1
1 TABLET ORAL 2 TIMES DAILY PRN
Qty: 60 TABLET | Refills: 0 | Status: SHIPPED
Start: 2023-05-02 | End: 2023-04-05 | Stop reason: ALTCHOICE

## 2023-04-04 RX ORDER — HYDROCODONE BITARTRATE AND ACETAMINOPHEN 7.5; 325 MG/1; MG/1
1 TABLET ORAL 2 TIMES DAILY PRN
COMMUNITY
End: 2023-04-04 | Stop reason: SDUPTHER

## 2023-04-04 RX ORDER — HYDROCODONE BITARTRATE AND ACETAMINOPHEN 7.5; 325 MG/1; MG/1
1 TABLET ORAL 2 TIMES DAILY PRN
Qty: 60 TABLET | Refills: 0 | Status: SHIPPED | OUTPATIENT
Start: 2023-04-04 | End: 2023-05-04

## 2023-04-04 RX ORDER — HYDROCODONE BITARTRATE AND ACETAMINOPHEN 7.5; 325 MG/1; MG/1
1 TABLET ORAL 2 TIMES DAILY PRN
Qty: 60 TABLET | Refills: 0 | Status: SHIPPED
Start: 2023-05-30 | End: 2023-04-05 | Stop reason: ALTCHOICE

## 2023-04-04 ASSESSMENT — ENCOUNTER SYMPTOMS
NAUSEA: 0
VOMITING: 0
DIARRHEA: 0
BACK PAIN: 1
CONSTIPATION: 0
SHORTNESS OF BREATH: 0
ABDOMINAL PAIN: 0
WHEEZING: 0
COUGH: 0

## 2023-04-04 NOTE — PROGRESS NOTES
agoraphobia     Counselor Alessia Watson    Psychogenic nonepileptic seizure      Past Surgical History:   Procedure Laterality Date    ABDOMEN SURGERY N/A 2021    EXCISION LIPOMA ABDOMEN performed by Yovany Ramirez MD at [address] [place], [place] [address]      c2-c7 Bone Kevin Grafting 2-6    COLONOSCOPY N/A 2021    COLORECTAL CANCER SCREENING, HIGH RISK performed by Yovany Ramirez MD at [facility] ENDOSCOPY    COLONOSCOPY N/A 2021    COLONOSCOPY WITH BIOPSY performed by Omar Billy MD at [address] [place] [address] (CERVIX STATUS UNKNOWN)      LAPAROTOMY      X5 Ovarian Cysts, ZACARIAS-BSO (Non Cancerous)    OTHER SURGICAL HISTORY      Lumbar Epidural - Donatelli 17, 17, 17    UPPER GASTROINTESTINAL ENDOSCOPY N/A 2021    EGD BIOPSY performed by Omar Billy MD at [facility] ENDOSCOPY     Family History   Problem Relation Age of Onset    Diabetes Mother     Cirrhosis Mother         NON alcoholic    Prostate Cancer Father     Kidney Disease Sister     Cerebral Aneurysm Brother      Social History     Socioeconomic History    Marital status:      Spouse name: Not on file    Number of children: Not on file    Years of education: Not on file    Highest education level: Not on file   Occupational History    Not on file   Tobacco Use    Smoking status: Former     Packs/day: 0.25     Years: 7.00     Pack years: 1.75     Types: Cigarettes     Quit date: 2023     Years since quittin.2    Smokeless tobacco: Never   Vaping Use    Vaping Use: Never used   Substance and Sexual Activity    Alcohol use:  Yes     Alcohol/week: 8.0 standard drinks     Types: 8 Glasses of wine per week    Drug use: No    Sexual activity: Not on file   Other Topics Concern    Not on file   Social History Narrative    Not on file     Social Determinants of Health     Financial Resource Strain: Not on file   Food Insecurity: Not on file   Transportation

## 2023-04-05 ENCOUNTER — TELEPHONE (OUTPATIENT)
Dept: PAIN MANAGEMENT | Age: 58
End: 2023-04-05

## 2023-04-05 NOTE — PROGRESS NOTES
[facility] PRE-ADMISSION TESTING INSTRUCTIONS    The Preadmission Testing patient is instructed accordingly using the following criteria (check applicable):    ARRIVAL INSTRUCTIONS:  [x] Parking the day of Surgery is located in the Main Entrance lot. Upon entering the door, make an immediate right to the surgery reception desk    [x] Bring photo ID and insurance card    [] Bring in a copy of Living will or Durable Power of  papers. [x] Please be sure to arrange for responsible adult to provide transportation to and from the hospital    [x] Please arrange for responsible adult to be with you for the 24 hour period post procedure due to having anesthesia    [x] If you awake am of surgery not feeling well or have temperature >100 please call [phone]    GENERAL INSTRUCTIONS:    [x] Nothing by mouth after midnight, including gum, candy, mints or water    [x] You may brush your teeth, but do not swallow any water    [x] Take medications as instructed with 1-2 oz of water    [x] Stop herbal supplements and vitamins 5 days prior to procedure    [] Follow preop dosing of blood thinners per physician instructions    [] Take 1/2 dose of evening insulin, but no insulin after midnight    [] No oral diabetic medications after midnight    [] If diabetic and have low blood sugar or feel symptomatic, take 1-2oz apple juice only    [] Bring inhalers day of surgery    [] Bring C-PAP/ Bi-Pap day of surgery    [] Bring urine specimen day of surgery    [x] Shower or bath with soap, lather and rinse well, AM of Surgery, no lotion, powders or creams to surgical site    [] Follow bowel prep as instructed per surgeon    [x] No tobacco products within 24 hours of surgery     [x] No alcohol or illegal drug use within 24 hours of surgery.     [x] Jewelry, body piercing's, eyeglasses, contact lenses and dentures are not permitted into surgery (bring cases)      [x] Please do not wear any nail polish,

## 2023-04-05 NOTE — TELEPHONE ENCOUNTER
Call to Johnna Page that procedure was approved for 4/13/2023 and that Lucyteo should call her a few days before for the pre op call and between 2:00 PM and 4:00 PM  the business day before with the arrival time. Instructed Kayleen to hold ibuprofen for 24 hours, naprosyn for 4 days and any aspirin containing products or fish oil for 7 days. Instructed to call office back if any questions. Teagan Nina verbalized understanding.     Electronically signed by Mary Pike RN on 4/5/2023 at 2:28 PM

## 2023-04-06 ENCOUNTER — ANESTHESIA (OUTPATIENT)
Dept: OPERATING ROOM | Age: 58
End: 2023-04-06
Payer: COMMERCIAL

## 2023-04-06 ENCOUNTER — HOSPITAL ENCOUNTER (OUTPATIENT)
Dept: GENERAL RADIOLOGY | Age: 58
Discharge: HOME | End: 2023-04-08
Attending: ANESTHESIOLOGY
Payer: COMMERCIAL

## 2023-04-06 ENCOUNTER — ANESTHESIA EVENT (OUTPATIENT)
Dept: OPERATING ROOM | Age: 58
End: 2023-04-06
Payer: COMMERCIAL

## 2023-04-06 ENCOUNTER — HOSPITAL ENCOUNTER (OUTPATIENT)
Age: 58
Setting detail: OUTPATIENT SURGERY
Discharge: HOME | End: 2023-04-06
Attending: ANESTHESIOLOGY | Admitting: ANESTHESIOLOGY
Payer: COMMERCIAL

## 2023-04-06 VITALS
WEIGHT: 130 LBS | HEIGHT: 64 IN | OXYGEN SATURATION: 97 % | SYSTOLIC BLOOD PRESSURE: 133 MMHG | BODY MASS INDEX: 22.2 KG/M2 | RESPIRATION RATE: 16 BRPM | HEART RATE: 82 BPM | DIASTOLIC BLOOD PRESSURE: 74 MMHG

## 2023-04-06 DIAGNOSIS — R52 PAIN MANAGEMENT: ICD-10-CM

## 2023-04-06 PROCEDURE — 6360000002 HC RX W HCPCS: Performed by: NURSE ANESTHETIST, CERTIFIED REGISTERED

## 2023-04-06 PROCEDURE — 6360000004 HC RX CONTRAST MEDICATION: Performed by: ANESTHESIOLOGY

## 2023-04-06 PROCEDURE — 3600000002 HC SURGERY LEVEL 2 BASE: Performed by: ANESTHESIOLOGY

## 2023-04-06 PROCEDURE — 3700000001 HC ADD 15 MINUTES (ANESTHESIA): Performed by: ANESTHESIOLOGY

## 2023-04-06 PROCEDURE — 2500000003 HC RX 250 WO HCPCS: Performed by: ANESTHESIOLOGY

## 2023-04-06 PROCEDURE — 3600000012 HC SURGERY LEVEL 2 ADDTL 15MIN: Performed by: ANESTHESIOLOGY

## 2023-04-06 PROCEDURE — 6360000002 HC RX W HCPCS: Performed by: ANESTHESIOLOGY

## 2023-04-06 PROCEDURE — 3209999900 FLUORO FOR SURGICAL PROCEDURES

## 2023-04-06 PROCEDURE — 2580000003 HC RX 258: Performed by: NURSE ANESTHETIST, CERTIFIED REGISTERED

## 2023-04-06 PROCEDURE — 3700000000 HC ANESTHESIA ATTENDED CARE: Performed by: ANESTHESIOLOGY

## 2023-04-06 PROCEDURE — 7100000011 HC PHASE II RECOVERY - ADDTL 15 MIN: Performed by: ANESTHESIOLOGY

## 2023-04-06 PROCEDURE — 2709999900 HC NON-CHARGEABLE SUPPLY: Performed by: ANESTHESIOLOGY

## 2023-04-06 PROCEDURE — 7100000010 HC PHASE II RECOVERY - FIRST 15 MIN: Performed by: ANESTHESIOLOGY

## 2023-04-06 RX ORDER — FENTANYL CITRATE 50 UG/ML
INJECTION, SOLUTION INTRAMUSCULAR; INTRAVENOUS PRN
Status: DISCONTINUED | OUTPATIENT
Start: 2023-04-06 | End: 2023-04-06 | Stop reason: SDUPTHER

## 2023-04-06 RX ORDER — SODIUM CHLORIDE 9 MG/ML
INJECTION, SOLUTION INTRAVENOUS PRN
Status: DISCONTINUED | OUTPATIENT
Start: 2023-04-06 | End: 2023-04-06 | Stop reason: HOSPADM

## 2023-04-06 RX ORDER — LIDOCAINE HYDROCHLORIDE 5 MG/ML
INJECTION, SOLUTION INFILTRATION; INTRAVENOUS PRN
Status: DISCONTINUED | OUTPATIENT
Start: 2023-04-06 | End: 2023-04-06 | Stop reason: ALTCHOICE

## 2023-04-06 RX ORDER — FENTANYL CITRATE 50 UG/ML
INJECTION, SOLUTION INTRAMUSCULAR; INTRAVENOUS
Status: COMPLETED
Start: 2023-04-06 | End: 2023-04-06

## 2023-04-06 RX ORDER — DIPHENHYDRAMINE HYDROCHLORIDE 50 MG/ML
INJECTION INTRAMUSCULAR; INTRAVENOUS
Status: COMPLETED
Start: 2023-04-06 | End: 2023-04-06

## 2023-04-06 RX ORDER — DIPHENHYDRAMINE HYDROCHLORIDE 50 MG/ML
INJECTION INTRAMUSCULAR; INTRAVENOUS PRN
Status: DISCONTINUED | OUTPATIENT
Start: 2023-04-06 | End: 2023-04-06 | Stop reason: SDUPTHER

## 2023-04-06 RX ORDER — MIDAZOLAM HYDROCHLORIDE 1 MG/ML
INJECTION INTRAMUSCULAR; INTRAVENOUS PRN
Status: DISCONTINUED | OUTPATIENT
Start: 2023-04-06 | End: 2023-04-06 | Stop reason: SDUPTHER

## 2023-04-06 RX ORDER — SODIUM CHLORIDE 0.9 % (FLUSH) 0.9 %
5-40 SYRINGE (ML) INJECTION PRN
Status: DISCONTINUED | OUTPATIENT
Start: 2023-04-06 | End: 2023-04-06 | Stop reason: HOSPADM

## 2023-04-06 RX ORDER — SODIUM CHLORIDE 0.9 % (FLUSH) 0.9 %
5-40 SYRINGE (ML) INJECTION EVERY 12 HOURS SCHEDULED
Status: DISCONTINUED | OUTPATIENT
Start: 2023-04-06 | End: 2023-04-06 | Stop reason: HOSPADM

## 2023-04-06 RX ORDER — METHYLPREDNISOLONE ACETATE 40 MG/ML
INJECTION, SUSPENSION INTRA-ARTICULAR; INTRALESIONAL; INTRAMUSCULAR; SOFT TISSUE PRN
Status: DISCONTINUED | OUTPATIENT
Start: 2023-04-06 | End: 2023-04-06 | Stop reason: ALTCHOICE

## 2023-04-06 RX ORDER — SODIUM CHLORIDE 9 MG/ML
INJECTION, SOLUTION INTRAVENOUS CONTINUOUS PRN
Status: DISCONTINUED | OUTPATIENT
Start: 2023-04-06 | End: 2023-04-06 | Stop reason: SDUPTHER

## 2023-04-06 RX ORDER — FENTANYL CITRATE 50 UG/ML
100 INJECTION, SOLUTION INTRAMUSCULAR; INTRAVENOUS ONCE AS NEEDED
Status: COMPLETED | OUTPATIENT
Start: 2023-04-06 | End: 2023-04-06

## 2023-04-06 RX ADMIN — MIDAZOLAM 1 MG: 1 INJECTION INTRAMUSCULAR; INTRAVENOUS at 14:01

## 2023-04-06 RX ADMIN — MIDAZOLAM 1 MG: 1 INJECTION INTRAMUSCULAR; INTRAVENOUS at 13:58

## 2023-04-06 RX ADMIN — FENTANYL CITRATE 50 MCG: 50 INJECTION, SOLUTION INTRAMUSCULAR; INTRAVENOUS at 14:10

## 2023-04-06 RX ADMIN — FENTANYL CITRATE 100 MCG: 50 INJECTION INTRAMUSCULAR; INTRAVENOUS at 13:15

## 2023-04-06 RX ADMIN — DIPHENHYDRAMINE HYDROCHLORIDE 25 MG: 50 INJECTION, SOLUTION INTRAMUSCULAR; INTRAVENOUS at 14:09

## 2023-04-06 RX ADMIN — FENTANYL CITRATE 100 MCG: 50 INJECTION, SOLUTION INTRAMUSCULAR; INTRAVENOUS at 14:03

## 2023-04-06 RX ADMIN — SODIUM CHLORIDE: 9 INJECTION, SOLUTION INTRAVENOUS at 13:54

## 2023-04-06 ASSESSMENT — PAIN DESCRIPTION - DESCRIPTORS
DESCRIPTORS: ACHING;CRAMPING;DISCOMFORT;SORE;TENDER
DESCRIPTORS: DISCOMFORT

## 2023-04-06 ASSESSMENT — PAIN SCALES - GENERAL: PAINLEVEL_OUTOF10: 9

## 2023-04-06 ASSESSMENT — PAIN - FUNCTIONAL ASSESSMENT: PAIN_FUNCTIONAL_ASSESSMENT: 0-10

## 2023-04-06 ASSESSMENT — PAIN DESCRIPTION - LOCATION: LOCATION: BACK

## 2023-04-06 ASSESSMENT — LIFESTYLE VARIABLES: SMOKING_STATUS: 0

## 2023-04-06 ASSESSMENT — PAIN DESCRIPTION - ORIENTATION: ORIENTATION: LOWER

## 2023-04-06 NOTE — ANESTHESIA POSTPROCEDURE EVALUATION
Department of Anesthesiology  Postprocedure Note    Patient: Tanika Mcelroy  MRN: [id number]  YOB: 1965  Date of evaluation: 4/6/2023      Procedure Summary     Date: 04/06/23 Room / Location: [facility] PROCEDURE ROOM 02 / [facility]    Anesthesia Start: 4752 Anesthesia Stop: 1414    Procedure: LUMBAR TRANSFORAMINAL EPIDURAL STEROID INJECTION LEFT L4- L5 AND L5-S1 UNDER FLUOROSCOPIC GUIDANCE   +++IODINE ALLERGY+++ (Left: Back) Diagnosis:       Lumbar radiculopathy      (Lumbar radiculopathy [M54.16])    Surgeons: Hai Rivas MD Responsible Provider: Linder Epley, MD    Anesthesia Type: MAC ASA Status: 3          Anesthesia Type: MAC    Adelaida Phase I: Adelaida Score: 10    Adelaida Phase II: Adelaida Score: 10      Anesthesia Post Evaluation    Patient location during evaluation: PACU  Patient participation: complete - patient participated  Level of consciousness: awake  Pain score: 3  Airway patency: patent  Nausea & Vomiting: no nausea and no vomiting  Complications: no  Cardiovascular status: blood pressure returned to baseline  Respiratory status: acceptable  Hydration status: euvolemic

## 2023-04-06 NOTE — INTERVAL H&P NOTE
Update History & Physical    The patient's History and Physical of March 23, 2023 was reviewed with the patient and I examined the patient. There was no change. The surgical site was confirmed by the patient and me. Plan: Lumbar TFESI. The risks, benefits, expected outcome, and alternative to the recommended procedure have been discussed with the patient. Patient understands and wants to proceed with the procedure.      Electronically signed by King Toño MD on 4/6/2023

## 2023-04-06 NOTE — OP NOTE
Operative Note      Patient: Iris Mckinley  YOB: 1965  MRN: [id number]    Date of Procedure: 2023    Pre-Op Diagnosis: Lumbar radiculopathy [M54.16]    Post-Op Diagnosis: Same       Procedure(s):  LUMBAR TRANSFORAMINAL EPIDURAL STEROID INJECTION LEFT L4- L5 AND L5-S1 UNDER FLUOROSCOPIC GUIDANCE       Surgeon(s):  Babs Canales MD    Assistant:   * No surgical staff found *    Anesthesia: Monitor Anesthesia Care    Estimated Blood Loss (mL): Minimal    Complications: None    Specimens:   * No specimens in log *    Implants:  * No implants in log *      Drains: * No LDAs found *    Findings: good needle placement    Detailed Description of Procedure:   2023    Patient: Iris Mckinley  :  1965  Age:  56 y.o. Sex:  female     PRE-OPERATIVE DIAGNOSIS: Lumbar disc displacement, lumbar neural foraminal stenosis, lumbar radiculopathy. POST-OPERATIVE DIAGNOSIS: Same. PROCEDURE: Left Transforaminal epidural steroid injection under fluoroscopic guidance at foraminal level L4-5 & L5-S1 . SURGEON: Babs Canales MD    ANESTHESIA: MAC    ESTIMATED BLOOD LOSS: None.  ______________________________________________________________________  BRIEF HISTORY: Iris Mckinley comes in today for the first lumbar transforaminal epidural steroid injection under fluoroscopic guidance. The potential complications of this procedure were discussed with her again today. She has elected to undergo the aforementioned procedure. Emma complete History & Physical examination were reviewed in depth, a copy of which is in the chart. DESCRIPTION OF PROCEDURE:    After confirming written and informed consent, a time-out was performed and Emma name and date of birth, the procedure to be performed as well as the plan for the location of the needle insertion were confirmed. The patient was brought into the procedure room and placed in the prone position on the fluoroscopy table.

## 2023-04-06 NOTE — ANESTHESIA PRE PROCEDURE
Smokeless tobacco: Never   Substance Use Topics    Alcohol use: Yes     Alcohol/week: 8.0 standard drinks     Types: 8 Glasses of wine per week                                Counseling given: Not Answered      Vital Signs (Current): There were no vitals filed for this visit. BP Readings from Last 3 Encounters:   04/04/23 110/76   03/23/23 123/77   03/17/23 (!) 156/84       NPO Status:                                                                                 BMI:   Wt Readings from Last 3 Encounters:   04/05/23 130 lb (59 kg)   04/04/23 133 lb 8 oz (60.6 kg)   03/23/23 130 lb (59 kg)     There is no height or weight on file to calculate BMI.    CBC:   Lab Results   Component Value Date/Time    WBC 5.5 03/31/2023 11:47 AM    RBC 4.22 03/31/2023 11:47 AM    HGB 14.1 03/31/2023 11:47 AM    HCT 42.9 03/31/2023 11:47 AM    .7 03/31/2023 11:47 AM    RDW 12.1 03/31/2023 11:47 AM     03/31/2023 11:47 AM       CMP:   Lab Results   Component Value Date/Time     03/31/2023 11:47 AM    K 4.6 03/31/2023 11:47 AM    K 3.9 04/09/2022 05:08 PM     03/31/2023 11:47 AM    CO2 25 03/31/2023 11:47 AM    BUN 22 03/31/2023 11:47 AM    CREATININE 1.1 03/31/2023 11:47 AM    GFRAA >60 04/09/2022 05:08 PM    LABGLOM 59 03/31/2023 11:47 AM    GLUCOSE 82 03/31/2023 11:47 AM    GLUCOSE 88 09/24/2011 05:50 AM    PROT 7.8 03/31/2023 11:47 AM    CALCIUM 9.5 03/31/2023 11:47 AM    BILITOT 0.4 03/31/2023 11:47 AM    ALKPHOS 67 03/31/2023 11:47 AM    AST 27 03/31/2023 11:47 AM    ALT 23 03/31/2023 11:47 AM       POC Tests: No results for input(s): POCGLU, POCNA, POCK, POCCL, POCBUN, POCHEMO, POCHCT in the last 72 hours.     Coags:   Lab Results   Component Value Date/Time    PROTIME 10.9 09/23/2011 06:45 PM    INR 1.1 09/23/2011 06:45 PM    APTT 31.0 09/23/2011 06:45 PM       HCG (If Applicable):   Lab Results   Component Value Date    PREGSERUM NEGATIVE 09/23/2011

## 2023-04-06 NOTE — DISCHARGE INSTRUCTIONS
Kamala Blank Block/Radiofrequency  Home Going Instructions    1-Go home, rest for the remainder of the day  2-Please do not lift over 20 pounds the day of the injection  3-If you received sedation No: alcohol, driving, operating lawn mowers, plows, tractors or other dangerous equipment until next morning. Do not make important decisions or sign legal documents for 24 hours. You may experience light headedness, dizziness, nausea or sleepiness after sedation. Do not stay alone. A responsible adult must be with you for 24 hours. You could be nauseated from the medications you have received. Your IV site may be sore and bruised. 4-No dietary restrictions     5-Resume all medications the same day, blood thinners to be resumed 24 hours after injection if you were instructed to stop any. 6-Keep the surgical site clean and dry, you may shower the next morning and remove the      dressing. 7- No sitz baths, tub baths or hot tubs/swimming for 24 hours. 8- If you have any pain at the injection site(s), application of an ice pack to the area should be       helpful, 20 minutes on/20 minutes off for next 48 hours. 9- Call [facility] immediately at if you develop.   Fever greater than 100.4 F  Have bleeding or drainage from the puncture site  Have progressive Leg/arm numbness and or weakness  Loss of control of bowel and or bladder (wet/soil yourself)  Severe headache with inability to lift head  10-You may return to work the next day

## 2023-04-19 ENCOUNTER — TELEPHONE (OUTPATIENT)
Dept: PAIN MANAGEMENT | Age: 58
End: 2023-04-19

## 2023-04-20 RX ORDER — NABUMETONE 750 MG/1
750 TABLET, FILM COATED ORAL 2 TIMES DAILY PRN
Qty: 40 TABLET | Refills: 1 | Status: SHIPPED | OUTPATIENT
Start: 2023-04-20

## 2023-04-26 NOTE — PROGRESS NOTES
Discharge instructions reviewed;sedation instructions provided Lorenzo/Neonatologist Keith/Neonatologist

## 2023-05-04 DIAGNOSIS — M47.817 LUMBOSACRAL SPONDYLOSIS WITHOUT MYELOPATHY: ICD-10-CM

## 2023-05-04 DIAGNOSIS — M48.062 SPINAL STENOSIS OF LUMBAR REGION WITH NEUROGENIC CLAUDICATION: ICD-10-CM

## 2023-05-04 RX ORDER — HYDROCODONE BITARTRATE AND ACETAMINOPHEN 7.5; 325 MG/1; MG/1
1 TABLET ORAL 2 TIMES DAILY PRN
Qty: 60 TABLET | Refills: 0 | OUTPATIENT
Start: 2023-05-04 | End: 2023-06-03

## 2023-05-17 ENCOUNTER — OFFICE VISIT (OUTPATIENT)
Dept: PAIN MANAGEMENT | Age: 58
End: 2023-05-17
Payer: COMMERCIAL

## 2023-05-17 ENCOUNTER — PREP FOR PROCEDURE (OUTPATIENT)
Dept: PAIN MANAGEMENT | Age: 58
End: 2023-05-17

## 2023-05-17 VITALS
SYSTOLIC BLOOD PRESSURE: 136 MMHG | BODY MASS INDEX: 19.26 KG/M2 | DIASTOLIC BLOOD PRESSURE: 88 MMHG | OXYGEN SATURATION: 98 % | RESPIRATION RATE: 16 BRPM | TEMPERATURE: 98.2 F | WEIGHT: 130 LBS | HEART RATE: 92 BPM | HEIGHT: 69 IN

## 2023-05-17 DIAGNOSIS — M47.817 LUMBOSACRAL SPONDYLOSIS WITHOUT MYELOPATHY: Primary | ICD-10-CM

## 2023-05-17 DIAGNOSIS — M71.38 SYNOVIAL CYST OF LUMBAR FACET JOINT: ICD-10-CM

## 2023-05-17 DIAGNOSIS — M51.36 DDD (DEGENERATIVE DISC DISEASE), LUMBAR: ICD-10-CM

## 2023-05-17 PROCEDURE — 99213 OFFICE O/P EST LOW 20 MIN: CPT | Performed by: ANESTHESIOLOGY

## 2023-05-17 PROCEDURE — 3079F DIAST BP 80-89 MM HG: CPT | Performed by: ANESTHESIOLOGY

## 2023-05-17 PROCEDURE — 3075F SYST BP GE 130 - 139MM HG: CPT | Performed by: ANESTHESIOLOGY

## 2023-05-17 RX ORDER — SODIUM CHLORIDE 0.9 % (FLUSH) 0.9 %
5-40 SYRINGE (ML) INJECTION PRN
Status: CANCELLED | OUTPATIENT
Start: 2023-05-17

## 2023-05-17 RX ORDER — SODIUM CHLORIDE 0.9 % (FLUSH) 0.9 %
5-40 SYRINGE (ML) INJECTION EVERY 12 HOURS SCHEDULED
Status: CANCELLED | OUTPATIENT
Start: 2023-05-17

## 2023-05-17 RX ORDER — SODIUM CHLORIDE 9 MG/ML
INJECTION, SOLUTION INTRAVENOUS PRN
Status: CANCELLED | OUTPATIENT
Start: 2023-05-17

## 2023-05-17 NOTE — PROGRESS NOTES
[facility] Pain Management        [address]  [facility], [address] [place] [address]  Dept: [phone]      Follow up Note      Floyd Falcon     Date of Visit:  5/17/2023    CC:  Patient presents for follow up   Chief Complaint   Patient presents with    Follow-up     LUMBAR TRANSFORAMINAL EPIDURAL STEROID INJECTION LEFT L4- L5 AND L5-S1 UNDER FLUOROSCOPIC GUIDANCE          HPI:    H/o low back and left LE pain. On and off pain for many yrs with recent acute exacerbation. Had MRI of LS spine. Evaluated by NSG and referred for interventions. Pain causes functional limitations/ limits Adl's : Yes     Nursing notes and details of the pain history reviewed. Please see intake notes for details. S/P TFESI - helped the LE pain significantly- continues to have ongoing axial low back pain. NOTE:   Has been on chronic Norco and Soma for many yrs. Prior ACDF  in 2008 - stable. H/o Anxiety - follows with psych. Previous treatments:   Physical Therapy : yes, continues HEP- currently in significant  pain and exercise increases pain     Chiropractic treatment: yes,     Medications: - NSAID's : yes -                       - Membrane stabilizers : yes - gabapentin- had side effects                       - Opioids : yes, Norco                       - Adjuvants or Others : yes, steroids/ muscle relaxants     Sine Surgeries: No LS spine surgery, Prior ACDF +      She has not been on anticoagulation medications. H/O Smoking: no  H/O alcohol abuse : no  H/O Illicit drug use : no- has used CBD cream     Imaging:      MRI of LS spein: 3/6/2023:  FINDINGS:   BONES/ALIGNMENT:       Vertebral heights and alignment are maintained. No suspicious marrow edema is seen. SOFT TISSUES:       No paraspinal soft tissue abnormality is seen. SPINAL CORD:       Normal signal intensity of the conus.        The conus is positioned at L1.       T12-L1: No significant spinal or neural foraminal stenosis are

## 2023-05-17 NOTE — PROGRESS NOTES
Johnna Page presents to the [address] [place] [address] on 5/17/2023. Teagan Nina is complaining of pain in her low back. The pain is constant. The pain is described as aching, dull, and sharp. Pain is rated on her best day at a 4, on her worst day at a 10, and on average at a 5 on the VAS scale. Any procedures since your last visit: Yes, with 40 % relief. Pacemaker or defibrillator: No     She is  on NSAIDS and is not on anticoagulation medications. Medication Contract and Consent for Opioid Use Documents Filed        No documents found                    /88   Pulse 92   Temp 98.2 °F (36.8 °C) (Infrared)   Resp 16   Ht 5' 9\" (1.753 m)   Wt 130 lb (59 kg)   SpO2 98%   BMI 19.20 kg/m²      No LMP recorded. Patient has had a hysterectomy.

## 2023-05-22 RX ORDER — TOPIRAMATE 25 MG/1
50 TABLET ORAL NIGHTLY
Qty: 180 TABLET | Refills: 1 | Status: SHIPPED | OUTPATIENT
Start: 2023-05-22

## 2023-05-23 ENCOUNTER — TELEPHONE (OUTPATIENT)
Dept: PAIN MANAGEMENT | Age: 58
End: 2023-05-23

## 2023-05-26 ENCOUNTER — APPOINTMENT (OUTPATIENT)
Dept: GENERAL RADIOLOGY | Age: 58
End: 2023-05-26

## 2023-05-26 ENCOUNTER — HOSPITAL ENCOUNTER (OUTPATIENT)
Age: 58
Setting detail: OBSERVATION
Discharge: HOME | End: 2023-05-28
Attending: EMERGENCY MEDICINE | Admitting: INTERNAL MEDICINE
Payer: COMMERCIAL

## 2023-05-26 DIAGNOSIS — R00.2 PALPITATIONS: ICD-10-CM

## 2023-05-26 DIAGNOSIS — R07.9 CHEST PAIN, UNSPECIFIED TYPE: Primary | ICD-10-CM

## 2023-05-26 LAB
ANION GAP SERPL CALCULATED.3IONS-SCNC: 13 MMOL/L (ref 7–16)
BASOPHILS # BLD: 0.03 E9/L (ref 0–0.2)
BASOPHILS NFR BLD: 0.5 % (ref 0–2)
BUN SERPL-MCNC: 12 MG/DL (ref 6–20)
CALCIUM SERPL-MCNC: 10.3 MG/DL (ref 8.6–10.2)
CHLORIDE SERPL-SCNC: 97 MMOL/L (ref 98–107)
CO2 SERPL-SCNC: 25 MMOL/L (ref 22–29)
CREAT SERPL-MCNC: 0.8 MG/DL (ref 0.5–1)
D DIMER: <200 NG/ML DDU
EKG ATRIAL RATE: 101 BPM
EKG P AXIS: 65 DEGREES
EKG P-R INTERVAL: 156 MS
EKG Q-T INTERVAL: 310 MS
EKG QRS DURATION: 70 MS
EKG QTC CALCULATION (BAZETT): 401 MS
EKG R AXIS: 66 DEGREES
EKG T AXIS: -98 DEGREES
EKG VENTRICULAR RATE: 101 BPM
EOSINOPHIL # BLD: 0.04 E9/L (ref 0.05–0.5)
EOSINOPHIL NFR BLD: 0.7 % (ref 0–6)
ERYTHROCYTE [DISTWIDTH] IN BLOOD BY AUTOMATED COUNT: 11.9 FL (ref 11.5–15)
GLUCOSE SERPL-MCNC: 98 MG/DL (ref 74–99)
HCT VFR BLD AUTO: 42.3 % (ref 34–48)
HGB BLD-MCNC: 14.4 G/DL (ref 11.5–15.5)
IMM GRANULOCYTES # BLD: 0.01 E9/L
IMM GRANULOCYTES NFR BLD: 0.2 % (ref 0–5)
LYMPHOCYTES # BLD: 1.28 E9/L (ref 1.5–4)
LYMPHOCYTES NFR BLD: 21.4 % (ref 20–42)
MCH RBC QN AUTO: 32.2 PG (ref 26–35)
MCHC RBC AUTO-ENTMCNC: 34 % (ref 32–34.5)
MCV RBC AUTO: 94.6 FL (ref 80–99.9)
MONOCYTES # BLD: 0.46 E9/L (ref 0.1–0.95)
MONOCYTES NFR BLD: 7.7 % (ref 2–12)
NEUTROPHILS # BLD: 4.15 E9/L (ref 1.8–7.3)
NEUTS SEG NFR BLD: 69.5 % (ref 43–80)
PLATELET # BLD AUTO: 308 E9/L (ref 130–450)
PMV BLD AUTO: 9.8 FL (ref 7–12)
POTASSIUM SERPL-SCNC: 3.8 MMOL/L (ref 3.5–5)
RBC # BLD AUTO: 4.47 E12/L (ref 3.5–5.5)
SODIUM SERPL-SCNC: 135 MMOL/L (ref 132–146)
T4 FREE SERPL-MCNC: 0.91 NG/DL (ref 0.93–1.7)
T4 SERPL-MCNC: 6 MCG/DL (ref 4.5–11.7)
TROPONIN, HIGH SENSITIVITY: <6 NG/L (ref 0–9)
TSH SERPL-MCNC: 5.38 UIU/ML (ref 0.27–4.2)
WBC # BLD: 6 E9/L (ref 4.5–11.5)

## 2023-05-26 PROCEDURE — 84443 ASSAY THYROID STIM HORMONE: CPT

## 2023-05-26 PROCEDURE — 85025 COMPLETE CBC W/AUTO DIFF WBC: CPT

## 2023-05-26 PROCEDURE — 71045 X-RAY EXAM CHEST 1 VIEW: CPT

## 2023-05-26 PROCEDURE — 93005 ELECTROCARDIOGRAM TRACING: CPT

## 2023-05-26 PROCEDURE — 6370000000 HC RX 637 (ALT 250 FOR IP): Performed by: HOSPITALIST

## 2023-05-26 PROCEDURE — 6360000002 HC RX W HCPCS: Performed by: INTERNAL MEDICINE

## 2023-05-26 PROCEDURE — 84439 ASSAY OF FREE THYROXINE: CPT

## 2023-05-26 PROCEDURE — G0378 HOSPITAL OBSERVATION PER HR: HCPCS

## 2023-05-26 PROCEDURE — 85378 FIBRIN DEGRADE SEMIQUANT: CPT

## 2023-05-26 PROCEDURE — 2580000003 HC RX 258

## 2023-05-26 PROCEDURE — 80048 BASIC METABOLIC PNL TOTAL CA: CPT

## 2023-05-26 PROCEDURE — 6370000000 HC RX 637 (ALT 250 FOR IP): Performed by: INTERNAL MEDICINE

## 2023-05-26 PROCEDURE — 96374 THER/PROPH/DIAG INJ IV PUSH: CPT

## 2023-05-26 PROCEDURE — 2580000003 HC RX 258: Performed by: ANESTHESIOLOGY

## 2023-05-26 PROCEDURE — 96361 HYDRATE IV INFUSION ADD-ON: CPT

## 2023-05-26 PROCEDURE — 99285 EMERGENCY DEPT VISIT HI MDM: CPT

## 2023-05-26 PROCEDURE — 82384 ASSAY THREE CATECHOLAMINES: CPT

## 2023-05-26 PROCEDURE — 6370000000 HC RX 637 (ALT 250 FOR IP)

## 2023-05-26 PROCEDURE — 84436 ASSAY OF TOTAL THYROXINE: CPT

## 2023-05-26 PROCEDURE — 96372 THER/PROPH/DIAG INJ SC/IM: CPT

## 2023-05-26 PROCEDURE — 36415 COLL VENOUS BLD VENIPUNCTURE: CPT

## 2023-05-26 PROCEDURE — 6360000002 HC RX W HCPCS

## 2023-05-26 PROCEDURE — 93010 ELECTROCARDIOGRAM REPORT: CPT | Performed by: INTERNAL MEDICINE

## 2023-05-26 PROCEDURE — 84484 ASSAY OF TROPONIN QUANT: CPT

## 2023-05-26 RX ORDER — ONDANSETRON 2 MG/ML
4 INJECTION INTRAMUSCULAR; INTRAVENOUS EVERY 6 HOURS PRN
Status: DISCONTINUED | OUTPATIENT
Start: 2023-05-26 | End: 2023-05-28 | Stop reason: HOSPADM

## 2023-05-26 RX ORDER — QUETIAPINE FUMARATE 100 MG/1
100 TABLET, FILM COATED ORAL NIGHTLY
Status: DISCONTINUED | OUTPATIENT
Start: 2023-05-26 | End: 2023-05-28 | Stop reason: HOSPADM

## 2023-05-26 RX ORDER — ONDANSETRON 4 MG/1
4 TABLET, ORALLY DISINTEGRATING ORAL EVERY 8 HOURS PRN
Status: DISCONTINUED | OUTPATIENT
Start: 2023-05-26 | End: 2023-05-28 | Stop reason: HOSPADM

## 2023-05-26 RX ORDER — ALPRAZOLAM 0.5 MG/1
0.5 TABLET ORAL 3 TIMES DAILY PRN
COMMUNITY
End: 2023-05-26 | Stop reason: SDUPTHER

## 2023-05-26 RX ORDER — CYCLOBENZAPRINE HCL 10 MG
10 TABLET ORAL 3 TIMES DAILY PRN
COMMUNITY
End: 2023-06-01 | Stop reason: SDUPTHER

## 2023-05-26 RX ORDER — HYDROCODONE BITARTRATE AND ACETAMINOPHEN 5; 325 MG/1; MG/1
1 TABLET ORAL EVERY 8 HOURS PRN
Status: DISCONTINUED | OUTPATIENT
Start: 2023-05-26 | End: 2023-05-28 | Stop reason: HOSPADM

## 2023-05-26 RX ORDER — SODIUM CHLORIDE 0.9 % (FLUSH) 0.9 %
5-40 SYRINGE (ML) INJECTION EVERY 12 HOURS SCHEDULED
Status: DISCONTINUED | OUTPATIENT
Start: 2023-05-26 | End: 2023-05-28 | Stop reason: HOSPADM

## 2023-05-26 RX ORDER — CALCIUM CARBONATE 200(500)MG
500 TABLET,CHEWABLE ORAL 2 TIMES DAILY PRN
Status: DISCONTINUED | OUTPATIENT
Start: 2023-05-26 | End: 2023-05-28 | Stop reason: HOSPADM

## 2023-05-26 RX ORDER — CARISOPRODOL 350 MG/1
350 TABLET ORAL 3 TIMES DAILY PRN
Status: ON HOLD | COMMUNITY
End: 2023-05-27 | Stop reason: HOSPADM

## 2023-05-26 RX ORDER — QUETIAPINE FUMARATE 100 MG/1
100 TABLET, FILM COATED ORAL NIGHTLY
COMMUNITY

## 2023-05-26 RX ORDER — ACETAMINOPHEN 325 MG/1
650 TABLET ORAL EVERY 6 HOURS PRN
Status: DISCONTINUED | OUTPATIENT
Start: 2023-05-26 | End: 2023-05-28 | Stop reason: HOSPADM

## 2023-05-26 RX ORDER — TIZANIDINE 4 MG/1
2 TABLET ORAL 3 TIMES DAILY
Status: DISCONTINUED | OUTPATIENT
Start: 2023-05-26 | End: 2023-05-28 | Stop reason: HOSPADM

## 2023-05-26 RX ORDER — ZOLPIDEM TARTRATE 5 MG/1
10 TABLET ORAL NIGHTLY PRN
Status: DISCONTINUED | OUTPATIENT
Start: 2023-05-26 | End: 2023-05-28 | Stop reason: HOSPADM

## 2023-05-26 RX ORDER — SERTRALINE HYDROCHLORIDE 100 MG/1
50 TABLET, FILM COATED ORAL NIGHTLY
Status: DISCONTINUED | OUTPATIENT
Start: 2023-05-26 | End: 2023-05-28 | Stop reason: HOSPADM

## 2023-05-26 RX ORDER — 0.9 % SODIUM CHLORIDE 0.9 %
1000 INTRAVENOUS SOLUTION INTRAVENOUS ONCE
Status: COMPLETED | OUTPATIENT
Start: 2023-05-26 | End: 2023-05-26

## 2023-05-26 RX ORDER — HYDROCODONE BITARTRATE AND ACETAMINOPHEN 7.5; 325 MG/1; MG/1
1 TABLET ORAL EVERY 12 HOURS PRN
COMMUNITY

## 2023-05-26 RX ORDER — LAMOTRIGINE 100 MG/1
100 TABLET ORAL NIGHTLY
COMMUNITY

## 2023-05-26 RX ORDER — AMLODIPINE BESYLATE 10 MG/1
10 TABLET ORAL NIGHTLY
Status: DISCONTINUED | OUTPATIENT
Start: 2023-05-26 | End: 2023-05-28 | Stop reason: HOSPADM

## 2023-05-26 RX ORDER — AMLODIPINE BESYLATE 5 MG/1
5 TABLET ORAL NIGHTLY
Status: DISCONTINUED | OUTPATIENT
Start: 2023-05-26 | End: 2023-05-26

## 2023-05-26 RX ORDER — CELECOXIB 100 MG/1
200 CAPSULE ORAL 2 TIMES DAILY PRN
Status: DISCONTINUED | OUTPATIENT
Start: 2023-05-26 | End: 2023-05-28 | Stop reason: HOSPADM

## 2023-05-26 RX ORDER — ALPRAZOLAM 0.25 MG/1
0.5 TABLET ORAL ONCE
Status: COMPLETED | OUTPATIENT
Start: 2023-05-26 | End: 2023-05-26

## 2023-05-26 RX ORDER — ACETAMINOPHEN 650 MG/1
650 SUPPOSITORY RECTAL EVERY 6 HOURS PRN
Status: DISCONTINUED | OUTPATIENT
Start: 2023-05-26 | End: 2023-05-28 | Stop reason: HOSPADM

## 2023-05-26 RX ORDER — ENOXAPARIN SODIUM 100 MG/ML
40 INJECTION SUBCUTANEOUS DAILY
Status: DISCONTINUED | OUTPATIENT
Start: 2023-05-26 | End: 2023-05-28 | Stop reason: HOSPADM

## 2023-05-26 RX ORDER — ATORVASTATIN CALCIUM 40 MG/1
40 TABLET, FILM COATED ORAL NIGHTLY
Status: DISCONTINUED | OUTPATIENT
Start: 2023-05-26 | End: 2023-05-28 | Stop reason: HOSPADM

## 2023-05-26 RX ORDER — LAMOTRIGINE 100 MG/1
100 TABLET ORAL NIGHTLY
Status: DISCONTINUED | OUTPATIENT
Start: 2023-05-26 | End: 2023-05-28 | Stop reason: HOSPADM

## 2023-05-26 RX ORDER — ZOLPIDEM TARTRATE 12.5 MG/1
12.5 TABLET, FILM COATED, EXTENDED RELEASE ORAL NIGHTLY PRN
COMMUNITY
End: 2023-05-26 | Stop reason: SDUPTHER

## 2023-05-26 RX ORDER — ESTRADIOL 0.1 MG/D
1 FILM, EXTENDED RELEASE TRANSDERMAL
COMMUNITY

## 2023-05-26 RX ORDER — SODIUM CHLORIDE 0.9 % (FLUSH) 0.9 %
5-40 SYRINGE (ML) INJECTION PRN
Status: DISCONTINUED | OUTPATIENT
Start: 2023-05-26 | End: 2023-05-28 | Stop reason: HOSPADM

## 2023-05-26 RX ORDER — ONDANSETRON 2 MG/ML
4 INJECTION INTRAMUSCULAR; INTRAVENOUS ONCE
Status: COMPLETED | OUTPATIENT
Start: 2023-05-26 | End: 2023-05-26

## 2023-05-26 RX ORDER — ESTRADIOL 0.1 MG/D
1 FILM, EXTENDED RELEASE TRANSDERMAL
Status: DISCONTINUED | OUTPATIENT
Start: 2023-05-29 | End: 2023-05-28 | Stop reason: HOSPADM

## 2023-05-26 RX ORDER — TOPIRAMATE 25 MG/1
50 TABLET ORAL NIGHTLY
Status: DISCONTINUED | OUTPATIENT
Start: 2023-05-26 | End: 2023-05-28 | Stop reason: HOSPADM

## 2023-05-26 RX ORDER — ASPIRIN 81 MG/1
81 TABLET, CHEWABLE ORAL DAILY
Status: DISCONTINUED | OUTPATIENT
Start: 2023-05-27 | End: 2023-05-28 | Stop reason: HOSPADM

## 2023-05-26 RX ORDER — AMLODIPINE BESYLATE 5 MG/1
5 TABLET ORAL NIGHTLY
Status: ON HOLD | COMMUNITY
End: 2023-05-27 | Stop reason: HOSPADM

## 2023-05-26 RX ORDER — ALPRAZOLAM 0.25 MG/1
0.5 TABLET ORAL 3 TIMES DAILY PRN
Status: DISCONTINUED | OUTPATIENT
Start: 2023-05-26 | End: 2023-05-28 | Stop reason: HOSPADM

## 2023-05-26 RX ORDER — SODIUM CHLORIDE 9 MG/ML
INJECTION, SOLUTION INTRAVENOUS PRN
Status: DISCONTINUED | OUTPATIENT
Start: 2023-05-26 | End: 2023-05-28 | Stop reason: HOSPADM

## 2023-05-26 RX ORDER — HYDROCODONE BITARTRATE AND ACETAMINOPHEN 7.5; 325 MG/1; MG/1
1 TABLET ORAL EVERY 12 HOURS PRN
Status: DISCONTINUED | OUTPATIENT
Start: 2023-05-26 | End: 2023-05-26

## 2023-05-26 RX ADMIN — SODIUM CHLORIDE 1000 ML: 9 INJECTION, SOLUTION INTRAVENOUS at 06:47

## 2023-05-26 RX ADMIN — CALCIUM CARBONATE 500 MG: 500 TABLET, CHEWABLE ORAL at 19:20

## 2023-05-26 RX ADMIN — TOPIRAMATE 50 MG: 25 TABLET, FILM COATED ORAL at 21:27

## 2023-05-26 RX ADMIN — QUETIAPINE FUMARATE 100 MG: 100 TABLET ORAL at 21:27

## 2023-05-26 RX ADMIN — AMLODIPINE BESYLATE 10 MG: 10 TABLET ORAL at 21:27

## 2023-05-26 RX ADMIN — SODIUM CHLORIDE, PRESERVATIVE FREE 10 ML: 5 INJECTION INTRAVENOUS at 21:29

## 2023-05-26 RX ADMIN — ENOXAPARIN SODIUM 40 MG: 100 INJECTION SUBCUTANEOUS at 14:27

## 2023-05-26 RX ADMIN — SERTRALINE 50 MG: 100 TABLET, FILM COATED ORAL at 21:27

## 2023-05-26 RX ADMIN — ZOLPIDEM TARTRATE 10 MG: 5 TABLET ORAL at 21:29

## 2023-05-26 RX ADMIN — ONDANSETRON 4 MG: 2 INJECTION INTRAMUSCULAR; INTRAVENOUS at 06:46

## 2023-05-26 RX ADMIN — ALPRAZOLAM 0.5 MG: 0.25 TABLET ORAL at 07:00

## 2023-05-26 RX ADMIN — LAMOTRIGINE 100 MG: 100 TABLET ORAL at 21:27

## 2023-05-26 RX ADMIN — TIZANIDINE 2 MG: 4 TABLET ORAL at 14:27

## 2023-05-26 RX ADMIN — TIZANIDINE 2 MG: 4 TABLET ORAL at 21:27

## 2023-05-26 RX ADMIN — ATORVASTATIN CALCIUM 40 MG: 40 TABLET, FILM COATED ORAL at 21:27

## 2023-05-26 RX ADMIN — ALPRAZOLAM 0.5 MG: 0.25 TABLET ORAL at 14:26

## 2023-05-26 ASSESSMENT — LIFESTYLE VARIABLES
HOW OFTEN DO YOU HAVE A DRINK CONTAINING ALCOHOL: 2-3 TIMES A WEEK
HOW MANY STANDARD DRINKS CONTAINING ALCOHOL DO YOU HAVE ON A TYPICAL DAY: 1 OR 2

## 2023-05-26 ASSESSMENT — PAIN SCALES - GENERAL: PAINLEVEL_OUTOF10: 0

## 2023-05-27 ENCOUNTER — APPOINTMENT (OUTPATIENT)
Dept: NUCLEAR MEDICINE | Age: 58
End: 2023-05-27
Payer: COMMERCIAL

## 2023-05-27 LAB
ALBUMIN SERPL-MCNC: 4 G/DL (ref 3.5–5.2)
ALP SERPL-CCNC: 68 U/L (ref 35–104)
ALT SERPL-CCNC: 15 U/L (ref 0–32)
ANION GAP SERPL CALCULATED.3IONS-SCNC: 10 MMOL/L (ref 7–16)
AST SERPL-CCNC: 22 U/L (ref 0–31)
BILIRUB SERPL-MCNC: 0.2 MG/DL (ref 0–1.2)
BUN SERPL-MCNC: 11 MG/DL (ref 6–20)
CALCIUM SERPL-MCNC: 9.2 MG/DL (ref 8.6–10.2)
CHLORIDE SERPL-SCNC: 105 MMOL/L (ref 98–107)
CHOLESTEROL, TOTAL: 205 MG/DL (ref 0–199)
CO2 SERPL-SCNC: 23 MMOL/L (ref 22–29)
CREAT SERPL-MCNC: 1 MG/DL (ref 0.5–1)
ERYTHROCYTE [DISTWIDTH] IN BLOOD BY AUTOMATED COUNT: 12 FL (ref 11.5–15)
GLUCOSE SERPL-MCNC: 95 MG/DL (ref 74–99)
HCG SERPL QL: NEGATIVE
HCT VFR BLD AUTO: 36.5 % (ref 34–48)
HDLC SERPL-MCNC: 78 MG/DL
HGB BLD-MCNC: 12.3 G/DL (ref 11.5–15.5)
LDLC SERPL CALC-MCNC: 97 MG/DL (ref 0–99)
LV EF: 82 %
LVEF MODALITY: NORMAL
MCH RBC QN AUTO: 32.3 PG (ref 26–35)
MCHC RBC AUTO-ENTMCNC: 33.7 % (ref 32–34.5)
MCV RBC AUTO: 95.8 FL (ref 80–99.9)
PLATELET # BLD AUTO: 300 E9/L (ref 130–450)
PMV BLD AUTO: 9.9 FL (ref 7–12)
POTASSIUM SERPL-SCNC: 3.6 MMOL/L (ref 3.5–5)
PROT SERPL-MCNC: 6.4 G/DL (ref 6.4–8.3)
RBC # BLD AUTO: 3.81 E12/L (ref 3.5–5.5)
SODIUM SERPL-SCNC: 138 MMOL/L (ref 132–146)
TRIGL SERPL-MCNC: 149 MG/DL (ref 0–149)
VLDLC SERPL CALC-MCNC: 30 MG/DL
WBC # BLD: 6.1 E9/L (ref 4.5–11.5)

## 2023-05-27 PROCEDURE — 3430000000 HC RX DIAGNOSTIC RADIOPHARMACEUTICAL: Performed by: RADIOLOGY

## 2023-05-27 PROCEDURE — 93017 CV STRESS TEST TRACING ONLY: CPT

## 2023-05-27 PROCEDURE — 80061 LIPID PANEL: CPT

## 2023-05-27 PROCEDURE — G0378 HOSPITAL OBSERVATION PER HR: HCPCS

## 2023-05-27 PROCEDURE — 78452 HT MUSCLE IMAGE SPECT MULT: CPT | Performed by: INTERNAL MEDICINE

## 2023-05-27 PROCEDURE — 84703 CHORIONIC GONADOTROPIN ASSAY: CPT

## 2023-05-27 PROCEDURE — A9500 TC99M SESTAMIBI: HCPCS | Performed by: RADIOLOGY

## 2023-05-27 PROCEDURE — 36415 COLL VENOUS BLD VENIPUNCTURE: CPT

## 2023-05-27 PROCEDURE — 6370000000 HC RX 637 (ALT 250 FOR IP): Performed by: HOSPITALIST

## 2023-05-27 PROCEDURE — 80053 COMPREHEN METABOLIC PANEL: CPT

## 2023-05-27 PROCEDURE — 2580000003 HC RX 258: Performed by: ANESTHESIOLOGY

## 2023-05-27 PROCEDURE — 85027 COMPLETE CBC AUTOMATED: CPT

## 2023-05-27 PROCEDURE — 6360000002 HC RX W HCPCS: Performed by: INTERNAL MEDICINE

## 2023-05-27 PROCEDURE — 96372 THER/PROPH/DIAG INJ SC/IM: CPT

## 2023-05-27 PROCEDURE — 78452 HT MUSCLE IMAGE SPECT MULT: CPT

## 2023-05-27 PROCEDURE — 6370000000 HC RX 637 (ALT 250 FOR IP): Performed by: INTERNAL MEDICINE

## 2023-05-27 RX ORDER — TETRAKIS(2-METHOXYISOBUTYLISOCYANIDE)COPPER(I) TETRAFLUOROBORATE 1 MG/ML
30 INJECTION, POWDER, LYOPHILIZED, FOR SOLUTION INTRAVENOUS
Status: COMPLETED | OUTPATIENT
Start: 2023-05-27 | End: 2023-05-27

## 2023-05-27 RX ORDER — AMLODIPINE BESYLATE 10 MG/1
10 TABLET ORAL NIGHTLY
Qty: 30 TABLET | Refills: 3 | Status: SHIPPED | OUTPATIENT
Start: 2023-05-27

## 2023-05-27 RX ORDER — ATORVASTATIN CALCIUM 40 MG/1
40 TABLET, FILM COATED ORAL NIGHTLY
Qty: 30 TABLET | Refills: 3 | Status: SHIPPED | OUTPATIENT
Start: 2023-05-27

## 2023-05-27 RX ORDER — TETRAKIS(2-METHOXYISOBUTYLISOCYANIDE)COPPER(I) TETRAFLUOROBORATE 1 MG/ML
10 INJECTION, POWDER, LYOPHILIZED, FOR SOLUTION INTRAVENOUS
Status: COMPLETED | OUTPATIENT
Start: 2023-05-27 | End: 2023-05-27

## 2023-05-27 RX ORDER — ASPIRIN 81 MG/1
81 TABLET, CHEWABLE ORAL DAILY
Qty: 30 TABLET | Refills: 3 | Status: SHIPPED | OUTPATIENT
Start: 2023-05-28

## 2023-05-27 RX ADMIN — QUETIAPINE FUMARATE 100 MG: 100 TABLET ORAL at 21:30

## 2023-05-27 RX ADMIN — SODIUM CHLORIDE, PRESERVATIVE FREE 10 ML: 5 INJECTION INTRAVENOUS at 21:31

## 2023-05-27 RX ADMIN — SERTRALINE 50 MG: 100 TABLET, FILM COATED ORAL at 21:30

## 2023-05-27 RX ADMIN — ZOLPIDEM TARTRATE 10 MG: 5 TABLET ORAL at 21:39

## 2023-05-27 RX ADMIN — HYDROCODONE BITARTRATE AND ACETAMINOPHEN 1 TABLET: 5; 325 TABLET ORAL at 04:52

## 2023-05-27 RX ADMIN — TIZANIDINE 2 MG: 4 TABLET ORAL at 11:48

## 2023-05-27 RX ADMIN — ENOXAPARIN SODIUM 40 MG: 100 INJECTION SUBCUTANEOUS at 11:49

## 2023-05-27 RX ADMIN — AMLODIPINE BESYLATE 10 MG: 10 TABLET ORAL at 21:30

## 2023-05-27 RX ADMIN — TOPIRAMATE 50 MG: 25 TABLET, FILM COATED ORAL at 21:31

## 2023-05-27 RX ADMIN — CELECOXIB 200 MG: 100 CAPSULE ORAL at 19:09

## 2023-05-27 RX ADMIN — ATORVASTATIN CALCIUM 40 MG: 40 TABLET, FILM COATED ORAL at 21:30

## 2023-05-27 RX ADMIN — Medication 30 MILLICURIE: at 09:55

## 2023-05-27 RX ADMIN — LAMOTRIGINE 100 MG: 100 TABLET ORAL at 21:31

## 2023-05-27 RX ADMIN — ASPIRIN 81 MG CHEWABLE TABLET 81 MG: 81 TABLET CHEWABLE at 11:49

## 2023-05-27 RX ADMIN — TIZANIDINE 2 MG: 4 TABLET ORAL at 21:30

## 2023-05-27 RX ADMIN — Medication 10 MILLICURIE: at 08:30

## 2023-05-27 RX ADMIN — ALPRAZOLAM 0.5 MG: 0.25 TABLET ORAL at 06:33

## 2023-05-27 RX ADMIN — HYDROCODONE BITARTRATE AND ACETAMINOPHEN 1 TABLET: 5; 325 TABLET ORAL at 17:38

## 2023-05-27 RX ADMIN — ACETAMINOPHEN 650 MG: 325 TABLET ORAL at 06:33

## 2023-05-27 ASSESSMENT — PAIN DESCRIPTION - DESCRIPTORS
DESCRIPTORS: ACHING
DESCRIPTORS: ACHING
DESCRIPTORS: DISCOMFORT
DESCRIPTORS: DISCOMFORT

## 2023-05-27 ASSESSMENT — PAIN DESCRIPTION - FREQUENCY
FREQUENCY: CONTINUOUS
FREQUENCY: CONTINUOUS

## 2023-05-27 ASSESSMENT — PAIN DESCRIPTION - LOCATION
LOCATION: HEAD
LOCATION: BACK;NECK
LOCATION: BACK;NECK
LOCATION: HEAD

## 2023-05-27 ASSESSMENT — PAIN SCALES - GENERAL
PAINLEVEL_OUTOF10: 8
PAINLEVEL_OUTOF10: 7
PAINLEVEL_OUTOF10: 6
PAINLEVEL_OUTOF10: 6
PAINLEVEL_OUTOF10: 9

## 2023-05-27 ASSESSMENT — ENCOUNTER SYMPTOMS
NAUSEA: 0
SHORTNESS OF BREATH: 0
DIARRHEA: 0
COUGH: 0
VOMITING: 0

## 2023-05-27 ASSESSMENT — PAIN - FUNCTIONAL ASSESSMENT
PAIN_FUNCTIONAL_ASSESSMENT: ACTIVITIES ARE NOT PREVENTED
PAIN_FUNCTIONAL_ASSESSMENT: ACTIVITIES ARE NOT PREVENTED

## 2023-05-27 ASSESSMENT — PAIN DESCRIPTION - ONSET
ONSET: ON-GOING
ONSET: ON-GOING

## 2023-05-27 ASSESSMENT — PAIN DESCRIPTION - PAIN TYPE
TYPE: ACUTE PAIN
TYPE: ACUTE PAIN

## 2023-05-28 VITALS
SYSTOLIC BLOOD PRESSURE: 104 MMHG | OXYGEN SATURATION: 99 % | BODY MASS INDEX: 23.56 KG/M2 | TEMPERATURE: 97.8 F | HEIGHT: 64 IN | RESPIRATION RATE: 16 BRPM | WEIGHT: 138 LBS | DIASTOLIC BLOOD PRESSURE: 58 MMHG | HEART RATE: 68 BPM

## 2023-05-28 PROCEDURE — G0378 HOSPITAL OBSERVATION PER HR: HCPCS

## 2023-05-28 ASSESSMENT — ENCOUNTER SYMPTOMS
DIARRHEA: 0
NAUSEA: 0
VOMITING: 0
COUGH: 0
SHORTNESS OF BREATH: 0

## 2023-05-30 ENCOUNTER — TELEPHONE (OUTPATIENT)
Dept: PAIN MANAGEMENT | Age: 58
End: 2023-05-30

## 2023-05-31 ENCOUNTER — TELEPHONE (OUTPATIENT)
Dept: FAMILY MEDICINE CLINIC | Age: 58
End: 2023-05-31

## 2023-05-31 LAB
CATECHOLS UR-IMP: NORMAL
COLLECT DURATION TIME SPEC: NORMAL H
CREAT 24H UR-MCNC: 34 MG/DL
CREAT 24H UR-MRATE: NORMAL MG/D (ref 500–1400)
DOPAMINE 24H UR-MRATE: NORMAL UG/D (ref 71–485)
DOPAMINE UR-MCNC: 30 UG/L
DOPAMINE/CREAT UR: 88 UG/G CRT (ref 0–250)
EPINEPH 24H UR-MRATE: NORMAL (ref 1–14)
EPINEPH UR-MCNC: 1 UG/L
EPINEPH/CREAT UR: 3 UG/G CRT (ref 0–20)
NOREPINEPH 24H UR-MRATE: NORMAL UG/D (ref 14–120)
NOREPINEPH UR-MCNC: 11 UG/L
NOREPINEPH/CREAT UR: 32 UG/G CRT (ref 0–45)
SPECIMEN VOL ?TM UR: NORMAL ML

## 2023-05-31 NOTE — TELEPHONE ENCOUNTER
Patient was discharged from the hospital on 5/28 for chest pain. I did not find any openings. Please advise.

## 2023-06-01 ENCOUNTER — OFFICE VISIT (OUTPATIENT)
Dept: PRIMARY CARE CLINIC | Age: 58
End: 2023-06-01
Payer: COMMERCIAL

## 2023-06-01 ENCOUNTER — PATIENT MESSAGE (OUTPATIENT)
Dept: PRIMARY CARE CLINIC | Age: 58
End: 2023-06-01

## 2023-06-01 VITALS
WEIGHT: 131 LBS | TEMPERATURE: 97.7 F | OXYGEN SATURATION: 100 % | DIASTOLIC BLOOD PRESSURE: 82 MMHG | HEIGHT: 64 IN | SYSTOLIC BLOOD PRESSURE: 114 MMHG | HEART RATE: 82 BPM | BODY MASS INDEX: 22.36 KG/M2

## 2023-06-01 DIAGNOSIS — R07.9 CHEST PAIN, UNSPECIFIED TYPE: Primary | ICD-10-CM

## 2023-06-01 DIAGNOSIS — Z09 HOSPITAL DISCHARGE FOLLOW-UP: ICD-10-CM

## 2023-06-01 DIAGNOSIS — R00.0 TACHYCARDIA: ICD-10-CM

## 2023-06-01 DIAGNOSIS — F41.0 PANIC DISORDER WITHOUT AGORAPHOBIA: ICD-10-CM

## 2023-06-01 DIAGNOSIS — R06.09 DOE (DYSPNEA ON EXERTION): ICD-10-CM

## 2023-06-01 PROCEDURE — 99214 OFFICE O/P EST MOD 30 MIN: CPT | Performed by: FAMILY MEDICINE

## 2023-06-01 PROCEDURE — 3074F SYST BP LT 130 MM HG: CPT | Performed by: FAMILY MEDICINE

## 2023-06-01 PROCEDURE — 3079F DIAST BP 80-89 MM HG: CPT | Performed by: FAMILY MEDICINE

## 2023-06-01 PROCEDURE — 1111F DSCHRG MED/CURRENT MED MERGE: CPT | Performed by: FAMILY MEDICINE

## 2023-06-01 RX ORDER — CYCLOBENZAPRINE HCL 10 MG
10 TABLET ORAL 3 TIMES DAILY PRN
Qty: 90 TABLET | Refills: 0 | Status: SHIPPED | OUTPATIENT
Start: 2023-06-01

## 2023-06-01 ASSESSMENT — ENCOUNTER SYMPTOMS
DIARRHEA: 0
COUGH: 0
SHORTNESS OF BREATH: 1
BACK PAIN: 1
NAUSEA: 0
CONSTIPATION: 0
VOMITING: 0
WHEEZING: 0
ABDOMINAL PAIN: 0

## 2023-06-01 NOTE — TELEPHONE ENCOUNTER
From: Landon La  To: Dr. Whitney Mederos  Sent: 6/1/2023 11:08 AM EDT  Subject: Need a refill     I'm so sorry, totally forgot to request my muscle relaxer medicine this morning, I was more concerned about the hospital stay and next steps. Could you take care of that before you leave,   Thank you!

## 2023-06-01 NOTE — PROGRESS NOTES
Post-Discharge Transitional Care Follow Up      Scooter Bee   YOB: 1965    Date of Office Visit:  6/1/2023  Date of Hospital Admission: 5/26/23  Date of Hospital Discharge: 5/28/23  Readmission Risk Score (high >=14%. Medium >=10%):No data recorded    Care management risk score Rising risk (score 2-5) and Complex Care (Scores >=6): No Risk Score On File     Non face to face  following discharge, date last encounter closed (first attempt may have been earlier): *No documented post hospital discharge outreach found in the last 14 days     Call initiated 2 business days of discharge: *No response recorded in the last 14 days     Chest pain, unspecified type  -     Cardiac event monitor; Future  -     Echocardiogram complete; Future  Resolved. Tachycardia  -     Cardiac event monitor; Future  -     Echocardiogram complete; Future  No longer having episodes like she had, but still feeling winded with simple activity. Family history of A. Fib. Will check LULA and Echo. PALAFOX (dyspnea on exertion)  -     Echocardiogram complete; Future    Panic disorder without agoraphobia  Unsure that this episode was all anxiety related. Will do a little more cardiac work up to determine underlying cause. Hospital discharge follow-up  -     [place] DISCHARGE MEDS RECONCILED W/ CURRENT OUTPATIENT MED LIST      Medical Decision Making: moderate complexity  Return in about 5 weeks (around 7/5/2023), or if symptoms worsen or fail to improve, for Chronic medical conditions. On this date 6/1/2023 I have spent 30 minutes reviewing previous notes, test results and face to face with the patient discussing the diagnosis and importance of compliance with the treatment plan as well as documenting on the day of the visit. Subjective:     Inpatient course: Discharge summary reviewed- see chart. Interval history/Current status: 57 yo female presents for hospital discharge follow up.   She presented to the ER

## 2023-07-05 ENCOUNTER — OFFICE VISIT (OUTPATIENT)
Dept: PRIMARY CARE CLINIC | Age: 58
End: 2023-07-05
Payer: COMMERCIAL

## 2023-07-05 VITALS
BODY MASS INDEX: 22.16 KG/M2 | HEART RATE: 90 BPM | DIASTOLIC BLOOD PRESSURE: 78 MMHG | WEIGHT: 133 LBS | SYSTOLIC BLOOD PRESSURE: 130 MMHG | OXYGEN SATURATION: 96 % | HEIGHT: 65 IN | TEMPERATURE: 98.6 F

## 2023-07-05 DIAGNOSIS — M51.36 DDD (DEGENERATIVE DISC DISEASE), LUMBAR: ICD-10-CM

## 2023-07-05 DIAGNOSIS — F41.1 GAD (GENERALIZED ANXIETY DISORDER): ICD-10-CM

## 2023-07-05 DIAGNOSIS — M47.817 LUMBOSACRAL SPONDYLOSIS WITHOUT MYELOPATHY: ICD-10-CM

## 2023-07-05 DIAGNOSIS — M48.062 SPINAL STENOSIS OF LUMBAR REGION WITH NEUROGENIC CLAUDICATION: ICD-10-CM

## 2023-07-05 DIAGNOSIS — I10 PRIMARY HYPERTENSION: Primary | ICD-10-CM

## 2023-07-05 DIAGNOSIS — F31.9 BIPOLAR I DISORDER (HCC): ICD-10-CM

## 2023-07-05 PROCEDURE — 3078F DIAST BP <80 MM HG: CPT | Performed by: FAMILY MEDICINE

## 2023-07-05 PROCEDURE — 99214 OFFICE O/P EST MOD 30 MIN: CPT | Performed by: FAMILY MEDICINE

## 2023-07-05 PROCEDURE — 3074F SYST BP LT 130 MM HG: CPT | Performed by: FAMILY MEDICINE

## 2023-07-05 RX ORDER — HYDROCODONE BITARTRATE AND ACETAMINOPHEN 7.5; 325 MG/1; MG/1
1 TABLET ORAL EVERY 8 HOURS PRN
Qty: 90 TABLET | Refills: 0 | Status: SHIPPED | OUTPATIENT
Start: 2023-08-02 | End: 2023-09-01

## 2023-07-05 RX ORDER — HYDROCODONE BITARTRATE AND ACETAMINOPHEN 7.5; 325 MG/1; MG/1
1 TABLET ORAL EVERY 8 HOURS PRN
Qty: 90 TABLET | Refills: 0 | Status: SHIPPED | OUTPATIENT
Start: 2023-07-12 | End: 2023-08-11

## 2023-07-05 RX ORDER — CARISOPRODOL 350 MG/1
350 TABLET ORAL 3 TIMES DAILY PRN
Qty: 90 TABLET | Refills: 2 | Status: SHIPPED | OUTPATIENT
Start: 2023-07-05 | End: 2023-08-04

## 2023-07-05 RX ORDER — HYDROCODONE BITARTRATE AND ACETAMINOPHEN 7.5; 325 MG/1; MG/1
1 TABLET ORAL EVERY 8 HOURS PRN
Qty: 90 TABLET | Refills: 0 | Status: SHIPPED | OUTPATIENT
Start: 2023-08-30 | End: 2023-09-29

## 2023-07-05 ASSESSMENT — ENCOUNTER SYMPTOMS
CONSTIPATION: 0
BACK PAIN: 1
ABDOMINAL PAIN: 0
SHORTNESS OF BREATH: 0
NAUSEA: 0
DIARRHEA: 0
VOMITING: 0
WHEEZING: 0
COUGH: 0

## 2023-07-10 ENCOUNTER — PATIENT MESSAGE (OUTPATIENT)
Dept: PRIMARY CARE CLINIC | Age: 58
End: 2023-07-10

## 2023-07-10 RX ORDER — ESTRADIOL 0.1 MG/D
1 FILM, EXTENDED RELEASE TRANSDERMAL
Qty: 8 PATCH | Refills: 3 | Status: SHIPPED | OUTPATIENT
Start: 2023-07-10

## 2023-07-10 NOTE — TELEPHONE ENCOUNTER
From: Kiet La  To: Dr. Natasha Wahl  Sent: 7/10/2023 5:18 PM EDT  Subject: Need a refill     My Estradiol patches need to be refilled, the pharmacist said they needed a script for it.   Thank you

## 2023-07-11 ENCOUNTER — TELEPHONE (OUTPATIENT)
Dept: NON INVASIVE DIAGNOSTICS | Age: 58
End: 2023-07-11

## 2023-07-11 NOTE — TELEPHONE ENCOUNTER
Ordered and mailed 14 day Zio XT monitor to confirmed address: [address] [place] [address]. Monitor ordered by Dr Lawrence Kern.

## 2023-07-24 ENCOUNTER — TELEPHONE (OUTPATIENT)
Dept: PRIMARY CARE CLINIC | Age: 58
End: 2023-07-24

## 2023-07-24 DIAGNOSIS — R06.09 DOE (DYSPNEA ON EXERTION): ICD-10-CM

## 2023-07-24 DIAGNOSIS — R07.9 CHEST PAIN, UNSPECIFIED TYPE: Primary | ICD-10-CM

## 2023-07-24 DIAGNOSIS — R00.0 TACHYCARDIA: ICD-10-CM

## 2023-07-24 NOTE — TELEPHONE ENCOUNTER
Patient was scheduled for echo today, but called to reschedule. They were going to reschedule for Sept, but order expires 7/31.  Order needing updated

## 2023-08-09 ENCOUNTER — OFFICE VISIT (OUTPATIENT)
Dept: PAIN MANAGEMENT | Age: 58
End: 2023-08-09
Payer: COMMERCIAL

## 2023-08-09 ENCOUNTER — PREP FOR PROCEDURE (OUTPATIENT)
Dept: PAIN MANAGEMENT | Age: 58
End: 2023-08-09

## 2023-08-09 VITALS
RESPIRATION RATE: 16 BRPM | HEART RATE: 71 BPM | BODY MASS INDEX: 22.16 KG/M2 | SYSTOLIC BLOOD PRESSURE: 155 MMHG | OXYGEN SATURATION: 98 % | HEIGHT: 65 IN | WEIGHT: 133 LBS | TEMPERATURE: 98.2 F | DIASTOLIC BLOOD PRESSURE: 83 MMHG

## 2023-08-09 DIAGNOSIS — M71.38 SYNOVIAL CYST OF LUMBAR FACET JOINT: ICD-10-CM

## 2023-08-09 DIAGNOSIS — F54 PSYCHOLOGICAL FACTORS AFFECTING MEDICAL CONDITION: ICD-10-CM

## 2023-08-09 DIAGNOSIS — M47.817 LUMBOSACRAL SPONDYLOSIS WITHOUT MYELOPATHY: Primary | ICD-10-CM

## 2023-08-09 DIAGNOSIS — M51.36 DDD (DEGENERATIVE DISC DISEASE), LUMBAR: ICD-10-CM

## 2023-08-09 DIAGNOSIS — F11.90 CHRONIC, CONTINUOUS USE OF OPIOIDS: ICD-10-CM

## 2023-08-09 PROCEDURE — 99213 OFFICE O/P EST LOW 20 MIN: CPT | Performed by: ANESTHESIOLOGY

## 2023-08-09 PROCEDURE — 3077F SYST BP >= 140 MM HG: CPT | Performed by: ANESTHESIOLOGY

## 2023-08-09 PROCEDURE — 3079F DIAST BP 80-89 MM HG: CPT | Performed by: ANESTHESIOLOGY

## 2023-08-09 RX ORDER — SODIUM CHLORIDE 0.9 % (FLUSH) 0.9 %
5-40 SYRINGE (ML) INJECTION PRN
Status: CANCELLED | OUTPATIENT
Start: 2023-08-09

## 2023-08-09 RX ORDER — SODIUM CHLORIDE 9 MG/ML
INJECTION, SOLUTION INTRAVENOUS PRN
Status: CANCELLED | OUTPATIENT
Start: 2023-08-09

## 2023-08-09 RX ORDER — SODIUM CHLORIDE 0.9 % (FLUSH) 0.9 %
5-40 SYRINGE (ML) INJECTION EVERY 12 HOURS SCHEDULED
Status: CANCELLED | OUTPATIENT
Start: 2023-08-09

## 2023-08-09 NOTE — H&P (VIEW-ONLY)
[facility]        [address] [place] [address]  Dept: [phone]      Follow up Note      [address]     Date of Visit:  8/9/2023    CC:  Patient presents for follow up   Chief Complaint   Patient presents with    Lower Back Pain    Follow Up After Procedure     BILATERAL LUMBAR FACET INJECTION   UNDER FLUOROSCOPIC  GUIDANCE AT L4-5 & L5-S1       HPI:    H/o low back and left LE pain. On and off pain for many yrs with recent exacerbation. Had MRI of LS spine. Evaluated by NSG and referred for interventions. Pain causes functional limitations/ limits Adl's : Yes     Nursing notes and details of the pain history reviewed. Please see intake notes for details. S/P TFESI - helped the LE pain significantly- continues to have ongoing axial low back pain. NOTE:   Has been on chronic Norco and Soma for many yrs. Prior ACDF  in 2008 - stable. H/o Anxiety - follows with psych. Previous treatments:   Physical Therapy : yes, continues HEP     Chiropractic treatment: yes,     Medications: - NSAID's : yes -                       - Membrane stabilizers : yes - gabapentin- had side effects                       - Opioids : yes, Norco                       - Adjuvants or Others : yes, steroids/ muscle relaxants     Sine Surgeries: No LS spine surgery, Prior ACDF +      Anticoagulation medications: has stopped ASA. H/O Smoking: no  H/O alcohol abuse : no  H/O Illicit drug use : no- has used CBD cream     Imaging:      MRI of LS spine: 3/6/2023:  FINDINGS:   BONES/ALIGNMENT:       Vertebral heights and alignment are maintained. No suspicious marrow edema is seen. SOFT TISSUES:       No paraspinal soft tissue abnormality is seen. SPINAL CORD:       Normal signal intensity of the conus. The conus is positioned at L1.       T12-L1: No significant spinal or neural foraminal stenosis are seen.        L1-L2: No significant spinal or neural foraminal

## 2023-08-14 ENCOUNTER — PATIENT MESSAGE (OUTPATIENT)
Dept: PRIMARY CARE CLINIC | Age: 58
End: 2023-08-14

## 2023-08-14 DIAGNOSIS — F41.0 PANIC DISORDER WITHOUT AGORAPHOBIA: ICD-10-CM

## 2023-08-14 RX ORDER — ALPRAZOLAM 0.5 MG/1
0.5 TABLET ORAL 3 TIMES DAILY PRN
Qty: 90 TABLET | Refills: 2 | OUTPATIENT
Start: 2023-08-14 | End: 2023-11-12

## 2023-08-14 NOTE — TELEPHONE ENCOUNTER
Both filled on 7/26 with refills.   Check with pharmacy to see if they will fill it that early to get through vacation

## 2023-08-14 NOTE — TELEPHONE ENCOUNTER
From: Kisha La  To: Dr. Isha Delacruz  Sent: 8/14/2023 2:04 PM EDT  Subject: Vacation medication. Good afternoon, [place] and will be gone for 10 days. My Nuria Haste runs out over that duration as dose my my Alprazolam. Both are refills over that period. Can you call in a vacation script? I only need a 4day period.  Thank you

## 2023-08-17 ENCOUNTER — TELEPHONE (OUTPATIENT)
Dept: PRIMARY CARE CLINIC | Age: 58
End: 2023-08-17

## 2023-08-17 NOTE — TELEPHONE ENCOUNTER
Not sure what Tabatha Ore" are. Amlodipine typically only causes leg swelling. Metoprolol can cause some fatigue due to slowing the HR down. We can discuss at her appt too if she would like to wait.

## 2023-08-17 NOTE — TELEPHONE ENCOUNTER
Please let patient know that event monitor showed 5 episodes of SVT (Fast HR from top chamber of the heart). They were brief in nature. Unlikely to cause chest pains, but she can feel this as palpitations. If the palpitations are bothersome, we could try Metoprolol to help control    Patient advised of cardiac event monitor results. She states that she is still having the palpitations but is asking what the side effects of metoprolol are. She is currently experiencing \"cherry marks\" on her arms which is a side effect from the amlodipine.

## 2023-08-24 ENCOUNTER — TELEPHONE (OUTPATIENT)
Dept: PAIN MANAGEMENT | Age: 58
End: 2023-08-24

## 2023-08-24 NOTE — TELEPHONE ENCOUNTER
8/24/23---Call to Joaquín Jansen to go over instructions for her injection on 8/31/2023, MB full, unable to leave a message at this time, will call back. 8/25/23---attempted to call Joaquín Jansen again, went to , which is full, unable to leave a message. 8/28/23----Again, attempted to call Joaquín Jansen about her injection on 8/28/2023.  full, unable to leave a message.

## 2023-08-29 ENCOUNTER — TELEPHONE (OUTPATIENT)
Dept: PAIN MANAGEMENT | Age: 58
End: 2023-08-29

## 2023-08-29 DIAGNOSIS — M47.817 LUMBOSACRAL SPONDYLOSIS WITHOUT MYELOPATHY: Primary | ICD-10-CM

## 2023-08-29 PROBLEM — M47.816 LUMBAR SPONDYLOSIS: Status: ACTIVE | Noted: 2023-08-29

## 2023-08-29 NOTE — TELEPHONE ENCOUNTER
Was unable to contact per phone. Contacted Kayleen via e-mail. She reports holding all medications as directed.

## 2023-08-29 NOTE — PROGRESS NOTES
[facility] PRE-ADMISSION TESTING INSTRUCTIONS    The Preadmission Testing patient is instructed accordingly using the following criteria (check applicable):    ARRIVAL INSTRUCTIONS:  [x] Parking the day of Surgery is located in the Main Entrance lot. Upon entering the door, make an immediate right to the surgery reception desk    [x] Bring photo ID and insurance card    [] Bring in a copy of Living will or Durable Power of  papers. [x] Please be sure to arrange for responsible adult to provide transportation to and from the hospital    [x] Please arrange for responsible adult to be with you for the 24 hour period post procedure due to having anesthesia    [x] If you awake am of surgery not feeling well or have temperature >100 please call [phone]    GENERAL INSTRUCTIONS:    [x] Nothing by mouth after midnight, including gum, candy, mints or water    [x] You may brush your teeth, but do not swallow any water    [x] Take medications as instructed with 1-2 oz of water    [x] Stop herbal supplements and vitamins 5 days prior to procedure    [x] Follow preop dosing of blood thinners per physician instructions    [] Take 1/2 dose of evening insulin, but no insulin after midnight    [] No oral diabetic medications after midnight    [] If diabetic and have low blood sugar or feel symptomatic, take 1-2oz apple juice only    [] Bring inhalers day of surgery    [] Bring C-PAP/ Bi-Pap day of surgery    [] Bring urine specimen day of surgery    [x] Shower or bath with soap, lather and rinse well, AM of Surgery, no lotion, powders or creams to surgical site    [] Follow bowel prep as instructed per surgeon    [x] No tobacco products within 24 hours of surgery     [x] No alcohol or illegal drug use within 24 hours of surgery.     [x] Jewelry, body piercing's, eyeglasses, contact lenses and dentures are not permitted into surgery (bring cases)      [x] Please do not wear any nail polish,

## 2023-08-31 ENCOUNTER — ANESTHESIA EVENT (OUTPATIENT)
Dept: OPERATING ROOM | Age: 58
End: 2023-08-31
Payer: COMMERCIAL

## 2023-08-31 ENCOUNTER — ANESTHESIA (OUTPATIENT)
Dept: OPERATING ROOM | Age: 58
End: 2023-08-31
Payer: COMMERCIAL

## 2023-08-31 ASSESSMENT — LIFESTYLE VARIABLES: SMOKING_STATUS: 0

## 2023-09-01 NOTE — PROGRESS NOTES
[facility] PRE-ADMISSION TESTING INSTRUCTIONS    The Preadmission Testing patient is instructed accordingly using the following criteria (check applicable):    ARRIVAL INSTRUCTIONS:  [x] Parking the day of Surgery is located in the Main Entrance lot. Upon entering the door, make an immediate right to the surgery reception desk    [x] Bring photo ID and insurance card    [] Bring in a copy of Living will or Durable Power of  papers. [x] Please be sure to arrange for responsible adult to provide transportation to and from the hospital    [x] Please arrange for responsible adult to be with you for the 24 hour period post procedure due to having anesthesia    [x] If you awake am of surgery not feeling well or have temperature >100 please call [phone]    GENERAL INSTRUCTIONS:    [x] Nothing by mouth after midnight, including gum, candy, mints or water    [x] You may brush your teeth, but do not swallow any water    [x] Take medications as instructed with 1-2 oz of water    [x] Stop herbal supplements and vitamins 5 days prior to procedure    [] Follow preop dosing of blood thinners per physician instructions    [] Take 1/2 dose of evening insulin, but no insulin after midnight    [] No oral diabetic medications after midnight    [] If diabetic and have low blood sugar or feel symptomatic, take 1-2oz apple juice only    [] Bring inhalers day of surgery    [] Bring C-PAP/ Bi-Pap day of surgery    [] Bring urine specimen day of surgery    [x] Shower or bath with soap, lather and rinse well, AM of Surgery, no lotion, powders or creams to surgical site    [] Follow bowel prep as instructed per surgeon    [x] No tobacco products within 24 hours of surgery     [x] No alcohol or illegal drug use within 24 hours of surgery.     [x] Jewelry, body piercing's, eyeglasses, contact lenses and dentures are not permitted into surgery (bring cases)      [x] Please do not wear any nail polish,

## 2023-09-05 ENCOUNTER — HOSPITAL ENCOUNTER (OUTPATIENT)
Dept: CARDIOLOGY | Age: 58
Discharge: HOME | End: 2023-09-05
Payer: COMMERCIAL

## 2023-09-05 DIAGNOSIS — R00.0 TACHYCARDIA: ICD-10-CM

## 2023-09-05 DIAGNOSIS — R07.9 CHEST PAIN, UNSPECIFIED TYPE: ICD-10-CM

## 2023-09-05 DIAGNOSIS — R06.09 DOE (DYSPNEA ON EXERTION): ICD-10-CM

## 2023-09-05 LAB
LV EF: 60 %
LVEF MODALITY: NORMAL

## 2023-09-05 PROCEDURE — 93306 TTE W/DOPPLER COMPLETE: CPT | Performed by: PSYCHIATRY & NEUROLOGY

## 2023-09-07 ENCOUNTER — HOSPITAL ENCOUNTER (OUTPATIENT)
Dept: GENERAL RADIOLOGY | Age: 58
Setting detail: OUTPATIENT SURGERY
Discharge: HOME | End: 2023-09-09
Attending: ANESTHESIOLOGY
Payer: COMMERCIAL

## 2023-09-07 ENCOUNTER — HOSPITAL ENCOUNTER (OUTPATIENT)
Age: 58
Setting detail: OUTPATIENT SURGERY
Discharge: HOME | End: 2023-09-07
Attending: ANESTHESIOLOGY | Admitting: ANESTHESIOLOGY
Payer: COMMERCIAL

## 2023-09-07 VITALS
BODY MASS INDEX: 21.34 KG/M2 | HEIGHT: 64 IN | HEART RATE: 75 BPM | DIASTOLIC BLOOD PRESSURE: 81 MMHG | SYSTOLIC BLOOD PRESSURE: 136 MMHG | TEMPERATURE: 98 F | WEIGHT: 125 LBS | OXYGEN SATURATION: 100 % | RESPIRATION RATE: 18 BRPM

## 2023-09-07 DIAGNOSIS — M47.816 LUMBAR SPONDYLOSIS: ICD-10-CM

## 2023-09-07 DIAGNOSIS — R52 PAIN MANAGEMENT: ICD-10-CM

## 2023-09-07 PROCEDURE — 2500000003 HC RX 250 WO HCPCS: Performed by: ANESTHESIOLOGY

## 2023-09-07 PROCEDURE — 3600000002 HC SURGERY LEVEL 2 BASE: Performed by: ANESTHESIOLOGY

## 2023-09-07 PROCEDURE — 7100000010 HC PHASE II RECOVERY - FIRST 15 MIN: Performed by: ANESTHESIOLOGY

## 2023-09-07 PROCEDURE — 6360000002 HC RX W HCPCS: Performed by: ANESTHESIOLOGY

## 2023-09-07 PROCEDURE — 2709999900 HC NON-CHARGEABLE SUPPLY: Performed by: ANESTHESIOLOGY

## 2023-09-07 PROCEDURE — 2580000003 HC RX 258

## 2023-09-07 PROCEDURE — 6360000002 HC RX W HCPCS

## 2023-09-07 PROCEDURE — 7100000011 HC PHASE II RECOVERY - ADDTL 15 MIN: Performed by: ANESTHESIOLOGY

## 2023-09-07 PROCEDURE — 3700000000 HC ANESTHESIA ATTENDED CARE: Performed by: ANESTHESIOLOGY

## 2023-09-07 RX ORDER — FENTANYL CITRATE 50 UG/ML
INJECTION, SOLUTION INTRAMUSCULAR; INTRAVENOUS PRN
Status: DISCONTINUED | OUTPATIENT
Start: 2023-09-07 | End: 2023-09-07 | Stop reason: SDUPTHER

## 2023-09-07 RX ORDER — MIDAZOLAM HYDROCHLORIDE 1 MG/ML
INJECTION INTRAMUSCULAR; INTRAVENOUS PRN
Status: DISCONTINUED | OUTPATIENT
Start: 2023-09-07 | End: 2023-09-07 | Stop reason: SDUPTHER

## 2023-09-07 RX ORDER — LIDOCAINE HYDROCHLORIDE 5 MG/ML
INJECTION, SOLUTION INFILTRATION; INTRAVENOUS PRN
Status: DISCONTINUED | OUTPATIENT
Start: 2023-09-07 | End: 2023-09-07 | Stop reason: ALTCHOICE

## 2023-09-07 RX ORDER — BUPIVACAINE HYDROCHLORIDE 5 MG/ML
INJECTION, SOLUTION EPIDURAL; INTRACAUDAL PRN
Status: DISCONTINUED | OUTPATIENT
Start: 2023-09-07 | End: 2023-09-07 | Stop reason: ALTCHOICE

## 2023-09-07 RX ORDER — SODIUM CHLORIDE 9 MG/ML
INJECTION, SOLUTION INTRAVENOUS PRN
Status: DISCONTINUED | OUTPATIENT
Start: 2023-09-07 | End: 2023-09-07 | Stop reason: HOSPADM

## 2023-09-07 RX ORDER — SODIUM CHLORIDE 0.9 % (FLUSH) 0.9 %
5-40 SYRINGE (ML) INJECTION EVERY 12 HOURS SCHEDULED
Status: DISCONTINUED | OUTPATIENT
Start: 2023-09-07 | End: 2023-09-07 | Stop reason: HOSPADM

## 2023-09-07 RX ORDER — SODIUM CHLORIDE 9 MG/ML
INJECTION, SOLUTION INTRAVENOUS CONTINUOUS PRN
Status: DISCONTINUED | OUTPATIENT
Start: 2023-09-07 | End: 2023-09-07 | Stop reason: SDUPTHER

## 2023-09-07 RX ORDER — METHYLPREDNISOLONE ACETATE 40 MG/ML
INJECTION, SUSPENSION INTRA-ARTICULAR; INTRALESIONAL; INTRAMUSCULAR; SOFT TISSUE PRN
Status: DISCONTINUED | OUTPATIENT
Start: 2023-09-07 | End: 2023-09-07 | Stop reason: ALTCHOICE

## 2023-09-07 RX ORDER — SODIUM CHLORIDE 0.9 % (FLUSH) 0.9 %
5-40 SYRINGE (ML) INJECTION PRN
Status: DISCONTINUED | OUTPATIENT
Start: 2023-09-07 | End: 2023-09-07 | Stop reason: HOSPADM

## 2023-09-07 RX ADMIN — SODIUM CHLORIDE: 9 INJECTION, SOLUTION INTRAVENOUS at 10:50

## 2023-09-07 RX ADMIN — FENTANYL CITRATE 50 MCG: 50 INJECTION, SOLUTION INTRAMUSCULAR; INTRAVENOUS at 10:59

## 2023-09-07 RX ADMIN — FENTANYL CITRATE 50 MCG: 50 INJECTION, SOLUTION INTRAMUSCULAR; INTRAVENOUS at 10:52

## 2023-09-07 RX ADMIN — MIDAZOLAM 1 MG: 1 INJECTION INTRAMUSCULAR; INTRAVENOUS at 10:59

## 2023-09-07 RX ADMIN — MIDAZOLAM 1 MG: 1 INJECTION INTRAMUSCULAR; INTRAVENOUS at 10:56

## 2023-09-07 RX ADMIN — FENTANYL CITRATE 50 MCG: 50 INJECTION, SOLUTION INTRAMUSCULAR; INTRAVENOUS at 10:56

## 2023-09-07 RX ADMIN — MIDAZOLAM 1 MG: 1 INJECTION INTRAMUSCULAR; INTRAVENOUS at 10:52

## 2023-09-07 ASSESSMENT — PAIN SCALES - GENERAL: PAINLEVEL_OUTOF10: 2

## 2023-09-07 ASSESSMENT — PAIN - FUNCTIONAL ASSESSMENT: PAIN_FUNCTIONAL_ASSESSMENT: 0-10

## 2023-09-07 ASSESSMENT — PAIN DESCRIPTION - DESCRIPTORS: DESCRIPTORS: DISCOMFORT

## 2023-09-07 NOTE — OP NOTE
Operative Note      Patient: Davi Lou  YOB: 1965  MRN: [id number]    Date of Procedure: 2023    Pre-Op Diagnosis Codes:     * Lumbar spondylosis [M47.816]    Post-Op Diagnosis: Same       Procedure(s):  BILATERAL LUMBARMEDIAL BRANCH NERVE BLOCK UNDER FLUOROSCOPIC GUIDANCE AT L3, L4 & L5 DORSAL RAMI    Surgeon(s):  Asia Gomes MD    Assistant:   * No surgical staff found *    Anesthesia: Monitor Anesthesia Care    Estimated Blood Loss (mL): Minimal    Complications: None    Specimens:   * No specimens in log *    Implants:  * No implants in log *      Drains: * No LDAs found *    Findings: good needle placement        Detailed Description of Procedure:   2023    Patient: Davi Lou  :  1965  Age:  66 y.o. Sex:  female     PRE-OPERATIVE DIAGNOSIS: Bilateral Lumbar spondylosis, lumbar facet syndrome. POST-OPERATIVE DIAGNOSIS: Same. PROCEDURE:   Fluoroscopic guided lumbar medial branch blocks Bilateral at Levels: L3, L4 &  L5- DR    SURGEON: Asia Gomes MD    ANESTHESIA: MAC    ESTIMATED BLOOD LOSS: None.  ______________________________________________________________________  BRIEF HISTORY:  Davi Lou comes in today for  fluoroscopic guided lumbar medial branch blocks  Bilateral  at Levels: L3, L4, L5  The potential complications of this procedure were discussed with her again today. She has elected to undergo the aforementioned procedure. Aria complete History & Physical examination were reviewed in depth, a copy of which is in the chart. DESCRIPTION OF PROCEDURE:   After confirming written and informed consent, a time-out was performed and Aria name and date of birth, the procedure to be performed as well as the plan for the location of the needle insertion were confirmed. The patient was brought into the procedure room and placed in the prone position on the fluoroscopy table.  Standard monitors were placed and vital

## 2023-09-07 NOTE — DISCHARGE INSTRUCTIONS
[facility] Osborn Block/Radiofrequency  Home Going Instructions    1-Go home, rest for the remainder of the day  2-Please do not lift over 20 pounds the day of the injection  3-If you received sedation No: alcohol, driving, operating lawn mowers, plows, tractors or other dangerous equipment until next morning. Do not make important decisions or sign legal documents for 24 hours. You may experience light headedness, dizziness, nausea or sleepiness after sedation. Do not stay alone. A responsible adult must be with you for 24 hours. You could be nauseated from the medications you have received. Your IV site may be sore and bruised. 4-No dietary restrictions     5-Resume all medications the same day, blood thinners to be resumed 24 hours after injection if you were instructed to stop any. 6-Keep the surgical site clean and dry, you may shower the next morning and remove the      dressing. 7- No sitz baths, tub baths or hot tubs/swimming for 24 hours. 8- If you have any pain at the injection site(s), application of an ice pack to the area should be       helpful, 20 minutes on/20 minutes off for next 48 hours. 9- Call [facility] immediately at if you develop.   Fever greater than 100.4 F  Have bleeding or drainage from the puncture site  Have progressive Leg/arm numbness and or weakness  Loss of control of bowel and or bladder (wet/soil yourself)  Severe headache with inability to lift head  10-You may return to work the next day

## 2023-09-07 NOTE — INTERVAL H&P NOTE
Update History & Physical    The patient's History and Physical of August 9, 2023 was reviewed with the patient and I examined the patient. There was no change. The surgical site was confirmed by the patient and me. Plan: The risks, benefits, expected outcome, and alternative to the recommended procedure have been discussed with the patient. Patient understands and wants to proceed with the procedure.      Electronically signed by Josi Rodriguez MD on 9/7/2023

## 2023-09-07 NOTE — ANESTHESIA POSTPROCEDURE EVALUATION
Department of Anesthesiology  Postprocedure Note    Patient: Anselmo Nance  MRN: [id number]  YOB: 1965  Date of evaluation: 9/7/2023      Procedure Summary     Date: 09/07/23 Room / Location: [facility] PROCEDURE ROOM 02 / [facility] [place]    Anesthesia Start: 1050 Anesthesia Stop: 1105    Procedure: BILATERAL [address] FLUOROSCOPIC GUIDANCE AT L3, L4 & L5 DORSAL RAMI (Bilateral: Back) Diagnosis:       Lumbar spondylosis      (Lumbar spondylosis [T46.255])    Surgeons: Jermain Tuttle MD Responsible Provider: Farhana Nance MD    Anesthesia Type: MAC ASA Status: 3          Anesthesia Type: No value filed.     Adelaida Phase I: Adelaida Score: 10    Adelaida Phase II:        Anesthesia Post Evaluation    Patient location during evaluation: PACU  Patient participation: complete - patient participated  Level of consciousness: awake and alert  Airway patency: patent  Nausea & Vomiting: no nausea and no vomiting  Respiratory status: acceptable  Hydration status: euvolemic  Pain management: adequate

## 2023-09-18 ENCOUNTER — OFFICE VISIT (OUTPATIENT)
Dept: PAIN MANAGEMENT | Age: 58
End: 2023-09-18
Payer: COMMERCIAL

## 2023-09-18 ENCOUNTER — PREP FOR PROCEDURE (OUTPATIENT)
Dept: PAIN MANAGEMENT | Age: 58
End: 2023-09-18

## 2023-09-18 VITALS
BODY MASS INDEX: 20.83 KG/M2 | HEIGHT: 65 IN | DIASTOLIC BLOOD PRESSURE: 87 MMHG | WEIGHT: 125 LBS | SYSTOLIC BLOOD PRESSURE: 144 MMHG | HEART RATE: 65 BPM | OXYGEN SATURATION: 97 %

## 2023-09-18 DIAGNOSIS — M47.817 LUMBOSACRAL SPONDYLOSIS WITHOUT MYELOPATHY: Primary | ICD-10-CM

## 2023-09-18 DIAGNOSIS — M51.36 DDD (DEGENERATIVE DISC DISEASE), LUMBAR: ICD-10-CM

## 2023-09-18 DIAGNOSIS — M71.38 SYNOVIAL CYST OF LUMBAR FACET JOINT: ICD-10-CM

## 2023-09-18 PROCEDURE — 99213 OFFICE O/P EST LOW 20 MIN: CPT | Performed by: ANESTHESIOLOGY

## 2023-09-18 PROCEDURE — 3077F SYST BP >= 140 MM HG: CPT | Performed by: ANESTHESIOLOGY

## 2023-09-18 PROCEDURE — 3079F DIAST BP 80-89 MM HG: CPT | Performed by: ANESTHESIOLOGY

## 2023-09-18 RX ORDER — SODIUM CHLORIDE 0.9 % (FLUSH) 0.9 %
5-40 SYRINGE (ML) INJECTION EVERY 12 HOURS SCHEDULED
Status: CANCELLED | OUTPATIENT
Start: 2023-09-18

## 2023-09-18 RX ORDER — SODIUM CHLORIDE 9 MG/ML
INJECTION, SOLUTION INTRAVENOUS PRN
Status: CANCELLED | OUTPATIENT
Start: 2023-09-18

## 2023-09-18 RX ORDER — SODIUM CHLORIDE 0.9 % (FLUSH) 0.9 %
5-40 SYRINGE (ML) INJECTION PRN
Status: CANCELLED | OUTPATIENT
Start: 2023-09-18

## 2023-09-18 NOTE — PROGRESS NOTES
Sofia Russell presents to the [address] [place] [address] on 9/18/2023. Akash Galvez is complaining of pain in back. The pain is constant. The pain is described as aching. Pain is rated on her best day at a 2, on her worst day at a 10, and on average at a 6 on the VAS scale. She took her last dose of Norco today. Any procedures since your last visit: Yes, with 80 % relief. Pacemaker or defibrillator: No.    She is  on NSAIDS and is  on anticoagulation medications to include ASA and is managed by Roosevelt Pinto DO  .     Medication Contract and Consent for Opioid Use Documents Filed        No documents found                    There were no vitals taken for this visit. No LMP recorded. Patient has had a hysterectomy.
APPENDECTOMY      BREAST REDUCTION SURGERY      CERVICAL FUSION      c2-c7 Bone Kevin Grafting 2-6    COLONOSCOPY N/A 03/03/2021    COLORECTAL CANCER SCREENING, HIGH RISK performed by Miguelito Olivera MD at [address] [facility] N/A 12/13/2021    COLONOSCOPY WITH BIOPSY performed by Lisseth Triana MD at [facility] (CERVIX STATUS UNKNOWN)      LAPAROTOMY      X5 Ovarian Cysts, ZACARIAS-BSO (Non Cancerous)    NERVE BLOCK Bilateral 6/15/2023    BILATERAL LUMBAR FACET INJECTION   UNDER FLUOROSCOPIC  GUIDANCE AT L4-5 & L5-S1 performed by Jermain Tuttle MD at [address] Bilateral 9/7/2023    BILATERAL LUMBARMEDIAL BRANCH NERVE BLOCK UNDER FLUOROSCOPIC GUIDANCE AT L3, L4 & L5 DORSAL RAMI performed by Jermain Tuttle MD at [address] [place] [address]      Lumbar Epidural - Donatelli 6/13/17, 6/27/17, 7/25/17    PAIN MANAGEMENT PROCEDURE Left 4/6/2023    LUMBAR TRANSFORAMINAL EPIDURAL STEROID INJECTION LEFT L4- L5 AND L5-S1 UNDER FLUOROSCOPIC GUIDANCE   +++IODINE ALLERGY+++ performed by Jermain Tuttle MD at [facility] N/A 12/13/2021    EGD BIOPSY performed by Lisseth Triana MD at [address] [place] [address]       Prior to Admission medications    Medication Sig Start Date End Date Taking? Authorizing Provider   zolpidem (AMBIEN CR) 12.5 MG extended release tablet Take 1 tablet by mouth nightly as needed for Sleep for up to 90 days. Max Daily Amount: 12.5 mg 7/26/23 10/24/23 Yes Carry Amas, DO   estradiol (VIVELLE) 0.1 MG/24HR Place 1 patch onto the skin Twice a Week *MON & THUR* 7/10/23  Yes Ana Ferris DO   HYDROcodone-acetaminophen (NORCO) 7.5-325 MG per tablet Take 1 tablet by mouth every 8 hours as needed for Pain for up to 30 days.  Intended supply: 30 days Max Daily Amount: 3 tablets 8/30/23 9/29/23 Yes nAa Ferris DO   sertraline (ZOLOFT) 50 MG tablet Take 1 tablet by mouth at bedtime 6/12/23  Yes Ana Ferris DO   amLODIPine

## 2023-09-18 NOTE — H&P (VIEW-ONLY)
(NORVASC) 10 MG tablet Take 1 tablet by mouth nightly 23  Yes Delaney Mondragon,    COLLAGEN PO Take 1 Scoop by mouth every morning MIX W/ H20   Yes Historical Provider, MD   lamoTRIgine (LAMICTAL) 100 MG tablet Take 1 tablet by mouth nightly   Yes Historical Provider, MD   QUEtiapine (SEROQUEL) 100 MG tablet Take 1 tablet by mouth nightly   Yes Historical Provider, MD   topiramate (TOPAMAX) 25 MG tablet Take 2 tablets by mouth at bedtime 23  Yes Roosevelt Pinto DO   nabumetone (RELAFEN) 750 MG tablet Take 1 tablet by mouth 2 times daily as needed for Pain 23  Yes Isaac Wilson MD   Cholecalciferol (VITAMIN D3) 125 MCG (5000 UT) TABS Take 1 tablet by mouth every morning   Yes Historical Provider, MD       Allergies   Allergen Reactions    Bee Venom Anaphylaxis    Fish-Derived Products Anaphylaxis     SWORDFISH    Hornet Venom Anaphylaxis    Iodine Anaphylaxis     ASSOCIATED WITH THE SWORDFISH. CONTRAST OR TOPICAL DOES NOT BOTHER HER ONLY WHEN INGESTED.      Wasp Venom Anaphylaxis       Social History     Socioeconomic History    Marital status:      Spouse name: Not on file    Number of children: Not on file    Years of education: Not on file    Highest education level: Not on file   Occupational History    Not on file   Tobacco Use    Smoking status: Some Days     Packs/day: 0.25     Years: 7.00     Additional pack years: 0.00     Total pack years: 1.75     Types: Cigarettes     Last attempt to quit: 2023     Years since quittin.6    Smokeless tobacco: Never   Vaping Use    Vaping Use: Never used   Substance and Sexual Activity    Alcohol use: Yes     Comment: occasionally    Drug use: No    Sexual activity: Not on file   Other Topics Concern    Not on file   Social History Narrative    Not on file     Social Determinants of Health     Financial Resource Strain: Not on file   Food Insecurity: Not on file   Transportation Needs: Not on file   Physical Activity: Not on file

## 2023-09-28 ENCOUNTER — OFFICE VISIT (OUTPATIENT)
Dept: FAMILY MEDICINE CLINIC | Age: 58
End: 2023-09-28
Payer: COMMERCIAL

## 2023-09-28 VITALS
DIASTOLIC BLOOD PRESSURE: 76 MMHG | OXYGEN SATURATION: 100 % | SYSTOLIC BLOOD PRESSURE: 112 MMHG | HEART RATE: 69 BPM | BODY MASS INDEX: 20.96 KG/M2 | WEIGHT: 124 LBS | TEMPERATURE: 97.8 F

## 2023-09-28 DIAGNOSIS — J01.90 ACUTE NON-RECURRENT SINUSITIS, UNSPECIFIED LOCATION: ICD-10-CM

## 2023-09-28 DIAGNOSIS — R19.7 DIARRHEA, UNSPECIFIED TYPE: ICD-10-CM

## 2023-09-28 DIAGNOSIS — J02.9 SORE THROAT: Primary | ICD-10-CM

## 2023-09-28 DIAGNOSIS — R09.81 NASAL CONGESTION: ICD-10-CM

## 2023-09-28 DIAGNOSIS — R09.82 POSTNASAL DRIP: ICD-10-CM

## 2023-09-28 LAB
Lab: NORMAL
PERFORMING INSTRUMENT: NORMAL
QC PASS/FAIL: NORMAL
S PYO AG THROAT QL: NORMAL
SARS-COV-2, POC: NORMAL

## 2023-09-28 PROCEDURE — 99213 OFFICE O/P EST LOW 20 MIN: CPT | Performed by: PHYSICIAN ASSISTANT

## 2023-09-28 PROCEDURE — 3074F SYST BP LT 130 MM HG: CPT | Performed by: PHYSICIAN ASSISTANT

## 2023-09-28 PROCEDURE — 3078F DIAST BP <80 MM HG: CPT | Performed by: PHYSICIAN ASSISTANT

## 2023-09-28 PROCEDURE — 87426 SARSCOV CORONAVIRUS AG IA: CPT | Performed by: PHYSICIAN ASSISTANT

## 2023-09-28 PROCEDURE — 87880 STREP A ASSAY W/OPTIC: CPT | Performed by: PHYSICIAN ASSISTANT

## 2023-09-28 RX ORDER — AZITHROMYCIN 250 MG/1
250 TABLET, FILM COATED ORAL SEE ADMIN INSTRUCTIONS
Qty: 6 TABLET | Refills: 0 | Status: SHIPPED | OUTPATIENT
Start: 2023-09-28 | End: 2023-10-03

## 2023-09-28 NOTE — PROGRESS NOTES
23  Maverick Florez : 1965 Sex: female  Age 66 y.o. Subjective:  Chief Complaint   Patient presents with    Diarrhea    Headache    Pharyngitis    Congestion         HPI:   HPI  Maverick Florez , 66 y.o. female presents to [facility] care for evaluation of diarrhea, headache, sore throat, congestion. The patient had the symptoms ongoing for the last several days. The patient started off with more GI like symptoms, nausea, diarrhea. Still having a little bit of diarrhea at this point. The patient has now been increasingly congested, drainage and sore throat. The patient had some burning in her throat. The patient's ears are hurting. She has noted swollen glands. The patient is not currently on any antibiotics. ROS:   Unless otherwise stated in this report the patient's positive and negative responses for review of systems for constitutional, eyes, ENT, cardiovascular, respiratory, gastrointestinal, neurological, , musculoskeletal, and integument systems and related systems to the presenting problem are either stated in the history of present illness or were not pertinent or were negative for the symptoms and/or complaints related to the presenting medical problem. Positives and pertinent negatives as per HPI. All others reviewed and are negative.       PMH:     Past Medical History:   Diagnosis Date    Anxiety     Bipolar disorder ([address])     Bipolar 1 disorder    Cervical disc disease     Cervical spondylitis with radiculitis (HCC)     C2, C7    CKD (chronic kidney disease)     Facet hypertrophy     L4/5 - S1    Lumbar canal stenosis     Migraine     Panic disorder without agoraphobia     Counselor Jaren Monroy    PONV (postoperative nausea and vomiting)     Psychogenic nonepileptic seizure     last one 2022    SVT (supraventricular tachycardia) ([address])     See cardiac event monitor report dated 2023 [facility]: No Atrial Fibrillation was found -

## 2023-10-02 ENCOUNTER — OFFICE VISIT (OUTPATIENT)
Dept: PRIMARY CARE CLINIC | Age: 58
End: 2023-10-02
Payer: COMMERCIAL

## 2023-10-02 VITALS
HEIGHT: 65 IN | HEART RATE: 71 BPM | OXYGEN SATURATION: 97 % | BODY MASS INDEX: 20.56 KG/M2 | SYSTOLIC BLOOD PRESSURE: 112 MMHG | DIASTOLIC BLOOD PRESSURE: 72 MMHG | TEMPERATURE: 98 F | WEIGHT: 123.4 LBS

## 2023-10-02 DIAGNOSIS — E55.9 VITAMIN D INSUFFICIENCY: ICD-10-CM

## 2023-10-02 DIAGNOSIS — F51.01 PRIMARY INSOMNIA: ICD-10-CM

## 2023-10-02 DIAGNOSIS — F41.0 PANIC DISORDER WITHOUT AGORAPHOBIA: ICD-10-CM

## 2023-10-02 DIAGNOSIS — I10 PRIMARY HYPERTENSION: Primary | ICD-10-CM

## 2023-10-02 DIAGNOSIS — M47.817 LUMBOSACRAL SPONDYLOSIS WITHOUT MYELOPATHY: ICD-10-CM

## 2023-10-02 DIAGNOSIS — I10 PRIMARY HYPERTENSION: ICD-10-CM

## 2023-10-02 DIAGNOSIS — R73.01 IMPAIRED FASTING GLUCOSE: ICD-10-CM

## 2023-10-02 DIAGNOSIS — Z12.31 ENCOUNTER FOR SCREENING MAMMOGRAM FOR MALIGNANT NEOPLASM OF BREAST: ICD-10-CM

## 2023-10-02 DIAGNOSIS — M51.36 DDD (DEGENERATIVE DISC DISEASE), LUMBAR: ICD-10-CM

## 2023-10-02 DIAGNOSIS — G43.709 CHRONIC MIGRAINE WITHOUT AURA WITHOUT STATUS MIGRAINOSUS, NOT INTRACTABLE: ICD-10-CM

## 2023-10-02 PROCEDURE — 3074F SYST BP LT 130 MM HG: CPT | Performed by: FAMILY MEDICINE

## 2023-10-02 PROCEDURE — 99214 OFFICE O/P EST MOD 30 MIN: CPT | Performed by: FAMILY MEDICINE

## 2023-10-02 PROCEDURE — 3078F DIAST BP <80 MM HG: CPT | Performed by: FAMILY MEDICINE

## 2023-10-02 RX ORDER — AMLODIPINE BESYLATE 10 MG/1
10 TABLET ORAL NIGHTLY
Qty: 90 TABLET | Refills: 1 | Status: SHIPPED | OUTPATIENT
Start: 2023-10-02

## 2023-10-02 RX ORDER — HYDROCODONE BITARTRATE AND ACETAMINOPHEN 7.5; 325 MG/1; MG/1
1 TABLET ORAL EVERY 8 HOURS PRN
Qty: 90 TABLET | Refills: 0 | Status: SHIPPED | OUTPATIENT
Start: 2023-10-02 | End: 2023-11-01

## 2023-10-02 RX ORDER — HYDROCODONE BITARTRATE AND ACETAMINOPHEN 7.5; 325 MG/1; MG/1
1 TABLET ORAL EVERY 8 HOURS PRN
Qty: 90 TABLET | Refills: 0 | Status: SHIPPED | OUTPATIENT
Start: 2023-10-30 | End: 2023-11-29

## 2023-10-02 RX ORDER — TOPIRAMATE 25 MG/1
50 TABLET ORAL NIGHTLY
Qty: 180 TABLET | Refills: 1 | Status: SHIPPED | OUTPATIENT
Start: 2023-10-02

## 2023-10-02 RX ORDER — CARISOPRODOL 350 MG/1
350 TABLET ORAL 4 TIMES DAILY PRN
COMMUNITY

## 2023-10-02 RX ORDER — ZOLPIDEM TARTRATE 12.5 MG/1
12.5 TABLET, FILM COATED, EXTENDED RELEASE ORAL NIGHTLY PRN
Qty: 90 TABLET | Refills: 0 | Status: SHIPPED | OUTPATIENT
Start: 2023-10-02 | End: 2023-12-31

## 2023-10-02 RX ORDER — HYDROCODONE BITARTRATE AND ACETAMINOPHEN 7.5; 325 MG/1; MG/1
1 TABLET ORAL EVERY 8 HOURS PRN
Qty: 90 TABLET | Refills: 0 | Status: SHIPPED | OUTPATIENT
Start: 2023-11-27 | End: 2023-12-27

## 2023-10-02 RX ORDER — CARISOPRODOL 350 MG/1
350 TABLET ORAL 4 TIMES DAILY PRN
Qty: 360 TABLET | Refills: 0 | Status: SHIPPED | OUTPATIENT
Start: 2023-10-02 | End: 2023-12-31

## 2023-10-02 RX ORDER — ALPRAZOLAM 0.5 MG/1
0.5 TABLET ORAL 3 TIMES DAILY PRN
Qty: 270 TABLET | Refills: 0 | Status: SHIPPED | OUTPATIENT
Start: 2023-10-02 | End: 2023-12-31

## 2023-10-02 ASSESSMENT — ENCOUNTER SYMPTOMS
CONSTIPATION: 0
NAUSEA: 0
COUGH: 0
BACK PAIN: 1
DIARRHEA: 0
WHEEZING: 0
VOMITING: 0
ABDOMINAL PAIN: 0
SHORTNESS OF BREATH: 0

## 2023-10-02 NOTE — PROGRESS NOTES
10/2/23  [facility] : 1965 Sex: female  Age: 66 y.o. Chief Complaint   Patient presents with    Follow-up    3 Month Follow-Up    Medication Refill     HPI:  66 y.o. female patient presents today for 3 month follow up for chronic medical conditions, medication refills and FBW. Patient's chart, medical, surgical and medication history all reviewed. Hypertension   The patient presents today for follow up of HTN. The problem is well controlled, improved. Risk factors for coronary artery disease include Age > 30 and HTN. Current treatments include amlodipine (Norvasc). The patient is compliant all of the time. Lifestyle changes the patient has made include  none . Today the patient is complaining of none. Bipolar  Patient complains of evaluation of anxiety disorder and post traumatic stress  disorder. She has the following anxiety symptoms: difficulty concentrating, feelings of losing control, irritable, racing thoughts. Onset of symptoms was approximately several years ago, stable since that time. She denies current suicidal and homicidal ideation. Previous treatment includes Ativan, Zoloft, and Lamictal/Seroquel  and individual therapy. She complains of the following side effects from the treatment: none. Patient notes that April is always difficult due to the death of her son over 10 years ago. Patient has a history of psuedoseizures, but states that she had an episode of intense convulsions for \"over 10 minutes\" per her  in early 2022. She thinks episode was due to increased stress. She called 911 and was given an injection of \"something\" and then taken to the ER. Developed severe migraine and was given a migraine cocktail. Notes that all of the medications between EMT and ER caused her to be very groggy and could not remember much of the episode.   States that she had a virtual visit with someone in the ER who asked her questions about depression, anxiety

## 2023-10-03 ENCOUNTER — TELEPHONE (OUTPATIENT)
Dept: PAIN MANAGEMENT | Age: 58
End: 2023-10-03

## 2023-10-03 NOTE — PROGRESS NOTES
[facility] [place] [facility] PRE-ADMISSION TESTING INSTRUCTIONS    The Preadmission Testing patient is instructed accordingly using the following criteria (check applicable):    ARRIVAL INSTRUCTIONS:  [x] Parking the day of Surgery is located in the Main Entrance lot. Upon entering the door, make an immediate right to the surgery reception desk    [x] Bring photo ID and insurance card    [] Bring in a copy of Living will or Durable Power of  papers. [x] Please be sure to arrange for responsible adult to provide transportation to and from the hospital    [x] Please arrange for responsible adult to be with you for the 24 hour period post procedure due to having anesthesia    [x] If you awake am of surgery not feeling well or have temperature >100 please call [phone]    GENERAL INSTRUCTIONS:    [x] Nothing by mouth after midnight, including gum, candy, mints or water    [x] You may brush your teeth, but do not swallow any water    [x] Take medications as instructed with 1-2 oz of water    [] Stop herbal supplements and vitamins 5 days prior to procedure    [x] Follow preop dosing of blood thinners per physician instructions    [] Take 1/2 dose of evening insulin, but no insulin after midnight    [] No oral diabetic medications after midnight    [] If diabetic and have low blood sugar or feel symptomatic, take 1-2oz apple juice only    [] Bring inhalers day of surgery    [] Bring C-PAP/ Bi-Pap day of surgery    [] Bring urine specimen day of surgery    [x] Shower or bath with soap, lather and rinse well, AM of Surgery, no lotion, powders or creams to surgical site    [] Follow bowel prep as instructed per surgeon    [x] No tobacco products within 24 hours of surgery     [x] No alcohol or illegal drug use within 24 hours of surgery.     [x] Jewelry, body piercing's, eyeglasses, contact lenses and dentures are not permitted into surgery (bring cases)      [x] Please do not wear any nail polish,

## 2023-10-03 NOTE — TELEPHONE ENCOUNTER
[facility] called stating she is in a lot pain and wants to be admitted to the ED. She is having a procedure on 10/5/23 she doesn't want to to mess anything up with getting the procedure done.

## 2023-10-03 NOTE — TELEPHONE ENCOUNTER
Assumed care @ 0730. Alert and orientated X4. Neuro's Q4, no acute changes. See flowsheets. NSR on tele, RA, VSS. Patient denies pain. IVF infusing per order, See MAR. Ortho's (-), see flowsheets. ECHO completed. PT/OT eval completed. Recommending home when able to discharge. Plan for LP tomorrow. Holding subQ heparin starting tonight. Patient/family updated on plan of care.    Bed/chair alarm on, call light within reach, I called the patient. Discussed that the only shot that we could give her at the office is a steroid shot. Discussed okay to go to the ED if she needs to for pain but should not receive any blood thinning agents (NSAIDs - toradol etc) if possible or her procedure will need to be delayed. She states that she is going to try not to go to the ED.

## 2023-10-05 ENCOUNTER — APPOINTMENT (OUTPATIENT)
Dept: GENERAL RADIOLOGY | Age: 58
End: 2023-10-05
Attending: ANESTHESIOLOGY
Payer: COMMERCIAL

## 2023-10-05 ENCOUNTER — HOSPITAL ENCOUNTER (OUTPATIENT)
Age: 58
Setting detail: OUTPATIENT SURGERY
Discharge: HOME | End: 2023-10-05
Attending: ANESTHESIOLOGY | Admitting: ANESTHESIOLOGY
Payer: COMMERCIAL

## 2023-10-05 ENCOUNTER — ANESTHESIA EVENT (OUTPATIENT)
Dept: OPERATING ROOM | Age: 58
End: 2023-10-05
Payer: COMMERCIAL

## 2023-10-05 ENCOUNTER — ANESTHESIA (OUTPATIENT)
Dept: OPERATING ROOM | Age: 58
End: 2023-10-05
Payer: COMMERCIAL

## 2023-10-05 VITALS
HEART RATE: 72 BPM | SYSTOLIC BLOOD PRESSURE: 134 MMHG | TEMPERATURE: 97 F | OXYGEN SATURATION: 100 % | WEIGHT: 120 LBS | HEIGHT: 64 IN | DIASTOLIC BLOOD PRESSURE: 73 MMHG | BODY MASS INDEX: 20.49 KG/M2 | RESPIRATION RATE: 18 BRPM

## 2023-10-05 PROCEDURE — 2709999900 HC NON-CHARGEABLE SUPPLY: Performed by: ANESTHESIOLOGY

## 2023-10-05 PROCEDURE — 3700000000 HC ANESTHESIA ATTENDED CARE: Performed by: ANESTHESIOLOGY

## 2023-10-05 PROCEDURE — 6360000002 HC RX W HCPCS: Performed by: NURSE ANESTHETIST, CERTIFIED REGISTERED

## 2023-10-05 PROCEDURE — 7100000011 HC PHASE II RECOVERY - ADDTL 15 MIN: Performed by: ANESTHESIOLOGY

## 2023-10-05 PROCEDURE — 2500000003 HC RX 250 WO HCPCS: Performed by: ANESTHESIOLOGY

## 2023-10-05 PROCEDURE — 3600000012 HC SURGERY LEVEL 2 ADDTL 15MIN: Performed by: ANESTHESIOLOGY

## 2023-10-05 PROCEDURE — 3700000001 HC ADD 15 MINUTES (ANESTHESIA): Performed by: ANESTHESIOLOGY

## 2023-10-05 PROCEDURE — 6360000002 HC RX W HCPCS: Performed by: ANESTHESIOLOGY

## 2023-10-05 PROCEDURE — 2580000003 HC RX 258: Performed by: ANESTHESIOLOGY

## 2023-10-05 PROCEDURE — 7100000010 HC PHASE II RECOVERY - FIRST 15 MIN: Performed by: ANESTHESIOLOGY

## 2023-10-05 PROCEDURE — 3600000002 HC SURGERY LEVEL 2 BASE: Performed by: ANESTHESIOLOGY

## 2023-10-05 RX ORDER — MIDAZOLAM HYDROCHLORIDE 1 MG/ML
INJECTION INTRAMUSCULAR; INTRAVENOUS PRN
Status: DISCONTINUED | OUTPATIENT
Start: 2023-10-05 | End: 2023-10-05 | Stop reason: SDUPTHER

## 2023-10-05 RX ORDER — LIDOCAINE HYDROCHLORIDE 10 MG/ML
INJECTION, SOLUTION EPIDURAL; INFILTRATION; INTRACAUDAL; PERINEURAL PRN
Status: DISCONTINUED | OUTPATIENT
Start: 2023-10-05 | End: 2023-10-05 | Stop reason: ALTCHOICE

## 2023-10-05 RX ORDER — METHYLPREDNISOLONE ACETATE 40 MG/ML
INJECTION, SUSPENSION INTRA-ARTICULAR; INTRALESIONAL; INTRAMUSCULAR; SOFT TISSUE PRN
Status: DISCONTINUED | OUTPATIENT
Start: 2023-10-05 | End: 2023-10-05 | Stop reason: ALTCHOICE

## 2023-10-05 RX ORDER — SODIUM CHLORIDE 0.9 % (FLUSH) 0.9 %
5-40 SYRINGE (ML) INJECTION PRN
Status: DISCONTINUED | OUTPATIENT
Start: 2023-10-05 | End: 2023-10-05 | Stop reason: HOSPADM

## 2023-10-05 RX ORDER — FENTANYL CITRATE 50 UG/ML
INJECTION, SOLUTION INTRAMUSCULAR; INTRAVENOUS PRN
Status: DISCONTINUED | OUTPATIENT
Start: 2023-10-05 | End: 2023-10-05 | Stop reason: SDUPTHER

## 2023-10-05 RX ORDER — BUPIVACAINE HYDROCHLORIDE 2.5 MG/ML
INJECTION, SOLUTION EPIDURAL; INFILTRATION; INTRACAUDAL PRN
Status: DISCONTINUED | OUTPATIENT
Start: 2023-10-05 | End: 2023-10-05 | Stop reason: ALTCHOICE

## 2023-10-05 RX ORDER — SODIUM CHLORIDE 0.9 % (FLUSH) 0.9 %
5-40 SYRINGE (ML) INJECTION EVERY 12 HOURS SCHEDULED
Status: DISCONTINUED | OUTPATIENT
Start: 2023-10-05 | End: 2023-10-05 | Stop reason: HOSPADM

## 2023-10-05 RX ORDER — SODIUM CHLORIDE 9 MG/ML
INJECTION, SOLUTION INTRAVENOUS PRN
Status: DISCONTINUED | OUTPATIENT
Start: 2023-10-05 | End: 2023-10-05 | Stop reason: HOSPADM

## 2023-10-05 RX ORDER — LIDOCAINE HYDROCHLORIDE 5 MG/ML
INJECTION, SOLUTION INFILTRATION; INTRAVENOUS PRN
Status: DISCONTINUED | OUTPATIENT
Start: 2023-10-05 | End: 2023-10-05 | Stop reason: ALTCHOICE

## 2023-10-05 RX ADMIN — MIDAZOLAM 1 MG: 1 INJECTION INTRAMUSCULAR; INTRAVENOUS at 08:35

## 2023-10-05 RX ADMIN — MIDAZOLAM 1 MG: 1 INJECTION INTRAMUSCULAR; INTRAVENOUS at 08:37

## 2023-10-05 RX ADMIN — MIDAZOLAM 1 MG: 1 INJECTION INTRAMUSCULAR; INTRAVENOUS at 08:50

## 2023-10-05 RX ADMIN — FENTANYL CITRATE 50 MCG: 50 INJECTION, SOLUTION INTRAMUSCULAR; INTRAVENOUS at 08:50

## 2023-10-05 RX ADMIN — SODIUM CHLORIDE: 9 INJECTION, SOLUTION INTRAVENOUS at 08:33

## 2023-10-05 RX ADMIN — FENTANYL CITRATE 50 MCG: 50 INJECTION, SOLUTION INTRAMUSCULAR; INTRAVENOUS at 08:36

## 2023-10-05 RX ADMIN — FENTANYL CITRATE 50 MCG: 50 INJECTION, SOLUTION INTRAMUSCULAR; INTRAVENOUS at 08:34

## 2023-10-05 ASSESSMENT — LIFESTYLE VARIABLES: SMOKING_STATUS: 1

## 2023-10-05 ASSESSMENT — PAIN - FUNCTIONAL ASSESSMENT
PAIN_FUNCTIONAL_ASSESSMENT: 0-10
PAIN_FUNCTIONAL_ASSESSMENT: PREVENTS OR INTERFERES SOME ACTIVE ACTIVITIES AND ADLS

## 2023-10-05 ASSESSMENT — PAIN DESCRIPTION - ORIENTATION: ORIENTATION: RIGHT

## 2023-10-05 ASSESSMENT — PAIN DESCRIPTION - LOCATION: LOCATION: BACK

## 2023-10-05 ASSESSMENT — PAIN DESCRIPTION - DESCRIPTORS: DESCRIPTORS: ACHING

## 2023-10-05 ASSESSMENT — PAIN SCALES - GENERAL: PAINLEVEL_OUTOF10: 1

## 2023-10-05 NOTE — OP NOTE
Operative Note      Patient: Anoop Salazar  YOB: 1965  MRN: [id number]    Date of Procedure: 10/5/2023    Pre-Op Diagnosis Codes:     * Lumbar spondylosis [M47.816]    Post-Op Diagnosis: Same       Procedure(s):  BILATERAL LUMBAR RADIOFREQUENCY ABLATION AT L3, L4 & L5 DORSAL RAMI UNDER FLUOROSCOPY    Surgeon(s):  David Gtz MD    Assistant:   * No surgical staff found *    Anesthesia: Monitor Anesthesia Care    Estimated Blood Loss (mL): Minimal    Complications: None    Specimens:   * No specimens in log *    Implants:  * No implants in log *      Drains: * No LDAs found *    Findings: good needle placemnet        Detailed Description of Procedure:   10/5/2023    Patient: Anoop Salazar  :  1965  Age:  66 y.o. Sex:  female     PRE-OPERATIVE DIAGNOSIS:  Lumbar spondylosis, lumbar facet arthropathy. POST-OPERATIVE DIAGNOSIS: Same. PROCEDURE:  Fluoroscopic-guided Bilateral  Lumbar facet medial branch radiofrequency ablation at levels L3, L4, L5 DR. SURGEON:  David Gtz MD    ANESTHESIA: MAC    ESTIMATED BLOOD LOSS: None.  ______________________________________________________________________  HISTORY & PHYSICAL: Anoop Salazar presents today for fluoroscopic-guided Bilateral lumbar facet medial branch radiofrequency ablation at levels L3, L4, L5DR. The potential complications of the procedure were explained to [facility] again today and she has elected to undergo the aforementioned procedure. Aria complete History & Physical examination were reviewed in depth, a copy of which is in the chart. DESCRIPTION OF PROCEDURE:    After confirming written and informed consent, a time-out was performed and Aria name and date of birth, the procedure to be performed as well as the plan for the location of the needle insertion were confirmed. The patient was brought into the procedure room and placed in the prone position on the fluoroscopy table.  Standard

## 2023-10-05 NOTE — INTERVAL H&P NOTE
Update History & Physical    The patient's History and Physical of September 18, 2023 was reviewed with the patient and I examined the patient. There was no change. The surgical site was confirmed by the patient and me. Plan: The risks, benefits, expected outcome, and alternative to the recommended procedure have been discussed with the patient. Patient understands and wants to proceed with the procedure.      Electronically signed by Chidi Rodriguez MD on 10/5/2023

## 2023-10-05 NOTE — ANESTHESIA POSTPROCEDURE EVALUATION
Department of Anesthesiology  Postprocedure Note    Patient: Sung Celaya  MRN: [id number]  YOB: 1965  Date of evaluation: 10/5/2023      Procedure Summary     Date: 10/05/23 Room / Location: [facility] PROCEDURE ROOM 02 / [facility]    Anesthesia Start: 8782 Anesthesia Stop: 1376    Procedure: BILATERAL LUMBAR RADIOFREQUENCY ABLATION AT L3, L4 & L5 DORSAL RAMI UNDER FLUOROSCOPY (Bilateral: Back) Diagnosis:       Lumbar spondylosis      (Lumbar spondylosis [Z69.824])    Surgeons: Payam Tellez MD Responsible Provider: Bob Conde DO    Anesthesia Type: MAC ASA Status: 3          Anesthesia Type: MAC    Adelaida Phase I: Adelaida Score: 10    Adelaida Phase II: Adelaida Score: 10      Anesthesia Post Evaluation    Level of consciousness: awake  Pain score: 3  Airway patency: patent  Nausea & Vomiting: no vomiting and no nausea  Complications: no  Cardiovascular status: hemodynamically stable  Respiratory status: acceptable  Hydration status: stable  Pain management: adequate

## 2023-10-05 NOTE — ANESTHESIA PRE PROCEDURE
found     Neuro/Psych:   (+) seizures (none since 2018):, neuromuscular disease ( Cervical spondylitis with radiculitis C2, C7 ):, headaches: migraine headaches, psychiatric history (Bipolar):depression/anxiety  (Panic disorder)             ROS comment: Spinal stenosis of lumbar region with neurogenic claudication  Cervical disc disease  Psychogenic nonepileptic seizure  DDD (degenerative disc disease), lumbar  Lumbar radicular pain  Synovial cyst of lumbar facet joint  Lumbosacral spondylosis without myelopathy  Facet arthropathy GI/Hepatic/Renal:   (+) renal disease (22/1.1/59): CRI,           Endo/Other:    (+) : arthritis (Narcotic dependent chronic pain s/p cervical fusion): OA., .    (-) blood dyscrasia        Pt had no PAT visit       Abdominal:         (-) obese       Vascular: negative vascular ROS. Other Findings:             Anesthesia Plan      MAC     ASA 3     (Level of sedation and reasonable expectations discussed)  Induction: intravenous. MIPS: Postoperative opioids intended and Prophylactic antiemetics administered. Anesthetic plan and risks discussed with patient.       Plan discussed with CRNA.            agree above information  Bijal Osborne, [address]  [facility]  October 5, 2023  6:25 AM

## 2023-10-05 NOTE — DISCHARGE INSTRUCTIONS
Alcira Montilla Block/Radiofrequency  Home Going Instructions    1-Go home, rest for the remainder of the day  2-Please do not lift over 20 pounds the day of the injection  3-If you received sedation No: alcohol, driving, operating lawn mowers, plows, tractors or other dangerous equipment until next morning. Do not make important decisions or sign legal documents for 24 hours. You may experience light headedness, dizziness, nausea or sleepiness after sedation. Do not stay alone. A responsible adult must be with you for 24 hours. You could be nauseated from the medications you have received. Your IV site may be sore and bruised. 4-No dietary restrictions     5-Resume all medications the same day, blood thinners to be resumed 24 hours after injection if you were instructed to stop any. 6-Keep the surgical site clean and dry, you may shower the next morning and remove the      dressing. 7- No sitz baths, tub baths or hot tubs/swimming for 24 hours. 8- If you have any pain at the injection site(s), application of an ice pack to the area should be       helpful, 20 minutes on/20 minutes off for next 48 hours. 9- Call [facility] immediately at if you develop.   Fever greater than 100.4 F  Have bleeding or drainage from the puncture site  Have progressive Leg/arm numbness and or weakness  Loss of control of bowel and or bladder (wet/soil yourself)  Severe headache with inability to lift head  10-You may return to work the next day

## 2023-10-23 LAB
ABSOLUTE IMMATURE GRANULOCYTE: <0.03 K/UL (ref 0–0.58)
ALBUMIN SERPL-MCNC: 4.5 G/DL (ref 3.5–5.2)
ALP BLD-CCNC: 83 U/L (ref 35–104)
ALT SERPL-CCNC: 22 U/L (ref 0–32)
ANION GAP SERPL CALCULATED.3IONS-SCNC: 19 MMOL/L (ref 7–16)
AST SERPL-CCNC: 29 U/L (ref 0–31)
BASOPHILS ABSOLUTE: 0.02 K/UL (ref 0–0.2)
BASOPHILS RELATIVE PERCENT: 0 % (ref 0–2)
BILIRUB SERPL-MCNC: 0.4 MG/DL (ref 0–1.2)
BUN BLDV-MCNC: 16 MG/DL (ref 6–20)
CALCIUM SERPL-MCNC: 9.3 MG/DL (ref 8.6–10.2)
CHLORIDE BLD-SCNC: 101 MMOL/L (ref 98–107)
CHOLESTEROL: 209 MG/DL
CO2: 18 MMOL/L (ref 22–29)
CREAT SERPL-MCNC: 0.9 MG/DL (ref 0.5–1)
EOSINOPHILS ABSOLUTE: 0.09 K/UL (ref 0.05–0.5)
EOSINOPHILS RELATIVE PERCENT: 2 % (ref 0–6)
GFR SERPL CREATININE-BSD FRML MDRD: >60 ML/MIN/1.73M2
GLUCOSE BLD-MCNC: 96 MG/DL (ref 74–99)
HBA1C MFR BLD: 5.5 % (ref 4–5.6)
HCT VFR BLD CALC: 43 % (ref 34–48)
HDLC SERPL-MCNC: 92 MG/DL
HEMOGLOBIN: 14.1 G/DL (ref 11.5–15.5)
IMMATURE GRANULOCYTES: 0 % (ref 0–5)
LDL CHOLESTEROL: 100 MG/DL
LYMPHOCYTES ABSOLUTE: 1.29 K/UL (ref 1.5–4)
LYMPHOCYTES RELATIVE PERCENT: 23 % (ref 20–42)
MCH RBC QN AUTO: 32.9 PG (ref 26–35)
MCHC RBC AUTO-ENTMCNC: 32.8 G/DL (ref 32–34.5)
MCV RBC AUTO: 100.2 FL (ref 80–99.9)
MONOCYTES ABSOLUTE: 0.43 K/UL (ref 0.1–0.95)
MONOCYTES RELATIVE PERCENT: 8 % (ref 2–12)
NEUTROPHILS ABSOLUTE: 3.86 K/UL (ref 1.8–7.3)
NEUTROPHILS RELATIVE PERCENT: 68 % (ref 43–80)
PDW BLD-RTO: 12.7 % (ref 11.5–15)
PLATELET # BLD: 248 K/UL (ref 130–450)
PMV BLD AUTO: 11.4 FL (ref 7–12)
POTASSIUM SERPL-SCNC: 4.3 MMOL/L (ref 3.5–5)
RBC # BLD: 4.29 M/UL (ref 3.5–5.5)
SODIUM BLD-SCNC: 138 MMOL/L (ref 132–146)
TOTAL PROTEIN: 7.8 G/DL (ref 6.4–8.3)
TRIGL SERPL-MCNC: 84 MG/DL
TSH SERPL DL<=0.05 MIU/L-ACNC: 2.34 UIU/ML (ref 0.27–4.2)
VITAMIN D 25-HYDROXY: 51.7 NG/ML (ref 30–100)
VLDLC SERPL CALC-MCNC: 17 MG/DL
WBC # BLD: 5.7 K/UL (ref 4.5–11.5)

## 2023-11-02 ENCOUNTER — OFFICE VISIT (OUTPATIENT)
Dept: PRIMARY CARE CLINIC | Age: 58
End: 2023-11-02
Payer: COMMERCIAL

## 2023-11-02 VITALS
BODY MASS INDEX: 20.66 KG/M2 | DIASTOLIC BLOOD PRESSURE: 78 MMHG | HEIGHT: 64 IN | HEART RATE: 54 BPM | OXYGEN SATURATION: 100 % | SYSTOLIC BLOOD PRESSURE: 128 MMHG | TEMPERATURE: 97.8 F | WEIGHT: 121 LBS

## 2023-11-02 DIAGNOSIS — R45.851 SUICIDAL IDEATION: ICD-10-CM

## 2023-11-02 DIAGNOSIS — G43.709 CHRONIC MIGRAINE WITHOUT AURA WITHOUT STATUS MIGRAINOSUS, NOT INTRACTABLE: ICD-10-CM

## 2023-11-02 DIAGNOSIS — F31.9 BIPOLAR I DISORDER (HCC): Primary | ICD-10-CM

## 2023-11-02 PROCEDURE — 3074F SYST BP LT 130 MM HG: CPT | Performed by: FAMILY MEDICINE

## 2023-11-02 PROCEDURE — 96372 THER/PROPH/DIAG INJ SC/IM: CPT | Performed by: FAMILY MEDICINE

## 2023-11-02 PROCEDURE — 99213 OFFICE O/P EST LOW 20 MIN: CPT | Performed by: FAMILY MEDICINE

## 2023-11-02 PROCEDURE — 3078F DIAST BP <80 MM HG: CPT | Performed by: FAMILY MEDICINE

## 2023-11-02 RX ORDER — KETOROLAC TROMETHAMINE 30 MG/ML
60 INJECTION, SOLUTION INTRAMUSCULAR; INTRAVENOUS ONCE
Status: COMPLETED | OUTPATIENT
Start: 2023-11-02 | End: 2023-11-02

## 2023-11-02 RX ADMIN — KETOROLAC TROMETHAMINE 60 MG: 30 INJECTION, SOLUTION INTRAMUSCULAR; INTRAVENOUS at 10:40

## 2023-11-02 ASSESSMENT — ENCOUNTER SYMPTOMS
VOMITING: 0
DIARRHEA: 0
BACK PAIN: 1
SHORTNESS OF BREATH: 0
COUGH: 0
CONSTIPATION: 0
NAUSEA: 0
ABDOMINAL PAIN: 0
WHEEZING: 0

## 2023-11-02 NOTE — PROGRESS NOTES
Constitutional:       General: She is not in acute distress. Appearance: Normal appearance. She is well-developed and normal weight. She is not ill-appearing. HENT:      Head: Normocephalic and atraumatic. Right Ear: Hearing and external ear normal.      Left Ear: Hearing and external ear normal.      Nose: Nose normal.      Mouth/Throat:      Mouth: Mucous membranes are moist.   Eyes:      General: Lids are normal. No scleral icterus. Extraocular Movements: Extraocular movements intact. Conjunctiva/sclera: Conjunctivae normal.   Neck:      Thyroid: No thyromegaly. Vascular: No carotid bruit. Cardiovascular:      Rate and Rhythm: Normal rate and regular rhythm. Heart sounds: Normal heart sounds. No murmur heard. Pulmonary:      Effort: Pulmonary effort is normal. No respiratory distress. Breath sounds: Normal breath sounds. No wheezing. Musculoskeletal:         General: No tenderness. Normal range of motion. Right hand: Deformity (thickening of palmar aponeurosis at 4-5th MCP) present. Left hand: Deformity (thickening of palmar aponeurosis at 4-5th MCP) present. Cervical back: Normal range of motion and neck supple. Right lower leg: No edema. Left lower leg: No edema. Lymphadenopathy:      Cervical: No cervical adenopathy. Skin:     General: Skin is warm and dry. Findings: No rash. Neurological:      General: No focal deficit present. Mental Status: She is alert and oriented to person, place, and time. Gait: Gait normal.   Psychiatric:         Mood and Affect: Mood and affect normal.         Speech: Speech normal.         Behavior: Behavior normal.         Thought Content:  Thought content normal.         Labs:  CBC with Differential:    Lab Results   Component Value Date/Time    WBC 5.7 10/23/2023 09:17 AM    RBC 4.29 10/23/2023 09:17 AM    HGB 14.1 10/23/2023 09:17 AM    HCT 43.0 10/23/2023 09:17 AM     10/23/2023 09:17

## 2023-11-06 ENCOUNTER — OFFICE VISIT (OUTPATIENT)
Dept: PAIN MANAGEMENT | Age: 58
End: 2023-11-06
Payer: COMMERCIAL

## 2023-11-06 VITALS
SYSTOLIC BLOOD PRESSURE: 124 MMHG | TEMPERATURE: 97.5 F | HEIGHT: 64 IN | RESPIRATION RATE: 16 BRPM | OXYGEN SATURATION: 95 % | WEIGHT: 121 LBS | DIASTOLIC BLOOD PRESSURE: 79 MMHG | HEART RATE: 65 BPM | BODY MASS INDEX: 20.66 KG/M2

## 2023-11-06 DIAGNOSIS — Z98.1 S/P CERVICAL SPINAL FUSION: ICD-10-CM

## 2023-11-06 DIAGNOSIS — F11.90 CHRONIC, CONTINUOUS USE OF OPIOIDS: ICD-10-CM

## 2023-11-06 DIAGNOSIS — M54.12 CERVICAL RADICULAR PAIN: ICD-10-CM

## 2023-11-06 DIAGNOSIS — M50.30 DDD (DEGENERATIVE DISC DISEASE), CERVICAL: Primary | ICD-10-CM

## 2023-11-06 DIAGNOSIS — M71.38 SYNOVIAL CYST OF LUMBAR FACET JOINT: ICD-10-CM

## 2023-11-06 DIAGNOSIS — M47.817 LUMBOSACRAL SPONDYLOSIS WITHOUT MYELOPATHY: ICD-10-CM

## 2023-11-06 DIAGNOSIS — M47.812 CERVICAL SPONDYLOSIS: ICD-10-CM

## 2023-11-06 DIAGNOSIS — M51.36 DDD (DEGENERATIVE DISC DISEASE), LUMBAR: ICD-10-CM

## 2023-11-06 DIAGNOSIS — F54 PSYCHOLOGICAL FACTORS AFFECTING MEDICAL CONDITION: ICD-10-CM

## 2023-11-06 PROCEDURE — 3074F SYST BP LT 130 MM HG: CPT | Performed by: ANESTHESIOLOGY

## 2023-11-06 PROCEDURE — 99213 OFFICE O/P EST LOW 20 MIN: CPT | Performed by: ANESTHESIOLOGY

## 2023-11-06 PROCEDURE — 99214 OFFICE O/P EST MOD 30 MIN: CPT | Performed by: ANESTHESIOLOGY

## 2023-11-06 PROCEDURE — 3078F DIAST BP <80 MM HG: CPT | Performed by: ANESTHESIOLOGY

## 2023-11-06 NOTE — PROGRESS NOTES
[facility]        [address] [place] [address]  Dept: [phone]      Follow up Note      Victoriano Mention     Date of Visit:  11/6/2023    CC:  Patient presents for follow up   Chief Complaint   Patient presents with    Follow-up     BILATERAL LUMBAR RADIOFREQUENCY ABLATION AT L3, L4 & L5 DORSAL RAMI UNDER FLUOROSCOPY    Back Pain    Neck Pain     HPI:    H/o low back and left LE pain. On and off pain for many yrs with recent exacerbation. Had MRI of LS spine. Evaluated by NSG and recommended interventions. Pain causes functional limitations/ limits Adl's : Yes     Nursing notes and details of the pain history reviewed. Please see intake notes for details. S/P TFESI - helped the LE pain significantly. S/P Lumbar MB RFA- helped axial low back pain. Current pain mainly over the neck and intermittent UE symptoms. NOTE:   Has been on chronic Norco and Soma for many yrs. Prior ACDF  in 2008 - stable. H/o Anxiety - follows with psych. Previous treatments:   Physical Therapy : yes, continues HEP     Chiropractic treatment: yes,     Medications: - NSAID's : yes -                       - Membrane stabilizers : yes - gabapentin- had side effects                       - Opioids : yes,                        - Adjuvants or Others : yes, steroids/ muscle relaxants     Sine Surgeries: No LS spine surgery, Prior ACDF +      Anticoagulation medications: has stopped ASA. H/O Smoking: no  H/O alcohol abuse : no  H/O Illicit drug use : no- has used CBD cream     Imaging:     Xray C spine: 3/16/2021:  FINDINGS:  Craniovertebral junction and upper cervical spine appears normal.  There is  anterior plate and screw fixation of C4 through C7 with interbody fusion. There is slight anterior subluxation of C7 with respect to T1. There is no  prevertebral soft tissue swelling.      IMPRESSION:  No definite acute process     MRI of LS spine: 3/6/2023:  FINDINGS:

## 2023-11-29 ENCOUNTER — OFFICE VISIT (OUTPATIENT)
Dept: FAMILY MEDICINE CLINIC | Age: 58
End: 2023-11-29
Payer: COMMERCIAL

## 2023-11-29 VITALS
HEART RATE: 75 BPM | OXYGEN SATURATION: 99 % | WEIGHT: 122.6 LBS | SYSTOLIC BLOOD PRESSURE: 132 MMHG | DIASTOLIC BLOOD PRESSURE: 74 MMHG | TEMPERATURE: 97.6 F | BODY MASS INDEX: 21.04 KG/M2 | RESPIRATION RATE: 17 BRPM

## 2023-11-29 DIAGNOSIS — G43.701 CHRONIC MIGRAINE WITHOUT AURA WITH STATUS MIGRAINOSUS, NOT INTRACTABLE: Primary | ICD-10-CM

## 2023-11-29 PROCEDURE — 3075F SYST BP GE 130 - 139MM HG: CPT | Performed by: PHYSICIAN ASSISTANT

## 2023-11-29 PROCEDURE — 96372 THER/PROPH/DIAG INJ SC/IM: CPT | Performed by: PHYSICIAN ASSISTANT

## 2023-11-29 PROCEDURE — 99214 OFFICE O/P EST MOD 30 MIN: CPT | Performed by: PHYSICIAN ASSISTANT

## 2023-11-29 PROCEDURE — 3078F DIAST BP <80 MM HG: CPT | Performed by: PHYSICIAN ASSISTANT

## 2023-11-29 RX ORDER — KETOROLAC TROMETHAMINE 30 MG/ML
60 INJECTION, SOLUTION INTRAMUSCULAR; INTRAVENOUS ONCE
Status: COMPLETED | OUTPATIENT
Start: 2023-11-29 | End: 2023-11-29

## 2023-11-29 RX ORDER — PROCHLORPERAZINE MALEATE 5 MG/1
5 TABLET ORAL EVERY 6 HOURS PRN
Qty: 12 TABLET | Refills: 0 | Status: SHIPPED | OUTPATIENT
Start: 2023-11-29

## 2023-11-29 RX ADMIN — KETOROLAC TROMETHAMINE 60 MG: 30 INJECTION, SOLUTION INTRAMUSCULAR; INTRAVENOUS at 11:29

## 2023-11-29 NOTE — PROGRESS NOTES
23  [facility] 1965 Sex: female  Age 66 y.o. Subjective:  Chief Complaint   Patient presents with    Migraine     Patient taking topamax  Started x4 days ago   Nausea   (-) Vomiting            HPI:   HPI  Stacy Phillips , 66 y.o. female presents to express care for evaluation of migraine headache. The patient started with the symptoms about 4 days ago. The patient states that this is pretty typical of her migraine headache. The patient has been doing her typical medications that she usually does and it does seem to help and then this morning she thought she was getting better and was taking her child to school and that she started noticing the headache come back. The patient is not having any lateralizing weakness of the upper or lower extremities. The patient is not any visual disturbances. No chest pain, shortness of breath, fever, chills. No rashes. The patient does note some photophobia and some phonophobia. No head injury or head trauma. Last breakthrough migraine was about a month ago    The patient responded well to IM injection of ketorolac here in the office      ROS:   Unless otherwise stated in this report the patient's positive and negative responses for review of systems for constitutional, eyes, ENT, cardiovascular, respiratory, gastrointestinal, neurological, , musculoskeletal, and integument systems and related systems to the presenting problem are either stated in the history of present illness or were not pertinent or were negative for the symptoms and/or complaints related to the presenting medical problem. Positives and pertinent negatives as per HPI. All others reviewed and are negative.       PMH:     Past Medical History:   Diagnosis Date    Anxiety     Bipolar disorder ([address])     Bipolar 1 disorder    Cervical disc disease     Cervical spondylitis with radiculitis (HCC)     C2, C7    Chronic back pain     Chronic kidney disease     In chart    CKD

## 2023-11-30 ENCOUNTER — OFFICE VISIT (OUTPATIENT)
Dept: PRIMARY CARE CLINIC | Age: 58
End: 2023-11-30
Payer: COMMERCIAL

## 2023-11-30 ENCOUNTER — TELEPHONE (OUTPATIENT)
Dept: PRIMARY CARE CLINIC | Age: 58
End: 2023-11-30

## 2023-11-30 VITALS
HEART RATE: 73 BPM | TEMPERATURE: 97.9 F | DIASTOLIC BLOOD PRESSURE: 78 MMHG | WEIGHT: 126 LBS | SYSTOLIC BLOOD PRESSURE: 128 MMHG | HEIGHT: 64 IN | BODY MASS INDEX: 21.51 KG/M2 | OXYGEN SATURATION: 100 %

## 2023-11-30 DIAGNOSIS — G43.709 CHRONIC MIGRAINE WITHOUT AURA WITHOUT STATUS MIGRAINOSUS, NOT INTRACTABLE: ICD-10-CM

## 2023-11-30 DIAGNOSIS — F31.9 BIPOLAR I DISORDER (HCC): Primary | ICD-10-CM

## 2023-11-30 DIAGNOSIS — I10 PRIMARY HYPERTENSION: ICD-10-CM

## 2023-11-30 PROCEDURE — 99214 OFFICE O/P EST MOD 30 MIN: CPT | Performed by: FAMILY MEDICINE

## 2023-11-30 PROCEDURE — 3074F SYST BP LT 130 MM HG: CPT | Performed by: FAMILY MEDICINE

## 2023-11-30 PROCEDURE — 3078F DIAST BP <80 MM HG: CPT | Performed by: FAMILY MEDICINE

## 2023-11-30 PROCEDURE — 96372 THER/PROPH/DIAG INJ SC/IM: CPT | Performed by: FAMILY MEDICINE

## 2023-11-30 RX ORDER — KETOROLAC TROMETHAMINE 30 MG/ML
30 INJECTION, SOLUTION INTRAMUSCULAR; INTRAVENOUS ONCE
Status: COMPLETED | OUTPATIENT
Start: 2023-11-30 | End: 2023-11-30

## 2023-11-30 RX ORDER — RIZATRIPTAN BENZOATE 10 MG/1
10 TABLET ORAL
Qty: 9 TABLET | Refills: 5 | Status: SHIPPED | OUTPATIENT
Start: 2023-11-30 | End: 2023-11-30

## 2023-11-30 RX ADMIN — KETOROLAC TROMETHAMINE 30 MG: 30 INJECTION, SOLUTION INTRAMUSCULAR; INTRAVENOUS at 14:14

## 2023-11-30 ASSESSMENT — ENCOUNTER SYMPTOMS
SHORTNESS OF BREATH: 0
CONSTIPATION: 0
DIARRHEA: 0
VOMITING: 0
BACK PAIN: 1
COUGH: 0
WHEEZING: 0
ABDOMINAL PAIN: 0
NAUSEA: 0

## 2023-11-30 NOTE — TELEPHONE ENCOUNTER
Rizatriptan not covered under insurance - PA done as she has tried imitrex - PA done and no Starling Colonel is needed.  Advised pharmacy and rx went through #9 for 30 days

## 2023-12-01 RX ORDER — PROCHLORPERAZINE MALEATE 5 MG/1
5 TABLET ORAL EVERY 6 HOURS PRN
Qty: 12 TABLET | Refills: 0 | OUTPATIENT
Start: 2023-12-01

## 2024-01-03 ENCOUNTER — OFFICE VISIT (OUTPATIENT)
Dept: PRIMARY CARE CLINIC | Age: 59
End: 2024-01-03
Payer: COMMERCIAL

## 2024-01-03 VITALS
WEIGHT: 124 LBS | DIASTOLIC BLOOD PRESSURE: 64 MMHG | BODY MASS INDEX: 21.17 KG/M2 | OXYGEN SATURATION: 97 % | HEART RATE: 77 BPM | TEMPERATURE: 97.6 F | HEIGHT: 64 IN | SYSTOLIC BLOOD PRESSURE: 110 MMHG

## 2024-01-03 DIAGNOSIS — M51.36 DDD (DEGENERATIVE DISC DISEASE), LUMBAR: ICD-10-CM

## 2024-01-03 DIAGNOSIS — M50.90 CERVICAL DISC DISEASE: ICD-10-CM

## 2024-01-03 DIAGNOSIS — M48.062 SPINAL STENOSIS OF LUMBAR REGION WITH NEUROGENIC CLAUDICATION: ICD-10-CM

## 2024-01-03 DIAGNOSIS — M25.551 RIGHT HIP PAIN: ICD-10-CM

## 2024-01-03 DIAGNOSIS — F41.0 PANIC DISORDER WITHOUT AGORAPHOBIA: ICD-10-CM

## 2024-01-03 DIAGNOSIS — F51.01 PRIMARY INSOMNIA: ICD-10-CM

## 2024-01-03 DIAGNOSIS — M47.817 LUMBOSACRAL SPONDYLOSIS WITHOUT MYELOPATHY: ICD-10-CM

## 2024-01-03 DIAGNOSIS — F31.9 BIPOLAR I DISORDER (HCC): ICD-10-CM

## 2024-01-03 DIAGNOSIS — I10 PRIMARY HYPERTENSION: Primary | ICD-10-CM

## 2024-01-03 DIAGNOSIS — G43.709 CHRONIC MIGRAINE WITHOUT AURA WITHOUT STATUS MIGRAINOSUS, NOT INTRACTABLE: ICD-10-CM

## 2024-01-03 PROCEDURE — 99214 OFFICE O/P EST MOD 30 MIN: CPT | Performed by: FAMILY MEDICINE

## 2024-01-03 PROCEDURE — 3074F SYST BP LT 130 MM HG: CPT | Performed by: FAMILY MEDICINE

## 2024-01-03 PROCEDURE — 3078F DIAST BP <80 MM HG: CPT | Performed by: FAMILY MEDICINE

## 2024-01-03 RX ORDER — LAMOTRIGINE 100 MG/1
100 TABLET ORAL NIGHTLY
Qty: 90 TABLET | Refills: 1 | Status: SHIPPED | OUTPATIENT
Start: 2024-01-03

## 2024-01-03 RX ORDER — ZOLPIDEM TARTRATE 12.5 MG/1
12.5 TABLET, FILM COATED, EXTENDED RELEASE ORAL NIGHTLY PRN
Qty: 90 TABLET | Refills: 0 | Status: SHIPPED | OUTPATIENT
Start: 2024-01-03 | End: 2024-04-02

## 2024-01-03 RX ORDER — HYDROCODONE BITARTRATE AND ACETAMINOPHEN 7.5; 325 MG/1; MG/1
1 TABLET ORAL 2 TIMES DAILY
Qty: 60 TABLET | Refills: 0 | Status: SHIPPED | OUTPATIENT
Start: 2024-01-31 | End: 2024-03-01

## 2024-01-03 RX ORDER — ALPRAZOLAM 0.5 MG/1
0.5 TABLET ORAL 3 TIMES DAILY PRN
Qty: 270 TABLET | Refills: 0 | Status: SHIPPED | OUTPATIENT
Start: 2024-01-03 | End: 2024-04-02

## 2024-01-03 RX ORDER — TOPIRAMATE 25 MG/1
50 TABLET ORAL NIGHTLY
Qty: 180 TABLET | Refills: 1 | Status: SHIPPED | OUTPATIENT
Start: 2024-01-03

## 2024-01-03 RX ORDER — CARISOPRODOL 350 MG/1
350 TABLET ORAL 4 TIMES DAILY PRN
Qty: 360 TABLET | Refills: 0 | Status: SHIPPED | OUTPATIENT
Start: 2024-01-03 | End: 2024-04-02

## 2024-01-03 RX ORDER — HYDROCODONE BITARTRATE AND ACETAMINOPHEN 7.5; 325 MG/1; MG/1
1 TABLET ORAL 2 TIMES DAILY
Qty: 60 TABLET | Refills: 0 | Status: SHIPPED | OUTPATIENT
Start: 2024-01-03 | End: 2024-02-02

## 2024-01-03 RX ORDER — HYDROCODONE BITARTRATE AND ACETAMINOPHEN 7.5; 325 MG/1; MG/1
1 TABLET ORAL 2 TIMES DAILY
Qty: 60 TABLET | Refills: 0 | Status: SHIPPED | OUTPATIENT
Start: 2024-01-24 | End: 2024-02-23

## 2024-01-03 RX ORDER — ESTRADIOL 0.1 MG/D
1 FILM, EXTENDED RELEASE TRANSDERMAL
Qty: 8 PATCH | Refills: 3 | Status: SHIPPED | OUTPATIENT
Start: 2024-01-04

## 2024-01-03 ASSESSMENT — PATIENT HEALTH QUESTIONNAIRE - PHQ9
6. FEELING BAD ABOUT YOURSELF - OR THAT YOU ARE A FAILURE OR HAVE LET YOURSELF OR YOUR FAMILY DOWN: 0
5. POOR APPETITE OR OVEREATING: 0
10. IF YOU CHECKED OFF ANY PROBLEMS, HOW DIFFICULT HAVE THESE PROBLEMS MADE IT FOR YOU TO DO YOUR WORK, TAKE CARE OF THINGS AT HOME, OR GET ALONG WITH OTHER PEOPLE: 0
7. TROUBLE CONCENTRATING ON THINGS, SUCH AS READING THE NEWSPAPER OR WATCHING TELEVISION: 0
SUM OF ALL RESPONSES TO PHQ9 QUESTIONS 1 & 2: 0
SUM OF ALL RESPONSES TO PHQ QUESTIONS 1-9: 0
4. FEELING TIRED OR HAVING LITTLE ENERGY: 0
SUM OF ALL RESPONSES TO PHQ QUESTIONS 1-9: 0
1. LITTLE INTEREST OR PLEASURE IN DOING THINGS: 0
SUM OF ALL RESPONSES TO PHQ QUESTIONS 1-9: 0
2. FEELING DOWN, DEPRESSED OR HOPELESS: 0
8. MOVING OR SPEAKING SO SLOWLY THAT OTHER PEOPLE COULD HAVE NOTICED. OR THE OPPOSITE, BEING SO FIGETY OR RESTLESS THAT YOU HAVE BEEN MOVING AROUND A LOT MORE THAN USUAL: 0
9. THOUGHTS THAT YOU WOULD BE BETTER OFF DEAD, OR OF HURTING YOURSELF: 0
SUM OF ALL RESPONSES TO PHQ QUESTIONS 1-9: 0
3. TROUBLE FALLING OR STAYING ASLEEP: 0

## 2024-01-03 ASSESSMENT — ENCOUNTER SYMPTOMS
CONSTIPATION: 0
BACK PAIN: 1
ABDOMINAL PAIN: 0
DIARRHEA: 0
VOMITING: 0
COUGH: 0
SHORTNESS OF BREATH: 0
WHEEZING: 0
NAUSEA: 0

## 2024-01-04 NOTE — PROGRESS NOTES
someone in the ER who asked her questions about depression, anxiety and suicidal thoughts and she states she told them the truth about the past- no present feelings.  She was thus pink slipped.  She had a CT head that was normal and basic labs, but was transferred to psych unit in [place].  No work up for seizures done.  She was discharged once they discussed in detail with her.      Recently, she had noted having increasing depressive symptoms that feel very heavy and dark.  Similar to how she felt prior to her last episode.  Had 3 days of severe symptoms and then slept for 2 days in a row.  Had been having some improvement, but still very dark. In early November 2023, had an episode of suicidal ideation and realized that she needed to have something changed.  Lamictal increased to 50/100 mg and doing better with moods.     Migraine  Patient complains of migraine headaches. She does have a headache at this time.     Description of Headaches:  Location of pain: bilateral  Radiation of pain?:none  Character of pain:aching, crushing, pulsating, and throbbing  Accompanying symptoms: nausea, sonophobia, photophobia, decreased social functioning  Prodromal sx?: none  Typical precipitants: unknown    Temporal Pattern of Headaches:  Started having HAs several years ago  Worst time of day: anytime  Awaken from sleep?: no  Seasonal pattern?: no  ‘Clustering’ of HAs over time? no  Change with menstrual cycle?: no  Overall pattern since problem began: gradually worsening    Degree of Functional Impairment: severe    Current Use of Meds to Treat HA:  Abortive meds? aspirin/acetaminophen/caffeine  Daily use? no  Prophylactic meds? Topamax    Previous Meds Tried for Treatment of HA:  Beta blockers:  none  Calcium channel blockers:  amlodipine  Anti-convulsants:  topamax  Triptans/Others:  Sumatriptan  oral (Imitrex )  Botox injections: no      Additional Relevant History:  History of head/neck trauma? yes   History of head/neck

## 2024-01-05 ENCOUNTER — PATIENT MESSAGE (OUTPATIENT)
Dept: PRIMARY CARE CLINIC | Age: 59
End: 2024-01-05

## 2024-01-05 DIAGNOSIS — M51.36 DDD (DEGENERATIVE DISC DISEASE), LUMBAR: ICD-10-CM

## 2024-01-05 DIAGNOSIS — M48.062 SPINAL STENOSIS OF LUMBAR REGION WITH NEUROGENIC CLAUDICATION: ICD-10-CM

## 2024-01-05 DIAGNOSIS — M47.817 LUMBOSACRAL SPONDYLOSIS WITHOUT MYELOPATHY: ICD-10-CM

## 2024-01-05 DIAGNOSIS — M50.90 CERVICAL DISC DISEASE: ICD-10-CM

## 2024-01-22 RX ORDER — HYDROCODONE BITARTRATE AND ACETAMINOPHEN 7.5; 325 MG/1; MG/1
1 TABLET ORAL EVERY 8 HOURS PRN
Qty: 90 TABLET | Refills: 0 | Status: SHIPPED | OUTPATIENT
Start: 2024-01-31 | End: 2024-03-01

## 2024-01-22 RX ORDER — HYDROCODONE BITARTRATE AND ACETAMINOPHEN 7.5; 325 MG/1; MG/1
1 TABLET ORAL EVERY 8 HOURS PRN
Qty: 90 TABLET | Refills: 0 | Status: SHIPPED | OUTPATIENT
Start: 2024-02-28 | End: 2024-03-29

## 2024-02-28 RX ORDER — QUETIAPINE FUMARATE 100 MG/1
100 TABLET, FILM COATED ORAL 2 TIMES DAILY
Qty: 180 TABLET | Refills: 1 | Status: SHIPPED | OUTPATIENT
Start: 2024-02-28

## 2024-03-07 DIAGNOSIS — F31.9 BIPOLAR I DISORDER (HCC): ICD-10-CM

## 2024-03-08 RX ORDER — LAMOTRIGINE 150 MG/1
150 TABLET ORAL NIGHTLY
Qty: 90 TABLET | Refills: 1 | Status: SHIPPED | OUTPATIENT
Start: 2024-03-08

## 2024-03-08 NOTE — TELEPHONE ENCOUNTER
Pt asking for RF on Lamictal but states she is taking total of 150mg and last script was sent for 100mg. Chart shows she is on 100mg qd.  Ok to change to 100mg 1.5tabs qd?

## 2024-03-14 ENCOUNTER — OFFICE VISIT (OUTPATIENT)
Dept: PAIN MANAGEMENT | Age: 59
End: 2024-03-14
Payer: COMMERCIAL

## 2024-03-14 VITALS
TEMPERATURE: 97.3 F | OXYGEN SATURATION: 97 % | RESPIRATION RATE: 16 BRPM | DIASTOLIC BLOOD PRESSURE: 76 MMHG | BODY MASS INDEX: 21.17 KG/M2 | SYSTOLIC BLOOD PRESSURE: 123 MMHG | HEART RATE: 77 BPM | HEIGHT: 64 IN | WEIGHT: 124 LBS

## 2024-03-14 DIAGNOSIS — M47.817 LUMBOSACRAL SPONDYLOSIS WITHOUT MYELOPATHY: ICD-10-CM

## 2024-03-14 DIAGNOSIS — M51.36 DDD (DEGENERATIVE DISC DISEASE), LUMBAR: ICD-10-CM

## 2024-03-14 DIAGNOSIS — M50.30 DDD (DEGENERATIVE DISC DISEASE), CERVICAL: Primary | ICD-10-CM

## 2024-03-14 DIAGNOSIS — M47.812 CERVICAL SPONDYLOSIS: ICD-10-CM

## 2024-03-14 DIAGNOSIS — Z98.1 S/P CERVICAL SPINAL FUSION: ICD-10-CM

## 2024-03-14 DIAGNOSIS — F11.90 CHRONIC, CONTINUOUS USE OF OPIOIDS: ICD-10-CM

## 2024-03-14 PROCEDURE — 99214 OFFICE O/P EST MOD 30 MIN: CPT | Performed by: ANESTHESIOLOGY

## 2024-03-14 PROCEDURE — 3074F SYST BP LT 130 MM HG: CPT | Performed by: ANESTHESIOLOGY

## 2024-03-14 PROCEDURE — 3078F DIAST BP <80 MM HG: CPT | Performed by: ANESTHESIOLOGY

## 2024-03-14 PROCEDURE — 99214 OFFICE O/P EST MOD 30 MIN: CPT

## 2024-03-14 NOTE — PROGRESS NOTES
Kayleen La presents to the [facility] on 3/14/2024. Kayleen is complaining of pain cervical . The pain is constant. The pain is described as aching, throbbing, shooting, and stabbing. Pain is rated on her best day at a 4, on her worst day at a 10, and on average at a 7 on the VAS scale. She took her last dose of Norco this morning .    Any procedures since your last visit: No    She is not on NSAIDS and  is not on anticoagulation medications   Pacemaker or defibrillator: No     Medication Contract and Consent for Opioid Use Documents Filed        No documents found                       Temp 97.3 °F (36.3 °C) (Temporal)   Resp 16   Ht 1.626 m (5' 4\")   Wt 56.2 kg (124 lb)   SpO2 97%   BMI 21.28 kg/m²      No LMP recorded. Patient has had a hysterectomy.  
monitor report dated 8/16/2023 [facility]: No Atrial Fibrillation was found - Predominant underlying rhythm was Sinus Rhythm with Supraventricular Predominant underlying rhythm was Sinus Rhythm with Supraventricular       Past Surgical History:   Procedure Laterality Date    ABDOMEN SURGERY N/A 04/19/2021    EXCISION LIPOMA ABDOMEN performed by Eddy Booth MD at [facility] OR    APPENDECTOMY      BREAST REDUCTION SURGERY      CERVICAL FUSION      c2-c7 Bone Kevin Grafting 2-6    COLONOSCOPY N/A 03/03/2021    COLORECTAL CANCER SCREENING, HIGH RISK performed by Eddy Booth MD at [facility] ENDOSCOPY    COLONOSCOPY N/A 12/13/2021    COLONOSCOPY WITH BIOPSY performed by Edward Huang MD at [facility] ENDOSCOPY    HYSTERECTOMY (CERVIX STATUS UNKNOWN)      LAPAROTOMY      X5 Ovarian Cysts, ZACARIAS-BSO (Non Cancerous)    NERVE BLOCK Bilateral 06/15/2023    BILATERAL LUMBAR FACET INJECTION   UNDER FLUOROSCOPIC  GUIDANCE AT L4-5 & L5-S1 performed by Eleazar Dinh MD at [facility] OR    NERVE BLOCK Bilateral 09/07/2023    BILATERAL LUMBARMEDIAL BRANCH NERVE BLOCK UNDER FLUOROSCOPIC GUIDANCE AT L3, L4 & L5 DORSAL RAMI performed by Eleazar Dinh MD at [facility] OR    OTHER SURGICAL HISTORY      Lumbar Epidural - Donatelli 6/13/17, 6/27/17, 7/25/17    OVARY REMOVAL      PAIN MANAGEMENT PROCEDURE Left 04/06/2023    LUMBAR TRANSFORAMINAL EPIDURAL STEROID INJECTION LEFT L4- L5 AND L5-S1 UNDER FLUOROSCOPIC GUIDANCE   +++IODINE ALLERGY+++ performed by Eleazar Dinh MD at [facility] OR    PAIN MANAGEMENT PROCEDURE Bilateral 10/05/2023    BILATERAL LUMBAR RADIOFREQUENCY ABLATION AT L3, L4 & L5 DORSAL RAMI UNDER FLUOROSCOPY performed by Eleazar Dinh MD at [facility] OR    PARTIAL HYSTERECTOMY (CERVIX NOT REMOVED)  1999    UPPER GASTROINTESTINAL ENDOSCOPY N/A 12/13/2021    EGD BIOPSY performed by Edward Huang MD at [facility] ENDOSCOPY       Prior to Admission medications    Medication Sig Start Date End Date Taking? Authorizing Provider

## 2024-03-26 DIAGNOSIS — F41.0 PANIC DISORDER WITHOUT AGORAPHOBIA: ICD-10-CM

## 2024-03-26 DIAGNOSIS — F51.01 PRIMARY INSOMNIA: ICD-10-CM

## 2024-03-26 RX ORDER — ALPRAZOLAM 0.5 MG/1
0.5 TABLET ORAL 3 TIMES DAILY PRN
Qty: 15 TABLET | Refills: 0 | Status: SHIPPED | OUTPATIENT
Start: 2024-03-26 | End: 2024-03-31

## 2024-03-26 RX ORDER — ZOLPIDEM TARTRATE 12.5 MG/1
12.5 TABLET, FILM COATED, EXTENDED RELEASE ORAL NIGHTLY PRN
Qty: 5 TABLET | Refills: 0 | Status: SHIPPED | OUTPATIENT
Start: 2024-03-26 | End: 2024-03-31

## 2024-03-26 NOTE — TELEPHONE ENCOUNTER
----- Message from Kayleen La sent at 3/26/2024 11:37 AM EDT -----  Regarding: Good morning Dr. Ferris,    Contact: [phone]  [address]  The phone number is [phone]

## 2024-03-26 NOTE — TELEPHONE ENCOUNTER
----- Message from Kayleen La sent at 3/26/2024  7:52 AM EDT -----  Regarding: Good morning Dr. Ferris,    Contact: [phone]  I need a few medications to get me through till our appointment.  Wanted to get them in before spring break.   I'll need 5 pills of Zolpidem 12.5mg  And 15 Alprazolam 0.5mg  Thank you.  Have a wonderful Easter.

## 2024-03-27 ENCOUNTER — APPOINTMENT (OUTPATIENT)
Dept: MRI IMAGING | Age: 59
End: 2024-03-27
Payer: COMMERCIAL

## 2024-03-27 ENCOUNTER — HOSPITAL ENCOUNTER (OUTPATIENT)
Age: 59
Setting detail: OBSERVATION
Discharge: HOME | End: 2024-03-29
Attending: EMERGENCY MEDICINE | Admitting: STUDENT IN AN ORGANIZED HEALTH CARE EDUCATION/TRAINING PROGRAM
Payer: COMMERCIAL

## 2024-03-27 ENCOUNTER — OFFICE VISIT (OUTPATIENT)
Dept: PAIN MANAGEMENT | Age: 59
End: 2024-03-27
Payer: COMMERCIAL

## 2024-03-27 VITALS
SYSTOLIC BLOOD PRESSURE: 142 MMHG | RESPIRATION RATE: 18 BRPM | BODY MASS INDEX: 21.17 KG/M2 | HEIGHT: 64 IN | TEMPERATURE: 97.5 F | OXYGEN SATURATION: 95 % | DIASTOLIC BLOOD PRESSURE: 80 MMHG | HEART RATE: 74 BPM | WEIGHT: 124 LBS

## 2024-03-27 DIAGNOSIS — M47.812 CERVICAL SPONDYLOSIS: ICD-10-CM

## 2024-03-27 DIAGNOSIS — M50.30 DDD (DEGENERATIVE DISC DISEASE), CERVICAL: ICD-10-CM

## 2024-03-27 DIAGNOSIS — M54.16 LUMBAR RADICULAR PAIN: Primary | ICD-10-CM

## 2024-03-27 DIAGNOSIS — R20.2 PARESTHESIAS: Primary | ICD-10-CM

## 2024-03-27 DIAGNOSIS — M51.36 DDD (DEGENERATIVE DISC DISEASE), LUMBAR: ICD-10-CM

## 2024-03-27 DIAGNOSIS — R29.898 WEAKNESS OF LOWER EXTREMITY, UNSPECIFIED LATERALITY: ICD-10-CM

## 2024-03-27 PROBLEM — M54.59 INTRACTABLE LOW BACK PAIN: Status: ACTIVE | Noted: 2024-03-27

## 2024-03-27 PROBLEM — R52 INTRACTABLE PAIN: Status: ACTIVE | Noted: 2024-03-27

## 2024-03-27 LAB
ALBUMIN SERPL-MCNC: 4.8 G/DL (ref 3.5–5.2)
ALP SERPL-CCNC: 95 U/L (ref 35–104)
ALT SERPL-CCNC: 14 U/L (ref 0–32)
ANION GAP SERPL CALCULATED.3IONS-SCNC: 11 MMOL/L (ref 7–16)
AST SERPL-CCNC: 20 U/L (ref 0–31)
BASOPHILS # BLD: 0.03 K/UL (ref 0–0.2)
BASOPHILS NFR BLD: 1 % (ref 0–2)
BILIRUB SERPL-MCNC: 0.2 MG/DL (ref 0–1.2)
BUN SERPL-MCNC: 14 MG/DL (ref 6–20)
CALCIUM SERPL-MCNC: 9.8 MG/DL (ref 8.6–10.2)
CHLORIDE SERPL-SCNC: 99 MMOL/L (ref 98–107)
CO2 SERPL-SCNC: 25 MMOL/L (ref 22–29)
CREAT SERPL-MCNC: 1 MG/DL (ref 0.5–1)
EOSINOPHIL # BLD: 0.05 K/UL (ref 0.05–0.5)
EOSINOPHILS RELATIVE PERCENT: 1 % (ref 0–6)
ERYTHROCYTE [DISTWIDTH] IN BLOOD BY AUTOMATED COUNT: 12.4 % (ref 11.5–15)
GFR SERPL CREATININE-BSD FRML MDRD: 68 ML/MIN/1.73M2
GLUCOSE SERPL-MCNC: 87 MG/DL (ref 74–99)
HCT VFR BLD AUTO: 43.1 % (ref 34–48)
HGB BLD-MCNC: 14.4 G/DL (ref 11.5–15.5)
IMM GRANULOCYTES # BLD AUTO: <0.03 K/UL (ref 0–0.58)
IMM GRANULOCYTES NFR BLD: 0 % (ref 0–5)
LYMPHOCYTES NFR BLD: 2.29 K/UL (ref 1.5–4)
LYMPHOCYTES RELATIVE PERCENT: 35 % (ref 20–42)
MCH RBC QN AUTO: 32.1 PG (ref 26–35)
MCHC RBC AUTO-ENTMCNC: 33.4 G/DL (ref 32–34.5)
MCV RBC AUTO: 96 FL (ref 80–99.9)
MONOCYTES NFR BLD: 0.45 K/UL (ref 0.1–0.95)
MONOCYTES NFR BLD: 7 % (ref 2–12)
NEUTROPHILS NFR BLD: 56 % (ref 43–80)
NEUTS SEG NFR BLD: 3.65 K/UL (ref 1.8–7.3)
PLATELET # BLD AUTO: 360 K/UL (ref 130–450)
PMV BLD AUTO: 10 FL (ref 7–12)
POTASSIUM SERPL-SCNC: 4.1 MMOL/L (ref 3.5–5)
PROT SERPL-MCNC: 8.1 G/DL (ref 6.4–8.3)
RBC # BLD AUTO: 4.49 M/UL (ref 3.5–5.5)
SODIUM SERPL-SCNC: 135 MMOL/L (ref 132–146)
WBC OTHER # BLD: 6.5 K/UL (ref 4.5–11.5)

## 2024-03-27 PROCEDURE — 6360000002 HC RX W HCPCS: Performed by: EMERGENCY MEDICINE

## 2024-03-27 PROCEDURE — 99285 EMERGENCY DEPT VISIT HI MDM: CPT

## 2024-03-27 PROCEDURE — 96374 THER/PROPH/DIAG INJ IV PUSH: CPT

## 2024-03-27 PROCEDURE — 96372 THER/PROPH/DIAG INJ SC/IM: CPT

## 2024-03-27 PROCEDURE — 3077F SYST BP >= 140 MM HG: CPT | Performed by: ANESTHESIOLOGY

## 2024-03-27 PROCEDURE — 80053 COMPREHEN METABOLIC PANEL: CPT

## 2024-03-27 PROCEDURE — 99213 OFFICE O/P EST LOW 20 MIN: CPT | Performed by: ANESTHESIOLOGY

## 2024-03-27 PROCEDURE — G0378 HOSPITAL OBSERVATION PER HR: HCPCS

## 2024-03-27 PROCEDURE — 6370000000 HC RX 637 (ALT 250 FOR IP): Performed by: EMERGENCY MEDICINE

## 2024-03-27 PROCEDURE — 6360000002 HC RX W HCPCS

## 2024-03-27 PROCEDURE — A9577 INJ MULTIHANCE: HCPCS | Performed by: RADIOLOGY

## 2024-03-27 PROCEDURE — 72158 MRI LUMBAR SPINE W/O & W/DYE: CPT

## 2024-03-27 PROCEDURE — 85025 COMPLETE CBC W/AUTO DIFF WBC: CPT

## 2024-03-27 PROCEDURE — 3079F DIAST BP 80-89 MM HG: CPT | Performed by: ANESTHESIOLOGY

## 2024-03-27 PROCEDURE — 99214 OFFICE O/P EST MOD 30 MIN: CPT | Performed by: ANESTHESIOLOGY

## 2024-03-27 PROCEDURE — 6360000004 HC RX CONTRAST MEDICATION: Performed by: RADIOLOGY

## 2024-03-27 PROCEDURE — 6370000000 HC RX 637 (ALT 250 FOR IP)

## 2024-03-27 PROCEDURE — 96375 TX/PRO/DX INJ NEW DRUG ADDON: CPT

## 2024-03-27 RX ORDER — POTASSIUM CHLORIDE 7.45 MG/ML
10 INJECTION INTRAVENOUS PRN
Status: DISCONTINUED | OUTPATIENT
Start: 2024-03-27 | End: 2024-03-29 | Stop reason: HOSPADM

## 2024-03-27 RX ORDER — DEXAMETHASONE SODIUM PHOSPHATE 10 MG/ML
10 INJECTION INTRAMUSCULAR; INTRAVENOUS ONCE
Status: COMPLETED | OUTPATIENT
Start: 2024-03-27 | End: 2024-03-27

## 2024-03-27 RX ORDER — ACETAMINOPHEN 325 MG/1
650 TABLET ORAL EVERY 6 HOURS PRN
Status: DISCONTINUED | OUTPATIENT
Start: 2024-03-27 | End: 2024-03-29 | Stop reason: HOSPADM

## 2024-03-27 RX ORDER — ONDANSETRON 2 MG/ML
4 INJECTION INTRAMUSCULAR; INTRAVENOUS EVERY 6 HOURS PRN
Status: DISCONTINUED | OUTPATIENT
Start: 2024-03-27 | End: 2024-03-29 | Stop reason: HOSPADM

## 2024-03-27 RX ORDER — ENOXAPARIN SODIUM 100 MG/ML
40 INJECTION SUBCUTANEOUS DAILY
Status: DISCONTINUED | OUTPATIENT
Start: 2024-03-28 | End: 2024-03-28

## 2024-03-27 RX ORDER — DIPHENHYDRAMINE HYDROCHLORIDE 50 MG/ML
50 INJECTION INTRAMUSCULAR; INTRAVENOUS ONCE
Status: COMPLETED | OUTPATIENT
Start: 2024-03-27 | End: 2024-03-27

## 2024-03-27 RX ORDER — HYDROCODONE BITARTRATE AND ACETAMINOPHEN 5; 325 MG/1; MG/1
2 TABLET ORAL ONCE
Status: COMPLETED | OUTPATIENT
Start: 2024-03-27 | End: 2024-03-27

## 2024-03-27 RX ORDER — SODIUM CHLORIDE 0.9 % (FLUSH) 0.9 %
10 SYRINGE (ML) INJECTION PRN
Status: DISCONTINUED | OUTPATIENT
Start: 2024-03-27 | End: 2024-03-29 | Stop reason: HOSPADM

## 2024-03-27 RX ORDER — SODIUM CHLORIDE 0.9 % (FLUSH) 0.9 %
10 SYRINGE (ML) INJECTION EVERY 12 HOURS SCHEDULED
Status: DISCONTINUED | OUTPATIENT
Start: 2024-03-27 | End: 2024-03-29 | Stop reason: HOSPADM

## 2024-03-27 RX ORDER — PROCHLORPERAZINE MALEATE 10 MG
5 TABLET ORAL EVERY 6 HOURS PRN
Status: DISCONTINUED | OUTPATIENT
Start: 2024-03-27 | End: 2024-03-29 | Stop reason: HOSPADM

## 2024-03-27 RX ORDER — SODIUM CHLORIDE 9 MG/ML
INJECTION, SOLUTION INTRAVENOUS PRN
Status: DISCONTINUED | OUTPATIENT
Start: 2024-03-27 | End: 2024-03-29 | Stop reason: HOSPADM

## 2024-03-27 RX ORDER — AMLODIPINE BESYLATE 10 MG/1
10 TABLET ORAL NIGHTLY
Status: DISCONTINUED | OUTPATIENT
Start: 2024-03-27 | End: 2024-03-29 | Stop reason: HOSPADM

## 2024-03-27 RX ORDER — ONDANSETRON 4 MG/1
4 TABLET, ORALLY DISINTEGRATING ORAL EVERY 8 HOURS PRN
Status: DISCONTINUED | OUTPATIENT
Start: 2024-03-27 | End: 2024-03-29 | Stop reason: HOSPADM

## 2024-03-27 RX ORDER — POTASSIUM CHLORIDE 20 MEQ/1
40 TABLET, EXTENDED RELEASE ORAL PRN
Status: DISCONTINUED | OUTPATIENT
Start: 2024-03-27 | End: 2024-03-29 | Stop reason: HOSPADM

## 2024-03-27 RX ORDER — QUETIAPINE FUMARATE 25 MG/1
100 TABLET, FILM COATED ORAL 2 TIMES DAILY
Status: DISCONTINUED | OUTPATIENT
Start: 2024-03-27 | End: 2024-03-29 | Stop reason: HOSPADM

## 2024-03-27 RX ORDER — TOPIRAMATE 25 MG/1
50 TABLET ORAL NIGHTLY
Status: DISCONTINUED | OUTPATIENT
Start: 2024-03-27 | End: 2024-03-29 | Stop reason: HOSPADM

## 2024-03-27 RX ORDER — LORAZEPAM 2 MG/ML
1 INJECTION INTRAMUSCULAR ONCE
Status: COMPLETED | OUTPATIENT
Start: 2024-03-27 | End: 2024-03-27

## 2024-03-27 RX ORDER — ALPRAZOLAM 0.25 MG/1
0.5 TABLET ORAL 3 TIMES DAILY PRN
Status: DISCONTINUED | OUTPATIENT
Start: 2024-03-27 | End: 2024-03-29 | Stop reason: HOSPADM

## 2024-03-27 RX ORDER — ACETAMINOPHEN 650 MG/1
650 SUPPOSITORY RECTAL EVERY 6 HOURS PRN
Status: DISCONTINUED | OUTPATIENT
Start: 2024-03-27 | End: 2024-03-29 | Stop reason: HOSPADM

## 2024-03-27 RX ORDER — ZOLPIDEM TARTRATE 5 MG/1
5 TABLET ORAL NIGHTLY PRN
Status: DISCONTINUED | OUTPATIENT
Start: 2024-03-27 | End: 2024-03-29 | Stop reason: HOSPADM

## 2024-03-27 RX ORDER — TIZANIDINE 4 MG/1
2 TABLET ORAL 3 TIMES DAILY
Status: DISCONTINUED | OUTPATIENT
Start: 2024-03-27 | End: 2024-03-29 | Stop reason: HOSPADM

## 2024-03-27 RX ORDER — FENTANYL CITRATE 50 UG/ML
50 INJECTION, SOLUTION INTRAMUSCULAR; INTRAVENOUS ONCE
Status: DISCONTINUED | OUTPATIENT
Start: 2024-03-27 | End: 2024-03-29 | Stop reason: HOSPADM

## 2024-03-27 RX ORDER — HYDROCODONE BITARTRATE AND ACETAMINOPHEN 7.5; 325 MG/1; MG/1
1 TABLET ORAL EVERY 8 HOURS PRN
Status: DISCONTINUED | OUTPATIENT
Start: 2024-03-27 | End: 2024-03-28 | Stop reason: CLARIF

## 2024-03-27 RX ORDER — ORPHENADRINE CITRATE 30 MG/ML
60 INJECTION INTRAMUSCULAR; INTRAVENOUS ONCE
Status: COMPLETED | OUTPATIENT
Start: 2024-03-27 | End: 2024-03-27

## 2024-03-27 RX ORDER — SENNOSIDES A AND B 8.6 MG/1
1 TABLET, FILM COATED ORAL DAILY PRN
Status: DISCONTINUED | OUTPATIENT
Start: 2024-03-27 | End: 2024-03-29 | Stop reason: HOSPADM

## 2024-03-27 RX ADMIN — LORAZEPAM 1 MG: 2 INJECTION INTRAMUSCULAR; INTRAVENOUS at 18:31

## 2024-03-27 RX ADMIN — DIPHENHYDRAMINE HYDROCHLORIDE 50 MG: 50 INJECTION, SOLUTION INTRAMUSCULAR; INTRAVENOUS at 18:29

## 2024-03-27 RX ADMIN — TIZANIDINE 2 MG: 4 TABLET ORAL at 23:59

## 2024-03-27 RX ADMIN — ONDANSETRON 4 MG: 2 INJECTION INTRAMUSCULAR; INTRAVENOUS at 23:55

## 2024-03-27 RX ADMIN — AMLODIPINE BESYLATE 10 MG: 10 TABLET ORAL at 23:59

## 2024-03-27 RX ADMIN — HYDROCODONE BITARTRATE AND ACETAMINOPHEN 2 TABLET: 5; 325 TABLET ORAL at 22:37

## 2024-03-27 RX ADMIN — ORPHENADRINE CITRATE 60 MG: 60 INJECTION INTRAMUSCULAR; INTRAVENOUS at 23:54

## 2024-03-27 RX ADMIN — QUETIAPINE FUMARATE 100 MG: 100 TABLET ORAL at 23:59

## 2024-03-27 RX ADMIN — GADOBENATE DIMEGLUMINE 11 ML: 529 INJECTION, SOLUTION INTRAVENOUS at 19:23

## 2024-03-27 RX ADMIN — DEXAMETHASONE SODIUM PHOSPHATE 10 MG: 10 INJECTION INTRAMUSCULAR; INTRAVENOUS at 23:55

## 2024-03-27 RX ADMIN — TOPIRAMATE 50 MG: 25 TABLET, FILM COATED ORAL at 23:59

## 2024-03-27 ASSESSMENT — PAIN SCALES - GENERAL
PAINLEVEL_OUTOF10: 6
PAINLEVEL_OUTOF10: 9

## 2024-03-27 ASSESSMENT — PAIN DESCRIPTION - LOCATION
LOCATION: BACK
LOCATION: BACK

## 2024-03-27 ASSESSMENT — PAIN DESCRIPTION - ORIENTATION: ORIENTATION: LOWER

## 2024-03-27 NOTE — PROGRESS NOTES
Kayleen La presents to the [facility] on 3/27/2024. Kayleen is complaining of pain in her lower back that radiates to BLE. Pt states that her legs became completely numb/tingling and hot sensations. The pain is constant. The pain is described as sharp and numb. Pain is rated on her best day at a 4, on her worst day at a 10, and on average at a 5 on the VAS scale. She took her last dose of Excedrin, Soma and Norco 2 days ago.      Any procedures since your last visit: No    She is  on NSAIDS and  is not on anticoagulation medications to include none and is managed by NA.     Pacemaker or defibrillator: No     Medication Contract and Consent for Opioid Use Documents Filed        No documents found                       There were no vitals taken for this visit.     No LMP recorded. Patient has had a hysterectomy.

## 2024-03-27 NOTE — PROGRESS NOTES
[facility]        [address], [place] [address]  Dept: [phone]      Follow up Note      Kayleen La     Date of Visit:  3/27/2024    CC:  Patient presents for follow up   Chief Complaint   Patient presents with    Back Pain     HPI:    H/o low back and left LE pain.    Evaluated by NSG and recommended interventions.    S/P TFESI - helped the LE pain significantly. S/P Lumbar MB RFA- helped axial low back pain.     Nursing notes and details of the pain history reviewed. Please see intake notes for details.    RECENT EXACERBATION OF LOW BACK AND LE PAIN - NEW ONSET ACUTE NUMBNESS OF THE LE AND SIGNIFICANT WEAKNESS OF THE LE. Increase urinary frequency.   Denies saddle anesthesia.  See on urgent basis     Pain causes functional limitations/ limits Adl's : Yes    NOTE:   Has been on chronic Norco and Soma for many yrs.   Prior ACDF  in 2008   H/o Anxiety - follows with psych.     Previous treatments:   Physical Therapy : yes, continues HEP     Chiropractic treatment: yes,     Medications: - NSAID's : yes -                       - Membrane stabilizers : yes - gabapentin- had side effects                       - Opioids : yes,                        - Adjuvants or Others : yes, steroids/ muscle relaxants     Sine Surgeries: No LS spine surgery, Prior ACDF +      Anticoagulation medications: has stopped ASA.     H/O Smoking: no  H/O alcohol abuse : no  H/O Illicit drug use : no- has used CBD cream     Imaging:     Xray C spine: 3/16/2021:  FINDINGS:  Craniovertebral junction and upper cervical spine appears normal.  There is  anterior plate and screw fixation of C4 through C7 with interbody fusion.  There is slight anterior subluxation of C7 with respect to T1.  There is no  prevertebral soft tissue swelling.     IMPRESSION:  No definite acute process     MRI of LS spine: 3/6/2023:  FINDINGS:   BONES/ALIGNMENT:       Vertebral heights and alignment are maintained.       No

## 2024-03-28 ENCOUNTER — APPOINTMENT (OUTPATIENT)
Dept: GENERAL RADIOLOGY | Age: 59
End: 2024-03-28
Payer: COMMERCIAL

## 2024-03-28 ENCOUNTER — PATIENT MESSAGE (OUTPATIENT)
Dept: PRIMARY CARE CLINIC | Age: 59
End: 2024-03-28

## 2024-03-28 DIAGNOSIS — M51.36 DDD (DEGENERATIVE DISC DISEASE), LUMBAR: ICD-10-CM

## 2024-03-28 DIAGNOSIS — M47.817 LUMBOSACRAL SPONDYLOSIS WITHOUT MYELOPATHY: ICD-10-CM

## 2024-03-28 LAB
ALBUMIN SERPL-MCNC: 4.5 G/DL (ref 3.5–5.2)
ALP SERPL-CCNC: 86 U/L (ref 35–104)
ALT SERPL-CCNC: 12 U/L (ref 0–32)
AMPHET UR QL SCN: NEGATIVE
ANION GAP SERPL CALCULATED.3IONS-SCNC: 11 MMOL/L (ref 7–16)
AST SERPL-CCNC: 17 U/L (ref 0–31)
BARBITURATES UR QL SCN: NEGATIVE
BASOPHILS # BLD: 0.01 K/UL (ref 0–0.2)
BASOPHILS NFR BLD: 0 % (ref 0–2)
BENZODIAZ UR QL: NEGATIVE
BILIRUB SERPL-MCNC: <0.2 MG/DL (ref 0–1.2)
BUN SERPL-MCNC: 17 MG/DL (ref 6–20)
BUPRENORPHINE UR QL: NEGATIVE
CALCIUM SERPL-MCNC: 9.3 MG/DL (ref 8.6–10.2)
CANNABINOIDS UR QL SCN: NEGATIVE
CHLORIDE SERPL-SCNC: 102 MMOL/L (ref 98–107)
CO2 SERPL-SCNC: 24 MMOL/L (ref 22–29)
COCAINE UR QL SCN: NEGATIVE
CREAT SERPL-MCNC: 1.1 MG/DL (ref 0.5–1)
EOSINOPHIL # BLD: 0.01 K/UL (ref 0.05–0.5)
EOSINOPHILS RELATIVE PERCENT: 0 % (ref 0–6)
ERYTHROCYTE [DISTWIDTH] IN BLOOD BY AUTOMATED COUNT: 12.3 % (ref 11.5–15)
FENTANYL UR QL: NEGATIVE
GFR SERPL CREATININE-BSD FRML MDRD: 60 ML/MIN/1.73M2
GLUCOSE SERPL-MCNC: 144 MG/DL (ref 74–99)
HCT VFR BLD AUTO: 41.6 % (ref 34–48)
HGB BLD-MCNC: 14.2 G/DL (ref 11.5–15.5)
IMM GRANULOCYTES # BLD AUTO: 0.03 K/UL (ref 0–0.58)
IMM GRANULOCYTES NFR BLD: 0 % (ref 0–5)
LYMPHOCYTES NFR BLD: 0.5 K/UL (ref 1.5–4)
LYMPHOCYTES RELATIVE PERCENT: 6 % (ref 20–42)
MCH RBC QN AUTO: 32.5 PG (ref 26–35)
MCHC RBC AUTO-ENTMCNC: 34.1 G/DL (ref 32–34.5)
MCV RBC AUTO: 95.2 FL (ref 80–99.9)
METHADONE UR QL: NEGATIVE
MONOCYTES NFR BLD: 0.08 K/UL (ref 0.1–0.95)
MONOCYTES NFR BLD: 1 % (ref 2–12)
NEUTROPHILS NFR BLD: 92 % (ref 43–80)
NEUTS SEG NFR BLD: 7.38 K/UL (ref 1.8–7.3)
OPIATES UR QL SCN: NEGATIVE
OXYCODONE UR QL SCN: NEGATIVE
PCP UR QL SCN: NEGATIVE
PLATELET # BLD AUTO: 359 K/UL (ref 130–450)
PMV BLD AUTO: 10.5 FL (ref 7–12)
POTASSIUM SERPL-SCNC: 3.9 MMOL/L (ref 3.5–5)
PROT SERPL-MCNC: 7.4 G/DL (ref 6.4–8.3)
RBC # BLD AUTO: 4.37 M/UL (ref 3.5–5.5)
RBC # BLD: ABNORMAL 10*6/UL
SODIUM SERPL-SCNC: 137 MMOL/L (ref 132–146)
TEST INFORMATION: NORMAL
WBC OTHER # BLD: 8 K/UL (ref 4.5–11.5)

## 2024-03-28 PROCEDURE — 80307 DRUG TEST PRSMV CHEM ANLYZR: CPT

## 2024-03-28 PROCEDURE — 2580000003 HC RX 258

## 2024-03-28 PROCEDURE — 96372 THER/PROPH/DIAG INJ SC/IM: CPT

## 2024-03-28 PROCEDURE — 85025 COMPLETE CBC W/AUTO DIFF WBC: CPT

## 2024-03-28 PROCEDURE — 80053 COMPREHEN METABOLIC PANEL: CPT

## 2024-03-28 PROCEDURE — G0378 HOSPITAL OBSERVATION PER HR: HCPCS

## 2024-03-28 PROCEDURE — 6360000002 HC RX W HCPCS

## 2024-03-28 PROCEDURE — 96376 TX/PRO/DX INJ SAME DRUG ADON: CPT

## 2024-03-28 PROCEDURE — 72120 X-RAY BEND ONLY L-S SPINE: CPT

## 2024-03-28 PROCEDURE — 81001 URINALYSIS AUTO W/SCOPE: CPT

## 2024-03-28 PROCEDURE — 6370000000 HC RX 637 (ALT 250 FOR IP)

## 2024-03-28 PROCEDURE — 36415 COLL VENOUS BLD VENIPUNCTURE: CPT

## 2024-03-28 PROCEDURE — 99222 1ST HOSP IP/OBS MODERATE 55: CPT | Performed by: NEUROLOGICAL SURGERY

## 2024-03-28 RX ORDER — HYDROCODONE BITARTRATE AND ACETAMINOPHEN 7.5; 325 MG/1; MG/1
1 TABLET ORAL EVERY 8 HOURS PRN
Status: DISCONTINUED | OUTPATIENT
Start: 2024-03-28 | End: 2024-03-29 | Stop reason: HOSPADM

## 2024-03-28 RX ADMIN — SODIUM CHLORIDE, PRESERVATIVE FREE 10 ML: 5 INJECTION INTRAVENOUS at 21:03

## 2024-03-28 RX ADMIN — HYDROCODONE BITARTRATE AND ACETAMINOPHEN 1 TABLET: 7.5; 325 TABLET ORAL at 04:43

## 2024-03-28 RX ADMIN — TIZANIDINE 2 MG: 4 TABLET ORAL at 21:02

## 2024-03-28 RX ADMIN — LAMOTRIGINE 150 MG: 100 TABLET ORAL at 21:01

## 2024-03-28 RX ADMIN — ONDANSETRON 4 MG: 2 INJECTION INTRAMUSCULAR; INTRAVENOUS at 08:45

## 2024-03-28 RX ADMIN — QUETIAPINE FUMARATE 100 MG: 100 TABLET ORAL at 21:00

## 2024-03-28 RX ADMIN — SODIUM CHLORIDE, PRESERVATIVE FREE 10 ML: 5 INJECTION INTRAVENOUS at 08:54

## 2024-03-28 RX ADMIN — HYDROCODONE BITARTRATE AND ACETAMINOPHEN 1 TABLET: 7.5; 325 TABLET ORAL at 21:00

## 2024-03-28 RX ADMIN — QUETIAPINE FUMARATE 100 MG: 100 TABLET ORAL at 08:51

## 2024-03-28 RX ADMIN — SERTRALINE 50 MG: 50 TABLET, FILM COATED ORAL at 00:51

## 2024-03-28 RX ADMIN — AMLODIPINE BESYLATE 10 MG: 10 TABLET ORAL at 21:00

## 2024-03-28 RX ADMIN — SODIUM CHLORIDE, PRESERVATIVE FREE 10 ML: 5 INJECTION INTRAVENOUS at 00:05

## 2024-03-28 RX ADMIN — TOPIRAMATE 50 MG: 25 TABLET, FILM COATED ORAL at 21:02

## 2024-03-28 RX ADMIN — ZOLPIDEM TARTRATE 5 MG: 5 TABLET ORAL at 00:51

## 2024-03-28 RX ADMIN — ALPRAZOLAM 0.5 MG: 0.25 TABLET ORAL at 12:40

## 2024-03-28 RX ADMIN — TIZANIDINE 2 MG: 4 TABLET ORAL at 13:43

## 2024-03-28 RX ADMIN — ENOXAPARIN SODIUM 40 MG: 100 INJECTION SUBCUTANEOUS at 08:49

## 2024-03-28 RX ADMIN — SERTRALINE 50 MG: 50 TABLET, FILM COATED ORAL at 21:02

## 2024-03-28 RX ADMIN — TIZANIDINE 2 MG: 4 TABLET ORAL at 08:51

## 2024-03-28 RX ADMIN — ONDANSETRON 4 MG: 4 TABLET, ORALLY DISINTEGRATING ORAL at 15:39

## 2024-03-28 RX ADMIN — HYDROCODONE BITARTRATE AND ACETAMINOPHEN 1 TABLET: 7.5; 325 TABLET ORAL at 12:44

## 2024-03-28 ASSESSMENT — ENCOUNTER SYMPTOMS
TROUBLE SWALLOWING: 0
BACK PAIN: 1
SHORTNESS OF BREATH: 0
ABDOMINAL PAIN: 0
PHOTOPHOBIA: 0

## 2024-03-28 ASSESSMENT — PAIN DESCRIPTION - ORIENTATION
ORIENTATION: MID;LOWER
ORIENTATION: LOWER
ORIENTATION: MID;LOWER
ORIENTATION: LOWER;LEFT;RIGHT

## 2024-03-28 ASSESSMENT — PAIN DESCRIPTION - ONSET: ONSET: AWAKENED FROM SLEEP

## 2024-03-28 ASSESSMENT — PAIN SCALES - GENERAL
PAINLEVEL_OUTOF10: 5
PAINLEVEL_OUTOF10: 8
PAINLEVEL_OUTOF10: 0
PAINLEVEL_OUTOF10: 6
PAINLEVEL_OUTOF10: 0
PAINLEVEL_OUTOF10: 6
PAINLEVEL_OUTOF10: 7

## 2024-03-28 ASSESSMENT — PAIN - FUNCTIONAL ASSESSMENT
PAIN_FUNCTIONAL_ASSESSMENT: ACTIVITIES ARE NOT PREVENTED
PAIN_FUNCTIONAL_ASSESSMENT: PREVENTS OR INTERFERES SOME ACTIVE ACTIVITIES AND ADLS

## 2024-03-28 ASSESSMENT — PAIN DESCRIPTION - DESCRIPTORS
DESCRIPTORS: ACHING;DISCOMFORT;DULL
DESCRIPTORS: DULL;ACHING;THROBBING
DESCRIPTORS: ACHING;STABBING;SORE
DESCRIPTORS: SHARP;THROBBING

## 2024-03-28 ASSESSMENT — PAIN DESCRIPTION - LOCATION
LOCATION: BACK

## 2024-03-28 ASSESSMENT — PAIN DESCRIPTION - PAIN TYPE: TYPE: CHRONIC PAIN

## 2024-03-28 ASSESSMENT — PAIN DESCRIPTION - FREQUENCY: FREQUENCY: INTERMITTENT

## 2024-03-28 NOTE — CONSULTS
Dose Route Frequency Provider Last Rate Last Admin    HYDROcodone-acetaminophen (NORCO) 7.5-325 MG per tablet 1 tablet  1 tablet Oral Q8H PRN Mamie Bryant APRN - CNP   1 tablet at 03/28/24 1244    fentaNYL (SUBLIMAZE) injection 50 mcg  50 mcg IntraVENous Once Coty Huntley DO        ALPRAZolam (XANAX) tablet 0.5 mg  0.5 mg Oral TID PRN Mamie Bryant APRN - CNP   0.5 mg at 03/28/24 1240    amLODIPine (NORVASC) tablet 10 mg  10 mg Oral Nightly Mamie Bryant APRN - CNP   10 mg at 03/27/24 2359    tiZANidine (ZANAFLEX) tablet 2 mg  2 mg Oral TID Mamie Bryant APRN - CNP   2 mg at 03/28/24 1343    lamoTRIgine (LAMICTAL) tablet 150 mg  150 mg Oral Nightly Mamie Bryant APRN - CNP        prochlorperazine (COMPAZINE) tablet 5 mg  5 mg Oral Q6H PRN Mamie Bryant APRN - CNP        QUEtiapine (SEROQUEL) tablet 100 mg  100 mg Oral BID Mamie Bryant APRN - CNP   100 mg at 03/28/24 0851    sertraline (ZOLOFT) tablet 50 mg  50 mg Oral Nightly Mamie Bryant APRN - CNP   50 mg at 03/28/24 0051    topiramate (TOPAMAX) tablet 50 mg  50 mg Oral Nightly Mamie Bryant APRN - CNP   50 mg at 03/27/24 2359    zolpidem (AMBIEN) tablet 5 mg  5 mg Oral Nightly PRN Mamie Bryant APRN - CNP   5 mg at 03/28/24 0051    sodium chloride flush 0.9 % injection 10 mL  10 mL IntraVENous 2 times per day Mamie Bryant APRN - CNP   10 mL at 03/28/24 0854    sodium chloride flush 0.9 % injection 10 mL  10 mL IntraVENous PRN Mamie Bryant APRN - CNP        0.9 % sodium chloride infusion   IntraVENous PRN Mamie Bryant APRN - CNP        potassium chloride (KLOR-CON M) extended release tablet 40 mEq  40 mEq Oral PRN Selway, Mamie, APRN - CNP        Or    potassium bicarb-citric acid (EFFER-K) effervescent tablet 40 mEq  40 mEq Oral PRN Sheng Bryanta, APRN - CNP        Or    potassium chloride 10 mEq/100 mL IVPB (Peripheral Line)  10 mEq IntraVENous PRN SelSheng limona, APRN - CNP        enoxaparin (LOVENOX) injection 40 mg  40 mg

## 2024-03-28 NOTE — ED PROVIDER NOTES
for low back pain and left-sided radicular symptoms.  They placed a referral for an MEHREEN at that time..    They were clinically stable, vital signs stable, nontoxic-appearing.  4+ out of 5 strength in the bilateral lower extremities.  She has good sensation to touch.  Normal rectal tone. Plan for labs, MRI imaging, pain control and the patient will be re-evaluated.    Workup ensued.  CBC was reassuring.  CMP reassuring other than mild creatinine elevation at 1.1.  CMP was reassuring.  She had not yet given the urinalysis.  Patient does admit to having MRIs with contrast in the past.  Required Benadryl which was given.  She also required a dose of Ativan prior to the MRI.  She has had injections in the past and with worsening symptoms and leg weakness concern for possible cord compression stat MRI was ordered which was also recommended by her pain provider and why they sent her to the ER.  With her history of injections in the past I did consider an MRI with and without contrast as to rule out any other evidence of abscess or infectious etiology.    At my time of departure from the ER we are currently awaiting patient's MRI of her lumbar spine.  Disposition pending MRI results but most likely plan for admission for leg weakness as well as pain control.  Possible urgent neurosurgical evaluation pending MRI.    Labs & imaging were reviewed and interpreted, see RESULTS. I have personally reviewed all laboratory and imaging results for this patient.                  Please see ED course for any additional MDM documentation.      This patient's ED course included: a personal history and physicial examination    This patient has remained hemodynamically stable during their ED course.      CONSULTATIONS:            Medicine by Dr alcantara.      PROCEDURES:            none.      COUNSELING:   ED physician have spoken with the patient for her work up. Still awaiting mri and signed out to night

## 2024-03-28 NOTE — TELEPHONE ENCOUNTER
From: Kayleen La  To: Dr. Ana Ferris  Sent: 3/28/2024 3:31 PM EDT  Subject: Medication     I'm going to need enough Hydrocodone. And Soma to get me through to my April 4th apt    I'm in the hospital for my back, but I am planning to leave tomorrow. I will be going forward with my back surgery. We are checking Dr. KARL Troncoso'S scheduled. If he can do it Monday, ill be checking back in. Im sure you can see everything in my chart. But i will keep you updated.     Thank you

## 2024-03-28 NOTE — H&P
Studies:  MRI LUMBAR SPINE W WO CONTRAST   Final Result   1. Moderate spinal stenosis at L4-5.   2. Moderate left foraminal zone disc bulging at L5-S1 results in mild   compression of the left L5 nerve root.   3. Mild right foraminal zone disc bulging at L5-S1 comes into contact with   the right L5 nerve root, without effacement of a significant amount of   perineural fat.         XR LUMBAR SPINE FLEXION AND EXTENSION ONLY    (Results Pending)       Assessment  Active Hospital Problems    Diagnosis     Intractable low back pain [M54.59]     Intractable pain [R52]     Lumbar spondylosis [M47.816]     DDD (degenerative disc disease), lumbar [M51.36]     Lumbar radicular pain [M54.16]     Lumbosacral spondylosis without myelopathy [M47.817]     Severe recurrent major depression without psychotic features (HCC) [F33.2]     Cervical disc disease [M50.90]     FELA (generalized anxiety disorder) [F41.1]     Spinal stenosis of lumbar region with neurogenic claudication [M48.062]     Bipolar I disorder (HCC) [F31.9]        Patient Active Problem List    Diagnosis Date Noted    Psychogenic nonepileptic seizure 09/27/2022     Priority: Medium    Primary hypertension 06/09/2022     Priority: Medium    Intractable low back pain 03/27/2024    Intractable pain 03/27/2024    Primary insomnia 01/03/2024    Chronic migraine without aura without status migrainosus, not intractable 10/02/2023    Lumbar spondylosis 08/29/2023     Added automatically from request for surgery [id number]      DDD (degenerative disc disease), lumbar 03/23/2023    Lumbar radicular pain 03/23/2023    Synovial cyst of lumbar facet joint 03/23/2023    Lumbosacral spondylosis without myelopathy 03/23/2023    Severe recurrent major depression without psychotic features (HCC) 04/11/2022    Depressive disorder 04/10/2022    Spinal stenosis of lumbar region with neurogenic claudication 09/08/2021    Cervical disc disease 09/08/2021    FELA (generalized anxiety disorder)

## 2024-03-28 NOTE — CARE COORDINATION
3/28/2024social work transition of care planning  Pt is from home with family and had been independent. Pt pcp is Ana Ferris and pharmacy is [facility] in [place]. Explained sw role in transition of care planning. Pt plan is home,pt will call for a ride at [place]. Awaiting further eval and recs to assist with transition of care planning.  Electronically signed by ISAIAS Borja on 3/28/2024 at 11:38 AM

## 2024-03-28 NOTE — ED NOTES
Patient being verbally aggressive towards this RN when telling patient to go back out to the waiting room

## 2024-03-28 NOTE — CARE COORDINATION
Internal Medicine On-call Care Coordination Note    I was called by the ED physician because they recommended admission for this patient and we cover their PCP.  The history as I understand it after discussion with the ED physician is as follows:    Presents from pain management for worsening back pain, and numbness from waist down. Hx LBP with surgeries. Sent to ED for urgent MRI  MRI Lumbar Spine noted  Fentanyl, Decadron, Norco, Ativan and Norflex given in ED for pain control.   Decision made for admit for observation, pain control and evaluation from NS    I placed admission orders.  Including:    Neurosurgery consult  PRN pain mangement    Dr. Jimenez or his coverage will see the patient tomorrow for H&P and review all orders.    Electronically signed by MADHU Herring CNP on 3/27/2024 at 10:43 PM

## 2024-03-29 VITALS
RESPIRATION RATE: 18 BRPM | WEIGHT: 124 LBS | TEMPERATURE: 97.5 F | OXYGEN SATURATION: 97 % | HEART RATE: 79 BPM | HEIGHT: 60 IN | BODY MASS INDEX: 24.35 KG/M2 | DIASTOLIC BLOOD PRESSURE: 81 MMHG | SYSTOLIC BLOOD PRESSURE: 121 MMHG

## 2024-03-29 LAB
BILIRUB UR QL STRIP: NEGATIVE
CLARITY UR: CLEAR
COLOR UR: YELLOW
EPI CELLS #/AREA URNS HPF: NORMAL /HPF
GLUCOSE UR STRIP-MCNC: NEGATIVE MG/DL
HGB UR QL STRIP.AUTO: NEGATIVE
KETONES UR STRIP-MCNC: NEGATIVE MG/DL
LEUKOCYTE ESTERASE UR QL STRIP: NEGATIVE
NITRITE UR QL STRIP: NEGATIVE
PH UR STRIP: 7.5 [PH] (ref 5–9)
PROT UR STRIP-MCNC: NEGATIVE MG/DL
RBC #/AREA URNS HPF: NORMAL /HPF
SP GR UR STRIP: 1.01 (ref 1–1.03)
UROBILINOGEN UR STRIP-ACNC: 0.2 EU/DL (ref 0–1)
WBC #/AREA URNS HPF: NORMAL /HPF

## 2024-03-29 PROCEDURE — 2580000003 HC RX 258

## 2024-03-29 PROCEDURE — 6370000000 HC RX 637 (ALT 250 FOR IP)

## 2024-03-29 PROCEDURE — G0378 HOSPITAL OBSERVATION PER HR: HCPCS

## 2024-03-29 RX ORDER — CARISOPRODOL 350 MG/1
350 TABLET ORAL 4 TIMES DAILY PRN
Qty: 24 TABLET | Refills: 0 | Status: SHIPPED | OUTPATIENT
Start: 2024-03-29 | End: 2024-04-04

## 2024-03-29 RX ORDER — HYDROCODONE BITARTRATE AND ACETAMINOPHEN 7.5; 325 MG/1; MG/1
1 TABLET ORAL EVERY 8 HOURS PRN
Qty: 18 TABLET | Refills: 0 | Status: SHIPPED | OUTPATIENT
Start: 2024-03-29 | End: 2024-04-04

## 2024-03-29 RX ADMIN — SODIUM CHLORIDE, PRESERVATIVE FREE 10 ML: 5 INJECTION INTRAVENOUS at 08:03

## 2024-03-29 RX ADMIN — QUETIAPINE FUMARATE 100 MG: 100 TABLET ORAL at 07:56

## 2024-03-29 RX ADMIN — ZOLPIDEM TARTRATE 5 MG: 5 TABLET ORAL at 00:12

## 2024-03-29 RX ADMIN — ALPRAZOLAM 0.5 MG: 0.25 TABLET ORAL at 15:57

## 2024-03-29 RX ADMIN — TIZANIDINE 2 MG: 4 TABLET ORAL at 07:56

## 2024-03-29 RX ADMIN — TIZANIDINE 2 MG: 4 TABLET ORAL at 13:03

## 2024-03-29 RX ADMIN — ALPRAZOLAM 0.5 MG: 0.25 TABLET ORAL at 08:03

## 2024-03-29 RX ADMIN — HYDROCODONE BITARTRATE AND ACETAMINOPHEN 1 TABLET: 7.5; 325 TABLET ORAL at 14:43

## 2024-03-29 RX ADMIN — HYDROCODONE BITARTRATE AND ACETAMINOPHEN 1 TABLET: 7.5; 325 TABLET ORAL at 06:46

## 2024-03-29 ASSESSMENT — PAIN SCALES - WONG BAKER
WONGBAKER_NUMERICALRESPONSE: NO HURT

## 2024-03-29 ASSESSMENT — PAIN DESCRIPTION - ORIENTATION
ORIENTATION: MID;LOWER
ORIENTATION: LOWER

## 2024-03-29 ASSESSMENT — PAIN DESCRIPTION - LOCATION
LOCATION: BACK
LOCATION: BACK;BUTTOCKS;LEG
LOCATION: BACK;LEG
LOCATION: BACK

## 2024-03-29 ASSESSMENT — PAIN SCALES - GENERAL
PAINLEVEL_OUTOF10: 8
PAINLEVEL_OUTOF10: 5
PAINLEVEL_OUTOF10: 4
PAINLEVEL_OUTOF10: 7
PAINLEVEL_OUTOF10: 5
PAINLEVEL_OUTOF10: 6
PAINLEVEL_OUTOF10: 7

## 2024-03-29 ASSESSMENT — PAIN DESCRIPTION - DESCRIPTORS
DESCRIPTORS: ACHING;SORE;DISCOMFORT
DESCRIPTORS: SHARP;STABBING;TENDER
DESCRIPTORS: ACHING;SPASM;SORE;DISCOMFORT
DESCRIPTORS: DISCOMFORT;ACHING

## 2024-03-29 NOTE — PLAN OF CARE
Problem: Discharge Planning  Goal: Discharge to home or other facility with appropriate resources  3/29/2024 0924 by Jennifer Medrano RN  Outcome: Adequate for Discharge  3/29/2024 0924 by Jennifer Medrano RN  Outcome: Adequate for Discharge  3/28/2024 2354 by Lizeth Lundberg RN  Outcome: Progressing     Problem: Safety - Adult  Goal: Free from fall injury  3/29/2024 0924 by Jennifer Medrano RN  Outcome: Adequate for Discharge  3/29/2024 0924 by Jennifer Medrano RN  Outcome: Adequate for Discharge  3/28/2024 2354 by Lizeth Lundberg RN  Outcome: Progressing     Problem: Pain  Goal: Verbalizes/displays adequate comfort level or baseline comfort level  3/29/2024 0924 by Jennifer Medrano RN  Outcome: Adequate for Discharge  3/29/2024 0924 by Jennifer Medrano RN  Outcome: Adequate for Discharge  3/28/2024 2354 by Lizeth Lundberg RN  Outcome: Progressing

## 2024-03-29 NOTE — PROGRESS NOTES
4 Eyes Skin Assessment     NAME:  Kayleen La  YOB: 1965  MEDICAL RECORD NUMBER:  [id number]    The patient is being assessed for  Admission    I agree that at least one RN has performed a thorough Head to Toe Skin Assessment on the patient. ALL assessment sites listed below have been assessed.      Areas assessed by both nurses:    Head, Face, Ears, Shoulders, Back, Chest, Arms, Elbows, Hands, Sacrum. Buttock, Coccyx, Ischium, Legs. Feet and Heels, and Under Medical Devices         Does the Patient have a Wound? No noted wound(s)       Tate Prevention initiated by RN: No  Wound Care Orders initiated by RN: No    Pressure Injury (Stage 3,4, Unstageable, DTI, NWPT, and Complex wounds) if present, place Wound referral order by RN under : No    New Ostomies, if present place, Ostomy referral order under : No     Nurse 1 eSignature: Electronically signed by Christophe So RN on 3/28/24 at 12:54 AM EDT    **SHARE this note so that the co-signing nurse can place an eSignature**    Nurse 2 eSignature: Electronically signed by Soledad Kebede RN on 3/28/24 at 1:07 AM EDT   
Messaged Dean WILLSON about pt wanting to know the plan. Per her \"Jovanalili said he talked to her last night that she will have surgery as an outpatient. She can discharge\"   
Pt notified of need for urine  
([facility]) [F33.2]     Cervical disc disease [M50.90]     FELA (generalized anxiety disorder) [F41.1]     Spinal stenosis of lumbar region with neurogenic claudication [M48.062]     Bipolar I disorder ([facility]) [F31.9]          Plan  Back pain worsening from baseline with numbness tingling and red flag symptoms   Sent in from pain management   MRI lumbar spine 3/27/24 w wo con    Moderate spinal stenosis at L4-5.  Moderate left foraminal zone disc bulging at L5-S1 results in mild compression of the left L5 nerve root.  Mild right foraminal zone disc bulging at L5-S1 comes into contact with the right L5 nerve root, without effacement of a significant amount of perineural fat  Neurosurgery consult   Discussed with NSG    History of psychogenic nonepileptic seizure  Hypertension  Generalized anxiety disorder  Bipolar type I    Discharge plan: Home once cleared by neurosurgery possibly today with op L fusion vs inpatient if pain uncontrolled     Electronically signed by Grabiel Jimenez MD on 3/29/2024 at 2:37 PM    I can be reached through [employer].

## 2024-03-29 NOTE — CARE COORDINATION
3/29/2024Social work transition of care planning  Pt plan is home,once medically stable.  Electronically signed by ISAIAS Borja on 3/29/2024 at 8:57 AM

## 2024-03-30 RX ORDER — ESTRADIOL 0.1 MG/D
FILM, EXTENDED RELEASE TRANSDERMAL
Qty: 8 PATCH | Refills: 3 | Status: SHIPPED | OUTPATIENT
Start: 2024-03-30

## 2024-04-01 ENCOUNTER — OFFICE VISIT (OUTPATIENT)
Dept: PAIN MANAGEMENT | Age: 59
End: 2024-04-01
Payer: COMMERCIAL

## 2024-04-01 ENCOUNTER — TELEPHONE (OUTPATIENT)
Dept: NEUROSURGERY | Age: 59
End: 2024-04-01

## 2024-04-01 ENCOUNTER — TELEPHONE (OUTPATIENT)
Dept: PRIMARY CARE CLINIC | Age: 59
End: 2024-04-01

## 2024-04-01 VITALS
HEIGHT: 60 IN | HEART RATE: 76 BPM | RESPIRATION RATE: 16 BRPM | WEIGHT: 124 LBS | BODY MASS INDEX: 24.35 KG/M2 | SYSTOLIC BLOOD PRESSURE: 129 MMHG | OXYGEN SATURATION: 95 % | DIASTOLIC BLOOD PRESSURE: 81 MMHG | TEMPERATURE: 98.1 F

## 2024-04-01 DIAGNOSIS — M54.2 CERVICALGIA: ICD-10-CM

## 2024-04-01 DIAGNOSIS — M79.18 CHRONIC MYOFASCIAL PAIN: Primary | ICD-10-CM

## 2024-04-01 DIAGNOSIS — G89.29 CHRONIC MYOFASCIAL PAIN: Primary | ICD-10-CM

## 2024-04-01 PROBLEM — F11.20 OPIOID DEPENDENCE WITH CURRENT USE (HCC): Status: ACTIVE | Noted: 2024-04-01

## 2024-04-01 PROCEDURE — 20553 NJX 1/MLT TRIGGER POINTS 3/>: CPT | Performed by: ANESTHESIOLOGY

## 2024-04-01 RX ORDER — BUPIVACAINE HYDROCHLORIDE 2.5 MG/ML
12 INJECTION, SOLUTION INFILTRATION; PERINEURAL ONCE
Status: COMPLETED | OUTPATIENT
Start: 2024-04-01 | End: 2024-04-01

## 2024-04-01 RX ORDER — METHYLPREDNISOLONE ACETATE 40 MG/ML
20 INJECTION, SUSPENSION INTRA-ARTICULAR; INTRALESIONAL; INTRAMUSCULAR; SOFT TISSUE ONCE
Status: COMPLETED | OUTPATIENT
Start: 2024-04-01 | End: 2024-04-01

## 2024-04-01 RX ORDER — METHYLPREDNISOLONE ACETATE 40 MG/ML
40 INJECTION, SUSPENSION INTRA-ARTICULAR; INTRALESIONAL; INTRAMUSCULAR; SOFT TISSUE ONCE
Status: COMPLETED | OUTPATIENT
Start: 2024-04-01 | End: 2024-04-01

## 2024-04-01 RX ADMIN — METHYLPREDNISOLONE ACETATE 40 MG: 40 INJECTION, SUSPENSION INTRA-ARTICULAR; INTRALESIONAL; INTRAMUSCULAR; SOFT TISSUE at 11:26

## 2024-04-01 RX ADMIN — METHYLPREDNISOLONE ACETATE 20 MG: 40 INJECTION, SUSPENSION INTRA-ARTICULAR; INTRALESIONAL; INTRAMUSCULAR; SOFT TISSUE at 11:25

## 2024-04-01 RX ADMIN — BUPIVACAINE HYDROCHLORIDE 30 MG: 2.5 INJECTION, SOLUTION INFILTRATION; PERINEURAL at 11:24

## 2024-04-01 NOTE — PROGRESS NOTES
4/1/2024    Chief Complaint   Patient presents with    Injections     TPI       Allergies as of 04/01/2024 - Fully Reviewed 04/01/2024   Allergen Reaction Noted    Bee venom Anaphylaxis 09/23/2011    Fish-derived products Anaphylaxis 09/23/2011    Hornet venom Anaphylaxis 10/14/2019    Iodine Anaphylaxis 04/16/2021    Wasp venom Anaphylaxis 05/26/2023    Bee venom  04/10/2022    Fish-derived products  04/10/2022    Hornet venom  04/10/2022    Iodine  04/10/2022        Procedure: trigger point injections     y consent signed y procedure verified with patient  y allergies noted y pt confirms not pregnant    Patient rates pain a 7/10 on the VAS scale.    Skin Prep:  ChloraPrep    Anesthetic:  n/a    Medication:  Marcaine 0.25% and DepMedrol    Vitals:    04/01/24 1011   BP: 129/81   Pulse: 76   Resp: 16   Temp: 98.1 °F (36.7 °C)   TempSrc: Infrared   SpO2: 95%   Weight: 56.2 kg (124 lb)   Height: 1.524 m (5')       I witnessed patient signing consent to Medical Procedure and Treatment form.     George Lauren RN

## 2024-04-01 NOTE — TELEPHONE ENCOUNTER
LM for patient to contact office.  Will need TCM dot phrase and appt changed on 4/4 to TCM if call is returned.

## 2024-04-01 NOTE — PROGRESS NOTES
2024    Patient: Kayleen La  :  1965  Age:  58 y.o.  Sex:  female     PRE-PROCEDURE DIAGNOSIS: Myofascial pain.      POST-PROCEDURE DIAGNOSIS: Same.     PROCEDURE:  Bilateral Cervical paraspinal and lumbar paraspinal muscles  trigger point injections.    SURGEON:   Eleazar Dinh MD    ANESTHESIA: Local    ESTIMATED BLOOD LOSS:  None.  ______________________________________________________________________    BRIEF HISTORY: Kayleen La comes in today for trigger point steroid injection. After discussing the potential risks and benefits of the procedure with the patient.  Kayleen did request that we proceed. A complete History & Physical was reviewed and it is unchanged.     DESCRIPTION OF PROCEDURE:   Each trigger points were marked with patient input and prepped with chloraprep and draped, a #25-gauge, 1.5-inch needle was passed into the area of greatest tenderness. A total of 12 trigger point injections were performed. Each trigger point  received 1 cc of 0.25% Marcaine and a total of 60 mg DepoMedrol after negative aspiration of blood, air or paresthesia.The needle was removed intact and Band-Aid was applied.    Disposition the patient tolerated the procedure well and there were no complications .     Kayleen will follow up in our [facility] as scheduled. She was encouraged to call with questions, concerns or if worsening of symptoms occurs.    Eleazar Dinh MD

## 2024-04-01 NOTE — TELEPHONE ENCOUNTER
Care Transitions Initial Follow Up Call    Outreach made within 2 business days of discharge: Yes    Patient: Kayleen La Patient : 1965   MRN: [id number]  Reason for Admission: There are no discharge diagnoses documented for the most recent discharge.  Discharge Date: 3/29/24       Spoke with: Kayleen    Discharge department/facility: [facility] Interactive Patient Contact:  Was patient able to fill all prescriptions: Yes  Was patient instructed to bring all medications to the follow-up visit: Yes  Is patient taking all medications as directed in the discharge summary? Yes  Does patient understand their discharge instructions: Yes  Does patient have questions or concerns that need addressed prior to 7-14 day follow up office visit: no    Scheduled appointment with PCP within 7-14 days    Follow Up  Future Appointments   Date Time Provider Department Center   2024  9:15 AM Ana Ferris DO [facility]       Jacy Abarca MA

## 2024-04-03 ENCOUNTER — PREP FOR PROCEDURE (OUTPATIENT)
Dept: NEUROSURGERY | Age: 59
End: 2024-04-03

## 2024-04-03 ENCOUNTER — TELEPHONE (OUTPATIENT)
Dept: NEUROSURGERY | Age: 59
End: 2024-04-03

## 2024-04-03 DIAGNOSIS — Z01.818 PRE-OP TESTING: Primary | ICD-10-CM

## 2024-04-03 PROBLEM — M48.061 STENOSIS, SPINAL, LUMBAR: Status: ACTIVE | Noted: 2024-04-03

## 2024-04-03 RX ORDER — SODIUM CHLORIDE 0.9 % (FLUSH) 0.9 %
5-40 SYRINGE (ML) INJECTION EVERY 12 HOURS SCHEDULED
Status: CANCELLED | OUTPATIENT
Start: 2024-04-03

## 2024-04-03 RX ORDER — SODIUM CHLORIDE 9 MG/ML
INJECTION, SOLUTION INTRAVENOUS CONTINUOUS
Status: CANCELLED | OUTPATIENT
Start: 2024-04-03

## 2024-04-03 RX ORDER — SODIUM CHLORIDE 0.9 % (FLUSH) 0.9 %
5-40 SYRINGE (ML) INJECTION PRN
Status: CANCELLED | OUTPATIENT
Start: 2024-04-03

## 2024-04-03 RX ORDER — SODIUM CHLORIDE 9 MG/ML
INJECTION, SOLUTION INTRAVENOUS PRN
Status: CANCELLED | OUTPATIENT
Start: 2024-04-03

## 2024-04-03 NOTE — TELEPHONE ENCOUNTER
Prior Authorization Form:      DEMOGRAPHICS:                     Patient Name:  Kayleen La  Patient :  1965            Insurance:  Payor: [employer] / Plan: [employer] [place] [id number] / Product Type: *No Product type* /   Insurance ID Number:    Payer/Plan Subscr  Sex Relation Sub. Ins. ID Effective Group Num   1. [employer] CLAYTON* NATA TIRADO OSMANY 1971 Male Spouse [id number]                                    [id number]         DIAGNOSIS & PROCEDURE:                       Procedure/Operation: L4-S1 laminectomy           CPT Code: 70523, 64277    Diagnosis:  L4-S1 stenosis    ICD10 Code: M48.061    Location:  Main    Surgeon:  Aba    SCHEDULING INFORMATION:                          Date: 24                Anesthesia:  general                                                       Status:  Outpatient        Special Comments:  none       Electronically signed by Kimi Ruffin MA on 4/3/2024 at 10:13 AM

## 2024-04-04 ENCOUNTER — OFFICE VISIT (OUTPATIENT)
Dept: PRIMARY CARE CLINIC | Age: 59
End: 2024-04-04

## 2024-04-04 VITALS
OXYGEN SATURATION: 100 % | HEART RATE: 75 BPM | BODY MASS INDEX: 24.15 KG/M2 | TEMPERATURE: 98.5 F | HEIGHT: 60 IN | DIASTOLIC BLOOD PRESSURE: 80 MMHG | WEIGHT: 123 LBS | SYSTOLIC BLOOD PRESSURE: 126 MMHG

## 2024-04-04 DIAGNOSIS — I10 PRIMARY HYPERTENSION: ICD-10-CM

## 2024-04-04 DIAGNOSIS — G43.709 CHRONIC MIGRAINE WITHOUT AURA WITHOUT STATUS MIGRAINOSUS, NOT INTRACTABLE: ICD-10-CM

## 2024-04-04 DIAGNOSIS — F41.0 PANIC DISORDER WITHOUT AGORAPHOBIA: ICD-10-CM

## 2024-04-04 DIAGNOSIS — F51.01 PRIMARY INSOMNIA: ICD-10-CM

## 2024-04-04 DIAGNOSIS — M51.36 DDD (DEGENERATIVE DISC DISEASE), LUMBAR: ICD-10-CM

## 2024-04-04 DIAGNOSIS — M47.817 LUMBOSACRAL SPONDYLOSIS WITHOUT MYELOPATHY: ICD-10-CM

## 2024-04-04 DIAGNOSIS — F11.20 OPIOID DEPENDENCE WITH CURRENT USE (HCC): ICD-10-CM

## 2024-04-04 DIAGNOSIS — Z09 HOSPITAL DISCHARGE FOLLOW-UP: ICD-10-CM

## 2024-04-04 DIAGNOSIS — M48.062 SPINAL STENOSIS OF LUMBAR REGION WITH NEUROGENIC CLAUDICATION: Primary | ICD-10-CM

## 2024-04-04 RX ORDER — ALPRAZOLAM 0.5 MG/1
0.5 TABLET ORAL 3 TIMES DAILY PRN
Qty: 90 TABLET | Refills: 2 | Status: SHIPPED | OUTPATIENT
Start: 2024-04-04 | End: 2024-07-03

## 2024-04-04 RX ORDER — ESTRADIOL 0.1 MG/D
FILM, EXTENDED RELEASE TRANSDERMAL
Qty: 8 PATCH | Refills: 3 | Status: CANCELLED | OUTPATIENT
Start: 2024-04-04

## 2024-04-04 RX ORDER — PROCHLORPERAZINE MALEATE 5 MG/1
5 TABLET ORAL EVERY 6 HOURS PRN
Qty: 30 TABLET | Refills: 3 | Status: SHIPPED | OUTPATIENT
Start: 2024-04-04

## 2024-04-04 RX ORDER — HYDROCODONE BITARTRATE AND ACETAMINOPHEN 7.5; 325 MG/1; MG/1
1 TABLET ORAL EVERY 8 HOURS PRN
Qty: 90 TABLET | Refills: 0 | Status: SHIPPED | OUTPATIENT
Start: 2024-05-30 | End: 2024-06-29

## 2024-04-04 RX ORDER — AMLODIPINE BESYLATE 10 MG/1
10 TABLET ORAL NIGHTLY
Qty: 90 TABLET | Refills: 1 | Status: SHIPPED | OUTPATIENT
Start: 2024-04-04

## 2024-04-04 RX ORDER — TOPIRAMATE 25 MG/1
50 TABLET ORAL NIGHTLY
Qty: 180 TABLET | Refills: 1 | Status: CANCELLED | OUTPATIENT
Start: 2024-04-04

## 2024-04-04 RX ORDER — ZOLPIDEM TARTRATE 12.5 MG/1
12.5 TABLET, FILM COATED, EXTENDED RELEASE ORAL NIGHTLY PRN
Qty: 30 TABLET | Refills: 2 | Status: SHIPPED | OUTPATIENT
Start: 2024-04-04 | End: 2024-07-03

## 2024-04-04 RX ORDER — CARISOPRODOL 350 MG/1
350 TABLET ORAL 4 TIMES DAILY PRN
Qty: 120 TABLET | Refills: 2 | Status: SHIPPED | OUTPATIENT
Start: 2024-04-04 | End: 2024-05-04

## 2024-04-04 RX ORDER — HYDROCODONE BITARTRATE AND ACETAMINOPHEN 7.5; 325 MG/1; MG/1
1 TABLET ORAL EVERY 8 HOURS PRN
Qty: 90 TABLET | Refills: 0 | Status: SHIPPED | OUTPATIENT
Start: 2024-05-02 | End: 2024-06-01

## 2024-04-04 RX ORDER — HYDROCODONE BITARTRATE AND ACETAMINOPHEN 7.5; 325 MG/1; MG/1
1 TABLET ORAL EVERY 8 HOURS PRN
Qty: 90 TABLET | Refills: 0 | Status: SHIPPED | OUTPATIENT
Start: 2024-04-04 | End: 2024-05-04

## 2024-04-04 SDOH — ECONOMIC STABILITY: FOOD INSECURITY: WITHIN THE PAST 12 MONTHS, YOU WORRIED THAT YOUR FOOD WOULD RUN OUT BEFORE YOU GOT MONEY TO BUY MORE.: NEVER TRUE

## 2024-04-04 SDOH — ECONOMIC STABILITY: FOOD INSECURITY: WITHIN THE PAST 12 MONTHS, THE FOOD YOU BOUGHT JUST DIDN'T LAST AND YOU DIDN'T HAVE MONEY TO GET MORE.: NEVER TRUE

## 2024-04-04 SDOH — ECONOMIC STABILITY: INCOME INSECURITY: HOW HARD IS IT FOR YOU TO PAY FOR THE VERY BASICS LIKE FOOD, HOUSING, MEDICAL CARE, AND HEATING?: NOT HARD AT ALL

## 2024-04-04 ASSESSMENT — ENCOUNTER SYMPTOMS
COUGH: 0
WHEEZING: 0
NAUSEA: 1
ABDOMINAL PAIN: 0
BACK PAIN: 1
DIARRHEA: 0
CONSTIPATION: 0
VOMITING: 0

## 2024-04-04 NOTE — PROGRESS NOTES
3 days. Take lowest dose possible to manage pain Max Daily Amount: 3 tablets 90 tablet 0    estradiol (VIVELLE) 0.1 MG/24HR PLACE 1 PATCH ONTO THE SKIN TWICE A WEEK, MONDAY AND THURSDAY 8 patch 3    sertraline (ZOLOFT) 50 MG tablet TAKE 1 TABLET BY MOUTH AT BEDTIME 90 tablet 1    lamoTRIgine (LAMICTAL) 150 MG tablet Take 1 tablet by mouth nightly 90 tablet 1    QUEtiapine (SEROQUEL) 100 MG tablet TAKE 1 TABLET BY MOUTH TWICE DAILY 180 tablet 1    topiramate (TOPAMAX) 25 MG tablet Take 2 tablets by mouth at bedtime 180 tablet 1    rizatriptan (MAXALT) 10 MG tablet Take 1 tablet by mouth once as needed for Migraine (Take at the onset of migraine; may repeat in 2 hours prn) 9 tablet 5    COLLAGEN PO Take 1 Scoop by mouth every morning MIX W/ H20      Cholecalciferol (VITAMIN D3) 125 MCG (5000 UT) TABS Take 1 tablet by mouth every morning          Medications patient taking as of now reconciled against medications ordered at time of hospital discharge: Yes    Review of Systems   Constitutional:  Negative for chills, fatigue and fever.   Respiratory:  Negative for cough, shortness of breath and wheezing.    Cardiovascular:  Negative for chest pain and palpitations.   Gastrointestinal:  Positive for nausea. Negative for abdominal pain, constipation, diarrhea and vomiting.   Musculoskeletal:  Positive for arthralgias, back pain, gait problem, neck pain and neck stiffness.   Skin:  Negative for rash.   Neurological:  Positive for numbness and headaches. Negative for dizziness.   Psychiatric/Behavioral:  Positive for behavioral problems, dysphoric mood and sleep disturbance. The patient is nervous/anxious.    All other systems reviewed and are negative.      Objective:    /80   Pulse 75   Temp 98.5 °F (36.9 °C)   Ht 1.524 m (5')   Wt 55.8 kg (123 lb)   SpO2 100%   BMI 24.02 kg/m²   Physical Exam  Vitals and nursing note reviewed.   Constitutional:       General: She is not in acute distress.     Appearance:

## 2024-04-17 ENCOUNTER — HOSPITAL ENCOUNTER (OUTPATIENT)
Age: 59
Discharge: HOME | End: 2024-04-17
Payer: COMMERCIAL

## 2024-04-17 ENCOUNTER — HOSPITAL ENCOUNTER (OUTPATIENT)
Age: 59
Discharge: HOME | End: 2024-04-19
Payer: COMMERCIAL

## 2024-04-17 ENCOUNTER — HOSPITAL ENCOUNTER (OUTPATIENT)
Dept: GENERAL RADIOLOGY | Age: 59
Discharge: HOME | End: 2024-04-19
Payer: COMMERCIAL

## 2024-04-17 ENCOUNTER — HOSPITAL ENCOUNTER (OUTPATIENT)
Dept: PREADMISSION TESTING | Age: 59
Discharge: HOME | End: 2024-04-17
Payer: COMMERCIAL

## 2024-04-17 VITALS
SYSTOLIC BLOOD PRESSURE: 149 MMHG | DIASTOLIC BLOOD PRESSURE: 72 MMHG | RESPIRATION RATE: 16 BRPM | HEIGHT: 65 IN | WEIGHT: 123.4 LBS | TEMPERATURE: 97 F | HEART RATE: 67 BPM | BODY MASS INDEX: 20.56 KG/M2 | OXYGEN SATURATION: 100 %

## 2024-04-17 DIAGNOSIS — Z01.818 PRE-OP TESTING: ICD-10-CM

## 2024-04-17 LAB
ABO + RH BLD: NORMAL
ANION GAP SERPL CALCULATED.3IONS-SCNC: 18 MMOL/L (ref 7–16)
ARM BAND NUMBER: NORMAL
BASOPHILS # BLD: 0.03 K/UL (ref 0–0.2)
BASOPHILS NFR BLD: 1 % (ref 0–2)
BILIRUB UR QL STRIP: NEGATIVE
BLOOD BANK SAMPLE EXPIRATION: NORMAL
BLOOD GROUP ANTIBODIES SERPL: NEGATIVE
BUN SERPL-MCNC: 10 MG/DL (ref 6–20)
CALCIUM SERPL-MCNC: 9.8 MG/DL (ref 8.6–10.2)
CHLORIDE SERPL-SCNC: 98 MMOL/L (ref 98–107)
CLARITY UR: CLEAR
CO2 SERPL-SCNC: 20 MMOL/L (ref 22–29)
COLOR UR: YELLOW
CREAT SERPL-MCNC: 0.9 MG/DL (ref 0.5–1)
EKG ATRIAL RATE: 69 BPM
EKG P AXIS: 66 DEGREES
EKG P-R INTERVAL: 152 MS
EKG Q-T INTERVAL: 410 MS
EKG QRS DURATION: 70 MS
EKG QTC CALCULATION (BAZETT): 439 MS
EKG R AXIS: 67 DEGREES
EKG T AXIS: 64 DEGREES
EKG VENTRICULAR RATE: 69 BPM
EOSINOPHIL # BLD: 0.06 K/UL (ref 0.05–0.5)
EOSINOPHILS RELATIVE PERCENT: 1 % (ref 0–6)
ERYTHROCYTE [DISTWIDTH] IN BLOOD BY AUTOMATED COUNT: 13 % (ref 11.5–15)
GFR SERPL CREATININE-BSD FRML MDRD: 80 ML/MIN/1.73M2
GLUCOSE SERPL-MCNC: 91 MG/DL (ref 74–99)
GLUCOSE UR STRIP-MCNC: NEGATIVE MG/DL
HCT VFR BLD AUTO: 50.1 % (ref 34–48)
HGB BLD-MCNC: 16.3 G/DL (ref 11.5–15.5)
HGB UR QL STRIP.AUTO: NEGATIVE
IMM GRANULOCYTES # BLD AUTO: <0.03 K/UL (ref 0–0.58)
IMM GRANULOCYTES NFR BLD: 0 % (ref 0–5)
INR PPP: 0.9
KETONES UR STRIP-MCNC: NEGATIVE MG/DL
LEUKOCYTE ESTERASE UR QL STRIP: ABNORMAL
LYMPHOCYTES NFR BLD: 1.42 K/UL (ref 1.5–4)
LYMPHOCYTES RELATIVE PERCENT: 27 % (ref 20–42)
MCH RBC QN AUTO: 32.3 PG (ref 26–35)
MCHC RBC AUTO-ENTMCNC: 32.5 G/DL (ref 32–34.5)
MCV RBC AUTO: 99.2 FL (ref 80–99.9)
MONOCYTES NFR BLD: 0.27 K/UL (ref 0.1–0.95)
MONOCYTES NFR BLD: 5 % (ref 2–12)
NEUTROPHILS NFR BLD: 66 % (ref 43–80)
NEUTS SEG NFR BLD: 3.45 K/UL (ref 1.8–7.3)
NITRITE UR QL STRIP: NEGATIVE
PH UR STRIP: 6 [PH] (ref 5–9)
PLATELET # BLD AUTO: 321 K/UL (ref 130–450)
PMV BLD AUTO: 10.2 FL (ref 7–12)
POTASSIUM SERPL-SCNC: 4.1 MMOL/L (ref 3.5–5)
PROT UR STRIP-MCNC: NEGATIVE MG/DL
PROTHROMBIN TIME: 9.5 SEC (ref 9.3–12.4)
RBC # BLD AUTO: 5.05 M/UL (ref 3.5–5.5)
RBC #/AREA URNS HPF: NORMAL /HPF
SODIUM SERPL-SCNC: 136 MMOL/L (ref 132–146)
SP GR UR STRIP: <1.005 (ref 1–1.03)
UROBILINOGEN UR STRIP-ACNC: 0.2 EU/DL (ref 0–1)
WBC #/AREA URNS HPF: NORMAL /HPF
WBC OTHER # BLD: 5.3 K/UL (ref 4.5–11.5)

## 2024-04-17 PROCEDURE — 86901 BLOOD TYPING SEROLOGIC RH(D): CPT

## 2024-04-17 PROCEDURE — 85025 COMPLETE CBC W/AUTO DIFF WBC: CPT

## 2024-04-17 PROCEDURE — 71046 X-RAY EXAM CHEST 2 VIEWS: CPT

## 2024-04-17 PROCEDURE — 87086 URINE CULTURE/COLONY COUNT: CPT

## 2024-04-17 PROCEDURE — 86900 BLOOD TYPING SEROLOGIC ABO: CPT

## 2024-04-17 PROCEDURE — 36415 COLL VENOUS BLD VENIPUNCTURE: CPT

## 2024-04-17 PROCEDURE — 85610 PROTHROMBIN TIME: CPT

## 2024-04-17 PROCEDURE — 80048 BASIC METABOLIC PNL TOTAL CA: CPT

## 2024-04-17 PROCEDURE — 81015 MICROSCOPIC EXAM OF URINE: CPT

## 2024-04-17 PROCEDURE — 86850 RBC ANTIBODY SCREEN: CPT

## 2024-04-17 PROCEDURE — 93005 ELECTROCARDIOGRAM TRACING: CPT

## 2024-04-18 LAB
MICROORGANISM SPEC CULT: NO GROWTH
SPECIMEN DESCRIPTION: NORMAL

## 2024-04-23 NOTE — H&P
HPI:   Kayleen La is a 58 y.o.  female who is known to our services and was seen for a similar complaints.  On exam patient complains of low back pain with radiation down her left leg. Describes the pain as sharp, stabbing, and shooting. She admits to numbness throughout her bilateral lower extremities, right worse than left. She does admit to some weakness. She denies any bowel or bladder incontinence, admits to urinary urgency. Denies any saddle anesthesia, headache, loss of dexterity, abnormal gait, fever, chills, N/V, SOB, or chest pain.      MRI spine reviewed and showed L4-S1 stenosis which is why Neurosurgery was consulted.      Past Medical History        Past Medical History:   Diagnosis Date    Anxiety      Bipolar disorder (HCC)       Bipolar 1 disorder    Cervical disc disease      Cervical spondylitis with radiculitis ([facility])       C2, C7    Chronic back pain      Chronic kidney disease       In chart    CKD (chronic kidney disease)      Depression      Facet hypertrophy       L4/5 - S1    Hypertension      Irritable bowel syndrome 20/18    Lumbar canal stenosis      Migraine      Osteoarthritis      Panic disorder without agoraphobia       [facility]    PONV (postoperative nausea and vomiting)      Psychogenic nonepileptic seizure       last one 4/2022    Restless legs syndrome      SVT (supraventricular tachycardia)       See cardiac event monitor report dated 8/16/2023 [facility]: No Atrial Fibrillation was found - Predominant underlying rhythm was Sinus Rhythm with Supraventricular Predominant underlying rhythm was Sinus Rhythm with Supraventricular         Past Surgical History         Past Surgical History:   Procedure Laterality Date    ABDOMEN SURGERY N/A 04/19/2021     EXCISION LIPOMA ABDOMEN performed by Eddy Booth MD at [facility] OR    APPENDECTOMY        BREAST REDUCTION SURGERY        CERVICAL FUSION         c2-c7 Bone Kevin Grafting 2-6

## 2024-04-24 ENCOUNTER — ANESTHESIA (OUTPATIENT)
Dept: OPERATING ROOM | Age: 59
End: 2024-04-24
Payer: COMMERCIAL

## 2024-04-24 ENCOUNTER — ANESTHESIA EVENT (OUTPATIENT)
Dept: OPERATING ROOM | Age: 59
End: 2024-04-24
Payer: COMMERCIAL

## 2024-04-24 ENCOUNTER — APPOINTMENT (OUTPATIENT)
Dept: GENERAL RADIOLOGY | Age: 59
End: 2024-04-24
Attending: NEUROLOGICAL SURGERY
Payer: COMMERCIAL

## 2024-04-24 ENCOUNTER — HOSPITAL ENCOUNTER (OUTPATIENT)
Age: 59
Setting detail: OBSERVATION
Discharge: HOME | End: 2024-04-26
Attending: NEUROLOGICAL SURGERY | Admitting: NEUROLOGICAL SURGERY
Payer: COMMERCIAL

## 2024-04-24 DIAGNOSIS — M48.062 SPINAL STENOSIS OF LUMBAR REGION WITH NEUROGENIC CLAUDICATION: ICD-10-CM

## 2024-04-24 DIAGNOSIS — M51.36 DDD (DEGENERATIVE DISC DISEASE), LUMBAR: ICD-10-CM

## 2024-04-24 DIAGNOSIS — M48.062 LUMBAR STENOSIS WITH NEUROGENIC CLAUDICATION: Primary | ICD-10-CM

## 2024-04-24 DIAGNOSIS — M47.817 LUMBOSACRAL SPONDYLOSIS WITHOUT MYELOPATHY: ICD-10-CM

## 2024-04-24 DIAGNOSIS — Z98.890 S/P LAMINECTOMY: ICD-10-CM

## 2024-04-24 PROCEDURE — 2580000003 HC RX 258: Performed by: NEUROLOGICAL SURGERY

## 2024-04-24 PROCEDURE — 2580000003 HC RX 258: Performed by: PHYSICIAN ASSISTANT

## 2024-04-24 PROCEDURE — 2720000010 HC SURG SUPPLY STERILE: Performed by: NEUROLOGICAL SURGERY

## 2024-04-24 PROCEDURE — 2709999900 HC NON-CHARGEABLE SUPPLY: Performed by: NEUROLOGICAL SURGERY

## 2024-04-24 PROCEDURE — 63047 LAM FACETEC & FORAMOT LUMBAR: CPT | Performed by: NEUROLOGICAL SURGERY

## 2024-04-24 PROCEDURE — 2580000003 HC RX 258

## 2024-04-24 PROCEDURE — 6370000000 HC RX 637 (ALT 250 FOR IP): Performed by: NEUROLOGICAL SURGERY

## 2024-04-24 PROCEDURE — 63048 LAM FACETEC &FORAMOT EA ADDL: CPT | Performed by: PHYSICIAN ASSISTANT

## 2024-04-24 PROCEDURE — 7100000001 HC PACU RECOVERY - ADDTL 15 MIN: Performed by: NEUROLOGICAL SURGERY

## 2024-04-24 PROCEDURE — 2500000003 HC RX 250 WO HCPCS

## 2024-04-24 PROCEDURE — G0378 HOSPITAL OBSERVATION PER HR: HCPCS

## 2024-04-24 PROCEDURE — 63047 LAM FACETEC & FORAMOT LUMBAR: CPT | Performed by: PHYSICIAN ASSISTANT

## 2024-04-24 PROCEDURE — 6360000002 HC RX W HCPCS: Performed by: NEUROLOGICAL SURGERY

## 2024-04-24 PROCEDURE — 97530 THERAPEUTIC ACTIVITIES: CPT

## 2024-04-24 PROCEDURE — 63048 LAM FACETEC &FORAMOT EA ADDL: CPT | Performed by: NEUROLOGICAL SURGERY

## 2024-04-24 PROCEDURE — 2500000003 HC RX 250 WO HCPCS: Performed by: NEUROLOGICAL SURGERY

## 2024-04-24 PROCEDURE — 6370000000 HC RX 637 (ALT 250 FOR IP)

## 2024-04-24 PROCEDURE — 97165 OT EVAL LOW COMPLEX 30 MIN: CPT

## 2024-04-24 PROCEDURE — 2500000003 HC RX 250 WO HCPCS: Performed by: ANESTHESIOLOGY

## 2024-04-24 PROCEDURE — 97535 SELF CARE MNGMENT TRAINING: CPT

## 2024-04-24 PROCEDURE — 3700000000 HC ANESTHESIA ATTENDED CARE: Performed by: NEUROLOGICAL SURGERY

## 2024-04-24 PROCEDURE — A4217 STERILE WATER/SALINE, 500 ML: HCPCS | Performed by: NEUROLOGICAL SURGERY

## 2024-04-24 PROCEDURE — 97161 PT EVAL LOW COMPLEX 20 MIN: CPT

## 2024-04-24 PROCEDURE — 6360000002 HC RX W HCPCS

## 2024-04-24 PROCEDURE — 3600000004 HC SURGERY LEVEL 4 BASE: Performed by: NEUROLOGICAL SURGERY

## 2024-04-24 PROCEDURE — 6360000002 HC RX W HCPCS: Performed by: PHYSICIAN ASSISTANT

## 2024-04-24 PROCEDURE — 3700000001 HC ADD 15 MINUTES (ANESTHESIA): Performed by: NEUROLOGICAL SURGERY

## 2024-04-24 PROCEDURE — 3600000014 HC SURGERY LEVEL 4 ADDTL 15MIN: Performed by: NEUROLOGICAL SURGERY

## 2024-04-24 PROCEDURE — 7100000000 HC PACU RECOVERY - FIRST 15 MIN: Performed by: NEUROLOGICAL SURGERY

## 2024-04-24 RX ORDER — OXYCODONE HYDROCHLORIDE 10 MG/1
10 TABLET ORAL EVERY 4 HOURS PRN
Status: DISCONTINUED | OUTPATIENT
Start: 2024-04-24 | End: 2024-04-26 | Stop reason: HOSPADM

## 2024-04-24 RX ORDER — ONDANSETRON 2 MG/ML
INJECTION INTRAMUSCULAR; INTRAVENOUS PRN
Status: DISCONTINUED | OUTPATIENT
Start: 2024-04-24 | End: 2024-04-24 | Stop reason: SDUPTHER

## 2024-04-24 RX ORDER — SODIUM CHLORIDE 9 MG/ML
INJECTION, SOLUTION INTRAVENOUS PRN
Status: DISCONTINUED | OUTPATIENT
Start: 2024-04-24 | End: 2024-04-24 | Stop reason: HOSPADM

## 2024-04-24 RX ORDER — ONDANSETRON 4 MG/1
4 TABLET, ORALLY DISINTEGRATING ORAL EVERY 8 HOURS PRN
Status: DISCONTINUED | OUTPATIENT
Start: 2024-04-24 | End: 2024-04-26 | Stop reason: HOSPADM

## 2024-04-24 RX ORDER — SODIUM CHLORIDE 0.9 % (FLUSH) 0.9 %
5-40 SYRINGE (ML) INJECTION EVERY 12 HOURS SCHEDULED
Status: DISCONTINUED | OUTPATIENT
Start: 2024-04-24 | End: 2024-04-24 | Stop reason: HOSPADM

## 2024-04-24 RX ORDER — OXYCODONE HYDROCHLORIDE 5 MG/1
5 TABLET ORAL EVERY 4 HOURS PRN
Status: DISCONTINUED | OUTPATIENT
Start: 2024-04-24 | End: 2024-04-26 | Stop reason: HOSPADM

## 2024-04-24 RX ORDER — PROPOFOL 10 MG/ML
INJECTION, EMULSION INTRAVENOUS PRN
Status: DISCONTINUED | OUTPATIENT
Start: 2024-04-24 | End: 2024-04-24 | Stop reason: SDUPTHER

## 2024-04-24 RX ORDER — MORPHINE SULFATE 4 MG/ML
4 INJECTION, SOLUTION INTRAMUSCULAR; INTRAVENOUS
Status: DISPENSED | OUTPATIENT
Start: 2024-04-24 | End: 2024-04-25

## 2024-04-24 RX ORDER — LIDOCAINE HYDROCHLORIDE AND EPINEPHRINE 5; 5 MG/ML; UG/ML
INJECTION, SOLUTION INFILTRATION; PERINEURAL PRN
Status: DISCONTINUED | OUTPATIENT
Start: 2024-04-24 | End: 2024-04-24 | Stop reason: ALTCHOICE

## 2024-04-24 RX ORDER — FENTANYL CITRATE 50 UG/ML
INJECTION, SOLUTION INTRAMUSCULAR; INTRAVENOUS PRN
Status: DISCONTINUED | OUTPATIENT
Start: 2024-04-24 | End: 2024-04-24 | Stop reason: SDUPTHER

## 2024-04-24 RX ORDER — ZOLPIDEM TARTRATE 5 MG/1
5 TABLET ORAL NIGHTLY PRN
Status: DISCONTINUED | OUTPATIENT
Start: 2024-04-24 | End: 2024-04-26 | Stop reason: HOSPADM

## 2024-04-24 RX ORDER — BISACODYL 5 MG/1
5 TABLET, DELAYED RELEASE ORAL DAILY
Status: DISCONTINUED | OUTPATIENT
Start: 2024-04-24 | End: 2024-04-26 | Stop reason: HOSPADM

## 2024-04-24 RX ORDER — DIPHENHYDRAMINE HCL 25 MG
25 TABLET ORAL EVERY 6 HOURS PRN
Status: DISCONTINUED | OUTPATIENT
Start: 2024-04-24 | End: 2024-04-26 | Stop reason: HOSPADM

## 2024-04-24 RX ORDER — SODIUM CHLORIDE, SODIUM LACTATE, POTASSIUM CHLORIDE, CALCIUM CHLORIDE 600; 310; 30; 20 MG/100ML; MG/100ML; MG/100ML; MG/100ML
INJECTION, SOLUTION INTRAVENOUS CONTINUOUS PRN
Status: DISCONTINUED | OUTPATIENT
Start: 2024-04-24 | End: 2024-04-24 | Stop reason: SDUPTHER

## 2024-04-24 RX ORDER — PROCHLORPERAZINE MALEATE 10 MG
5 TABLET ORAL EVERY 6 HOURS PRN
Status: DISCONTINUED | OUTPATIENT
Start: 2024-04-24 | End: 2024-04-26 | Stop reason: HOSPADM

## 2024-04-24 RX ORDER — POLYETHYLENE GLYCOL 3350 17 G/17G
17 POWDER, FOR SOLUTION ORAL DAILY
Status: DISCONTINUED | OUTPATIENT
Start: 2024-04-24 | End: 2024-04-26 | Stop reason: HOSPADM

## 2024-04-24 RX ORDER — AMLODIPINE BESYLATE 10 MG/1
10 TABLET ORAL NIGHTLY
Status: DISCONTINUED | OUTPATIENT
Start: 2024-04-24 | End: 2024-04-26 | Stop reason: HOSPADM

## 2024-04-24 RX ORDER — ALPRAZOLAM 0.25 MG/1
0.5 TABLET ORAL 3 TIMES DAILY PRN
Status: DISCONTINUED | OUTPATIENT
Start: 2024-04-24 | End: 2024-04-26 | Stop reason: HOSPADM

## 2024-04-24 RX ORDER — PHENYLEPHRINE HCL IN 0.9% NACL 1 MG/10 ML
SYRINGE (ML) INTRAVENOUS PRN
Status: DISCONTINUED | OUTPATIENT
Start: 2024-04-24 | End: 2024-04-24 | Stop reason: SDUPTHER

## 2024-04-24 RX ORDER — PROPOFOL 10 MG/ML
INJECTION, EMULSION INTRAVENOUS CONTINUOUS PRN
Status: DISCONTINUED | OUTPATIENT
Start: 2024-04-24 | End: 2024-04-24 | Stop reason: SDUPTHER

## 2024-04-24 RX ORDER — DIPHENHYDRAMINE HYDROCHLORIDE 50 MG/ML
INJECTION INTRAMUSCULAR; INTRAVENOUS PRN
Status: DISCONTINUED | OUTPATIENT
Start: 2024-04-24 | End: 2024-04-24 | Stop reason: SDUPTHER

## 2024-04-24 RX ORDER — NALOXONE HYDROCHLORIDE 0.4 MG/ML
INJECTION, SOLUTION INTRAMUSCULAR; INTRAVENOUS; SUBCUTANEOUS PRN
Status: DISCONTINUED | OUTPATIENT
Start: 2024-04-24 | End: 2024-04-24 | Stop reason: HOSPADM

## 2024-04-24 RX ORDER — SUMATRIPTAN 50 MG/1
100 TABLET, FILM COATED ORAL
Status: DISPENSED | OUTPATIENT
Start: 2024-04-24 | End: 2024-04-25

## 2024-04-24 RX ORDER — LIDOCAINE HYDROCHLORIDE 20 MG/ML
INJECTION, SOLUTION INTRAVENOUS PRN
Status: DISCONTINUED | OUTPATIENT
Start: 2024-04-24 | End: 2024-04-24 | Stop reason: SDUPTHER

## 2024-04-24 RX ORDER — VANCOMYCIN HYDROCHLORIDE 500 MG/10ML
INJECTION, POWDER, LYOPHILIZED, FOR SOLUTION INTRAVENOUS PRN
Status: DISCONTINUED | OUTPATIENT
Start: 2024-04-24 | End: 2024-04-24 | Stop reason: ALTCHOICE

## 2024-04-24 RX ORDER — DOCUSATE SODIUM 100 MG/1
200 CAPSULE, LIQUID FILLED ORAL 2 TIMES DAILY
Status: DISCONTINUED | OUTPATIENT
Start: 2024-04-24 | End: 2024-04-26 | Stop reason: HOSPADM

## 2024-04-24 RX ORDER — QUETIAPINE FUMARATE 25 MG/1
100 TABLET, FILM COATED ORAL 2 TIMES DAILY
Status: DISCONTINUED | OUTPATIENT
Start: 2024-04-24 | End: 2024-04-26 | Stop reason: HOSPADM

## 2024-04-24 RX ORDER — SENNA AND DOCUSATE SODIUM 50; 8.6 MG/1; MG/1
1 TABLET, FILM COATED ORAL 2 TIMES DAILY
Status: DISCONTINUED | OUTPATIENT
Start: 2024-04-24 | End: 2024-04-26 | Stop reason: HOSPADM

## 2024-04-24 RX ORDER — SODIUM CHLORIDE 9 MG/ML
INJECTION, SOLUTION INTRAVENOUS CONTINUOUS
Status: DISCONTINUED | OUTPATIENT
Start: 2024-04-24 | End: 2024-04-26 | Stop reason: HOSPADM

## 2024-04-24 RX ORDER — HYDROMORPHONE HYDROCHLORIDE 1 MG/ML
0.5 INJECTION, SOLUTION INTRAMUSCULAR; INTRAVENOUS; SUBCUTANEOUS EVERY 5 MIN PRN
Status: COMPLETED | OUTPATIENT
Start: 2024-04-24 | End: 2024-04-24

## 2024-04-24 RX ORDER — SCOLOPAMINE TRANSDERMAL SYSTEM 1 MG/1
1 PATCH, EXTENDED RELEASE TRANSDERMAL
Status: DISCONTINUED | OUTPATIENT
Start: 2024-04-24 | End: 2024-04-26 | Stop reason: HOSPADM

## 2024-04-24 RX ORDER — MORPHINE SULFATE 2 MG/ML
2 INJECTION, SOLUTION INTRAMUSCULAR; INTRAVENOUS
Status: ACTIVE | OUTPATIENT
Start: 2024-04-24 | End: 2024-04-25

## 2024-04-24 RX ORDER — DIPHENHYDRAMINE HYDROCHLORIDE 50 MG/ML
25 INJECTION INTRAMUSCULAR; INTRAVENOUS EVERY 6 HOURS PRN
Status: DISCONTINUED | OUTPATIENT
Start: 2024-04-24 | End: 2024-04-26 | Stop reason: HOSPADM

## 2024-04-24 RX ORDER — MIDAZOLAM HYDROCHLORIDE 1 MG/ML
INJECTION INTRAMUSCULAR; INTRAVENOUS PRN
Status: DISCONTINUED | OUTPATIENT
Start: 2024-04-24 | End: 2024-04-24 | Stop reason: SDUPTHER

## 2024-04-24 RX ORDER — MEPERIDINE HYDROCHLORIDE 25 MG/ML
12.5 INJECTION INTRAMUSCULAR; INTRAVENOUS; SUBCUTANEOUS EVERY 5 MIN PRN
Status: DISCONTINUED | OUTPATIENT
Start: 2024-04-24 | End: 2024-04-24 | Stop reason: HOSPADM

## 2024-04-24 RX ORDER — ENEMA 19; 7 G/133ML; G/133ML
1 ENEMA RECTAL DAILY PRN
Status: DISCONTINUED | OUTPATIENT
Start: 2024-04-24 | End: 2024-04-26 | Stop reason: HOSPADM

## 2024-04-24 RX ORDER — SODIUM CHLORIDE 9 MG/ML
INJECTION, SOLUTION INTRAVENOUS PRN
Status: DISCONTINUED | OUTPATIENT
Start: 2024-04-24 | End: 2024-04-26 | Stop reason: HOSPADM

## 2024-04-24 RX ORDER — BACLOFEN 10 MG/1
10 TABLET ORAL 3 TIMES DAILY
Status: DISCONTINUED | OUTPATIENT
Start: 2024-04-24 | End: 2024-04-26 | Stop reason: HOSPADM

## 2024-04-24 RX ORDER — GLYCOPYRROLATE 0.2 MG/ML
INJECTION INTRAMUSCULAR; INTRAVENOUS PRN
Status: DISCONTINUED | OUTPATIENT
Start: 2024-04-24 | End: 2024-04-24 | Stop reason: SDUPTHER

## 2024-04-24 RX ORDER — TOPIRAMATE 25 MG/1
50 TABLET ORAL NIGHTLY
Status: DISCONTINUED | OUTPATIENT
Start: 2024-04-24 | End: 2024-04-26 | Stop reason: HOSPADM

## 2024-04-24 RX ORDER — HYDROMORPHONE HYDROCHLORIDE 1 MG/ML
0.5 INJECTION, SOLUTION INTRAMUSCULAR; INTRAVENOUS; SUBCUTANEOUS EVERY 5 MIN PRN
Status: DISCONTINUED | OUTPATIENT
Start: 2024-04-24 | End: 2024-04-24 | Stop reason: HOSPADM

## 2024-04-24 RX ORDER — ACETAMINOPHEN 325 MG/1
650 TABLET ORAL EVERY 6 HOURS
Status: DISCONTINUED | OUTPATIENT
Start: 2024-04-24 | End: 2024-04-26 | Stop reason: HOSPADM

## 2024-04-24 RX ORDER — SODIUM CHLORIDE 0.9 % (FLUSH) 0.9 %
5-40 SYRINGE (ML) INJECTION PRN
Status: DISCONTINUED | OUTPATIENT
Start: 2024-04-24 | End: 2024-04-24 | Stop reason: HOSPADM

## 2024-04-24 RX ORDER — BISACODYL 10 MG
10 SUPPOSITORY, RECTAL RECTAL DAILY PRN
Status: DISCONTINUED | OUTPATIENT
Start: 2024-04-24 | End: 2024-04-26 | Stop reason: HOSPADM

## 2024-04-24 RX ORDER — SODIUM CHLORIDE 0.9 % (FLUSH) 0.9 %
5-40 SYRINGE (ML) INJECTION EVERY 12 HOURS SCHEDULED
Status: DISCONTINUED | OUTPATIENT
Start: 2024-04-24 | End: 2024-04-26 | Stop reason: HOSPADM

## 2024-04-24 RX ORDER — MIDAZOLAM HYDROCHLORIDE 1 MG/ML
2 INJECTION INTRAMUSCULAR; INTRAVENOUS ONCE
Status: DISCONTINUED | OUTPATIENT
Start: 2024-04-24 | End: 2024-04-26 | Stop reason: HOSPADM

## 2024-04-24 RX ORDER — ROCURONIUM BROMIDE 10 MG/ML
INJECTION, SOLUTION INTRAVENOUS PRN
Status: DISCONTINUED | OUTPATIENT
Start: 2024-04-24 | End: 2024-04-24 | Stop reason: SDUPTHER

## 2024-04-24 RX ORDER — SODIUM CHLORIDE 9 MG/ML
INJECTION, SOLUTION INTRAVENOUS CONTINUOUS
Status: DISCONTINUED | OUTPATIENT
Start: 2024-04-24 | End: 2024-04-24 | Stop reason: HOSPADM

## 2024-04-24 RX ORDER — HYDROMORPHONE HYDROCHLORIDE 2 MG/ML
INJECTION, SOLUTION INTRAMUSCULAR; INTRAVENOUS; SUBCUTANEOUS PRN
Status: DISCONTINUED | OUTPATIENT
Start: 2024-04-24 | End: 2024-04-24 | Stop reason: SDUPTHER

## 2024-04-24 RX ORDER — DEXAMETHASONE SODIUM PHOSPHATE 10 MG/ML
INJECTION INTRAMUSCULAR; INTRAVENOUS PRN
Status: DISCONTINUED | OUTPATIENT
Start: 2024-04-24 | End: 2024-04-24 | Stop reason: SDUPTHER

## 2024-04-24 RX ORDER — ONDANSETRON 2 MG/ML
4 INJECTION INTRAMUSCULAR; INTRAVENOUS EVERY 6 HOURS PRN
Status: DISCONTINUED | OUTPATIENT
Start: 2024-04-24 | End: 2024-04-26 | Stop reason: HOSPADM

## 2024-04-24 RX ORDER — SODIUM CHLORIDE 0.9 % (FLUSH) 0.9 %
5-40 SYRINGE (ML) INJECTION PRN
Status: DISCONTINUED | OUTPATIENT
Start: 2024-04-24 | End: 2024-04-26 | Stop reason: HOSPADM

## 2024-04-24 RX ORDER — BUPIVACAINE HYDROCHLORIDE 2.5 MG/ML
INJECTION, SOLUTION EPIDURAL; INFILTRATION; INTRACAUDAL PRN
Status: DISCONTINUED | OUTPATIENT
Start: 2024-04-24 | End: 2024-04-24 | Stop reason: ALTCHOICE

## 2024-04-24 RX ADMIN — DIPHENHYDRAMINE HYDROCHLORIDE 12.5 MG: 50 INJECTION, SOLUTION INTRAMUSCULAR; INTRAVENOUS at 09:49

## 2024-04-24 RX ADMIN — SODIUM CHLORIDE: 9 INJECTION, SOLUTION INTRAVENOUS at 12:53

## 2024-04-24 RX ADMIN — Medication 100 MCG: at 09:10

## 2024-04-24 RX ADMIN — BACLOFEN 10 MG: 10 TABLET ORAL at 20:47

## 2024-04-24 RX ADMIN — TOPIRAMATE 50 MG: 25 TABLET, FILM COATED ORAL at 20:47

## 2024-04-24 RX ADMIN — MIDAZOLAM 2 MG: 1 INJECTION INTRAMUSCULAR; INTRAVENOUS at 08:21

## 2024-04-24 RX ADMIN — HYDROMORPHONE HYDROCHLORIDE 0.5 MG: 1 INJECTION, SOLUTION INTRAMUSCULAR; INTRAVENOUS; SUBCUTANEOUS at 10:49

## 2024-04-24 RX ADMIN — ONDANSETRON 4 MG: 2 INJECTION INTRAMUSCULAR; INTRAVENOUS at 09:30

## 2024-04-24 RX ADMIN — GLYCOPYRROLATE 0.2 MG: 0.2 INJECTION INTRAMUSCULAR; INTRAVENOUS at 08:21

## 2024-04-24 RX ADMIN — HYDROMORPHONE HYDROCHLORIDE 0.5 MG: 1 INJECTION, SOLUTION INTRAMUSCULAR; INTRAVENOUS; SUBCUTANEOUS at 11:43

## 2024-04-24 RX ADMIN — PROPOFOL 150 MCG/KG/MIN: 10 INJECTION, EMULSION INTRAVENOUS at 08:45

## 2024-04-24 RX ADMIN — Medication 100 MCG: at 09:02

## 2024-04-24 RX ADMIN — QUETIAPINE FUMARATE 100 MG: 25 TABLET ORAL at 20:45

## 2024-04-24 RX ADMIN — ROCURONIUM BROMIDE 10 MG: 10 INJECTION INTRAVENOUS at 09:25

## 2024-04-24 RX ADMIN — MORPHINE SULFATE 4 MG: 4 INJECTION, SOLUTION INTRAMUSCULAR; INTRAVENOUS at 14:59

## 2024-04-24 RX ADMIN — OXYCODONE HYDROCHLORIDE 10 MG: 10 TABLET ORAL at 18:43

## 2024-04-24 RX ADMIN — HYDROMORPHONE HYDROCHLORIDE 1 MG: 2 INJECTION, SOLUTION INTRAMUSCULAR; INTRAVENOUS; SUBCUTANEOUS at 09:35

## 2024-04-24 RX ADMIN — HYDROMORPHONE HYDROCHLORIDE 0.5 MG: 1 INJECTION, SOLUTION INTRAMUSCULAR; INTRAVENOUS; SUBCUTANEOUS at 10:57

## 2024-04-24 RX ADMIN — OXYCODONE HYDROCHLORIDE 10 MG: 10 TABLET ORAL at 23:40

## 2024-04-24 RX ADMIN — MORPHINE SULFATE 4 MG: 4 INJECTION, SOLUTION INTRAMUSCULAR; INTRAVENOUS at 17:30

## 2024-04-24 RX ADMIN — SODIUM CHLORIDE: 9 INJECTION, SOLUTION INTRAVENOUS at 07:49

## 2024-04-24 RX ADMIN — MIDAZOLAM 2 MG: 1 INJECTION INTRAMUSCULAR; INTRAVENOUS at 09:37

## 2024-04-24 RX ADMIN — SENNOSIDES AND DOCUSATE SODIUM 1 TABLET: 8.6; 5 TABLET ORAL at 20:46

## 2024-04-24 RX ADMIN — DOCUSATE SODIUM 200 MG: 100 CAPSULE, LIQUID FILLED ORAL at 20:45

## 2024-04-24 RX ADMIN — AMLODIPINE BESYLATE 10 MG: 10 TABLET ORAL at 20:46

## 2024-04-24 RX ADMIN — OXYCODONE HYDROCHLORIDE 10 MG: 10 TABLET ORAL at 12:51

## 2024-04-24 RX ADMIN — Medication 100 MCG: at 09:15

## 2024-04-24 RX ADMIN — WATER 2000 MG: 1 INJECTION INTRAMUSCULAR; INTRAVENOUS; SUBCUTANEOUS at 23:34

## 2024-04-24 RX ADMIN — ACETAMINOPHEN 650 MG: 325 TABLET ORAL at 17:30

## 2024-04-24 RX ADMIN — PROPOFOL 160 MG: 10 INJECTION, EMULSION INTRAVENOUS at 08:34

## 2024-04-24 RX ADMIN — LAMOTRIGINE 150 MG: 100 TABLET ORAL at 20:46

## 2024-04-24 RX ADMIN — SODIUM CHLORIDE, POTASSIUM CHLORIDE, SODIUM LACTATE AND CALCIUM CHLORIDE: 600; 310; 30; 20 INJECTION, SOLUTION INTRAVENOUS at 09:43

## 2024-04-24 RX ADMIN — Medication 100 MCG: at 09:20

## 2024-04-24 RX ADMIN — HYDROMORPHONE HYDROCHLORIDE 0.5 MG: 1 INJECTION, SOLUTION INTRAMUSCULAR; INTRAVENOUS; SUBCUTANEOUS at 11:34

## 2024-04-24 RX ADMIN — DEXAMETHASONE SODIUM PHOSPHATE 10 MG: 10 INJECTION INTRAMUSCULAR; INTRAVENOUS at 08:47

## 2024-04-24 RX ADMIN — SERTRALINE 50 MG: 50 TABLET, FILM COATED ORAL at 20:45

## 2024-04-24 RX ADMIN — SUGAMMADEX 50 MG: 100 INJECTION, SOLUTION INTRAVENOUS at 09:51

## 2024-04-24 RX ADMIN — LIDOCAINE HYDROCHLORIDE 60 MG: 20 INJECTION, SOLUTION INTRAVENOUS at 08:34

## 2024-04-24 RX ADMIN — ROCURONIUM BROMIDE 50 MG: 10 INJECTION INTRAVENOUS at 08:34

## 2024-04-24 RX ADMIN — SUGAMMADEX 50 MG: 100 INJECTION, SOLUTION INTRAVENOUS at 09:37

## 2024-04-24 RX ADMIN — MORPHINE SULFATE 4 MG: 4 INJECTION, SOLUTION INTRAMUSCULAR; INTRAVENOUS at 19:55

## 2024-04-24 RX ADMIN — Medication 100 MCG: at 09:28

## 2024-04-24 RX ADMIN — Medication 100 MCG: at 08:59

## 2024-04-24 RX ADMIN — CEFAZOLIN 2000 MG: 2 INJECTION, POWDER, FOR SOLUTION INTRAMUSCULAR; INTRAVENOUS at 08:41

## 2024-04-24 RX ADMIN — BACLOFEN 10 MG: 10 TABLET ORAL at 11:54

## 2024-04-24 RX ADMIN — ACETAMINOPHEN 650 MG: 325 TABLET ORAL at 12:51

## 2024-04-24 RX ADMIN — REMIFENTANIL HYDROCHLORIDE 0.3 MCG/KG/MIN: 1 INJECTION, POWDER, LYOPHILIZED, FOR SOLUTION INTRAVENOUS at 08:45

## 2024-04-24 RX ADMIN — FENTANYL CITRATE 50 MCG: 50 INJECTION, SOLUTION INTRAMUSCULAR; INTRAVENOUS at 08:34

## 2024-04-24 RX ADMIN — ACETAMINOPHEN 650 MG: 325 TABLET ORAL at 23:34

## 2024-04-24 RX ADMIN — SODIUM CHLORIDE: 9 INJECTION, SOLUTION INTRAVENOUS at 11:41

## 2024-04-24 RX ADMIN — SUGAMMADEX 50 MG: 100 INJECTION, SOLUTION INTRAVENOUS at 09:48

## 2024-04-24 RX ADMIN — FENTANYL CITRATE 50 MCG: 50 INJECTION, SOLUTION INTRAMUSCULAR; INTRAVENOUS at 08:28

## 2024-04-24 RX ADMIN — WATER 2000 MG: 1 INJECTION INTRAMUSCULAR; INTRAVENOUS; SUBCUTANEOUS at 16:59

## 2024-04-24 RX ADMIN — DIPHENHYDRAMINE HYDROCHLORIDE 12.5 MG: 50 INJECTION, SOLUTION INTRAMUSCULAR; INTRAVENOUS at 09:30

## 2024-04-24 RX ADMIN — Medication 200 MCG: at 09:34

## 2024-04-24 ASSESSMENT — PAIN SCALES - GENERAL
PAINLEVEL_OUTOF10: 4
PAINLEVEL_OUTOF10: 9
PAINLEVEL_OUTOF10: 7
PAINLEVEL_OUTOF10: 9
PAINLEVEL_OUTOF10: 6
PAINLEVEL_OUTOF10: 3
PAINLEVEL_OUTOF10: 7

## 2024-04-24 ASSESSMENT — PAIN DESCRIPTION - PAIN TYPE
TYPE: SURGICAL PAIN

## 2024-04-24 ASSESSMENT — LIFESTYLE VARIABLES
HOW OFTEN DO YOU HAVE A DRINK CONTAINING ALCOHOL: NEVER
SMOKING_STATUS: 1
HOW MANY STANDARD DRINKS CONTAINING ALCOHOL DO YOU HAVE ON A TYPICAL DAY: PATIENT DOES NOT DRINK

## 2024-04-24 ASSESSMENT — PAIN DESCRIPTION - LOCATION
LOCATION: BACK

## 2024-04-24 ASSESSMENT — PAIN DESCRIPTION - DESCRIPTORS
DESCRIPTORS: ACHING;DISCOMFORT
DESCRIPTORS: ACHING;SORE;BURNING
DESCRIPTORS: SORE;BURNING
DESCRIPTORS: ACHING;DISCOMFORT
DESCRIPTORS: ACHING;DISCOMFORT
DESCRIPTORS: SPASM;ACHING;DISCOMFORT
DESCRIPTORS: DISCOMFORT
DESCRIPTORS: STABBING;THROBBING;BURNING
DESCRIPTORS: BURNING;STABBING;SORE

## 2024-04-24 ASSESSMENT — PAIN DESCRIPTION - ORIENTATION
ORIENTATION: LOWER
ORIENTATION: LOWER
ORIENTATION: POSTERIOR
ORIENTATION: LOWER
ORIENTATION: LOWER;RIGHT

## 2024-04-24 ASSESSMENT — PAIN - FUNCTIONAL ASSESSMENT: PAIN_FUNCTIONAL_ASSESSMENT: 0-10

## 2024-04-24 NOTE — H&P
I have examined the patient and reviewed the H and P and no changes are noted    Susanna Troncoso MD

## 2024-04-24 NOTE — BRIEF OP NOTE
Brief Postoperative Note      Patient: Kayleen La  YOB: 1965  MRN: [id number]    Date of Procedure: 4/24/2024    Pre-Op Diagnosis Codes:     * Stenosis, spinal, lumbar [M48.061]    Post-Op Diagnosis: Same       Procedure(s):  L4-S1 laminectomy    Surgeon(s):  Susanna Troncoso MD    Assistant:  Physician Assistant: Tony Sher PA    Anesthesia: General    Estimated Blood Loss (mL): Minimal    Complications: None    Specimens:   * No specimens in log *    Implants:  * No implants in log *      Drains: * No LDAs found *    Findings:  Infection Present At Time Of Surgery (PATOS) (choose all levels that have infection present):  No infection present  Other Findings: see dictated op note    Electronically signed by Susanna Troncoso MD on 4/24/2024 at 9:53 AM

## 2024-04-24 NOTE — ANESTHESIA POSTPROCEDURE EVALUATION
Department of Anesthesiology  Postprocedure Note    Patient: Kayleen La  MRN: [id number]  YOB: 1965  Date of evaluation: 4/24/2024    Procedure Summary       Date: 04/24/24 Room / Location: [address]    Anesthesia Start:  Anesthesia Stop:     Procedure: L4-S1 laminectomy,c-arm, aislinn table (Bilateral) Diagnosis:       Stenosis, spinal, lumbar      (Stenosis, spinal, lumbar [M48.061])    Surgeons: Susanna Troncoso MD Responsible Provider:     Anesthesia Type: general ASA Status: 3            Anesthesia Type: No value filed.    Adelaida Phase I: Adelaida Score: 10    Adelaida Phase II:      Anesthesia Post Evaluation    Patient location during evaluation: PACU  Patient participation: complete - patient participated  Level of consciousness: awake  Pain score: 3  Airway patency: patent  Nausea & Vomiting: no nausea and no vomiting  Cardiovascular status: blood pressure returned to baseline  Respiratory status: acceptable  Hydration status: euvolemic      No notable events documented.

## 2024-04-24 NOTE — ANESTHESIA PRE PROCEDURE
Department of Anesthesiology  Preprocedure Note       Name:  Kayleen La   Age:  58 y.o.  :  1965                                          MRN:  [id number]         Date:  2024      Surgeon: Surgeon(s):  Susanna Troncoso MD    Procedure: Procedure(s):  L4-S1 laminectomy,c-arm, aislinn table    Medications prior to admission:   Prior to Admission medications    Medication Sig Start Date End Date Taking? Authorizing Provider   baclofen (LIORESAL) 10 MG tablet Take 1 tablet by mouth 3 times daily 24   Ana Ferris DO   amLODIPine (NORVASC) 10 MG tablet Take 1 tablet by mouth nightly 24   Ana Ferris DO   carisoprodol (SOMA) 350 MG tablet Take 1 tablet by mouth 4 times daily as needed for Muscle spasms for up to 30 days. Max Daily Amount: 1,400 mg 24  Ana Ferris DO   HYDROcodone-acetaminophen (NORCO) 7.5-325 MG per tablet Take 1 tablet by mouth every 8 hours as needed for Pain for up to 30 days. Intended supply: 30 days Max Daily Amount: 3 tablets 24  Ana Ferris DO   prochlorperazine (COMPAZINE) 5 MG tablet Take 1 tablet by mouth every 6 hours as needed for Nausea 24   Ana Ferris DO   ALPRAZolam (XANAX) 0.5 MG tablet Take 1 tablet by mouth 3 times daily as needed for Sleep or Anxiety for up to 90 days. Max Daily Amount: 1.5 mg 4/4/24 7/3/24  Ana Ferris DO   zolpidem (AMBIEN CR) 12.5 MG extended release tablet Take 1 tablet by mouth nightly as needed for Sleep for up to 90 days. Max Daily Amount: 12.5 mg 4/4/24 7/3/24  Ana Ferris DO   estradiol (VIVELLE) 0.1 MG/24HR PLACE 1 PATCH ONTO THE SKIN TWICE A WEEK, MONDAY AND THURSDAY 3/30/24   Ana Ferris DO   sertraline (ZOLOFT) 50 MG tablet TAKE 1 TABLET BY MOUTH AT BEDTIME 3/26/24   Ana Ferris DO   lamoTRIgine (LAMICTAL) 150 MG tablet Take 1 tablet by mouth nightly 3/8/24   Ana Ferris DO   QUEtiapine (SEROQUEL) 100 MG tablet TAKE 1 TABLET BY MOUTH TWICE DAILY

## 2024-04-25 LAB
ANION GAP SERPL CALCULATED.3IONS-SCNC: 12 MMOL/L (ref 7–16)
BUN SERPL-MCNC: 8 MG/DL (ref 6–20)
CALCIUM SERPL-MCNC: 9.3 MG/DL (ref 8.6–10.2)
CHLORIDE SERPL-SCNC: 110 MMOL/L (ref 98–107)
CO2 SERPL-SCNC: 21 MMOL/L (ref 22–29)
CREAT SERPL-MCNC: 0.8 MG/DL (ref 0.5–1)
ERYTHROCYTE [DISTWIDTH] IN BLOOD BY AUTOMATED COUNT: 12.4 % (ref 11.5–15)
GFR SERPL CREATININE-BSD FRML MDRD: 84 ML/MIN/1.73M2
GLUCOSE SERPL-MCNC: 143 MG/DL (ref 74–99)
HCT VFR BLD AUTO: 41.5 % (ref 34–48)
HGB BLD-MCNC: 13.6 G/DL (ref 11.5–15.5)
MCH RBC QN AUTO: 32.5 PG (ref 26–35)
MCHC RBC AUTO-ENTMCNC: 32.8 G/DL (ref 32–34.5)
MCV RBC AUTO: 99.3 FL (ref 80–99.9)
PLATELET # BLD AUTO: 269 K/UL (ref 130–450)
PMV BLD AUTO: 10.9 FL (ref 7–12)
POTASSIUM SERPL-SCNC: 3.8 MMOL/L (ref 3.5–5)
RBC # BLD AUTO: 4.18 M/UL (ref 3.5–5.5)
SODIUM SERPL-SCNC: 143 MMOL/L (ref 132–146)
WBC OTHER # BLD: 10.8 K/UL (ref 4.5–11.5)

## 2024-04-25 PROCEDURE — 6370000000 HC RX 637 (ALT 250 FOR IP): Performed by: NEUROLOGICAL SURGERY

## 2024-04-25 PROCEDURE — 6360000002 HC RX W HCPCS: Performed by: STUDENT IN AN ORGANIZED HEALTH CARE EDUCATION/TRAINING PROGRAM

## 2024-04-25 PROCEDURE — 85027 COMPLETE CBC AUTOMATED: CPT

## 2024-04-25 PROCEDURE — 6360000002 HC RX W HCPCS: Performed by: NEUROLOGICAL SURGERY

## 2024-04-25 PROCEDURE — G0378 HOSPITAL OBSERVATION PER HR: HCPCS

## 2024-04-25 PROCEDURE — 36415 COLL VENOUS BLD VENIPUNCTURE: CPT

## 2024-04-25 PROCEDURE — 97530 THERAPEUTIC ACTIVITIES: CPT

## 2024-04-25 PROCEDURE — 80048 BASIC METABOLIC PNL TOTAL CA: CPT

## 2024-04-25 PROCEDURE — 97535 SELF CARE MNGMENT TRAINING: CPT

## 2024-04-25 RX ADMIN — ONDANSETRON 4 MG: 2 INJECTION INTRAMUSCULAR; INTRAVENOUS at 05:02

## 2024-04-25 RX ADMIN — BACLOFEN 10 MG: 10 TABLET ORAL at 19:24

## 2024-04-25 RX ADMIN — QUETIAPINE FUMARATE 100 MG: 25 TABLET ORAL at 08:20

## 2024-04-25 RX ADMIN — OXYCODONE HYDROCHLORIDE 10 MG: 10 TABLET ORAL at 19:24

## 2024-04-25 RX ADMIN — OXYCODONE HYDROCHLORIDE 10 MG: 10 TABLET ORAL at 06:10

## 2024-04-25 RX ADMIN — BISACODYL 5 MG: 5 TABLET, COATED ORAL at 08:21

## 2024-04-25 RX ADMIN — ACETAMINOPHEN 650 MG: 325 TABLET ORAL at 06:10

## 2024-04-25 RX ADMIN — MORPHINE SULFATE 4 MG: 4 INJECTION, SOLUTION INTRAMUSCULAR; INTRAVENOUS at 09:26

## 2024-04-25 RX ADMIN — ALPRAZOLAM 0.5 MG: 0.25 TABLET ORAL at 18:28

## 2024-04-25 RX ADMIN — AMLODIPINE BESYLATE 10 MG: 10 TABLET ORAL at 19:24

## 2024-04-25 RX ADMIN — MORPHINE SULFATE 4 MG: 4 INJECTION, SOLUTION INTRAMUSCULAR; INTRAVENOUS at 04:03

## 2024-04-25 RX ADMIN — BACLOFEN 10 MG: 10 TABLET ORAL at 13:36

## 2024-04-25 RX ADMIN — DOCUSATE SODIUM 200 MG: 100 CAPSULE, LIQUID FILLED ORAL at 19:23

## 2024-04-25 RX ADMIN — ACETAMINOPHEN 650 MG: 325 TABLET ORAL at 18:24

## 2024-04-25 RX ADMIN — LAMOTRIGINE 150 MG: 100 TABLET ORAL at 20:50

## 2024-04-25 RX ADMIN — ZOLPIDEM TARTRATE 5 MG: 5 TABLET ORAL at 20:51

## 2024-04-25 RX ADMIN — ACETAMINOPHEN 650 MG: 325 TABLET ORAL at 12:31

## 2024-04-25 RX ADMIN — OXYCODONE HYDROCHLORIDE 10 MG: 10 TABLET ORAL at 11:07

## 2024-04-25 RX ADMIN — OXYCODONE HYDROCHLORIDE 10 MG: 10 TABLET ORAL at 23:51

## 2024-04-25 RX ADMIN — QUETIAPINE FUMARATE 100 MG: 25 TABLET ORAL at 20:51

## 2024-04-25 RX ADMIN — SERTRALINE 50 MG: 50 TABLET, FILM COATED ORAL at 20:51

## 2024-04-25 RX ADMIN — OXYCODONE HYDROCHLORIDE 10 MG: 10 TABLET ORAL at 15:21

## 2024-04-25 RX ADMIN — MORPHINE SULFATE 4 MG: 4 INJECTION, SOLUTION INTRAMUSCULAR; INTRAVENOUS at 13:42

## 2024-04-25 RX ADMIN — POLYETHYLENE GLYCOL 3350 17 G: 17 POWDER, FOR SOLUTION ORAL at 08:20

## 2024-04-25 RX ADMIN — SENNOSIDES AND DOCUSATE SODIUM 1 TABLET: 8.6; 5 TABLET ORAL at 19:24

## 2024-04-25 RX ADMIN — SENNOSIDES AND DOCUSATE SODIUM 1 TABLET: 8.6; 5 TABLET ORAL at 08:20

## 2024-04-25 RX ADMIN — BACLOFEN 10 MG: 10 TABLET ORAL at 08:21

## 2024-04-25 RX ADMIN — DOCUSATE SODIUM 200 MG: 100 CAPSULE, LIQUID FILLED ORAL at 08:20

## 2024-04-25 RX ADMIN — ACETAMINOPHEN 650 MG: 325 TABLET ORAL at 23:51

## 2024-04-25 RX ADMIN — TOPIRAMATE 50 MG: 25 TABLET, FILM COATED ORAL at 20:51

## 2024-04-25 ASSESSMENT — PAIN SCALES - GENERAL
PAINLEVEL_OUTOF10: 9
PAINLEVEL_OUTOF10: 2
PAINLEVEL_OUTOF10: 9
PAINLEVEL_OUTOF10: 8
PAINLEVEL_OUTOF10: 8
PAINLEVEL_OUTOF10: 9
PAINLEVEL_OUTOF10: 4
PAINLEVEL_OUTOF10: 2
PAINLEVEL_OUTOF10: 4
PAINLEVEL_OUTOF10: 9
PAINLEVEL_OUTOF10: 7
PAINLEVEL_OUTOF10: 10
PAINLEVEL_OUTOF10: 3
PAINLEVEL_OUTOF10: 7
PAINLEVEL_OUTOF10: 3
PAINLEVEL_OUTOF10: 4
PAINLEVEL_OUTOF10: 3
PAINLEVEL_OUTOF10: 2
PAINLEVEL_OUTOF10: 9
PAINLEVEL_OUTOF10: 6

## 2024-04-25 ASSESSMENT — PAIN DESCRIPTION - DESCRIPTORS
DESCRIPTORS: ACHING;THROBBING;SHARP
DESCRIPTORS: ACHING;SORE
DESCRIPTORS: STABBING;SHOOTING;BURNING
DESCRIPTORS: ACHING;SORE
DESCRIPTORS: ACHING;SHOOTING;BURNING
DESCRIPTORS: BURNING;STABBING;SHOOTING
DESCRIPTORS: ACHING;DISCOMFORT;DULL
DESCRIPTORS: ACHING;BURNING;SHARP

## 2024-04-25 ASSESSMENT — PAIN DESCRIPTION - LOCATION
LOCATION: BACK;ABDOMEN
LOCATION: BACK

## 2024-04-25 ASSESSMENT — PAIN DESCRIPTION - PAIN TYPE
TYPE: SURGICAL PAIN

## 2024-04-25 ASSESSMENT — PAIN DESCRIPTION - ORIENTATION
ORIENTATION: LOWER
ORIENTATION: MID;POSTERIOR
ORIENTATION: LOWER
ORIENTATION: LOWER
ORIENTATION: MID;POSTERIOR;LOWER
ORIENTATION: LOWER

## 2024-04-25 ASSESSMENT — PAIN DESCRIPTION - FREQUENCY: FREQUENCY: INTERMITTENT

## 2024-04-25 NOTE — OP NOTE
[facility]              [address] [place] [address]                            OPERATIVE REPORT      PATIENT NAME: PAPA HARMON             : 1965  MED REC NO: [id number]                        ROOM: 5210  ACCOUNT NO: [id number]                       ADMIT DATE: 2024  PROVIDER: Susanna Troncoso MD      DATE OF PROCEDURE:  2024    SURGEON:  Susanna Troncoso MD    PREOPERATIVE DIAGNOSIS:  Lumbar canal stenosis from L4-S1.    POSTOPERATIVE DIAGNOSIS:  Lumbar canal stenosis from L4-S1.    PROCEDURES:    1. Bilateral L4, L5, and S1 laminectomy, bilateral L4-L5 and L5-S1 medial facetectomy, and bilateral L4, L5, and S1 foraminotomy.  2. Use of intraoperative fluoroscopy interpreted by myself, the surgeon.  3. AS modifier for Tony Sher PA-C, who assisted with primary exposure and primary closure.    ANESTHESIA:  Generalized endotracheal anesthesia.    ASSISTANT:  Tony Sher PA-C.    COMPLICATIONS:  None.    ESTIMATED BLOOD LOSS:  Minimal.    SPECIMEN:  None.    OPERATIVE INDICATIONS:  The patient is a 58-year-old lady who presented to the office complaining of back pain radiating to her leg.  She had an MRI that showed that she had stenosis from L4-S1.  She had failed conservative therapy and after risks, benefits, and alternatives were discussed with the patient, it was determined that she would undergo the above-listed procedure.  Of note, Tony Sher PA-C's services were required as he was the only qualified assistant to assist with primary exposure and primary closure.    DESCRIPTION OF PROCEDURE:  The patient was brought into the operating room.  A time-out was performed where she was identified by her name, medical record number, and the operative procedure which she was brought to undergo.  Next, induction of generalized endotracheal anesthesia was then commenced.  Upon completion of induction of generalized endotracheal

## 2024-04-25 NOTE — PLAN OF CARE
Problem: Discharge Planning  Goal: Discharge to home or other facility with appropriate resources  Outcome: Progressing     Problem: Safety - Adult  Goal: Free from fall injury  Outcome: Progressing     Problem: Pain  Goal: Verbalizes/displays adequate comfort level or baseline comfort level  Outcome: Progressing      no

## 2024-04-25 NOTE — DISCHARGE INSTRUCTIONS
Discharge Instructions:    1. No lifting more than 10 pounds.  2. Refrain from bending, twisting, or turning at the waist.   3. No brace is needed to be worn.  4. Walking is encouraged, slowly increase time and distance.   5. Can remove dressing and leave open to air two (2) days after surgery .  6. All stitches are under the skin and will dissolve in time.  7. Patient may shower. DO NOT soak or scrub at incision site.  8. Do not drive while taking narcotic pain medications.   9. No sexual activity for one (1) month after surgery   10. Take medications as prescribed. Continue taking stool softener while taking narcotic pain medications.   11. Follow up in the Neurosurgery clinic in 4-6 weeks. No films necessary.

## 2024-04-25 NOTE — CARE COORDINATION
4/25. Met with the pt at the bedside to discuss transition of care. The pt lives with her  + daughter, and is usually independent. Her home is one level.  She will return home when medically stable. She will need a BSC for home use. She does not have a preference for DME agencies. She is agreeable to any company that is in network with her insurance. She will return home when medically stable. Family will provide transportation. Her sister works for a home health agency and will be assisting her. Mayuri Yates RN  Referral made for BSC to [facility] will deliver to the pt's room tomorrow.   The Plan for Transition of Care is related to the following treatment goals: to be able to return to of    The Patient  was provided with a choice of provider and agrees   with the discharge plan. [x] Yes [] No    Freedom of choice list was provided with basic dialogue that supports the patient's individualized plan of care/goals, treatment preferences and shares the quality data associated with the providers. [x] Yes [] No    Case Management Assessment  Initial Evaluation    Date/Time of Evaluation: 4/25/2024 1:53 PM  Assessment Completed by: Mayuri Yates RN    If patient is discharged prior to next notation, then this note serves as note for discharge by case management.    Patient Name: Kayleen La                   YOB: 1965  Diagnosis: Stenosis, spinal, lumbar [M48.061]  Lumbar stenosis with neurogenic claudication [M48.062]                   Date / Time: 4/24/2024  7:10 AM    Patient Admission Status: Observation   Readmission Risk (Low < 19, Mod (19-27), High > 27): No data recorded  Current PCP: Ana Ferris, DO  PCP verified by CM? Yes (Dr Ferris)    Chart Reviewed: Yes      History Provided by: Patient  Patient Orientation: Alert and Oriented    Patient Cognition: Alert    Hospitalization in the last 30 days (Readmission):  No    If yes, Readmission Assessment in CM Navigator will

## 2024-04-25 NOTE — PLAN OF CARE
Problem: Discharge Planning  Goal: Discharge to home or other facility with appropriate resources  4/25/2024 1002 by Elizabeth Winslow, RN  Outcome: Progressing     Problem: Safety - Adult  Goal: Free from fall injury  4/25/2024 1002 by Elizabeth Winslow RN  Outcome: Progressing     Problem: Pain  Goal: Verbalizes/displays adequate comfort level or baseline comfort level  4/25/2024 1002 by Elizabeth Winslow RN  Outcome: Progressing     Problem: ABCDS Injury Assessment  Goal: Absence of physical injury  Outcome: Progressing

## 2024-04-26 VITALS
RESPIRATION RATE: 18 BRPM | WEIGHT: 150.44 LBS | OXYGEN SATURATION: 97 % | SYSTOLIC BLOOD PRESSURE: 119 MMHG | BODY MASS INDEX: 25.68 KG/M2 | TEMPERATURE: 97 F | DIASTOLIC BLOOD PRESSURE: 79 MMHG | HEIGHT: 64 IN | HEART RATE: 80 BPM

## 2024-04-26 PROCEDURE — G0378 HOSPITAL OBSERVATION PER HR: HCPCS

## 2024-04-26 PROCEDURE — 2580000003 HC RX 258: Performed by: NEUROLOGICAL SURGERY

## 2024-04-26 PROCEDURE — 6370000000 HC RX 637 (ALT 250 FOR IP): Performed by: NEUROLOGICAL SURGERY

## 2024-04-26 PROCEDURE — 97530 THERAPEUTIC ACTIVITIES: CPT

## 2024-04-26 RX ORDER — OXYCODONE HYDROCHLORIDE 10 MG/1
10 TABLET ORAL EVERY 4 HOURS PRN
Qty: 42 TABLET | Refills: 0 | Status: SHIPPED | OUTPATIENT
Start: 2024-04-26 | End: 2024-05-03

## 2024-04-26 RX ADMIN — OXYCODONE HYDROCHLORIDE 10 MG: 10 TABLET ORAL at 04:17

## 2024-04-26 RX ADMIN — SENNOSIDES AND DOCUSATE SODIUM 1 TABLET: 8.6; 5 TABLET ORAL at 08:50

## 2024-04-26 RX ADMIN — BISACODYL 5 MG: 5 TABLET, COATED ORAL at 08:50

## 2024-04-26 RX ADMIN — ACETAMINOPHEN 650 MG: 325 TABLET ORAL at 12:08

## 2024-04-26 RX ADMIN — ALPRAZOLAM 0.5 MG: 0.25 TABLET ORAL at 12:07

## 2024-04-26 RX ADMIN — SODIUM CHLORIDE, PRESERVATIVE FREE 10 ML: 5 INJECTION INTRAVENOUS at 08:51

## 2024-04-26 RX ADMIN — ALPRAZOLAM 0.5 MG: 0.25 TABLET ORAL at 04:30

## 2024-04-26 RX ADMIN — OXYCODONE 5 MG: 5 TABLET ORAL at 13:40

## 2024-04-26 RX ADMIN — BACLOFEN 10 MG: 10 TABLET ORAL at 13:40

## 2024-04-26 RX ADMIN — QUETIAPINE FUMARATE 100 MG: 25 TABLET ORAL at 08:50

## 2024-04-26 RX ADMIN — DOCUSATE SODIUM 200 MG: 100 CAPSULE, LIQUID FILLED ORAL at 08:50

## 2024-04-26 RX ADMIN — ACETAMINOPHEN 650 MG: 325 TABLET ORAL at 04:46

## 2024-04-26 RX ADMIN — POLYETHYLENE GLYCOL 3350 17 G: 17 POWDER, FOR SOLUTION ORAL at 08:50

## 2024-04-26 RX ADMIN — OXYCODONE HYDROCHLORIDE 10 MG: 10 TABLET ORAL at 08:51

## 2024-04-26 RX ADMIN — BACLOFEN 10 MG: 10 TABLET ORAL at 08:50

## 2024-04-26 ASSESSMENT — PAIN SCALES - GENERAL
PAINLEVEL_OUTOF10: 6
PAINLEVEL_OUTOF10: 8
PAINLEVEL_OUTOF10: 7

## 2024-04-26 ASSESSMENT — PAIN DESCRIPTION - LOCATION
LOCATION: BACK

## 2024-04-26 ASSESSMENT — PAIN DESCRIPTION - DESCRIPTORS
DESCRIPTORS: BURNING;SHOOTING;STABBING
DESCRIPTORS: BURNING;STABBING;SHOOTING;SHARP

## 2024-04-26 ASSESSMENT — PAIN DESCRIPTION - ORIENTATION
ORIENTATION: LOWER
ORIENTATION: LOWER

## 2024-04-26 NOTE — PLAN OF CARE
Problem: Discharge Planning  Goal: Discharge to home or other facility with appropriate resources  4/26/2024 1452 by Seema Rivera, RN  Outcome: Adequate for Discharge  4/26/2024 0916 by Aura Lerma RN  Outcome: Progressing     Problem: Safety - Adult  Goal: Free from fall injury  4/26/2024 1452 by Seema Rivera, RN  Outcome: Adequate for Discharge  4/26/2024 0916 by Aura Lerma RN  Outcome: Progressing     Problem: Pain  Goal: Verbalizes/displays adequate comfort level or baseline comfort level  4/26/2024 1452 by Seema Rivera, RN  Outcome: Adequate for Discharge  4/26/2024 0916 by Aura Lerma RN  Outcome: Progressing     Problem: ABCDS Injury Assessment  Goal: Absence of physical injury  4/26/2024 1452 by Seema Rivera, RN  Outcome: Adequate for Discharge  4/26/2024 0916 by Aura Lerma RN  Outcome: Progressing

## 2024-04-26 NOTE — CARE COORDINATION
Care Coordination:   Following for discharge planning.  Chart reviewed and met with patient at bedside. Plan is home at discharge with spouse and daughter.  BSC has been ordered.  Patient and OT requested a tub transfer bench.  Order received.  Patient will pay privately.  Request phoned to [facility].    [facility] to deliver BSC to patient today.  No further needs.  Family will transport.

## 2024-04-26 NOTE — PLAN OF CARE
Problem: Discharge Planning  Goal: Discharge to home or other facility with appropriate resources  4/26/2024 0916 by Aura Lerma RN  Outcome: Progressing     Problem: Safety - Adult  Goal: Free from fall injury  4/26/2024 0916 by Aura Lerma RN  Outcome: Progressing     Problem: Pain  Goal: Verbalizes/displays adequate comfort level or baseline comfort level  4/26/2024 0916 by Aura Lerma RN  Outcome: Progressing     Problem: ABCDS Injury Assessment  Goal: Absence of physical injury  4/26/2024 0916 by Aura Lerma RN  Outcome: Progressing

## 2024-04-26 NOTE — PLAN OF CARE
Problem: Discharge Planning  Goal: Discharge to home or other facility with appropriate resources  4/25/2024 2258 by Lucio Beyer RN  Outcome: Progressing  4/25/2024 1002 by Elizabeth Winslow RN  Outcome: Progressing     Problem: Safety - Adult  Goal: Free from fall injury  4/25/2024 2258 by Lucio Beyer RN  Outcome: Progressing  4/25/2024 1002 by Elizabeth Winslow RN  Outcome: Progressing     Problem: Pain  Goal: Verbalizes/displays adequate comfort level or baseline comfort level  4/25/2024 2258 by Lucio Beyer RN  Outcome: Progressing  4/25/2024 1002 by Elizabeth Winslow RN  Outcome: Progressing     Problem: ABCDS Injury Assessment  Goal: Absence of physical injury  4/25/2024 2258 by Lucio Beyer RN  Outcome: Progressing  4/25/2024 1002 by Elizabeth Winslow RN  Outcome: Progressing

## 2024-04-26 NOTE — PROGRESS NOTES
OCCUPATIONAL THERAPY INITIAL EVALUATION    [facility]  [address], [place]        Date:2024                                                  Patient Name: Kayleen La    MRN: [id number]    : 1965    Room: [address] [place] [address]      Evaluating OT: Rhea Amaro OTR/L; [id number]       Referring Provider: Susanna Troncoso MD     Specific Provider Orders/Date: OT Eval and Treat 24       Diagnosis: Lumbar stenosis with neurogenic claudication      Surgery: 24 L4-S1 laminectomy      Pertinent Medical History:  has a past medical history of Anxiety, Bipolar disorder ([facility]), Cervical disc disease, Cervical spondylitis with radiculitis ([facility]), Chronic back pain, Chronic kidney disease, CKD (chronic kidney disease), Depression, Facet hypertrophy, Hypertension, Irritable bowel syndrome, Lumbar canal stenosis, Migraine, Osteoarthritis, Panic disorder without agoraphobia, PONV (postoperative nausea and vomiting), Psychogenic nonepileptic seizure, Restless legs syndrome, and SVT (supraventricular tachycardia) ([facility]).     Recommended Adaptive Equipment: extended tub bench, AE for LE bathing and dressing PRN     Precautions:  Fall Risk, spinal precautions     Assessment of current deficits    [x] Functional mobility  [x]ADLs  [x] Strength               []Cognition    [x] Functional transfers   [x] IADLs         [x] Safety Awareness   [x]Endurance    [] Fine Coordination              [x] Balance      [] Vision/perception   []Sensation     []Gross Motor Coordination  [] ROM  [] Delirium                   [] Motor Control     OT PLAN OF CARE   OT POC based on physician orders, patient diagnosis and results of clinical assessment    Frequency/Duration 3-5 days/wk for 2 weeks PRN   Specific OT Treatment Interventions to include:   * Instruction/training on adapted ADL techniques and AE recommendations to increase functional independence within 
4 Eyes Skin Assessment     NAME:  Kayleen La  YOB: 1965  MEDICAL RECORD NUMBER:  [id number]    The patient is being assessed for  Admission    I agree that at least one RN has performed a thorough Head to Toe Skin Assessment on the patient. ALL assessment sites listed below have been assessed.      Areas assessed by both nurses:    Head, Face, Ears, Shoulders, Back, Chest, Arms, Elbows, Hands, Sacrum. Buttock, Coccyx, Ischium, Legs. Feet and Heels, Under Medical Devices , and Other          Does the Patient have a Wound? No noted wound(s)       Tate Prevention initiated by RN: No  Wound Care Orders initiated by RN: No    Pressure Injury (Stage 3,4, Unstageable, DTI, NWPT, and Complex wounds) if present, place Wound referral order by RN under : No    New Ostomies, if present place, Ostomy referral order under : No     Nurse 1 eSignature: Electronically signed by Shivani Gooden RN on 4/24/24 at 1:12 PM EDT    **SHARE this note so that the co-signing nurse can place an eSignature**    Nurse 2 eSignature: Electronically signed by Trav Higuera RN on 4/24/24 at 1:13 PM EDT   
CLINICAL PHARMACY NOTE: MEDS TO BEDS    Total # of Prescriptions Filled: 1   The following medications were delivered to the patient:  Oxycodone 10 mg    Additional Documentation:   Stefano HUDSON picked up in the pharmacy  
Department of Neurosurgery  Progress Note    CHIEF COMPLAINT: s/p laminectomy    SUBJECTIVE:  Radha op site pain okay. No new issues overnight.     REVIEW OF SYSTEMS :  Constitutional: Negative for chills and fever.    Neurological: Negative for dizziness, tremors and speech change.     OBJECTIVE:   VITALS:  /79   Pulse 80   Temp 97 °F (36.1 °C) (Temporal)   Resp 18   Ht 1.626 m (5' 4\")   Wt 68.2 kg (150 lb 7 oz)   SpO2 97%   BMI 25.82 kg/m²     PHYSICAL:  Alert, oriented  Appears stated age  PERRL  EOMI  Strength full  Sensation intact to light touch  Dressing c/d/i    DATA:  CBC:   Lab Results   Component Value Date/Time    WBC 10.8 04/25/2024 05:44 AM    RBC 4.18 04/25/2024 05:44 AM    HGB 13.6 04/25/2024 05:44 AM    HCT 41.5 04/25/2024 05:44 AM    MCV 99.3 04/25/2024 05:44 AM    MCH 32.5 04/25/2024 05:44 AM    MCHC 32.8 04/25/2024 05:44 AM    RDW 12.4 04/25/2024 05:44 AM     04/25/2024 05:44 AM    MPV 10.9 04/25/2024 05:44 AM     BMP:    Lab Results   Component Value Date/Time     04/25/2024 05:44 AM    K 3.8 04/25/2024 05:44 AM    K 3.6 05/27/2023 02:08 AM     04/25/2024 05:44 AM    CO2 21 04/25/2024 05:44 AM    BUN 8 04/25/2024 05:44 AM    CREATININE 0.8 04/25/2024 05:44 AM    CALCIUM 9.3 04/25/2024 05:44 AM    GFRAA >60 04/09/2022 05:08 PM    LABGLOM 84 04/25/2024 05:44 AM    GLUCOSE 143 04/25/2024 05:44 AM    GLUCOSE 88 09/24/2011 05:50 AM     PT/INR:    Lab Results   Component Value Date/Time    PROTIME 9.5 04/17/2024 09:35 AM    PROTIME 10.9 09/23/2011 06:45 PM    INR 0.9 04/17/2024 09:35 AM     PTT:    Lab Results   Component Value Date/Time    APTT 31.0 09/23/2011 06:45 PM   [APTT}    Current Inpatient Medications  Current Facility-Administered Medications: scopolamine (TRANSDERM-SCOP) transdermal patch 1 patch, 1 patch, TransDERmal, Q72H  midazolam (VERSED) injection 2 mg, 2 mg, IntraVENous, Once  ALPRAZolam (XANAX) tablet 0.5 mg, 0.5 mg, Oral, TID PRN  amLODIPine (NORVASC) 
Dr. Troncoso presenting talking to patient.  
Messaged Evelin Barfield PA-C to inquire of patient is discharging today.     1328: Plan for discharge home tomorrow 4/26  
Occupational Therapy  OT BEDSIDE TREATMENT NOTE   [facility]  [address], [place]        Date:2024  Patient Name: Kayleen La  MRN: [id number]  : 1965  Room: [address] [place] [address]A     Per OT Eval:    Evaluating OT: Rhea Amaro OTR/L; [id number]        Referring Provider: Susanna Troncoso MD     Specific Provider Orders/Date: OT Eval and Treat 24        Diagnosis: Lumbar stenosis with neurogenic claudication      Surgery: 24 L4-S1 laminectomy      Pertinent Medical History:  has a past medical history of Anxiety, Bipolar disorder ([facility]), Cervical disc disease, Cervical spondylitis with radiculitis ([facility]), Chronic back pain, Chronic kidney disease, CKD (chronic kidney disease), Depression, Facet hypertrophy, Hypertension, Irritable bowel syndrome, Lumbar canal stenosis, Migraine, Osteoarthritis, Panic disorder without agoraphobia, PONV (postoperative nausea and vomiting), Psychogenic nonepileptic seizure, Restless legs syndrome, and SVT (supraventricular tachycardia) ([facility]).      Recommended Adaptive Equipment: extended tub bench, AE for LE bathing and dressing PRN      Precautions:  Fall Risk, spinal precautions      Assessment of current deficits    [x] Functional mobility            [x]ADLs           [x] Strength                  []Cognition    [x] Functional transfers          [x] IADLs         [x] Safety Awareness   [x]Endurance    [] Fine Coordination                         [x] Balance      [] Vision/perception   []Sensation      []Gross Motor Coordination             [] ROM           [] Delirium                   [] Motor Control      OT PLAN OF CARE   OT POC based on physician orders, patient diagnosis and results of clinical assessment     Frequency/Duration 3-5 days/wk for 2 weeks PRN   Specific OT Treatment Interventions to include:   * Instruction/training on adapted ADL techniques and AE recommendations to increase 
Patient notified to arrive at 0715 on 4/24.  
Physical Therapy  Physical Therapy Initial Assessment     Name: Kayleen La  : 1965  MRN: [id number]      Date of Service: 2024    Evaluating PT:  Christine Barth PT, DPT [id number]    Room #:  5210/5210-A  Diagnosis:  Stenosis, spinal, lumbar [M48.061]  Lumbar stenosis with neurogenic claudication [M48.062]  PMHx/PSHx:    Past Medical History:   Diagnosis Date    Anxiety     Bipolar disorder (HCC)     Bipolar 1 disorder    Cervical disc disease     Cervical spondylitis with radiculitis ([facility])     C2, C7    Chronic back pain     Chronic kidney disease     In chart    CKD (chronic kidney disease)     Depression     Facet hypertrophy     L4/5 - S1    Hypertension     Irritable bowel syndrome     Lumbar canal stenosis     Migraine     Osteoarthritis     Panic disorder without agoraphobia     [facility]    PONV (postoperative nausea and vomiting)     Psychogenic nonepileptic seizure     last one 2022    Restless legs syndrome     SVT (supraventricular tachycardia) ([facility])     See cardiac event monitor report dated 2023 [facility] Cardiology: No Atrial Fibrillation was found - Predominant underlying rhythm was Sinus Rhythm with Supraventricular Predominant underlying rhythm was Sinus Rhythm with Supraventricular      Procedure/Surgery:  L4-S1 laminectomy   Precautions:  Falls, spinal  Equipment Needs: none; has WW    SUBJECTIVE:    Pt lives with  and daughter in a 2 story home with 2 stairs to enter and no rail.  Bed is on first floor and bath is on first floor.  Pt ambulated with no AD PTA.    OBJECTIVE:   Initial Evaluation  Date: 24 Treatment Short Term/ Long Term   Goals   AM-PAC 6 Clicks      Was pt agreeable to Eval/treatment? Yes      Does pt have pain? Yes; 8-/10 LBP     Bed Mobility  Rolling: ModA  Supine to sit: ModA  Sit to supine: ModA  Scooting: Winnie  Rolling: Independent   Supine to sit: Independent   Sit to supine: Independent 
Physical Therapy  Physical Therapy Treatment     Name: Kayleen La  : 1965  MRN: [id number]      Date of Service: 2024    Evaluating PT:  Christine Barth PT, DPT [id number]    Room #:  5210/5210-A  Diagnosis:  Stenosis, spinal, lumbar [M48.061]  Lumbar stenosis with neurogenic claudication [M48.062]  PMHx/PSHx:    Past Medical History:   Diagnosis Date    Anxiety     Bipolar disorder (HCC)     Bipolar 1 disorder    Cervical disc disease     Cervical spondylitis with radiculitis ([facility])     C2, C7    Chronic back pain     Chronic kidney disease     In chart    CKD (chronic kidney disease)     Depression     Facet hypertrophy     L4/5 - S1    Hypertension     Irritable bowel syndrome     Lumbar canal stenosis     Migraine     Osteoarthritis     Panic disorder without agoraphobia     [facility]    PONV (postoperative nausea and vomiting)     Psychogenic nonepileptic seizure     last one 2022    Restless legs syndrome     SVT (supraventricular tachycardia) ([facility])     See cardiac event monitor report dated 2023 [facility] Cardiology: No Atrial Fibrillation was found - Predominant underlying rhythm was Sinus Rhythm with Supraventricular Predominant underlying rhythm was Sinus Rhythm with Supraventricular      Procedure/Surgery:  L4-S1 laminectomy   Precautions:  Falls, spinal  Equipment Needs: none; has WW    SUBJECTIVE:    Pt lives with  and daughter in a 2 story home with 2 stairs to enter and no rail.  Bed is on first floor and bath is on first floor.  Pt ambulated with no AD PTA.    OBJECTIVE:   Initial Evaluation  Date: 24 Treatment  Date: 24 Short Term/ Long Term   Goals   AM-PAC 6 Clicks     Was pt agreeable to Eval/treatment? Yes  Yes     Does pt have pain? Yes; 8-9/10 LBP Yes; unrated LBP    Bed Mobility  Rolling: ModA  Supine to sit: ModA  Sit to supine: ModA  Scooting: Winnie Rolling: NT  Supine to sit: NT  Sit to supine: 
Physical Therapy  Physical Therapy Treatment     Name: Kayleen La  : 1965  MRN: [id number]      Date of Service: 2024    Evaluating PT:  Christine Barth PT, DPT [id number]    Room #:  5210/5210-A  Diagnosis:  Stenosis, spinal, lumbar [M48.061]  Lumbar stenosis with neurogenic claudication [M48.062]  PMHx/PSHx:    Past Medical History:   Diagnosis Date    Anxiety     Bipolar disorder (HCC)     Bipolar 1 disorder    Cervical disc disease     Cervical spondylitis with radiculitis ([facility])     C2, C7    Chronic back pain     Chronic kidney disease     In chart    CKD (chronic kidney disease)     Depression     Facet hypertrophy     L4/5 - S1    Hypertension     Irritable bowel syndrome     Lumbar canal stenosis     Migraine     Osteoarthritis     Panic disorder without agoraphobia     [facility]    PONV (postoperative nausea and vomiting)     Psychogenic nonepileptic seizure     last one 2022    Restless legs syndrome     SVT (supraventricular tachycardia) ([facility])     See cardiac event monitor report dated 2023 [facility] Cardiology: No Atrial Fibrillation was found - Predominant underlying rhythm was Sinus Rhythm with Supraventricular Predominant underlying rhythm was Sinus Rhythm with Supraventricular      Procedure/Surgery:  L4-S1 laminectomy   Precautions:  Falls, spinal  Equipment Needs: none; has WW    SUBJECTIVE:    Pt lives with  and daughter in a 2 story home with 2 stairs to enter and no rail.  Bed is on first floor and bath is on first floor.  Pt ambulated with no AD PTA.    OBJECTIVE:   Initial Evaluation  Date: 24 Treatment  Date: 24 Short Term/ Long Term   Goals   AM-PAC 6 Clicks     Was pt agreeable to Eval/treatment? Yes  Yes     Does pt have pain? Yes; 8-9/10 LBP Yes; unrated LBP    Bed Mobility  Rolling: ModA  Supine to sit: ModA  Sit to supine: ModA  Scooting: Winnie Rolling: SBA  Supine to sit: NT  Sit to supine: 
4/24/2024    PLAN:  Pain control  PT/OT  Discharge planning  Follow up in clinic in 4 weeks for follow up      Electronically signed by DEMETRIS Pacheco on 4/25/2024 at 10:28 AM   
Time     Total Timed Treatment 24 2        Emelyn HASTINGS/BRAYDON [id number]

## 2024-05-22 ENCOUNTER — OFFICE VISIT (OUTPATIENT)
Dept: NEUROSURGERY | Age: 59
End: 2024-05-22

## 2024-05-22 DIAGNOSIS — M54.42 CHRONIC LEFT-SIDED LOW BACK PAIN WITH LEFT-SIDED SCIATICA: Primary | ICD-10-CM

## 2024-05-22 DIAGNOSIS — Z98.890 S/P LUMBAR LAMINECTOMY: ICD-10-CM

## 2024-05-22 DIAGNOSIS — G89.29 CHRONIC LEFT-SIDED LOW BACK PAIN WITH LEFT-SIDED SCIATICA: Primary | ICD-10-CM

## 2024-05-22 PROCEDURE — 99024 POSTOP FOLLOW-UP VISIT: CPT | Performed by: PHYSICIAN ASSISTANT

## 2024-05-22 NOTE — PROGRESS NOTES
Post Operative Follow-up     This is a 58 year old female who presents to the office for a 1 month follow-up s/p L4-S1 laminectomy     Subjective: Patient states overall she has been doing well. Back pain and leg pain continues to improve. She does have some right leg numbness which is also improving. Ambulating well. Denies new complaints. No issues with incision site.      Physical Exam:              WDWN, no apparent distress              Non-labored breathing               Vitals Stable              Alert and oriented x3              CN 3-12 intact              PERRL              EOMI              GANDARA well              Motor strength symmetric              Sensation to LT intact bilaterally   Incision healing well with no signs of infection                 Imaging: N/A     Assessment: This is a 58 y.o.  female presenting for a 1 month follow-up s/p L4-S1 laminectomy. Stable.      Plan:  -Pain control and expectations discussed  -Continue restrictions   -Okay to drive  -OARRS report reviewed   -Follow-up in neurosurgery clinic in 2 months for 3 month follow up  -Call or return to neurosurgery office sooner if symptoms worsen or if new issues arise in the interim.    Electronically signed by Brenda Moran PA-C on 5/22/2024 at 12:26 PM

## 2024-06-27 ENCOUNTER — OFFICE VISIT (OUTPATIENT)
Dept: PRIMARY CARE CLINIC | Age: 59
End: 2024-06-27
Payer: COMMERCIAL

## 2024-06-27 VITALS
DIASTOLIC BLOOD PRESSURE: 86 MMHG | SYSTOLIC BLOOD PRESSURE: 122 MMHG | WEIGHT: 126 LBS | OXYGEN SATURATION: 98 % | HEART RATE: 81 BPM | HEIGHT: 64 IN | TEMPERATURE: 97.7 F | BODY MASS INDEX: 21.51 KG/M2

## 2024-06-27 DIAGNOSIS — R73.01 IMPAIRED FASTING GLUCOSE: ICD-10-CM

## 2024-06-27 DIAGNOSIS — E55.9 VITAMIN D INSUFFICIENCY: ICD-10-CM

## 2024-06-27 DIAGNOSIS — M51.36 DDD (DEGENERATIVE DISC DISEASE), LUMBAR: ICD-10-CM

## 2024-06-27 DIAGNOSIS — M47.817 LUMBOSACRAL SPONDYLOSIS WITHOUT MYELOPATHY: ICD-10-CM

## 2024-06-27 DIAGNOSIS — F51.01 PRIMARY INSOMNIA: ICD-10-CM

## 2024-06-27 DIAGNOSIS — F33.2 SEVERE RECURRENT MAJOR DEPRESSION WITHOUT PSYCHOTIC FEATURES (HCC): ICD-10-CM

## 2024-06-27 DIAGNOSIS — M48.062 SPINAL STENOSIS OF LUMBAR REGION WITH NEUROGENIC CLAUDICATION: ICD-10-CM

## 2024-06-27 DIAGNOSIS — I10 PRIMARY HYPERTENSION: Primary | ICD-10-CM

## 2024-06-27 DIAGNOSIS — F41.0 PANIC DISORDER WITHOUT AGORAPHOBIA: ICD-10-CM

## 2024-06-27 PROBLEM — F32.A DEPRESSIVE DISORDER: Status: RESOLVED | Noted: 2022-04-10 | Resolved: 2024-06-27

## 2024-06-27 PROCEDURE — 3079F DIAST BP 80-89 MM HG: CPT | Performed by: FAMILY MEDICINE

## 2024-06-27 PROCEDURE — 99214 OFFICE O/P EST MOD 30 MIN: CPT | Performed by: FAMILY MEDICINE

## 2024-06-27 PROCEDURE — 3074F SYST BP LT 130 MM HG: CPT | Performed by: FAMILY MEDICINE

## 2024-06-27 RX ORDER — ZOLPIDEM TARTRATE 12.5 MG/1
12.5 TABLET, FILM COATED, EXTENDED RELEASE ORAL NIGHTLY PRN
Qty: 30 TABLET | Refills: 2 | Status: SHIPPED | OUTPATIENT
Start: 2024-06-27 | End: 2024-09-25

## 2024-06-27 RX ORDER — ALPRAZOLAM 0.5 MG/1
0.5 TABLET ORAL 3 TIMES DAILY PRN
Qty: 90 TABLET | Refills: 2 | Status: SHIPPED | OUTPATIENT
Start: 2024-06-27 | End: 2024-09-25

## 2024-06-27 RX ORDER — HYDROCODONE BITARTRATE AND ACETAMINOPHEN 7.5; 325 MG/1; MG/1
1 TABLET ORAL EVERY 8 HOURS PRN
Qty: 90 TABLET | Refills: 0 | Status: SHIPPED | OUTPATIENT
Start: 2024-06-27 | End: 2024-07-27

## 2024-06-27 RX ORDER — CARISOPRODOL 350 MG/1
350 TABLET ORAL 4 TIMES DAILY PRN
Qty: 120 TABLET | Refills: 2 | Status: SHIPPED | OUTPATIENT
Start: 2024-06-27 | End: 2024-09-25

## 2024-06-27 RX ORDER — HYDROCODONE BITARTRATE AND ACETAMINOPHEN 7.5; 325 MG/1; MG/1
1 TABLET ORAL EVERY 8 HOURS PRN
Qty: 90 TABLET | Refills: 0 | Status: SHIPPED | OUTPATIENT
Start: 2024-07-25 | End: 2024-08-24

## 2024-06-27 RX ORDER — HYDROCODONE BITARTRATE AND ACETAMINOPHEN 7.5; 325 MG/1; MG/1
1 TABLET ORAL EVERY 8 HOURS PRN
Qty: 90 TABLET | Refills: 0 | Status: SHIPPED | OUTPATIENT
Start: 2024-08-22 | End: 2024-09-21

## 2024-06-27 ASSESSMENT — ENCOUNTER SYMPTOMS
ABDOMINAL PAIN: 0
BACK PAIN: 1
WHEEZING: 0
CONSTIPATION: 0
COUGH: 0
NAUSEA: 0
VOMITING: 0
DIARRHEA: 0
SHORTNESS OF BREATH: 0

## 2024-06-27 NOTE — PROGRESS NOTES
24  Kayleen La : 1965 Sex: female  Age: 58 y.o.    Chief Complaint   Patient presents with    Hypertension    Back Pain     Chronic med refills     HPI:  58 y.o. female patient presents today for 3 month follow up of chronic medical conditions, medication refills and FBW. Patient's chart, medical, surgical and medication history all reviewed.    Hypertension   The patient presents today for follow up of HTN.  The problem is well controlled. Risk factors for coronary artery disease include Age > 30, HTN, and family Hx. Current treatments include amlodipine (Norvasc). The patient is compliant all of the time.  Lifestyle changes the patient has made include  none .  Today the patient is complaining of none.        Bipolar  Patient complains of evaluation of anxiety disorder and post traumatic stress  disorder.  She has the following anxiety symptoms: difficulty concentrating, feelings of losing control, irritable, racing thoughts. Onset of symptoms was approximately several years ago, stable since that time. She denies current suicidal and homicidal ideation.  Previous treatment includes Ativan, Zoloft, and Lamictal/Seroquel  and individual therapy.  She complains of the following side effects from the treatment: none.    Patient notes that April is always difficult due to the death of her son over 10 years ago.  Patient has a history of psuedoseizures, but states that she had an episode of intense convulsions for \"over 10 minutes\" per her  in early 2022.  She thinks episode was due to increased stress.  She called 911 and was given an injection of \"something\" and then taken to the ER.  Developed severe migraine and was given a migraine cocktail.  Notes that all of the medications between EMT and ER caused her to be very groggy and could not remember much of the episode.  States that she had a virtual visit with someone in the ER who asked her questions about depression, anxiety and

## 2024-06-30 DIAGNOSIS — G43.709 CHRONIC MIGRAINE WITHOUT AURA WITHOUT STATUS MIGRAINOSUS, NOT INTRACTABLE: ICD-10-CM

## 2024-07-01 ENCOUNTER — TELEPHONE (OUTPATIENT)
Dept: NEUROSURGERY | Age: 59
End: 2024-07-01

## 2024-07-01 DIAGNOSIS — G89.29 CHRONIC LEFT-SIDED LOW BACK PAIN WITH LEFT-SIDED SCIATICA: Primary | ICD-10-CM

## 2024-07-01 DIAGNOSIS — Z98.890 S/P LUMBAR LAMINECTOMY: ICD-10-CM

## 2024-07-01 DIAGNOSIS — M54.42 CHRONIC LEFT-SIDED LOW BACK PAIN WITH LEFT-SIDED SCIATICA: Primary | ICD-10-CM

## 2024-07-01 RX ORDER — TOPIRAMATE 25 MG/1
50 TABLET ORAL NIGHTLY
Qty: 180 TABLET | Refills: 1 | Status: SHIPPED | OUTPATIENT
Start: 2024-07-01

## 2024-07-01 NOTE — TELEPHONE ENCOUNTER
Patient had surgery in April for lami but says she fell about 4 days ago and has a lot of back pain.  She wants to know if she needs to come in?  [phone]

## 2024-07-01 NOTE — TELEPHONE ENCOUNTER
Lumbar XR ordered. Patient is to call our office after completion to discuss further treatment plan

## 2024-07-23 ENCOUNTER — OFFICE VISIT (OUTPATIENT)
Dept: NEUROSURGERY | Age: 59
End: 2024-07-23

## 2024-07-23 DIAGNOSIS — F11.20 OPIOID DEPENDENCE WITH CURRENT USE (HCC): ICD-10-CM

## 2024-07-23 DIAGNOSIS — M54.16 LUMBAR RADICULAR PAIN: Primary | ICD-10-CM

## 2024-07-23 PROCEDURE — 99024 POSTOP FOLLOW-UP VISIT: CPT | Performed by: PHYSICIAN ASSISTANT

## 2024-07-23 NOTE — PROGRESS NOTES
Post Operative Follow-up    Patient is status post: lumbar laminectomy.  Pre op leg pain gone.    Physical Exam  Alert and Oriented X 3  PERRLA, EOMI  GANDARA 5/5  Wound: C/D/I    A/P: patient is s/p lumbar laminectomy 3 months ago.  No restrictions. F/u PRN

## 2024-07-28 DIAGNOSIS — F31.9 BIPOLAR I DISORDER (HCC): ICD-10-CM

## 2024-07-29 RX ORDER — LAMOTRIGINE 100 MG/1
100 TABLET ORAL NIGHTLY
Qty: 90 TABLET | Refills: 1 | OUTPATIENT
Start: 2024-07-29

## 2024-08-20 ENCOUNTER — HOSPITAL ENCOUNTER (OUTPATIENT)
Dept: GENERAL RADIOLOGY | Age: 59
Discharge: HOME | End: 2024-08-22
Payer: COMMERCIAL

## 2024-08-20 ENCOUNTER — HOSPITAL ENCOUNTER (OUTPATIENT)
Age: 59
Discharge: HOME | End: 2024-08-22
Payer: COMMERCIAL

## 2024-08-20 DIAGNOSIS — Z98.890 S/P LUMBAR LAMINECTOMY: ICD-10-CM

## 2024-08-20 DIAGNOSIS — G89.29 CHRONIC LEFT-SIDED LOW BACK PAIN WITH LEFT-SIDED SCIATICA: ICD-10-CM

## 2024-08-20 DIAGNOSIS — M54.42 CHRONIC LEFT-SIDED LOW BACK PAIN WITH LEFT-SIDED SCIATICA: ICD-10-CM

## 2024-08-20 PROCEDURE — 72100 X-RAY EXAM L-S SPINE 2/3 VWS: CPT

## 2024-08-21 ENCOUNTER — OFFICE VISIT (OUTPATIENT)
Dept: FAMILY MEDICINE CLINIC | Age: 59
End: 2024-08-21
Payer: COMMERCIAL

## 2024-08-21 VITALS
OXYGEN SATURATION: 98 % | HEIGHT: 64 IN | SYSTOLIC BLOOD PRESSURE: 122 MMHG | DIASTOLIC BLOOD PRESSURE: 78 MMHG | WEIGHT: 124.8 LBS | BODY MASS INDEX: 21.31 KG/M2 | HEART RATE: 78 BPM | TEMPERATURE: 97.5 F

## 2024-08-21 DIAGNOSIS — R10.11 RIGHT UPPER QUADRANT ABDOMINAL PAIN: Primary | ICD-10-CM

## 2024-08-21 PROCEDURE — 99214 OFFICE O/P EST MOD 30 MIN: CPT | Performed by: FAMILY MEDICINE

## 2024-08-21 PROCEDURE — 3078F DIAST BP <80 MM HG: CPT | Performed by: FAMILY MEDICINE

## 2024-08-21 PROCEDURE — 3074F SYST BP LT 130 MM HG: CPT | Performed by: FAMILY MEDICINE

## 2024-08-21 ASSESSMENT — ENCOUNTER SYMPTOMS
SINUS PAIN: 0
EYE DISCHARGE: 0
TROUBLE SWALLOWING: 0
VOMITING: 0
SHORTNESS OF BREATH: 0
ABDOMINAL PAIN: 1
ALLERGIC/IMMUNOLOGIC NEGATIVE: 1
EYE PAIN: 0
SORE THROAT: 0
BACK PAIN: 0
PHOTOPHOBIA: 0
BLOOD IN STOOL: 0
NAUSEA: 1
DIARRHEA: 1
COUGH: 0
CHEST TIGHTNESS: 0
EYE REDNESS: 0

## 2024-08-21 NOTE — PROGRESS NOTES
Rfl: 1    lamoTRIgine (LAMICTAL) 150 MG tablet, Take 1 tablet by mouth nightly, Disp: 90 tablet, Rfl: 1    QUEtiapine (SEROQUEL) 100 MG tablet, TAKE 1 TABLET BY MOUTH TWICE DAILY, Disp: 180 tablet, Rfl: 1    rizatriptan (MAXALT) 10 MG tablet, Take 1 tablet by mouth once as needed for Migraine (Take at the onset of migraine; may repeat in 2 hours prn), Disp: 9 tablet, Rfl: 5    Cholecalciferol (VITAMIN D3) 125 MCG (5000 UT) TABS, Take 1 tablet by mouth every morning, Disp: , Rfl:   Allergies   Allergen Reactions    Bee Venom Anaphylaxis    Fish-Derived Products Anaphylaxis     SWORDFISH    Hornet Venom Anaphylaxis    Iodine Anaphylaxis     ASSOCIATED WITH THE SWORDFISH. CONTRAST OR TOPICAL DOES NOT BOTHER HER ONLY WHEN INGESTED.     Wasp Venom Anaphylaxis    Bee Venom     Fish-Derived Products     Hornet Venom     Iodine        Past Medical History:   Diagnosis Date    Anxiety     Bipolar disorder (HCC)     Bipolar 1 disorder    Cervical disc disease     Cervical spondylitis with radiculitis ([facility])     C2, C7    Chronic back pain     Chronic kidney disease     In chart    CKD (chronic kidney disease)     Depression     Facet hypertrophy     L4/5 - S1    Hypertension     Irritable bowel syndrome 20/18    Lumbar canal stenosis     Migraine     Osteoarthritis     Panic disorder without agoraphobia     [facility]    PONV (postoperative nausea and vomiting)     Psychogenic nonepileptic seizure     last one 4/2022    Restless legs syndrome     SVT (supraventricular tachycardia) ([facility])     See cardiac event monitor report dated 8/16/2023 [facility] Cardiology: No Atrial Fibrillation was found - Predominant underlying rhythm was Sinus Rhythm with Supraventricular Predominant underlying rhythm was Sinus Rhythm with Supraventricular     Past Surgical History:   Procedure Laterality Date    ABDOMEN SURGERY N/A 04/19/2021    EXCISION LIPOMA ABDOMEN performed by Eddy Booth MD at [facility] OR

## 2024-08-26 ENCOUNTER — OFFICE VISIT (OUTPATIENT)
Dept: PRIMARY CARE CLINIC | Age: 59
End: 2024-08-26
Payer: COMMERCIAL

## 2024-08-26 VITALS
HEIGHT: 64 IN | OXYGEN SATURATION: 96 % | SYSTOLIC BLOOD PRESSURE: 108 MMHG | WEIGHT: 126 LBS | TEMPERATURE: 97.5 F | DIASTOLIC BLOOD PRESSURE: 76 MMHG | BODY MASS INDEX: 21.51 KG/M2 | HEART RATE: 65 BPM

## 2024-08-26 DIAGNOSIS — R10.11 RUQ PAIN: Primary | ICD-10-CM

## 2024-08-26 DIAGNOSIS — K21.9 GASTROESOPHAGEAL REFLUX DISEASE, UNSPECIFIED WHETHER ESOPHAGITIS PRESENT: ICD-10-CM

## 2024-08-26 DIAGNOSIS — G43.709 CHRONIC MIGRAINE WITHOUT AURA WITHOUT STATUS MIGRAINOSUS, NOT INTRACTABLE: ICD-10-CM

## 2024-08-26 PROCEDURE — 99214 OFFICE O/P EST MOD 30 MIN: CPT | Performed by: FAMILY MEDICINE

## 2024-08-26 PROCEDURE — 96372 THER/PROPH/DIAG INJ SC/IM: CPT | Performed by: FAMILY MEDICINE

## 2024-08-26 PROCEDURE — 3078F DIAST BP <80 MM HG: CPT | Performed by: FAMILY MEDICINE

## 2024-08-26 PROCEDURE — 3074F SYST BP LT 130 MM HG: CPT | Performed by: FAMILY MEDICINE

## 2024-08-26 RX ORDER — SUCRALFATE 1 G/1
1 TABLET ORAL 4 TIMES DAILY
Qty: 120 TABLET | Refills: 2 | Status: SHIPPED | OUTPATIENT
Start: 2024-08-26

## 2024-08-26 RX ORDER — KETOROLAC TROMETHAMINE 30 MG/ML
60 INJECTION, SOLUTION INTRAMUSCULAR; INTRAVENOUS ONCE
Status: COMPLETED | OUTPATIENT
Start: 2024-08-26 | End: 2024-08-26

## 2024-08-26 RX ORDER — PANTOPRAZOLE SODIUM 40 MG/1
40 TABLET, DELAYED RELEASE ORAL
Qty: 30 TABLET | Refills: 2 | Status: SHIPPED | OUTPATIENT
Start: 2024-08-26

## 2024-08-26 RX ORDER — PANTOPRAZOLE SODIUM 40 MG/1
40 TABLET, DELAYED RELEASE ORAL
Qty: 90 TABLET | OUTPATIENT
Start: 2024-08-26

## 2024-08-26 RX ADMIN — KETOROLAC TROMETHAMINE 60 MG: 30 INJECTION, SOLUTION INTRAMUSCULAR; INTRAVENOUS at 11:59

## 2024-08-26 ASSESSMENT — ENCOUNTER SYMPTOMS
DIARRHEA: 0
COUGH: 0
NAUSEA: 0
CONSTIPATION: 0
BACK PAIN: 1
SHORTNESS OF BREATH: 0
ABDOMINAL PAIN: 1
WHEEZING: 0
VOMITING: 0

## 2024-08-26 NOTE — ASSESSMENT & PLAN NOTE
Acute/chronic.  Will treat with IM Toradol today.     Orders:    ketorolac (TORADOL) injection 60 mg

## 2024-08-26 NOTE — PROGRESS NOTES
24  Kayleen La : 1965 Sex: female  Age: 59 y.o.    Chief Complaint   Patient presents with    Abdominal Pain     Not nearly in as much pain as she was but it is still there.      HPI:  59 y.o. female presents today for follow up from [facility].   Patient's chart, medical, surgical and medication history all reviewed.    Abdominal Pain  59 y.o. female presents today with complaint of abdominal pain.  The patient states that she has had abdominal pain for the last 1 month(s)  The pain is described as aching, dull, and sharp and is located in the RUQ.  The patient denies nausea and vomiting.  Bowel movements have been normal color and consistency.  No diarrhea.  No black or bloody stools.  The patient denies dysuria, urinary frequency, urgency and or gross hematuria.  No flank pain.  No fevers or chills.  No chest pain or dyspnea.  She has tried TUMS treatments with no relief.  The patient has had Appendectomy, Hysterectomy, EGD.    Patient also noting increase in chronic migraine headache pain today.  States that as soon as she went outside into the bright sunlight her typical migraines worsened.      ROS:  Review of Systems   Constitutional:  Negative for chills, fatigue and fever.   Respiratory:  Negative for cough, shortness of breath and wheezing.    Cardiovascular:  Negative for chest pain and palpitations.   Gastrointestinal:  Positive for abdominal pain. Negative for constipation, diarrhea, nausea and vomiting.   Musculoskeletal:  Positive for arthralgias, back pain, gait problem, neck pain and neck stiffness.   Skin:  Negative for rash.   Neurological:  Positive for headaches. Negative for dizziness.   Psychiatric/Behavioral:  Positive for behavioral problems, dysphoric mood and sleep disturbance. The patient is nervous/anxious.    All other systems reviewed and are negative.     Current Outpatient Medications on File Prior to Visit   Medication Sig Dispense Refill    topiramate (TOPAMAX)    Cardiovascular:      Rate and Rhythm: Normal rate and regular rhythm.      Heart sounds: Normal heart sounds. No murmur heard.  Pulmonary:      Effort: Pulmonary effort is normal. No respiratory distress.      Breath sounds: Normal breath sounds. No wheezing.   Abdominal:      General: Abdomen is flat. Bowel sounds are normal.      Palpations: Abdomen is soft.   Musculoskeletal:      Right hand: Deformity (thickening of palmar aponeurosis at 4-5th MCP) present.      Left hand: Deformity (thickening of palmar aponeurosis at 4-5th MCP) present.      Cervical back: Neck supple.      Right lower leg: No edema.      Left lower leg: No edema.   Lymphadenopathy:      Cervical: No cervical adenopathy.   Skin:     General: Skin is warm and dry.      Findings: No rash.   Neurological:      General: No focal deficit present.      Mental Status: She is alert and oriented to person, place, and time.      Gait: Gait normal.   Psychiatric:         Mood and Affect: Mood and affect normal.         Speech: Speech normal.         Behavior: Behavior normal.         Thought Content: Thought content normal.         Labs:  CBC with Differential:    Lab Results   Component Value Date/Time    WBC 10.8 04/25/2024 05:44 AM    RBC 4.18 04/25/2024 05:44 AM    HGB 13.6 04/25/2024 05:44 AM    HCT 41.5 04/25/2024 05:44 AM     04/25/2024 05:44 AM    MCV 99.3 04/25/2024 05:44 AM    MCH 32.5 04/25/2024 05:44 AM    MCHC 32.8 04/25/2024 05:44 AM    RDW 12.4 04/25/2024 05:44 AM    LYMPHOPCT 27 04/17/2024 09:35 AM    MONOPCT 5 04/17/2024 09:35 AM    EOSPCT 1 04/17/2024 09:35 AM    BASOPCT 1 04/17/2024 09:35 AM    MONOSABS 0.27 04/17/2024 09:35 AM    LYMPHSABS 1.42 04/17/2024 09:35 AM    EOSABS 0.06 04/17/2024 09:35 AM    BASOSABS 0.03 04/17/2024 09:35 AM     CMP:    Lab Results   Component Value Date/Time     04/25/2024 05:44 AM    K 3.8 04/25/2024 05:44 AM    K 3.6 05/27/2023 02:08 AM     04/25/2024 05:44 AM    CO2 21 04/25/2024

## 2024-09-09 ENCOUNTER — OFFICE VISIT (OUTPATIENT)
Dept: NEUROSURGERY | Age: 59
End: 2024-09-09
Payer: COMMERCIAL

## 2024-09-09 DIAGNOSIS — M54.16 LUMBAR RADICULAR PAIN: Primary | ICD-10-CM

## 2024-09-09 PROCEDURE — 99214 OFFICE O/P EST MOD 30 MIN: CPT | Performed by: PHYSICIAN ASSISTANT

## 2024-09-09 RX ORDER — METHYLPREDNISOLONE 4 MG
TABLET, DOSE PACK ORAL
Qty: 1 KIT | Refills: 0 | Status: SHIPPED | OUTPATIENT
Start: 2024-09-09 | End: 2024-09-15

## 2024-09-12 DIAGNOSIS — M47.817 LUMBOSACRAL SPONDYLOSIS WITHOUT MYELOPATHY: ICD-10-CM

## 2024-09-12 DIAGNOSIS — M48.062 SPINAL STENOSIS OF LUMBAR REGION WITH NEUROGENIC CLAUDICATION: ICD-10-CM

## 2024-09-12 DIAGNOSIS — E55.9 VITAMIN D INSUFFICIENCY: ICD-10-CM

## 2024-09-12 DIAGNOSIS — I10 PRIMARY HYPERTENSION: ICD-10-CM

## 2024-09-12 DIAGNOSIS — R73.01 IMPAIRED FASTING GLUCOSE: ICD-10-CM

## 2024-09-12 DIAGNOSIS — M51.36 DDD (DEGENERATIVE DISC DISEASE), LUMBAR: ICD-10-CM

## 2024-09-12 LAB
ALBUMIN: 4.7 G/DL (ref 3.5–5.2)
ALP BLD-CCNC: 86 U/L (ref 35–104)
ALT SERPL-CCNC: 11 U/L (ref 0–32)
AMPHETAMINE SCREEN URINE: NEGATIVE
ANION GAP SERPL CALCULATED.3IONS-SCNC: 21 MMOL/L (ref 7–16)
AST SERPL-CCNC: 30 U/L (ref 0–31)
BARBITURATE SCREEN URINE: NEGATIVE
BASOPHILS ABSOLUTE: 0.04 K/UL (ref 0–0.2)
BASOPHILS RELATIVE PERCENT: 1 % (ref 0–2)
BENZODIAZEPINE SCREEN, URINE: NEGATIVE
BILIRUB SERPL-MCNC: 0.5 MG/DL (ref 0–1.2)
BILIRUBIN, URINE: NEGATIVE
BUN BLDV-MCNC: 15 MG/DL (ref 6–20)
BUPRENORPHINE URINE: NEGATIVE
CALCIUM SERPL-MCNC: 9.7 MG/DL (ref 8.6–10.2)
CANNABINOID SCREEN URINE: NEGATIVE
CHLORIDE BLD-SCNC: 102 MMOL/L (ref 98–107)
CHOLESTEROL, TOTAL: 298 MG/DL
CO2: 17 MMOL/L (ref 22–29)
COCAINE METABOLITE, URINE: NEGATIVE
COLOR, UA: YELLOW
COMMENT: ABNORMAL
CREAT SERPL-MCNC: 0.9 MG/DL (ref 0.5–1)
EOSINOPHILS ABSOLUTE: 0.09 K/UL (ref 0.05–0.5)
EOSINOPHILS RELATIVE PERCENT: 2 % (ref 0–6)
FENTANYL URINE: NEGATIVE
GFR, ESTIMATED: 76 ML/MIN/1.73M2
GLUCOSE BLD-MCNC: 81 MG/DL (ref 74–99)
GLUCOSE URINE: NEGATIVE MG/DL
HCT VFR BLD CALC: 45.4 % (ref 34–48)
HDLC SERPL-MCNC: 95 MG/DL
HEMOGLOBIN: 14.4 G/DL (ref 11.5–15.5)
IMMATURE GRANULOCYTES %: 0 % (ref 0–5)
IMMATURE GRANULOCYTES ABSOLUTE: <0.03 K/UL (ref 0–0.58)
KETONES, URINE: NEGATIVE MG/DL
LDL CHOLESTEROL: 157 MG/DL
LEUKOCYTE ESTERASE, URINE: NEGATIVE
LYMPHOCYTES ABSOLUTE: 1.54 K/UL (ref 1.5–4)
LYMPHOCYTES RELATIVE PERCENT: 30 % (ref 20–42)
MCH RBC QN AUTO: 32.7 PG (ref 26–35)
MCHC RBC AUTO-ENTMCNC: 31.7 G/DL (ref 32–34.5)
MCV RBC AUTO: 102.9 FL (ref 80–99.9)
METHADONE SCREEN, URINE: NEGATIVE
MONOCYTES ABSOLUTE: 0.49 K/UL (ref 0.1–0.95)
MONOCYTES RELATIVE PERCENT: 10 % (ref 2–12)
NEUTROPHILS ABSOLUTE: 2.91 K/UL (ref 1.8–7.3)
NEUTROPHILS RELATIVE PERCENT: 57 % (ref 43–80)
NITRITE, URINE: NEGATIVE
OPIATES, URINE: NEGATIVE
OXYCODONE SCREEN URINE: NEGATIVE
PCP,URINE: NEGATIVE
PDW BLD-RTO: 13 % (ref 11.5–15)
PH, URINE: 6 (ref 5–9)
PLATELET # BLD: 266 K/UL (ref 130–450)
PMV BLD AUTO: 10.6 FL (ref 7–12)
POTASSIUM SERPL-SCNC: 4.9 MMOL/L (ref 3.5–5)
PROTEIN UA: NEGATIVE MG/DL
RBC # BLD: 4.41 M/UL (ref 3.5–5.5)
SODIUM BLD-SCNC: 140 MMOL/L (ref 132–146)
SPECIFIC GRAVITY UA: <1.005 (ref 1–1.03)
TEST INFORMATION: NORMAL
TOTAL PROTEIN: 8 G/DL (ref 6.4–8.3)
TRIGL SERPL-MCNC: 232 MG/DL
TSH SERPL DL<=0.05 MIU/L-ACNC: 1.57 UIU/ML (ref 0.27–4.2)
TURBIDITY: CLEAR
URINE HGB: NEGATIVE
UROBILINOGEN, URINE: 0.2 EU/DL (ref 0–1)
VITAMIN D 25-HYDROXY: 45.1 NG/ML (ref 30–100)
VLDLC SERPL CALC-MCNC: 46 MG/DL
WBC # BLD: 5.1 K/UL (ref 4.5–11.5)

## 2024-09-13 LAB — HBA1C MFR BLD: 5.2 % (ref 4–5.6)

## 2024-09-16 ENCOUNTER — OFFICE VISIT (OUTPATIENT)
Dept: PRIMARY CARE CLINIC | Age: 59
End: 2024-09-16
Payer: COMMERCIAL

## 2024-09-16 VITALS
WEIGHT: 129 LBS | OXYGEN SATURATION: 98 % | HEIGHT: 64 IN | BODY MASS INDEX: 22.02 KG/M2 | SYSTOLIC BLOOD PRESSURE: 114 MMHG | DIASTOLIC BLOOD PRESSURE: 80 MMHG | TEMPERATURE: 97.1 F | HEART RATE: 88 BPM

## 2024-09-16 DIAGNOSIS — F33.2 SEVERE RECURRENT MAJOR DEPRESSION WITHOUT PSYCHOTIC FEATURES (HCC): ICD-10-CM

## 2024-09-16 DIAGNOSIS — M51.36 DDD (DEGENERATIVE DISC DISEASE), LUMBAR: ICD-10-CM

## 2024-09-16 DIAGNOSIS — M50.90 CERVICAL DISC DISEASE: ICD-10-CM

## 2024-09-16 DIAGNOSIS — Z78.0 POSTMENOPAUSAL ESTROGEN DEFICIENCY: ICD-10-CM

## 2024-09-16 DIAGNOSIS — M48.062 SPINAL STENOSIS OF LUMBAR REGION WITH NEUROGENIC CLAUDICATION: ICD-10-CM

## 2024-09-16 DIAGNOSIS — I10 PRIMARY HYPERTENSION: ICD-10-CM

## 2024-09-16 DIAGNOSIS — M47.817 LUMBOSACRAL SPONDYLOSIS WITHOUT MYELOPATHY: ICD-10-CM

## 2024-09-16 DIAGNOSIS — F41.0 PANIC DISORDER WITHOUT AGORAPHOBIA: ICD-10-CM

## 2024-09-16 DIAGNOSIS — Z00.01 ENCOUNTER FOR WELL ADULT EXAM WITH ABNORMAL FINDINGS: Primary | ICD-10-CM

## 2024-09-16 DIAGNOSIS — F31.9 BIPOLAR I DISORDER (HCC): ICD-10-CM

## 2024-09-16 PROBLEM — R52 INTRACTABLE PAIN: Status: RESOLVED | Noted: 2024-03-27 | Resolved: 2024-09-16

## 2024-09-16 PROBLEM — M48.061 STENOSIS, SPINAL, LUMBAR: Status: RESOLVED | Noted: 2024-04-03 | Resolved: 2024-09-16

## 2024-09-16 PROCEDURE — 3074F SYST BP LT 130 MM HG: CPT | Performed by: FAMILY MEDICINE

## 2024-09-16 PROCEDURE — 3079F DIAST BP 80-89 MM HG: CPT | Performed by: FAMILY MEDICINE

## 2024-09-16 PROCEDURE — 99396 PREV VISIT EST AGE 40-64: CPT | Performed by: FAMILY MEDICINE

## 2024-09-16 RX ORDER — LAMOTRIGINE 150 MG/1
150 TABLET ORAL NIGHTLY
Qty: 90 TABLET | Refills: 1 | Status: SHIPPED | OUTPATIENT
Start: 2024-09-16

## 2024-09-16 RX ORDER — QUETIAPINE FUMARATE 100 MG/1
100 TABLET, FILM COATED ORAL 2 TIMES DAILY
Qty: 180 TABLET | Refills: 1 | Status: SHIPPED | OUTPATIENT
Start: 2024-09-16

## 2024-09-16 RX ORDER — CARISOPRODOL 350 MG/1
350 TABLET ORAL 4 TIMES DAILY PRN
Qty: 120 TABLET | Refills: 2 | Status: SHIPPED | OUTPATIENT
Start: 2024-09-16 | End: 2024-12-15

## 2024-09-16 RX ORDER — ESTRADIOL 0.1 MG/D
1 FILM, EXTENDED RELEASE TRANSDERMAL
Qty: 8 PATCH | Refills: 3 | Status: SHIPPED | OUTPATIENT
Start: 2024-09-16

## 2024-09-16 RX ORDER — AMLODIPINE BESYLATE 10 MG/1
10 TABLET ORAL NIGHTLY
Qty: 90 TABLET | Refills: 1 | Status: SHIPPED | OUTPATIENT
Start: 2024-09-16

## 2024-09-16 RX ORDER — HYDROCODONE BITARTRATE AND ACETAMINOPHEN 7.5; 325 MG/1; MG/1
1 TABLET ORAL EVERY 8 HOURS PRN
Qty: 90 TABLET | Refills: 0 | Status: SHIPPED | OUTPATIENT
Start: 2024-10-14 | End: 2024-11-13

## 2024-09-16 RX ORDER — HYDROCODONE BITARTRATE AND ACETAMINOPHEN 7.5; 325 MG/1; MG/1
1 TABLET ORAL EVERY 8 HOURS PRN
Qty: 90 TABLET | Refills: 0 | Status: SHIPPED | OUTPATIENT
Start: 2024-11-11 | End: 2024-12-11

## 2024-09-16 RX ORDER — BACLOFEN 10 MG/1
10 TABLET ORAL 3 TIMES DAILY
Qty: 90 TABLET | Refills: 5 | Status: SHIPPED | OUTPATIENT
Start: 2024-09-16

## 2024-09-16 RX ORDER — ALPRAZOLAM 0.5 MG
0.5 TABLET ORAL 3 TIMES DAILY PRN
Qty: 90 TABLET | Refills: 2 | Status: SHIPPED | OUTPATIENT
Start: 2024-09-16 | End: 2024-12-15

## 2024-09-16 RX ORDER — HYDROCODONE BITARTRATE AND ACETAMINOPHEN 7.5; 325 MG/1; MG/1
1 TABLET ORAL EVERY 8 HOURS PRN
Qty: 90 TABLET | Refills: 0 | Status: SHIPPED | OUTPATIENT
Start: 2024-09-16 | End: 2024-10-16

## 2024-09-16 ASSESSMENT — ENCOUNTER SYMPTOMS
DIARRHEA: 0
WHEEZING: 0
COUGH: 0
CONSTIPATION: 0
VOMITING: 0
BACK PAIN: 1
SHORTNESS OF BREATH: 0
ABDOMINAL PAIN: 0
NAUSEA: 0

## 2024-09-20 ENCOUNTER — PATIENT MESSAGE (OUTPATIENT)
Dept: PRIMARY CARE CLINIC | Age: 59
End: 2024-09-20

## 2024-09-20 DIAGNOSIS — F51.01 PRIMARY INSOMNIA: ICD-10-CM

## 2024-09-20 RX ORDER — ZOLPIDEM TARTRATE 12.5 MG/1
12.5 TABLET, FILM COATED, EXTENDED RELEASE ORAL NIGHTLY PRN
Qty: 30 TABLET | Refills: 2 | Status: SHIPPED | OUTPATIENT
Start: 2024-09-20 | End: 2024-12-19

## 2024-09-30 ENCOUNTER — HOSPITAL ENCOUNTER (OUTPATIENT)
Dept: MRI IMAGING | Age: 59
Discharge: HOME | End: 2024-10-02
Payer: COMMERCIAL

## 2024-09-30 ENCOUNTER — HOSPITAL ENCOUNTER (OUTPATIENT)
Dept: GENERAL RADIOLOGY | Age: 59
Discharge: HOME | End: 2024-10-02
Payer: COMMERCIAL

## 2024-09-30 ENCOUNTER — HOSPITAL ENCOUNTER (OUTPATIENT)
Age: 59
Discharge: HOME | End: 2024-10-02
Payer: COMMERCIAL

## 2024-09-30 DIAGNOSIS — M54.16 LUMBAR RADICULAR PAIN: ICD-10-CM

## 2024-09-30 PROCEDURE — A9577 INJ MULTIHANCE: HCPCS | Performed by: RADIOLOGY

## 2024-09-30 PROCEDURE — 72120 X-RAY BEND ONLY L-S SPINE: CPT

## 2024-09-30 PROCEDURE — 72158 MRI LUMBAR SPINE W/O & W/DYE: CPT

## 2024-09-30 PROCEDURE — 6360000004 HC RX CONTRAST MEDICATION: Performed by: RADIOLOGY

## 2024-09-30 RX ADMIN — GADOBENATE DIMEGLUMINE 12 ML: 529 INJECTION, SOLUTION INTRAVENOUS at 16:47

## 2024-10-01 ENCOUNTER — TELEPHONE (OUTPATIENT)
Dept: NEUROSURGERY | Age: 59
End: 2024-10-01

## 2024-10-01 ENCOUNTER — PATIENT MESSAGE (OUTPATIENT)
Dept: PAIN MANAGEMENT | Age: 59
End: 2024-10-01

## 2024-10-02 ENCOUNTER — HOSPITAL ENCOUNTER (OUTPATIENT)
Age: 59
Setting detail: OBSERVATION
Discharge: HOME | End: 2024-10-03
Attending: STUDENT IN AN ORGANIZED HEALTH CARE EDUCATION/TRAINING PROGRAM | Admitting: STUDENT IN AN ORGANIZED HEALTH CARE EDUCATION/TRAINING PROGRAM
Payer: COMMERCIAL

## 2024-10-02 ENCOUNTER — HOSPITAL ENCOUNTER (EMERGENCY)
Age: 59
Discharge: ANOTHER HOSPITAL | End: 2024-10-02
Attending: EMERGENCY MEDICINE
Payer: COMMERCIAL

## 2024-10-02 VITALS
DIASTOLIC BLOOD PRESSURE: 82 MMHG | HEART RATE: 69 BPM | OXYGEN SATURATION: 100 % | HEIGHT: 64 IN | SYSTOLIC BLOOD PRESSURE: 145 MMHG | BODY MASS INDEX: 20.83 KG/M2 | TEMPERATURE: 98.2 F | WEIGHT: 122 LBS | RESPIRATION RATE: 14 BRPM

## 2024-10-02 DIAGNOSIS — M54.32 SCIATICA OF LEFT SIDE: Primary | ICD-10-CM

## 2024-10-02 PROBLEM — M54.9 INTRACTABLE BACK PAIN: Status: ACTIVE | Noted: 2024-10-02

## 2024-10-02 PROBLEM — M54.40 ACUTE RIGHT-SIDED LOW BACK PAIN WITH SCIATICA: Status: ACTIVE | Noted: 2024-10-02

## 2024-10-02 LAB
INR PPP: 0.9
PROTHROMBIN TIME: 9.6 SEC (ref 9.3–12.4)

## 2024-10-02 PROCEDURE — 2580000003 HC RX 258: Performed by: STUDENT IN AN ORGANIZED HEALTH CARE EDUCATION/TRAINING PROGRAM

## 2024-10-02 PROCEDURE — 6370000000 HC RX 637 (ALT 250 FOR IP): Performed by: STUDENT IN AN ORGANIZED HEALTH CARE EDUCATION/TRAINING PROGRAM

## 2024-10-02 PROCEDURE — 99285 EMERGENCY DEPT VISIT HI MDM: CPT

## 2024-10-02 PROCEDURE — 96372 THER/PROPH/DIAG INJ SC/IM: CPT

## 2024-10-02 PROCEDURE — 36415 COLL VENOUS BLD VENIPUNCTURE: CPT

## 2024-10-02 PROCEDURE — 99222 1ST HOSP IP/OBS MODERATE 55: CPT | Performed by: NEUROLOGICAL SURGERY

## 2024-10-02 PROCEDURE — G0379 DIRECT REFER HOSPITAL OBSERV: HCPCS

## 2024-10-02 PROCEDURE — 6360000002 HC RX W HCPCS

## 2024-10-02 PROCEDURE — 6360000002 HC RX W HCPCS: Performed by: STUDENT IN AN ORGANIZED HEALTH CARE EDUCATION/TRAINING PROGRAM

## 2024-10-02 PROCEDURE — G0378 HOSPITAL OBSERVATION PER HR: HCPCS

## 2024-10-02 PROCEDURE — 85610 PROTHROMBIN TIME: CPT

## 2024-10-02 PROCEDURE — 96374 THER/PROPH/DIAG INJ IV PUSH: CPT

## 2024-10-02 RX ORDER — ACETAMINOPHEN 325 MG/1
650 TABLET ORAL EVERY 6 HOURS PRN
Status: DISCONTINUED | OUTPATIENT
Start: 2024-10-02 | End: 2024-10-03 | Stop reason: HOSPADM

## 2024-10-02 RX ORDER — BACLOFEN 10 MG/1
10 TABLET ORAL 3 TIMES DAILY PRN
Status: DISCONTINUED | OUTPATIENT
Start: 2024-10-02 | End: 2024-10-03 | Stop reason: HOSPADM

## 2024-10-02 RX ORDER — SODIUM CHLORIDE, SODIUM LACTATE, POTASSIUM CHLORIDE, CALCIUM CHLORIDE 600; 310; 30; 20 MG/100ML; MG/100ML; MG/100ML; MG/100ML
INJECTION, SOLUTION INTRAVENOUS CONTINUOUS
Status: ACTIVE | OUTPATIENT
Start: 2024-10-02 | End: 2024-10-03

## 2024-10-02 RX ORDER — POTASSIUM CHLORIDE 1500 MG/1
40 TABLET, EXTENDED RELEASE ORAL PRN
Status: DISCONTINUED | OUTPATIENT
Start: 2024-10-02 | End: 2024-10-03 | Stop reason: HOSPADM

## 2024-10-02 RX ORDER — TOPIRAMATE 25 MG/1
50 TABLET, FILM COATED ORAL NIGHTLY
Status: DISCONTINUED | OUTPATIENT
Start: 2024-10-02 | End: 2024-10-03 | Stop reason: HOSPADM

## 2024-10-02 RX ORDER — PANTOPRAZOLE SODIUM 40 MG/1
40 TABLET, DELAYED RELEASE ORAL
Status: DISCONTINUED | OUTPATIENT
Start: 2024-10-03 | End: 2024-10-03 | Stop reason: HOSPADM

## 2024-10-02 RX ORDER — SENNOSIDES A AND B 8.6 MG/1
1 TABLET, FILM COATED ORAL DAILY PRN
Status: DISCONTINUED | OUTPATIENT
Start: 2024-10-02 | End: 2024-10-03 | Stop reason: HOSPADM

## 2024-10-02 RX ORDER — ACETAMINOPHEN 650 MG/1
650 SUPPOSITORY RECTAL EVERY 6 HOURS PRN
Status: DISCONTINUED | OUTPATIENT
Start: 2024-10-02 | End: 2024-10-03 | Stop reason: HOSPADM

## 2024-10-02 RX ORDER — OXYCODONE HYDROCHLORIDE 5 MG/1
5 TABLET ORAL EVERY 6 HOURS PRN
Status: DISCONTINUED | OUTPATIENT
Start: 2024-10-02 | End: 2024-10-03

## 2024-10-02 RX ORDER — SODIUM CHLORIDE 9 MG/ML
INJECTION, SOLUTION INTRAVENOUS PRN
Status: DISCONTINUED | OUTPATIENT
Start: 2024-10-02 | End: 2024-10-03 | Stop reason: HOSPADM

## 2024-10-02 RX ORDER — ONDANSETRON 4 MG/1
4 TABLET, ORALLY DISINTEGRATING ORAL EVERY 8 HOURS PRN
Status: DISCONTINUED | OUTPATIENT
Start: 2024-10-02 | End: 2024-10-03 | Stop reason: HOSPADM

## 2024-10-02 RX ORDER — LANOLIN ALCOHOL/MO/W.PET/CERES
3 CREAM (GRAM) TOPICAL NIGHTLY PRN
Status: DISCONTINUED | OUTPATIENT
Start: 2024-10-02 | End: 2024-10-03 | Stop reason: HOSPADM

## 2024-10-02 RX ORDER — SODIUM CHLORIDE 0.9 % (FLUSH) 0.9 %
10 SYRINGE (ML) INJECTION EVERY 12 HOURS SCHEDULED
Status: DISCONTINUED | OUTPATIENT
Start: 2024-10-02 | End: 2024-10-03 | Stop reason: HOSPADM

## 2024-10-02 RX ORDER — HYDROCODONE BITARTRATE AND ACETAMINOPHEN 7.5; 325 MG/1; MG/1
1 TABLET ORAL EVERY 6 HOURS PRN
Status: DISCONTINUED | OUTPATIENT
Start: 2024-10-02 | End: 2024-10-03 | Stop reason: HOSPADM

## 2024-10-02 RX ORDER — ALPRAZOLAM 0.25 MG
0.5 TABLET ORAL 3 TIMES DAILY PRN
Status: DISCONTINUED | OUTPATIENT
Start: 2024-10-02 | End: 2024-10-03 | Stop reason: HOSPADM

## 2024-10-02 RX ORDER — SODIUM CHLORIDE 0.9 % (FLUSH) 0.9 %
10 SYRINGE (ML) INJECTION PRN
Status: DISCONTINUED | OUTPATIENT
Start: 2024-10-02 | End: 2024-10-03 | Stop reason: HOSPADM

## 2024-10-02 RX ORDER — AMLODIPINE BESYLATE 10 MG/1
10 TABLET ORAL NIGHTLY
Status: DISCONTINUED | OUTPATIENT
Start: 2024-10-02 | End: 2024-10-03 | Stop reason: HOSPADM

## 2024-10-02 RX ORDER — ONDANSETRON 2 MG/ML
4 INJECTION INTRAMUSCULAR; INTRAVENOUS EVERY 6 HOURS PRN
Status: DISCONTINUED | OUTPATIENT
Start: 2024-10-02 | End: 2024-10-03 | Stop reason: HOSPADM

## 2024-10-02 RX ORDER — POTASSIUM CHLORIDE 7.45 MG/ML
10 INJECTION INTRAVENOUS PRN
Status: DISCONTINUED | OUTPATIENT
Start: 2024-10-02 | End: 2024-10-03 | Stop reason: HOSPADM

## 2024-10-02 RX ORDER — QUETIAPINE FUMARATE 25 MG/1
100 TABLET, FILM COATED ORAL 2 TIMES DAILY
Status: DISCONTINUED | OUTPATIENT
Start: 2024-10-02 | End: 2024-10-03 | Stop reason: HOSPADM

## 2024-10-02 RX ADMIN — HYDROMORPHONE HYDROCHLORIDE 0.5 MG: 1 INJECTION, SOLUTION INTRAMUSCULAR; INTRAVENOUS; SUBCUTANEOUS at 16:19

## 2024-10-02 RX ADMIN — HYDROMORPHONE HYDROCHLORIDE 1 MG: 1 INJECTION, SOLUTION INTRAMUSCULAR; INTRAVENOUS; SUBCUTANEOUS at 09:24

## 2024-10-02 RX ADMIN — SODIUM CHLORIDE, POTASSIUM CHLORIDE, SODIUM LACTATE AND CALCIUM CHLORIDE: 600; 310; 30; 20 INJECTION, SOLUTION INTRAVENOUS at 13:55

## 2024-10-02 RX ADMIN — ALPRAZOLAM 0.5 MG: 0.25 TABLET ORAL at 21:41

## 2024-10-02 RX ADMIN — QUETIAPINE FUMARATE 100 MG: 25 TABLET ORAL at 21:41

## 2024-10-02 RX ADMIN — HYDROCODONE BITARTRATE AND ACETAMINOPHEN 1 TABLET: 7.5; 325 TABLET ORAL at 18:52

## 2024-10-02 RX ADMIN — AMLODIPINE BESYLATE 10 MG: 10 TABLET ORAL at 21:41

## 2024-10-02 RX ADMIN — SERTRALINE 50 MG: 50 TABLET, FILM COATED ORAL at 21:42

## 2024-10-02 RX ADMIN — LAMOTRIGINE 150 MG: 100 TABLET ORAL at 21:42

## 2024-10-02 RX ADMIN — TOPIRAMATE 50 MG: 25 TABLET, FILM COATED ORAL at 21:42

## 2024-10-02 RX ADMIN — OXYCODONE HYDROCHLORIDE 5 MG: 5 TABLET ORAL at 13:39

## 2024-10-02 RX ADMIN — BACLOFEN 10 MG: 10 TABLET ORAL at 14:03

## 2024-10-02 RX ADMIN — Medication 3 MG: at 21:41

## 2024-10-02 ASSESSMENT — PAIN DESCRIPTION - ONSET: ONSET: ON-GOING

## 2024-10-02 ASSESSMENT — PAIN DESCRIPTION - LOCATION
LOCATION: BACK;BUTTOCKS
LOCATION: BACK
LOCATION: BACK;BUTTOCKS
LOCATION: BACK;LEG
LOCATION: BACK

## 2024-10-02 ASSESSMENT — PAIN SCALES - GENERAL
PAINLEVEL_OUTOF10: 7
PAINLEVEL_OUTOF10: 6
PAINLEVEL_OUTOF10: 9
PAINLEVEL_OUTOF10: 8
PAINLEVEL_OUTOF10: 10
PAINLEVEL_OUTOF10: 9
PAINLEVEL_OUTOF10: 9

## 2024-10-02 ASSESSMENT — PAIN DESCRIPTION - ORIENTATION
ORIENTATION: LEFT;LOWER
ORIENTATION: LEFT;LOWER
ORIENTATION: LOWER
ORIENTATION: LOWER;LEFT
ORIENTATION: LOWER

## 2024-10-02 ASSESSMENT — PAIN DESCRIPTION - PAIN TYPE: TYPE: ACUTE PAIN

## 2024-10-02 ASSESSMENT — PAIN DESCRIPTION - DESCRIPTORS
DESCRIPTORS: ACHING;DISCOMFORT;SORE;SPASM
DESCRIPTORS: ACHING;DISCOMFORT;SHARP;STABBING
DESCRIPTORS: ACHING;DISCOMFORT
DESCRIPTORS: SHARP;SHOOTING
DESCRIPTORS: ACHING;DISCOMFORT;STABBING;SHARP

## 2024-10-02 ASSESSMENT — PAIN DESCRIPTION - FREQUENCY: FREQUENCY: CONTINUOUS

## 2024-10-02 ASSESSMENT — LIFESTYLE VARIABLES
HOW OFTEN DO YOU HAVE A DRINK CONTAINING ALCOHOL: NEVER
HOW MANY STANDARD DRINKS CONTAINING ALCOHOL DO YOU HAVE ON A TYPICAL DAY: PATIENT DOES NOT DRINK

## 2024-10-02 NOTE — PROGRESS NOTES
4 Eyes Skin Assessment     NAME:  Kayleen La  YOB: 1965  MEDICAL RECORD NUMBER:  [id number]    The patient is being assessed for  Admission    I agree that at least one RN has performed a thorough Head to Toe Skin Assessment on the patient. ALL assessment sites listed below have been assessed.      Areas assessed by both nurses:    Head, Face, Ears, Shoulders, Back, Chest, Arms, Elbows, Hands, Sacrum. Buttock, Coccyx, Ischium, Legs. Feet and Heels, and Under Medical Devices         Does the Patient have a Wound? No noted wound(s)       Tate Prevention initiated by RN: Yes  Wound Care Orders initiated by RN: No    Pressure Injury (Stage 3,4, Unstageable, DTI, NWPT, and Complex wounds) if present, place Wound referral order by RN under : No    New Ostomies, if present place, Ostomy referral order under : No     Scratches/Abrasions: BUE  Scar: Low Back  Boggy/Blanchable: Heels    Nurse 1 eSignature: Electronically signed by Candy Key RN on 10/2/24 at 12:26 PM EDT    **SHARE this note so that the co-signing nurse can place an eSignature**    Nurse 2 eSignature: Electronically signed by Oneal Hurtado RN on 10/2/24 at 5:51 PM EDT

## 2024-10-02 NOTE — PROGRESS NOTES
Date: 10/2/2024       Patient Name: Kayleen La  : 1965      MRN: [id number]    PT order received. Chart has been reviewed. PT evaluation will be on hold due to awaiting NS POC 10/2. Will continue to follow and complete evaluation at later time.     George Shearer, PT

## 2024-10-02 NOTE — CONSULTS
LAPAROTOMY      X5 Ovarian Cysts, ZACARIAS-BSO (Non Cancerous)    NERVE BLOCK Bilateral 06/15/2023    BILATERAL LUMBAR FACET INJECTION   UNDER FLUOROSCOPIC  GUIDANCE AT L4-5 & L5-S1 performed by Eleazar Dinh MD at [facility] OR    NERVE BLOCK Bilateral 09/07/2023    BILATERAL LUMBARMEDIAL BRANCH NERVE BLOCK UNDER FLUOROSCOPIC GUIDANCE AT L3, L4 & L5 DORSAL RAMI performed by Eleazar Dinh MD at [facility] OR    OTHER SURGICAL HISTORY      Lumbar Epidural - Donatelli 6/13/17, 6/27/17, 7/25/17    OVARY REMOVAL      PAIN MANAGEMENT PROCEDURE Left 04/06/2023    LUMBAR TRANSFORAMINAL EPIDURAL STEROID INJECTION LEFT L4- L5 AND L5-S1 UNDER FLUOROSCOPIC GUIDANCE   +++IODINE ALLERGY+++ performed by Eleazar Dinh MD at [facility] OR    PAIN MANAGEMENT PROCEDURE Bilateral 10/05/2023    BILATERAL LUMBAR RADIOFREQUENCY ABLATION AT L3, L4 & L5 DORSAL RAMI UNDER FLUOROSCOPY performed by Eleazar Dinh MD at [facility] OR    PARTIAL HYSTERECTOMY (CERVIX NOT REMOVED)  1999    UPPER GASTROINTESTINAL ENDOSCOPY N/A 12/13/2021    EGD BIOPSY performed by Edward Huang MD at [facility] ENDOSCOPY      Family History   Problem Relation Age of Onset    Diabetes Mother     Cirrhosis Mother         NON alcoholic    Arthritis Mother     Other Mother     Prostate Cancer Father     Atrial Fibrillation Father     Kidney Disease Sister     Cerebral Aneurysm Brother     Allergy (Severe) Brother     Colon Cancer Maternal Grandfather     Heart Disease Paternal Grandmother     Breast Cancer Maternal Aunt       Social History     Socioeconomic History    Marital status: Life Partner     Spouse name: Not on file    Number of children: Not on file    Years of education: Not on file    Highest education level: Not on file   Occupational History    Not on file   Tobacco Use    Smoking status: Some Days     Current packs/day: 0.25     Average packs/day: 0.3 packs/day for 1 year (0.3 ttl pk-yrs)     Types: Cigarettes     Passive exposure: Current    Smokeless

## 2024-10-02 NOTE — PROGRESS NOTES
Occupational Therapy    Date:10/2/2024  Patient Name: Kayleen La  MRN: [id number]  : 1965  Room: [address] [address]-A     OT orders received and chart reviewed. OT eval on hold at this time pending NS consult.  OT will follow and re-attempt eval as appropriate at a later time/date.    Rhea Amaro OTR/L; [id number]

## 2024-10-02 NOTE — PROGRESS NOTES
Received report from norman at 12:15pm. Pt has just been admitted/transferred from [facility]. No PIV is noted.   Walker ordered already.

## 2024-10-02 NOTE — H&P
hematemesis  Genitourinary: dysuria, hematuria, increased frequency  Musculoskeletal: lower extremity edema, myalgias, arthralgias, back pain  Integumentary: rashes, itching   Neurological: headache, lightheadedness, dizziness, confusion, syncope, numbness, tingling, focal weakness  Psychiatric: depression, suicidal ideation, anxiety  Endocrine: unintentional weight change  Hematologic/Lymphatic: lymphadenopathy, easy bruising, easy bleeding   Allergic/Immunologic: recurrent infections      Objective  VITALS:  BP (!) 156/90   Pulse 67   Temp 98 °F (36.7 °C) (Temporal)   Resp 15   SpO2 98%     Physical Exam:  General: awake, alert, oriented to person, place, time, and purpose, appears stated age, cooperative, no acute distress, pleasant, appropriate mood  Eyes: conjunctivae/corneas clear, sclera non icteric, EOMI  Ears: no obvious scars, no lesions, no masses, hearing intact  Mouth: mucous membranes moist, no obvious oral sores  Head: normocephalic, atraumatic  Neck: no JVD, no adenopathy, no thyromegaly, neck is supple, trachea is midline  Back: ROM normal, no CVA tenderness.  Chest: no pain on palpation  Lungs: clear to auscultation bilaterally, without rhonchi, crackle, wheezing, or rale, no retractions or use of accessory muscles  Heart: regular rate and regular rhythm, no murmur, normal S1, S2  Abdomen: soft, non-tender; bowel sounds normal; no masses, no organomegaly  : Deferred   Extremities: no lower extremity edema, extremities atraumatic, no cyanosis, no clubbing, 2+ pedal pulses palpated  Skin: normal color, normal texture, normal turgor, no rashes, no lesions  Neurologic:5/5 muscle strength throughout, normal muscle tone throughout, face symmetric, hearing intact, tongue midline, speech appropriate without slurring, sensation to fine touch intact in upper and lower extremities    Labs-   Lab Results   Component Value Date    WBC 5.1 09/12/2024    HGB 14.4 09/12/2024    HCT 45.4 09/12/2024

## 2024-10-02 NOTE — PROGRESS NOTES
PIV inserted #22 gauge at R posterior hand, patent and infusing well.    2:12PM: Called lab again to follow up on stat order for IR reference once result would be out.    3PM: Called lab to fu on stat order.

## 2024-10-02 NOTE — ED PROVIDER NOTES
history of Anxiety, Bipolar disorder ([facility]), Cervical disc disease, Cervical spondylitis with radiculitis ([facility]), Chronic back pain, Chronic kidney disease, CKD (chronic kidney disease), Depression, Facet hypertrophy, Hypertension, Irritable bowel syndrome (20/18), Lumbar canal stenosis, Migraine, Osteoarthritis, Panic disorder without agoraphobia, PONV (postoperative nausea and vomiting), Psychogenic nonepileptic seizure, Restless legs syndrome, and SVT (supraventricular tachycardia) ([facility]).     EMERGENCY DEPARTMENT COURSE    Vitals:    Vitals:    10/02/24 0822   BP: (!) 138/93   Pulse: 79   Resp: 16   Temp: 98.1 °F (36.7 °C)   TempSrc: Oral   SpO2: 100%   Weight: 55.3 kg (122 lb)   Height: 1.626 m (5' 4\")       Patient was given the following medications:  Medications   HYDROmorphone (DILAUDID) injection 1 mg (1 mg IntraMUSCular Given 10/2/24 0924)         Is this patient to be included in the SEP-1 Core Measure due to severe sepsis or septic shock?   No   Exclusion criteria - the patient is NOT to be included for SEP-1 Core Measure due to:  Infection is not suspected          Medical Decision Making/Differential Diagnosis:    CC/HPI Summary, Social Determinants of health, Records Reviewed, DDx, testing done/not done, ED Course, Reassessment, disposition considerations/shared decision making with patient, consults, disposition:        ED Course as of 10/02/24 1046   Wed Oct 02, 2024   0917 Consulted with inpatient neurosurgeon Dr. Troncoso who recommended IR consult for epidural injection.  Does not recommend additional MRI at this time as her most recent one was only 2 days ago.  States that if IR is unable to handle injection at [facility], to transfer to [facility] for IR guided epidural injection [MEMO]   0945 Consulted with IR physician Dr. Prater who states that IR guided epidural injections are not routinely done here in [facility], will initiate transfer to [facility]. [MEMO]   1024 Consulted with IR physician at [facility]

## 2024-10-03 VITALS
WEIGHT: 131.84 LBS | HEIGHT: 64 IN | RESPIRATION RATE: 16 BRPM | HEART RATE: 59 BPM | BODY MASS INDEX: 22.51 KG/M2 | DIASTOLIC BLOOD PRESSURE: 73 MMHG | SYSTOLIC BLOOD PRESSURE: 133 MMHG | OXYGEN SATURATION: 100 % | TEMPERATURE: 97.8 F

## 2024-10-03 DIAGNOSIS — M54.42 CHRONIC BILATERAL LOW BACK PAIN WITH BILATERAL SCIATICA: ICD-10-CM

## 2024-10-03 DIAGNOSIS — M54.41 CHRONIC BILATERAL LOW BACK PAIN WITH BILATERAL SCIATICA: ICD-10-CM

## 2024-10-03 DIAGNOSIS — M71.30 SYNOVIAL CYST: Primary | ICD-10-CM

## 2024-10-03 DIAGNOSIS — M54.16 LUMBAR RADICULOPATHY: ICD-10-CM

## 2024-10-03 DIAGNOSIS — G89.29 CHRONIC BILATERAL LOW BACK PAIN WITH BILATERAL SCIATICA: ICD-10-CM

## 2024-10-03 PROCEDURE — 6370000000 HC RX 637 (ALT 250 FOR IP): Performed by: STUDENT IN AN ORGANIZED HEALTH CARE EDUCATION/TRAINING PROGRAM

## 2024-10-03 PROCEDURE — 97165 OT EVAL LOW COMPLEX 30 MIN: CPT

## 2024-10-03 PROCEDURE — 2580000003 HC RX 258: Performed by: STUDENT IN AN ORGANIZED HEALTH CARE EDUCATION/TRAINING PROGRAM

## 2024-10-03 PROCEDURE — 96376 TX/PRO/DX INJ SAME DRUG ADON: CPT

## 2024-10-03 PROCEDURE — 97161 PT EVAL LOW COMPLEX 20 MIN: CPT

## 2024-10-03 PROCEDURE — 6360000002 HC RX W HCPCS: Performed by: STUDENT IN AN ORGANIZED HEALTH CARE EDUCATION/TRAINING PROGRAM

## 2024-10-03 PROCEDURE — G0378 HOSPITAL OBSERVATION PER HR: HCPCS

## 2024-10-03 RX ADMIN — SODIUM CHLORIDE, PRESERVATIVE FREE 10 ML: 5 INJECTION INTRAVENOUS at 09:46

## 2024-10-03 RX ADMIN — HYDROMORPHONE HYDROCHLORIDE 0.5 MG: 1 INJECTION, SOLUTION INTRAMUSCULAR; INTRAVENOUS; SUBCUTANEOUS at 03:34

## 2024-10-03 RX ADMIN — OXYCODONE HYDROCHLORIDE 5 MG: 5 TABLET ORAL at 02:34

## 2024-10-03 RX ADMIN — ALPRAZOLAM 0.5 MG: 0.25 TABLET ORAL at 12:01

## 2024-10-03 RX ADMIN — TIZANIDINE 2 MG: 4 TABLET ORAL at 17:56

## 2024-10-03 RX ADMIN — HYDROMORPHONE HYDROCHLORIDE 0.5 MG: 1 INJECTION, SOLUTION INTRAMUSCULAR; INTRAVENOUS; SUBCUTANEOUS at 09:46

## 2024-10-03 RX ADMIN — HYDROCODONE BITARTRATE AND ACETAMINOPHEN 1 TABLET: 7.5; 325 TABLET ORAL at 06:53

## 2024-10-03 RX ADMIN — HYDROMORPHONE HYDROCHLORIDE 0.5 MG: 1 INJECTION, SOLUTION INTRAMUSCULAR; INTRAVENOUS; SUBCUTANEOUS at 15:32

## 2024-10-03 RX ADMIN — SODIUM CHLORIDE, POTASSIUM CHLORIDE, SODIUM LACTATE AND CALCIUM CHLORIDE: 600; 310; 30; 20 INJECTION, SOLUTION INTRAVENOUS at 02:33

## 2024-10-03 RX ADMIN — HYDROCODONE BITARTRATE AND ACETAMINOPHEN 1 TABLET: 7.5; 325 TABLET ORAL at 12:00

## 2024-10-03 RX ADMIN — SODIUM CHLORIDE, PRESERVATIVE FREE 10 ML: 5 INJECTION INTRAVENOUS at 15:33

## 2024-10-03 RX ADMIN — TIZANIDINE 2 MG: 4 TABLET ORAL at 06:53

## 2024-10-03 RX ADMIN — PANTOPRAZOLE SODIUM 40 MG: 40 TABLET, DELAYED RELEASE ORAL at 06:53

## 2024-10-03 ASSESSMENT — PAIN DESCRIPTION - LOCATION
LOCATION: BACK
LOCATION: BACK;LEG
LOCATION: BACK
LOCATION: BACK;LEG
LOCATION: BACK;LEG
LOCATION: BACK
LOCATION: BACK

## 2024-10-03 ASSESSMENT — PAIN DESCRIPTION - FREQUENCY: FREQUENCY: CONTINUOUS

## 2024-10-03 ASSESSMENT — PAIN SCALES - GENERAL
PAINLEVEL_OUTOF10: 7
PAINLEVEL_OUTOF10: 10
PAINLEVEL_OUTOF10: 7
PAINLEVEL_OUTOF10: 8
PAINLEVEL_OUTOF10: 9
PAINLEVEL_OUTOF10: 9
PAINLEVEL_OUTOF10: 10
PAINLEVEL_OUTOF10: 8
PAINLEVEL_OUTOF10: 9

## 2024-10-03 ASSESSMENT — PAIN DESCRIPTION - ORIENTATION
ORIENTATION: LEFT;LOWER
ORIENTATION: LOWER
ORIENTATION: LEFT;LOWER
ORIENTATION: LOWER
ORIENTATION: LOWER
ORIENTATION: LEFT;MID;LOWER

## 2024-10-03 ASSESSMENT — PAIN DESCRIPTION - DESCRIPTORS
DESCRIPTORS: SHARP
DESCRIPTORS: SHARP;STABBING;SHOOTING
DESCRIPTORS: SHARP;STABBING;ACHING
DESCRIPTORS: SHARP;STABBING;ACHING
DESCRIPTORS: SHARP
DESCRIPTORS: SHARP
DESCRIPTORS: BURNING;SHARP;SHOOTING

## 2024-10-03 ASSESSMENT — PAIN - FUNCTIONAL ASSESSMENT
PAIN_FUNCTIONAL_ASSESSMENT: PREVENTS OR INTERFERES SOME ACTIVE ACTIVITIES AND ADLS

## 2024-10-03 ASSESSMENT — PAIN DESCRIPTION - PAIN TYPE: TYPE: ACUTE PAIN

## 2024-10-03 ASSESSMENT — PAIN DESCRIPTION - ONSET: ONSET: ON-GOING

## 2024-10-03 NOTE — PROGRESS NOTES
Patient is scheduled for Tues 10-8-24 @ 10am. She understands appointment and registration instructions. A 13hr prep was asked to be ordered for her iodine allergy.   CONTRAST ALLERGY PREMEDICATION POLICY:  The Radiology Department at [facility] strives to provide quality healthcare to our patients. In order to provide that quality care the Radiology Department has implemented the following policy regarding patients with contrast allergies.   Procedure:   1. Non-emergency patients with contrast allergies will be given the following prep prior to their scan:   a) Prednisone 50mg by mouth thirteen hours prior to the exam   b) Prednisone 50mg by mouth seven hours prior to the exam   c) Prednisone 50mg by mouth one hour prior to the exam   d) Diphenhydramine (Benadryl) 50mg one hour prior to the exam

## 2024-10-03 NOTE — ACP (ADVANCE CARE PLANNING)
Advance Care Planning   The patient has the following advanced directives on file:  Advance Directives       Power of  Living Will ACP-Advance Directive ACP-Power of     Not on File Filed on 09/28/11 Filed Not on File            The patient has appointed the following active healthcare agents:    Primary Decision Maker: Stefano Vogel - Spouse - [phone]    The Patient has the following current code status:  Full code    Ramiro Mills RN  10/3/2024

## 2024-10-03 NOTE — PROGRESS NOTES
Spoke with Bedside nurse TAMRA Stephenson about MEHREEN.  Will attempt Monday with anesthesia and pre treatment for iodine allergy. Patient could be discharged and scheduled as an outpatient for MEHREEN. Bedside nurse to return call and inform IR if waiting for Monday or scheduling as outpatient.

## 2024-10-03 NOTE — PROGRESS NOTES
Department of Neurosurgery  Progress Note    CHIEF COMPLAINT: seen for low back pain and left leg pain    SUBJECTIVE:  Resting in bed, still with back and leg pain    REVIEW OF SYSTEMS :  Constitutional: Negative for chills and fever.    Neurological: Negative for dizziness, tremors and speech change.     OBJECTIVE:   VITALS:  /64   Pulse 57   Temp 98 °F (36.7 °C) (Temporal)   Resp 16   Ht 1.626 m (5' 4.02\")   Wt 59.8 kg (131 lb 13.4 oz)   SpO2 98%   BMI 22.62 kg/m²     PHYSICAL:  Alert, oriented  Appears stated age  PERRL  EOMI  Strength full  Sensation intact to light touch    DATA:  CBC:   Lab Results   Component Value Date/Time    WBC 5.1 09/12/2024 11:11 AM    RBC 4.41 09/12/2024 11:11 AM    HGB 14.4 09/12/2024 11:11 AM    HCT 45.4 09/12/2024 11:11 AM    .9 09/12/2024 11:11 AM    MCH 32.7 09/12/2024 11:11 AM    MCHC 31.7 09/12/2024 11:11 AM    RDW 13.0 09/12/2024 11:11 AM     09/12/2024 11:11 AM    MPV 10.6 09/12/2024 11:11 AM     BMP:    Lab Results   Component Value Date/Time     09/12/2024 11:11 AM    K 4.9 09/12/2024 11:11 AM    K 3.6 05/27/2023 02:08 AM     09/12/2024 11:11 AM    CO2 17 09/12/2024 11:11 AM    BUN 15 09/12/2024 11:11 AM    CREATININE 0.9 09/12/2024 11:11 AM    CALCIUM 9.7 09/12/2024 11:11 AM    GFRAA >60 04/09/2022 05:08 PM    LABGLOM 76 09/12/2024 11:11 AM    LABGLOM 84 04/25/2024 05:44 AM    GLUCOSE 81 09/12/2024 11:11 AM    GLUCOSE 88 09/24/2011 05:50 AM     PT/INR:    Lab Results   Component Value Date/Time    PROTIME 9.6 10/02/2024 03:16 PM    PROTIME 10.9 09/23/2011 06:45 PM    INR 0.9 10/02/2024 03:16 PM     PTT:    Lab Results   Component Value Date/Time    APTT 31.0 09/23/2011 06:45 PM   [APTT}    Current Inpatient Medications  Current Facility-Administered Medications: sodium chloride flush 0.9 % injection 10 mL, 10 mL, IntraVENous, 2 times per day  sodium chloride flush 0.9 % injection 10 mL, 10 mL, IntraVENous, PRN  0.9 % sodium chloride

## 2024-10-03 NOTE — PROGRESS NOTES
Messaged Dr Jimenez per patient request regarding the the frequency of the Norco being changed to every 4 hours as needed due to the Epidural not being complete until Tuesday.

## 2024-10-03 NOTE — PLAN OF CARE
Problem: Discharge Planning  Goal: Discharge to home or other facility with appropriate resources  Outcome: Adequate for Discharge     Problem: Safety - Adult  Goal: Free from fall injury  Outcome: Adequate for Discharge     Problem: ABCDS Injury Assessment  Goal: Absence of physical injury  Outcome: Adequate for Discharge     Problem: Pain  Goal: Verbalizes/displays adequate comfort level or baseline comfort level  Outcome: Adequate for Discharge

## 2024-10-03 NOTE — DISCHARGE INSTRUCTIONS
procedure.   What happens on the day of the procedure?    Your doctor may tell you not to eat or drink for a certain amount of time before the procedure. Follow these instructions carefully.     Take a bath or shower before you come in for your procedure. Do not apply lotions, perfumes, deodorants, or nail polish.   At the hospital or surgery center   Bring a picture ID.     You may get medicine that relaxes you or puts you in a light sleep. The area being worked on will be numb.     The procedure will take 5 to 15 minutes. You will go home about an hour later.   When should you call your doctor?   You have questions or concerns.     You don't understand how to prepare for your procedure.     You become ill before the procedure (such as fever, flu, or a cold).     You need to reschedule or have changed your mind about having the procedure.   Current as of: July 10, 2023  Content Version: 14.2  © 2024 [employer].   Care instructions adapted under license by [employer]. If you have questions about a medical condition or this instruction, always ask your healthcare professional. [employer] disclaims any warranty or liability for your use of this information.

## 2024-10-03 NOTE — CARE COORDINATION
Patient Representative Name:       The Patient and/or Patient Representative Agree with the Discharge Plan     Detail Level: Simple Instructions: This plan will send the code FBSE to the PM system.  DO NOT or CHANGE the price. Price (Do Not Change): 0.00

## 2024-10-03 NOTE — PLAN OF CARE
Problem: Discharge Planning  Goal: Discharge to home or other facility with appropriate resources  Outcome: Progressing     Problem: Safety - Adult  Goal: Free from fall injury  Outcome: Progressing     Problem: ABCDS Injury Assessment  Goal: Absence of physical injury  10/2/2024 2302 by Jeana Eason, RN  Outcome: Progressing  10/2/2024 1519 by Oneal Hurtado RN  Outcome: Progressing     Problem: Pain  Goal: Verbalizes/displays adequate comfort level or baseline comfort level  Outcome: Progressing

## 2024-10-03 NOTE — PROGRESS NOTES
OCCUPATIONAL THERAPY INITIAL EVALUATION    [facility] [address], [place]      Date:10/3/2024                                                Patient Name: Kayleen La  MRN: [id number]  : 1965  Room: [facility]     Evaluating OT:Amelia Gagnon, OTR/L   License #  [id number]       Referring Provider: Grabiel Jimenez MD.  Susanna Troncoso MD     Specific Provider Orders/Date: OT evaluation & treatment        Diagnosis: Intractable back pain [M54.9]      Pertinent Medical History:  has a past medical history of Anxiety, Bipolar disorder ([facility]), Cervical disc disease, Cervical spondylitis with radiculitis ([facility]), Chronic back pain, Chronic kidney disease, CKD (chronic kidney disease), Depression, Facet hypertrophy, Hypertension, Irritable bowel syndrome, Lumbar canal stenosis, Migraine, Osteoarthritis, Panic disorder without agoraphobia, PONV (postoperative nausea and vomiting), Psychogenic nonepileptic seizure, Restless legs syndrome, and SVT (supraventricular tachycardia) ([facility]).    Surgery: for lumbar MEHREEN 10-3-24 per NS    Past Surgical History:  has a past surgical history that includes Hysterectomy; Breast reduction surgery; cervical fusion; laparotomy; other surgical history; Appendectomy; Colonoscopy (N/A, 2021); Abdomen surgery (N/A, 2021); Upper gastrointestinal endoscopy (N/A, 2021); Colonoscopy (N/A, 2021); Pain management procedure (Left, 2023); Nerve Block (Bilateral, 06/15/2023); Nerve Block (Bilateral, 2023); Partial hysterectomy (); Pain management procedure (Bilateral, 10/05/2023); Ovary removal; and laminectomy (N/A, 2024).       Precautions:  Fall Risk, neutral spine      Assessment of current deficits    [x] Functional mobility            [x]ADLs           [x] Strength                  [x]Cognition    [x] Functional transfers          [x] IADLs         [x] Safety Awareness

## 2024-10-03 NOTE — PROGRESS NOTES
Room #:  5215/5215-A  Patient Name: Kayleen La  Referring Provider:   10/02/24 1245  PT evaluation and treat  Start:  10/02/24 1245,   End:  10/02/24 1245,   ONE TIME,   Standing Count:  1 Occurrences,   R         Grabiel Jimenez MD      PHYSICAL THERAPY INITIAL EVALUATION    Plan of care: No skilled needs identified; will discharge from services. If condition changes, please reorder physical therapy.     Placement recommendation: home   Equipment recommendation:  Patient has needed equipment         AM-PAC Basic Mobility   20/24          Order:  EVALUATE AND TREAT  Diagnosis/Problem list:      Patient Active Problem List   Diagnosis    Bipolar I disorder (HCC)    Panic disorder without agoraphobia    Spinal stenosis of lumbar region with neurogenic claudication    Cervical disc disease    FELA (generalized anxiety disorder)    Primary hypertension    Psychogenic nonepileptic seizure    DDD (degenerative disc disease), lumbar    Lumbar radicular pain    Synovial cyst of lumbar facet joint    Lumbosacral spondylosis without myelopathy    Lumbar spondylosis    Chronic migraine without aura without status migrainosus, not intractable    Primary insomnia    Severe recurrent major depression without psychotic features (HCC)    Intractable low back pain    Opioid dependence with current use (HCC)    Acute right-sided low back pain with sciatica       Past medical history:       Diagnosis Date    Anxiety     Bipolar disorder (HCC)     Bipolar 1 disorder    Cervical disc disease     Cervical spondylitis with radiculitis (HCC)     C2, C7    Chronic back pain     Chronic kidney disease     In chart    CKD (chronic kidney disease)     Depression     Facet hypertrophy     L4/5 - S1    Hypertension     Irritable bowel syndrome 20/18    Lumbar canal stenosis     Migraine     Osteoarthritis     Panic disorder without agoraphobia     Counselor [facility]    PONV (postoperative nausea and vomiting)

## 2024-10-04 ENCOUNTER — CLINICAL DOCUMENTATION (OUTPATIENT)
Dept: INTERVENTIONAL RADIOLOGY/VASCULAR | Age: 59
End: 2024-10-04

## 2024-10-04 ENCOUNTER — CARE COORDINATION (OUTPATIENT)
Dept: CARE COORDINATION | Age: 59
End: 2024-10-04

## 2024-10-04 ENCOUNTER — TELEPHONE (OUTPATIENT)
Dept: PRIMARY CARE CLINIC | Age: 59
End: 2024-10-04

## 2024-10-04 DIAGNOSIS — M54.41 ACUTE RIGHT-SIDED LOW BACK PAIN WITH RIGHT-SIDED SCIATICA: Primary | ICD-10-CM

## 2024-10-04 RX ORDER — PREDNISONE 10 MG/1
50 TABLET ORAL ONCE
Qty: 5 TABLET | Refills: 0 | Status: SHIPPED | OUTPATIENT
Start: 2024-10-04 | End: 2024-10-04

## 2024-10-04 RX ORDER — BACLOFEN 10 MG/1
10 TABLET ORAL 3 TIMES DAILY PRN
COMMUNITY

## 2024-10-04 RX ORDER — DIPHENHYDRAMINE HCL 25 MG
50 CAPSULE ORAL ONCE
Qty: 2 CAPSULE | Refills: 0 | Status: SHIPPED | OUTPATIENT
Start: 2024-10-04 | End: 2024-10-04

## 2024-10-04 NOTE — TELEPHONE ENCOUNTER
Please check with patient to see if this is something she actually needs.  There isn't much that I am able to help her with at this time, they are awaiting an injection through the pain management specialist

## 2024-10-04 NOTE — PROGRESS NOTES
I called patients insurance [employer] and spoke with Authorization Rep Waters.   CPT 64483 x2 units is approved    Authorization: [id number]  Valid Dates: 10/3/2024 - 10/8/2024

## 2024-10-04 NOTE — TELEPHONE ENCOUNTER
Spoke with patient.  She will be getting her injection on the 5th.  She will reach out to Dr Ferris after that if she needs anything.

## 2024-10-04 NOTE — DISCHARGE SUMMARY
hospital  Please review changes to pre-hospital admission medications and prescriptions for new medications upon discharge from the hospital with PCP  Please review results of labs and imaging studies with PCP  Follow-up with consultants as directed by them   If recurrence or worsening of symptoms please call primary care physician or return to the ER immediately  Diet: No diet orders on file    Preparing for this patient's discharge, including paperwork, orders, instructions, and meeting with patient did required >35 minutes.    Electronically signed by Grabiel Jimenez MD on 10/4/2024 at 8:14 AM

## 2024-10-04 NOTE — CARE COORDINATION
Care Transitions Note    Initial Call - Call within 2 business days of discharge: Yes    Patient Current Location:  Home: [address] [place] [id number]    Care Transition Nurse contacted the patient by telephone to perform post hospital discharge assessment, verified name and  as identifiers. Provided introduction to self, and explanation of the Care Transition Nurse role.     Patient: Kayleen La    Patient : 1965   MRN: [id number]    Reason for Admission: Acute right-sided low back pain with sciatica  Discharge Date: 10/3/24  RURS: No data recorded    Last Discharge Facility       Date Complaint Diagnosis Description Type Department Provider    10/2/24   Admission (Discharged) Grabiel Winters MD    10/2/24 Back Pain Sciatica of left side ED (TRANSFER) [facility], DO          Was this an external facility discharge? No    Additional needs identified to be addressed with provider   No needs identified             Method of communication with provider: none.    Patients top risk factors for readmission: functional physical ability and polypharmacy    Interventions to address risk factors:   Education: Reiterated no driving while on opiod/anxiety/sleep aid meds.     Care Summary Note: Spoke with patient today 10/4/24 for initial TRISTIAN/hospital discharge follow up.     Patient states having ongoing right sided low back pain which she rates 7/10 in severity on pain scale. States getting pain relief from prescribed Norco. Noted MRI of lumbar spine obtained showed the following below:     IMPRESSION:  1. No fracture or joint dislocation.  2. Status post L4 and L5 laminectomies.  3. No significant central canal stenosis.  4. 8 x 4 mm synovial cyst arising medially from the left L5-S1 facet joint,  exerting mass effect on the left S1 nerve.  5. Moderate to severe left neural foraminal stenosis at L5-S1.    Confirmed with patient she has a past history of cervical fusion and lumbar

## 2024-10-07 ENCOUNTER — TELEPHONE (OUTPATIENT)
Dept: FAMILY MEDICINE CLINIC | Age: 59
End: 2024-10-07

## 2024-10-07 NOTE — TELEPHONE ENCOUNTER
Patient was in the hospital for Acute right-sided low back pain with sciatica.  She was discharged on 10/3. Did you need to see her for a follow up?

## 2024-10-08 ENCOUNTER — HOSPITAL ENCOUNTER (OUTPATIENT)
Dept: INTERVENTIONAL RADIOLOGY/VASCULAR | Age: 59
Discharge: HOME | End: 2024-10-10
Payer: COMMERCIAL

## 2024-10-08 VITALS
BODY MASS INDEX: 21.34 KG/M2 | HEART RATE: 85 BPM | SYSTOLIC BLOOD PRESSURE: 121 MMHG | OXYGEN SATURATION: 100 % | HEIGHT: 64 IN | DIASTOLIC BLOOD PRESSURE: 67 MMHG | RESPIRATION RATE: 16 BRPM | WEIGHT: 125 LBS | TEMPERATURE: 98.4 F

## 2024-10-08 DIAGNOSIS — M54.42 CHRONIC BILATERAL LOW BACK PAIN WITH BILATERAL SCIATICA: ICD-10-CM

## 2024-10-08 DIAGNOSIS — M71.30 SYNOVIAL CYST: ICD-10-CM

## 2024-10-08 DIAGNOSIS — M54.16 LUMBAR RADICULOPATHY: ICD-10-CM

## 2024-10-08 DIAGNOSIS — M54.41 CHRONIC BILATERAL LOW BACK PAIN WITH BILATERAL SCIATICA: ICD-10-CM

## 2024-10-08 DIAGNOSIS — G89.29 CHRONIC BILATERAL LOW BACK PAIN WITH BILATERAL SCIATICA: ICD-10-CM

## 2024-10-08 PROCEDURE — 6360000002 HC RX W HCPCS: Performed by: RADIOLOGY

## 2024-10-08 PROCEDURE — 64484 NJX AA&/STRD TFRM EPI L/S EA: CPT

## 2024-10-08 PROCEDURE — 2500000003 HC RX 250 WO HCPCS: Performed by: RADIOLOGY

## 2024-10-08 PROCEDURE — 6360000004 HC RX CONTRAST MEDICATION: Performed by: RADIOLOGY

## 2024-10-08 PROCEDURE — 7100000011 HC PHASE II RECOVERY - ADDTL 15 MIN

## 2024-10-08 PROCEDURE — 64483 NJX AA&/STRD TFRM EPI L/S 1: CPT

## 2024-10-08 PROCEDURE — 2709999900 IR INJ SI JOINT ANESTHETIC/STEROID W IMAGING W OR WO ARTHROGRAPHY

## 2024-10-08 PROCEDURE — 27096 INJECT SACROILIAC JOINT: CPT

## 2024-10-08 PROCEDURE — 7100000010 HC PHASE II RECOVERY - FIRST 15 MIN

## 2024-10-08 RX ORDER — TRIAMCINOLONE ACETONIDE 40 MG/ML
INJECTION, SUSPENSION INTRA-ARTICULAR; INTRAMUSCULAR PRN
Status: COMPLETED | OUTPATIENT
Start: 2024-10-08 | End: 2024-10-08

## 2024-10-08 RX ORDER — IOPAMIDOL 408 MG/ML
INJECTION, SOLUTION INTRATHECAL PRN
Status: COMPLETED | OUTPATIENT
Start: 2024-10-08 | End: 2024-10-08

## 2024-10-08 RX ORDER — LIDOCAINE HYDROCHLORIDE AND EPINEPHRINE BITARTRATE 20; .01 MG/ML; MG/ML
INJECTION, SOLUTION SUBCUTANEOUS PRN
Status: COMPLETED | OUTPATIENT
Start: 2024-10-08 | End: 2024-10-08

## 2024-10-08 RX ORDER — BUPIVACAINE HYDROCHLORIDE 2.5 MG/ML
INJECTION, SOLUTION EPIDURAL; INFILTRATION; INTRACAUDAL PRN
Status: COMPLETED | OUTPATIENT
Start: 2024-10-08 | End: 2024-10-08

## 2024-10-08 RX ORDER — LIDOCAINE HYDROCHLORIDE 20 MG/ML
INJECTION, SOLUTION INFILTRATION; PERINEURAL PRN
Status: COMPLETED | OUTPATIENT
Start: 2024-10-08 | End: 2024-10-08

## 2024-10-08 RX ADMIN — TRIAMCINOLONE ACETONIDE 80 MG: 40 INJECTION, SUSPENSION INTRA-ARTICULAR; INTRAMUSCULAR at 10:37

## 2024-10-08 RX ADMIN — IOPAMIDOL 2 ML: 408 INJECTION, SOLUTION INTRATHECAL at 10:46

## 2024-10-08 RX ADMIN — LIDOCAINE HYDROCHLORIDE,EPINEPHRINE BITARTRATE 16 ML: 20; .01 INJECTION, SOLUTION INFILTRATION; PERINEURAL at 10:36

## 2024-10-08 RX ADMIN — LIDOCAINE HYDROCHLORIDE 10 ML: 20 INJECTION, SOLUTION INFILTRATION; PERINEURAL at 10:32

## 2024-10-08 RX ADMIN — BUPIVACAINE HYDROCHLORIDE 10 MG: 2.5 INJECTION, SOLUTION EPIDURAL; INFILTRATION; INTRACAUDAL; PERINEURAL at 10:39

## 2024-10-08 NOTE — PROCEDURES
0935 Patient arrived to Radiology department for lumbar MEHREEN. Vital signs taken. IV placed, blood obtained, IV flushed and prn adapter attached. Allergies, home medications, medical history, H&P and fasting instructions reviewed with patient. Paper chart reviewed for correct documents.    1004 Procedural instructions given, questions answered, understanding expressed and consent signed.

## 2024-10-08 NOTE — PROCEDURES
1053 Patient returned from lumbar MEHREEN. Report received from TAMRA Jimenez. Dressing checked, clean, dry, and intact. Patient stable. No s/s of complications noted or reported. Vitals will be checked q 15min, see flow sheets. Patient will receive rest of fluid bolus and then be okayed for discharge.    1125 Site was checked with every set of vitals. Site clean dry and intact. Discharge papers reviewed with patient and questions answered. Patient taken to door via wheelchair with spouse. Patient in stable condition, no s/s of complications noted or reported.

## 2024-10-16 ENCOUNTER — CARE COORDINATION (OUTPATIENT)
Dept: CARE COORDINATION | Age: 59
End: 2024-10-16

## 2024-10-16 NOTE — CARE COORDINATION
Care Transitions Note    Follow Up Call     Attempted to reach patient for transitions of care follow up.  Unable to reach patient.      Outreach Attempts:   HIPAA compliant voicemail left for patient.     Care Summary Note: Attempted to contact patient today 10/16/24 for TRISTIAN/hospital discharge follow up sub call for acute right-sided low back pain with sciatica. Let message on home/mobile number requesting a return call back to CTN and provided contact information.     Noted patient had her lumbar MEHREEN per IR on 10/8/24. CTN will continue to follow for Care Transition.     Follow Up Appointment:   Future Appointments         Provider Specialty Dept Phone    12/18/2024 11:45 AM Ana Ferris DO [facility] [phone]                Brittany Aguilar, APRN

## 2024-10-23 ENCOUNTER — CARE COORDINATION (OUTPATIENT)
Dept: CARE COORDINATION | Age: 59
End: 2024-10-23

## 2024-10-23 NOTE — CARE COORDINATION
Care Transitions Note    Follow Up Call     Attempted to reach patient for transitions of care follow up.  Unable to reach patient.      Outreach Attempts:   Multiple attempts to contact patient at phone numbers on file.     Care Summary Note: Second attempt made today 10/23/24 for TRISTIAN/hospital discharge follow up sub call. Left message on home/mobile number requesting a return call back to CTN and provided contact information. CTN signing off if no return call.     Follow Up Appointment:   Future Appointments         Provider Specialty Dept Phone    12/18/2024 11:45 AM Ana Ferris DO [facility] [phone]              Brittany Aguilar, APRN

## 2024-11-14 DIAGNOSIS — F31.9 BIPOLAR I DISORDER (HCC): ICD-10-CM

## 2024-11-15 RX ORDER — LAMOTRIGINE 150 MG/1
150 TABLET ORAL NIGHTLY
Qty: 90 TABLET | Refills: 1 | Status: SHIPPED | OUTPATIENT
Start: 2024-11-15

## 2024-11-21 DIAGNOSIS — F31.9 BIPOLAR I DISORDER (HCC): ICD-10-CM

## 2024-11-21 RX ORDER — LAMOTRIGINE 150 MG/1
150 TABLET ORAL NIGHTLY
Qty: 90 TABLET | Refills: 1 | OUTPATIENT
Start: 2024-11-21

## 2024-12-18 ENCOUNTER — OFFICE VISIT (OUTPATIENT)
Dept: PRIMARY CARE CLINIC | Age: 59
End: 2024-12-18
Payer: COMMERCIAL

## 2024-12-18 VITALS
TEMPERATURE: 97.1 F | HEART RATE: 75 BPM | WEIGHT: 122 LBS | OXYGEN SATURATION: 94 % | SYSTOLIC BLOOD PRESSURE: 120 MMHG | DIASTOLIC BLOOD PRESSURE: 76 MMHG | HEIGHT: 64 IN | BODY MASS INDEX: 20.83 KG/M2

## 2024-12-18 DIAGNOSIS — G43.709 CHRONIC MIGRAINE WITHOUT AURA WITHOUT STATUS MIGRAINOSUS, NOT INTRACTABLE: ICD-10-CM

## 2024-12-18 DIAGNOSIS — F31.9 BIPOLAR I DISORDER (HCC): ICD-10-CM

## 2024-12-18 DIAGNOSIS — M48.062 SPINAL STENOSIS OF LUMBAR REGION WITH NEUROGENIC CLAUDICATION: ICD-10-CM

## 2024-12-18 DIAGNOSIS — M50.90 CERVICAL DISC DISEASE: ICD-10-CM

## 2024-12-18 DIAGNOSIS — F41.0 PANIC DISORDER WITHOUT AGORAPHOBIA: ICD-10-CM

## 2024-12-18 DIAGNOSIS — Z12.31 ENCOUNTER FOR SCREENING MAMMOGRAM FOR MALIGNANT NEOPLASM OF BREAST: ICD-10-CM

## 2024-12-18 DIAGNOSIS — F11.20 OPIOID DEPENDENCE WITH CURRENT USE (HCC): ICD-10-CM

## 2024-12-18 DIAGNOSIS — M51.362 DEGENERATION OF INTERVERTEBRAL DISC OF LUMBAR REGION WITH DISCOGENIC BACK PAIN AND LOWER EXTREMITY PAIN: ICD-10-CM

## 2024-12-18 DIAGNOSIS — F51.01 PRIMARY INSOMNIA: ICD-10-CM

## 2024-12-18 DIAGNOSIS — M47.817 LUMBOSACRAL SPONDYLOSIS WITHOUT MYELOPATHY: ICD-10-CM

## 2024-12-18 DIAGNOSIS — I10 PRIMARY HYPERTENSION: Primary | ICD-10-CM

## 2024-12-18 PROBLEM — M54.59 INTRACTABLE LOW BACK PAIN: Status: RESOLVED | Noted: 2024-03-27 | Resolved: 2024-12-18

## 2024-12-18 PROBLEM — F41.1 GAD (GENERALIZED ANXIETY DISORDER): Status: RESOLVED | Noted: 2021-09-08 | Resolved: 2024-12-18

## 2024-12-18 PROBLEM — F33.2 SEVERE RECURRENT MAJOR DEPRESSION WITHOUT PSYCHOTIC FEATURES (HCC): Status: RESOLVED | Noted: 2022-04-11 | Resolved: 2024-12-18

## 2024-12-18 PROBLEM — M54.40 ACUTE RIGHT-SIDED LOW BACK PAIN WITH SCIATICA: Status: RESOLVED | Noted: 2024-10-02 | Resolved: 2024-12-18

## 2024-12-18 PROCEDURE — 3074F SYST BP LT 130 MM HG: CPT | Performed by: FAMILY MEDICINE

## 2024-12-18 PROCEDURE — 3078F DIAST BP <80 MM HG: CPT | Performed by: FAMILY MEDICINE

## 2024-12-18 PROCEDURE — 99214 OFFICE O/P EST MOD 30 MIN: CPT | Performed by: FAMILY MEDICINE

## 2024-12-18 RX ORDER — HYDROCODONE BITARTRATE AND ACETAMINOPHEN 7.5; 325 MG/1; MG/1
1 TABLET ORAL EVERY 8 HOURS PRN
Qty: 90 TABLET | Refills: 0 | Status: SHIPPED | OUTPATIENT
Start: 2025-02-12 | End: 2025-03-14

## 2024-12-18 RX ORDER — ALPRAZOLAM 0.5 MG
0.5 TABLET ORAL 3 TIMES DAILY PRN
Qty: 270 TABLET | Refills: 0 | Status: SHIPPED | OUTPATIENT
Start: 2024-12-18 | End: 2025-03-18

## 2024-12-18 RX ORDER — TOPIRAMATE 25 MG/1
50 TABLET, FILM COATED ORAL NIGHTLY
Qty: 180 TABLET | Refills: 1 | Status: SHIPPED | OUTPATIENT
Start: 2024-12-18

## 2024-12-18 RX ORDER — CARISOPRODOL 350 MG/1
350 TABLET ORAL 4 TIMES DAILY PRN
Qty: 120 TABLET | Refills: 2 | Status: SHIPPED | OUTPATIENT
Start: 2024-12-18 | End: 2025-03-18

## 2024-12-18 RX ORDER — HYDROCODONE BITARTRATE AND ACETAMINOPHEN 7.5; 325 MG/1; MG/1
1 TABLET ORAL EVERY 8 HOURS PRN
Qty: 90 TABLET | Refills: 0 | Status: SHIPPED | OUTPATIENT
Start: 2025-01-15 | End: 2025-02-14

## 2024-12-18 RX ORDER — HYDROCODONE BITARTRATE AND ACETAMINOPHEN 7.5; 325 MG/1; MG/1
1 TABLET ORAL EVERY 8 HOURS PRN
Qty: 90 TABLET | Refills: 0 | Status: SHIPPED | OUTPATIENT
Start: 2024-12-18 | End: 2025-01-17

## 2024-12-18 RX ORDER — ZOLPIDEM TARTRATE 12.5 MG/1
12.5 TABLET, FILM COATED, EXTENDED RELEASE ORAL NIGHTLY PRN
Qty: 30 TABLET | Refills: 2 | Status: SHIPPED | OUTPATIENT
Start: 2024-12-18 | End: 2025-03-18

## 2024-12-18 ASSESSMENT — ENCOUNTER SYMPTOMS
VOMITING: 0
WHEEZING: 0
CONSTIPATION: 0
SHORTNESS OF BREATH: 0
ABDOMINAL PAIN: 0
DIARRHEA: 0
BACK PAIN: 1
NAUSEA: 0
COUGH: 0

## 2024-12-18 NOTE — ASSESSMENT & PLAN NOTE
Orders:    carisoprodol (SOMA) 350 MG tablet; Take 1 tablet by mouth 4 times daily as needed for Muscle spasms for up to 90 days. Max Daily Amount: 1,400 mg

## 2024-12-18 NOTE — ASSESSMENT & PLAN NOTE
OARRS reviewed and is appropriate.  Follows with PM, NS for management outside of medications.    Orders:    HYDROcodone-acetaminophen (NORCO) 7.5-325 MG per tablet; Take 1 tablet by mouth every 8 hours as needed for Pain for up to 30 days. Intended supply: 30 days Max Daily Amount: 3 tablets    HYDROcodone-acetaminophen (NORCO) 7.5-325 MG per tablet; Take 1 tablet by mouth every 8 hours as needed for Pain for up to 30 days. Intended supply: 30 days Max Daily Amount: 3 tablets    HYDROcodone-acetaminophen (NORCO) 7.5-325 MG per tablet; Take 1 tablet by mouth every 8 hours as needed for Pain for up to 30 days. Intended supply: 30 days Max Daily Amount: 3 tablets    carisoprodol (SOMA) 350 MG tablet; Take 1 tablet by mouth 4 times daily as needed for Muscle spasms for up to 90 days. Max Daily Amount: 1,400 mg

## 2024-12-18 NOTE — ASSESSMENT & PLAN NOTE
Chronic, at goal (stable), continue current treatment plan    Orders:    zolpidem (AMBIEN CR) 12.5 MG extended release tablet; Take 1 tablet by mouth nightly as needed for Sleep for up to 90 days. Max Daily Amount: 12.5 mg

## 2024-12-18 NOTE — ASSESSMENT & PLAN NOTE
Chronic, at goal (stable), continue current treatment plan    Orders:    topiramate (TOPAMAX) 25 MG tablet; Take 2 tablets by mouth at bedtime

## 2024-12-18 NOTE — ASSESSMENT & PLAN NOTE
OARRS appropriate.  Stable on current dosing.     Orders:    ALPRAZolam (XANAX) 0.5 MG tablet; Take 1 tablet by mouth 3 times daily as needed for Sleep for up to 90 days.    sertraline (ZOLOFT) 50 MG tablet; Take 1 tablet by mouth at bedtime

## 2024-12-18 NOTE — PROGRESS NOTES
24  Kayleen La : 1965 Sex: female  Age: 59 y.o.    Chief Complaint   Patient presents with    Hypertension    Medication Refill    Ear Pain       L ear started on the way here      HPI:  59 y.o. female patient presents today for 3 month follow up of chronic medical conditions, medication refills. Patient's chart, medical, surgical and medication history all reviewed.    Hypertension   The patient presents today for follow up of HTN.  The problem is well controlled. Risk factors for coronary artery disease include Age > 30, HTN, and family Hx. Current treatments include amlodipine (Norvasc). The patient is compliant all of the time.  Lifestyle changes the patient has made include  none .  Today the patient is complaining of none.        Bipolar  Patient complains of evaluation of anxiety disorder and post traumatic stress  disorder.  She has the following anxiety symptoms: difficulty concentrating, feelings of losing control, irritable, racing thoughts. Onset of symptoms was approximately several years ago, stable since that time. She denies current suicidal and homicidal ideation.  Previous treatment includes Ativan, Zoloft, and Lamictal/Seroquel  and individual therapy.  She complains of the following side effects from the treatment: none.    Patient notes that April is always difficult due to the death of her son over 10 years ago.  Patient has a history of psuedoseizures, but states that she had an episode of intense convulsions for \"over 10 minutes\" per her  in early 2022.  She thinks episode was due to increased stress.  She called 911 and was given an injection of \"something\" and then taken to the ER.  Developed severe migraine and was given a migraine cocktail.  Notes that all of the medications between EMT and ER caused her to be very groggy and could not remember much of the episode.  States that she had a virtual visit with someone in the ER who asked her questions

## 2024-12-18 NOTE — ASSESSMENT & PLAN NOTE
Planning breast reduction with Dr. Santos to hopefully alleviate pain and pressure from neck.     Orders:    HYDROcodone-acetaminophen (NORCO) 7.5-325 MG per tablet; Take 1 tablet by mouth every 8 hours as needed for Pain for up to 30 days. Intended supply: 30 days Max Daily Amount: 3 tablets    HYDROcodone-acetaminophen (NORCO) 7.5-325 MG per tablet; Take 1 tablet by mouth every 8 hours as needed for Pain for up to 30 days. Intended supply: 30 days Max Daily Amount: 3 tablets    HYDROcodone-acetaminophen (NORCO) 7.5-325 MG per tablet; Take 1 tablet by mouth every 8 hours as needed for Pain for up to 30 days. Intended supply: 30 days Max Daily Amount: 3 tablets

## 2025-01-12 DIAGNOSIS — Z78.0 POSTMENOPAUSAL ESTROGEN DEFICIENCY: ICD-10-CM

## 2025-01-13 ENCOUNTER — OFFICE VISIT (OUTPATIENT)
Dept: FAMILY MEDICINE CLINIC | Age: 60
End: 2025-01-13
Payer: COMMERCIAL

## 2025-01-13 VITALS
SYSTOLIC BLOOD PRESSURE: 118 MMHG | DIASTOLIC BLOOD PRESSURE: 62 MMHG | HEIGHT: 64 IN | TEMPERATURE: 98.6 F | BODY MASS INDEX: 20.83 KG/M2 | HEART RATE: 73 BPM | OXYGEN SATURATION: 99 % | RESPIRATION RATE: 16 BRPM | WEIGHT: 122 LBS

## 2025-01-13 DIAGNOSIS — G43.709 CHRONIC MIGRAINE WITHOUT AURA WITHOUT STATUS MIGRAINOSUS, NOT INTRACTABLE: Primary | ICD-10-CM

## 2025-01-13 PROCEDURE — 3078F DIAST BP <80 MM HG: CPT

## 2025-01-13 PROCEDURE — 3074F SYST BP LT 130 MM HG: CPT

## 2025-01-13 PROCEDURE — 99214 OFFICE O/P EST MOD 30 MIN: CPT

## 2025-01-13 PROCEDURE — 96372 THER/PROPH/DIAG INJ SC/IM: CPT

## 2025-01-13 RX ORDER — METHYLPREDNISOLONE ACETATE 40 MG/ML
40 INJECTION, SUSPENSION INTRA-ARTICULAR; INTRALESIONAL; INTRAMUSCULAR; SOFT TISSUE ONCE
Status: COMPLETED | OUTPATIENT
Start: 2025-01-13 | End: 2025-01-13

## 2025-01-13 RX ORDER — KETOROLAC TROMETHAMINE 30 MG/ML
30 INJECTION, SOLUTION INTRAMUSCULAR; INTRAVENOUS ONCE
Status: COMPLETED | OUTPATIENT
Start: 2025-01-13 | End: 2025-01-13

## 2025-01-13 RX ORDER — ONDANSETRON 4 MG/1
4 TABLET, ORALLY DISINTEGRATING ORAL EVERY 8 HOURS PRN
Qty: 21 TABLET | Refills: 0 | Status: SHIPPED | OUTPATIENT
Start: 2025-01-13

## 2025-01-13 RX ORDER — ESTRADIOL 0.1 MG/D
FILM, EXTENDED RELEASE TRANSDERMAL
Qty: 8 PATCH | Refills: 3 | Status: SHIPPED | OUTPATIENT
Start: 2025-01-13

## 2025-01-13 RX ADMIN — KETOROLAC TROMETHAMINE 30 MG: 30 INJECTION, SOLUTION INTRAMUSCULAR; INTRAVENOUS at 13:04

## 2025-01-13 RX ADMIN — METHYLPREDNISOLONE ACETATE 40 MG: 40 INJECTION, SUSPENSION INTRA-ARTICULAR; INTRALESIONAL; INTRAMUSCULAR; SOFT TISSUE at 13:05

## 2025-01-13 NOTE — PROGRESS NOTES
Chief Complaint   Migraine    History of Present Illness   Source of history provided by: patient.      Kayleen La is a 59 y.o. old female presenting to the walk in clinic for evaluation of a headache which has been present x 1 days. Headache is located over the temporal region and feels tight in the back of her head region.  Reports associated photophobia, phonophobia, and nausea.  Denies vomiting episodes. Pt reports having headaches like this in the past.  She has extensive history of migraines.  She is not on any chronic or abortive therapy currently.  She reports she did attempt 1 leftover dose of medication she used to take for abortive therapy yesterday and it did not work.  She is unsure of the name.  Denies this being the worst HA of his/her life. Pt denies any recent falls, trauma, dizziness, syncope, CP, SOB, palpitations, nasal congestion, visual loss, unilateral weakness, fever, neck stiffness, or recent illness.      ROS    Unless otherwise stated in this report or unable to obtain because of the patient's clinical or mental status as evidenced by the medical record, this patients's positive and negative responses for Review of Systems, constitutional, psych, eyes, ENT, cardiovascular, respiratory, gastrointestinal, neurological, genitourinary, musculoskeletal, integument systems and systems related to the presenting problem are either stated in the preceding or were not pertinent or were negative for the symptoms and/or complaints related to the medical problem.      Physical Exam         VS:  /62   Pulse 73   Temp 98.6 °F (37 °C)   Resp 16   Ht 1.626 m (5' 4.02\")   Wt 55.3 kg (122 lb)   SpO2 99%   BMI 20.93 kg/m²    Oxygen Saturation Interpretation: Normal.    Constitutional:  Level of Consciousness: Alert, gives appropriate history, ambulated self to the room.    Eyes:  PERRL, EOMI, no discharge or conjunctival injection.  Ears:  External ears without lesions. TM's clear

## 2025-02-14 ENCOUNTER — HOSPITAL ENCOUNTER (OUTPATIENT)
Age: 60
Discharge: HOME | End: 2025-02-16

## 2025-02-14 PROCEDURE — 88305 TISSUE EXAM BY PATHOLOGIST: CPT

## 2025-02-20 LAB — SURGICAL PATHOLOGY REPORT: NORMAL

## 2025-03-10 DIAGNOSIS — F51.01 PRIMARY INSOMNIA: ICD-10-CM

## 2025-03-10 RX ORDER — ZOLPIDEM TARTRATE 12.5 MG/1
12.5 TABLET, FILM COATED, EXTENDED RELEASE ORAL NIGHTLY PRN
Qty: 5 TABLET | Refills: 0 | Status: SHIPPED | OUTPATIENT
Start: 2025-03-14 | End: 2025-03-19

## 2025-03-15 DIAGNOSIS — I10 PRIMARY HYPERTENSION: ICD-10-CM

## 2025-03-16 DIAGNOSIS — F33.2 SEVERE RECURRENT MAJOR DEPRESSION WITHOUT PSYCHOTIC FEATURES (HCC): ICD-10-CM

## 2025-03-16 DIAGNOSIS — F41.0 PANIC DISORDER WITHOUT AGORAPHOBIA: ICD-10-CM

## 2025-03-17 RX ORDER — QUETIAPINE FUMARATE 100 MG/1
100 TABLET, FILM COATED ORAL 2 TIMES DAILY
Qty: 180 TABLET | Refills: 1 | OUTPATIENT
Start: 2025-03-17

## 2025-03-17 RX ORDER — AMLODIPINE BESYLATE 10 MG/1
10 TABLET ORAL NIGHTLY
Qty: 90 TABLET | Refills: 1 | OUTPATIENT
Start: 2025-03-17

## 2025-03-17 NOTE — TELEPHONE ENCOUNTER
Name of Medication(s) Requested:  Requested Prescriptions     Pending Prescriptions Disp Refills    sertraline (ZOLOFT) 50 MG tablet [Pharmacy Med Name: SERTRALINE 50MG TABLETS] 90 tablet 1     Sig: TAKE 1 TABLET BY MOUTH AT BEDTIME    QUEtiapine (SEROQUEL) 100 MG tablet [Pharmacy Med Name: QUETIAPINE 100MG TABLETS] 180 tablet 1     Sig: TAKE 1 TABLET BY MOUTH TWICE DAILY       Medication is on current medication list Yes    Dosage and directions were verified? Yes    Quantity verified: 90 day supply     Pharmacy Verified?  Yes    Last Appointment:  12/18/2024    Future appts:  Future Appointments   Date Time Provider Department Center   3/18/2025 10:45 AM Ana Ferris DO [facility]        (If no appt send self scheduling link. .REFILLAPPT)  Scheduling request sent?     [] Yes  [x] No    Does patient need updated?  [] Yes  [x] No

## 2025-03-18 ENCOUNTER — OFFICE VISIT (OUTPATIENT)
Dept: PRIMARY CARE CLINIC | Age: 60
End: 2025-03-18
Payer: COMMERCIAL

## 2025-03-18 VITALS
SYSTOLIC BLOOD PRESSURE: 126 MMHG | OXYGEN SATURATION: 98 % | HEART RATE: 88 BPM | BODY MASS INDEX: 20.83 KG/M2 | TEMPERATURE: 97.9 F | WEIGHT: 122 LBS | HEIGHT: 64 IN | DIASTOLIC BLOOD PRESSURE: 84 MMHG

## 2025-03-18 DIAGNOSIS — M50.90 CERVICAL DISC DISEASE: ICD-10-CM

## 2025-03-18 DIAGNOSIS — G43.709 CHRONIC MIGRAINE WITHOUT AURA WITHOUT STATUS MIGRAINOSUS, NOT INTRACTABLE: ICD-10-CM

## 2025-03-18 DIAGNOSIS — M51.362 DEGENERATION OF INTERVERTEBRAL DISC OF LUMBAR REGION WITH DISCOGENIC BACK PAIN AND LOWER EXTREMITY PAIN: ICD-10-CM

## 2025-03-18 DIAGNOSIS — F31.9 BIPOLAR I DISORDER (HCC): ICD-10-CM

## 2025-03-18 DIAGNOSIS — F41.0 PANIC DISORDER WITHOUT AGORAPHOBIA: ICD-10-CM

## 2025-03-18 DIAGNOSIS — I10 PRIMARY HYPERTENSION: Primary | ICD-10-CM

## 2025-03-18 DIAGNOSIS — F51.01 PRIMARY INSOMNIA: ICD-10-CM

## 2025-03-18 DIAGNOSIS — M48.062 SPINAL STENOSIS OF LUMBAR REGION WITH NEUROGENIC CLAUDICATION: ICD-10-CM

## 2025-03-18 DIAGNOSIS — M47.817 LUMBOSACRAL SPONDYLOSIS WITHOUT MYELOPATHY: ICD-10-CM

## 2025-03-18 DIAGNOSIS — F33.2 SEVERE RECURRENT MAJOR DEPRESSION WITHOUT PSYCHOTIC FEATURES (HCC): ICD-10-CM

## 2025-03-18 PROCEDURE — 99214 OFFICE O/P EST MOD 30 MIN: CPT | Performed by: FAMILY MEDICINE

## 2025-03-18 PROCEDURE — 3079F DIAST BP 80-89 MM HG: CPT | Performed by: FAMILY MEDICINE

## 2025-03-18 PROCEDURE — 3074F SYST BP LT 130 MM HG: CPT | Performed by: FAMILY MEDICINE

## 2025-03-18 RX ORDER — ALPRAZOLAM 0.5 MG
0.5 TABLET ORAL 3 TIMES DAILY PRN
Qty: 270 TABLET | Refills: 0 | Status: SHIPPED | OUTPATIENT
Start: 2025-03-18 | End: 2025-06-16

## 2025-03-18 RX ORDER — HYDROCODONE BITARTRATE AND ACETAMINOPHEN 7.5; 325 MG/1; MG/1
1 TABLET ORAL EVERY 8 HOURS PRN
Qty: 90 TABLET | Refills: 0 | Status: SHIPPED | OUTPATIENT
Start: 2025-03-18 | End: 2025-04-17

## 2025-03-18 RX ORDER — CARISOPRODOL 350 MG/1
350 TABLET ORAL 4 TIMES DAILY PRN
Qty: 120 TABLET | Refills: 2 | Status: SHIPPED | OUTPATIENT
Start: 2025-03-18 | End: 2025-06-16

## 2025-03-18 RX ORDER — TOPIRAMATE 25 MG/1
50 TABLET, FILM COATED ORAL NIGHTLY
Qty: 180 TABLET | Refills: 1 | Status: SHIPPED | OUTPATIENT
Start: 2025-03-18

## 2025-03-18 RX ORDER — AMLODIPINE BESYLATE 10 MG/1
10 TABLET ORAL NIGHTLY
Qty: 90 TABLET | Refills: 1 | Status: SHIPPED | OUTPATIENT
Start: 2025-03-18

## 2025-03-18 RX ORDER — LAMOTRIGINE 150 MG/1
150 TABLET ORAL NIGHTLY
Qty: 90 TABLET | Refills: 1 | Status: SHIPPED | OUTPATIENT
Start: 2025-03-18

## 2025-03-18 RX ORDER — ZOLPIDEM TARTRATE 12.5 MG/1
12.5 TABLET, FILM COATED, EXTENDED RELEASE ORAL NIGHTLY PRN
Qty: 90 TABLET | Refills: 0 | Status: SHIPPED | OUTPATIENT
Start: 2025-03-18 | End: 2025-06-16

## 2025-03-18 RX ORDER — ONDANSETRON 4 MG/1
4 TABLET, ORALLY DISINTEGRATING ORAL EVERY 8 HOURS PRN
Qty: 30 TABLET | Refills: 2 | Status: SHIPPED | OUTPATIENT
Start: 2025-03-18

## 2025-03-18 RX ORDER — QUETIAPINE FUMARATE 100 MG/1
100 TABLET, FILM COATED ORAL 2 TIMES DAILY
Qty: 180 TABLET | Refills: 1 | Status: SHIPPED | OUTPATIENT
Start: 2025-03-18

## 2025-03-18 RX ORDER — HYDROCODONE BITARTRATE AND ACETAMINOPHEN 7.5; 325 MG/1; MG/1
1 TABLET ORAL EVERY 8 HOURS PRN
Qty: 90 TABLET | Refills: 0 | Status: SHIPPED | OUTPATIENT
Start: 2025-05-13 | End: 2025-06-12

## 2025-03-18 RX ORDER — HYDROCODONE BITARTRATE AND ACETAMINOPHEN 7.5; 325 MG/1; MG/1
1 TABLET ORAL EVERY 8 HOURS PRN
Qty: 90 TABLET | Refills: 0 | Status: SHIPPED | OUTPATIENT
Start: 2025-04-15 | End: 2025-05-15

## 2025-03-18 ASSESSMENT — ENCOUNTER SYMPTOMS
BACK PAIN: 1
WHEEZING: 0
COUGH: 0
VOMITING: 0
NAUSEA: 0
CONSTIPATION: 0
SHORTNESS OF BREATH: 0
ABDOMINAL PAIN: 0
DIARRHEA: 0

## 2025-03-18 ASSESSMENT — PATIENT HEALTH QUESTIONNAIRE - PHQ9
4. FEELING TIRED OR HAVING LITTLE ENERGY: NOT AT ALL
2. FEELING DOWN, DEPRESSED OR HOPELESS: NOT AT ALL
3. TROUBLE FALLING OR STAYING ASLEEP: NOT AT ALL
5. POOR APPETITE OR OVEREATING: NOT AT ALL
10. IF YOU CHECKED OFF ANY PROBLEMS, HOW DIFFICULT HAVE THESE PROBLEMS MADE IT FOR YOU TO DO YOUR WORK, TAKE CARE OF THINGS AT HOME, OR GET ALONG WITH OTHER PEOPLE: NOT DIFFICULT AT ALL
8. MOVING OR SPEAKING SO SLOWLY THAT OTHER PEOPLE COULD HAVE NOTICED. OR THE OPPOSITE, BEING SO FIGETY OR RESTLESS THAT YOU HAVE BEEN MOVING AROUND A LOT MORE THAN USUAL: NOT AT ALL
SUM OF ALL RESPONSES TO PHQ QUESTIONS 1-9: 0
SUM OF ALL RESPONSES TO PHQ QUESTIONS 1-9: 0
6. FEELING BAD ABOUT YOURSELF - OR THAT YOU ARE A FAILURE OR HAVE LET YOURSELF OR YOUR FAMILY DOWN: NOT AT ALL
SUM OF ALL RESPONSES TO PHQ QUESTIONS 1-9: 0
SUM OF ALL RESPONSES TO PHQ QUESTIONS 1-9: 0
1. LITTLE INTEREST OR PLEASURE IN DOING THINGS: NOT AT ALL
7. TROUBLE CONCENTRATING ON THINGS, SUCH AS READING THE NEWSPAPER OR WATCHING TELEVISION: NOT AT ALL
9. THOUGHTS THAT YOU WOULD BE BETTER OFF DEAD, OR OF HURTING YOURSELF: NOT AT ALL

## 2025-03-18 NOTE — ASSESSMENT & PLAN NOTE
Orders:    sertraline (ZOLOFT) 50 MG tablet; Take 1 tablet by mouth at bedtime    ALPRAZolam (XANAX) 0.5 MG tablet; Take 1 tablet by mouth 3 times daily as needed for Sleep for up to 90 days.

## 2025-03-18 NOTE — ASSESSMENT & PLAN NOTE
Orders:    ondansetron (ZOFRAN-ODT) 4 MG disintegrating tablet; Take 1 tablet by mouth every 8 hours as needed for Nausea or Vomiting    topiramate (TOPAMAX) 25 MG tablet; Take 2 tablets by mouth at bedtime

## 2025-03-18 NOTE — ASSESSMENT & PLAN NOTE
Orders:    zolpidem (AMBIEN CR) 12.5 MG extended release tablet; Take 1 tablet by mouth nightly as needed for Sleep for up to 90 days. Max Daily Amount: 12.5 mg

## 2025-03-18 NOTE — ASSESSMENT & PLAN NOTE
OARRS reviewed and is appropriate.  Tolerating medications well.  No signs of diversion nor abuse.     Orders:    carisoprodol (SOMA) 350 MG tablet; Take 1 tablet by mouth 4 times daily as needed for Muscle spasms for up to 90 days. Max Daily Amount: 1,400 mg    HYDROcodone-acetaminophen (NORCO) 7.5-325 MG per tablet; Take 1 tablet by mouth every 8 hours as needed for Pain for up to 30 days. Intended supply: 30 days Max Daily Amount: 3 tablets    HYDROcodone-acetaminophen (NORCO) 7.5-325 MG per tablet; Take 1 tablet by mouth every 8 hours as needed for Pain for up to 30 days. Intended supply: 30 days Max Daily Amount: 3 tablets    HYDROcodone-acetaminophen (NORCO) 7.5-325 MG per tablet; Take 1 tablet by mouth every 8 hours as needed for Pain for up to 30 days. Intended supply: 30 days Max Daily Amount: 3 tablets

## 2025-03-18 NOTE — ASSESSMENT & PLAN NOTE
Patient states that she feels like she is stable for the first time in a while.  No issues with winter blues this year.     Orders:    lamoTRIgine (LAMICTAL) 150 MG tablet; Take 1 tablet by mouth nightly

## 2025-03-18 NOTE — PROGRESS NOTES
Average packs/day: 0.3 packs/day for 1 year (0.3 ttl pk-yrs)     Types: Cigarettes     Passive exposure: Current    Smokeless tobacco: Never   Vaping Use    Vaping status: Never Used   Substance and Sexual Activity    Alcohol use: Yes     Alcohol/week: 7.0 standard drinks of alcohol     Types: 7 Glasses of wine per week     Comment: nightly    Drug use: Not Currently     Types: Marijuana (Weed)     Comment: 4/9/2024 States has not used marijuana for approximately a year    Sexual activity: Not on file   Other Topics Concern    Not on file   Social History Narrative    Not on file     Social Drivers of Health     Financial Resource Strain: Low Risk  (4/4/2024)    Overall Financial Resource Strain (CARDIA)     Difficulty of Paying Living Expenses: Not hard at all   Food Insecurity: No Food Insecurity (10/2/2024)    Hunger Vital Sign     Worried About Running Out of Food in the Last Year: Never true     Ran Out of Food in the Last Year: Never true   Transportation Needs: No Transportation Needs (10/2/2024)    PRAPARE - Transportation     Lack of Transportation (Medical): No     Lack of Transportation (Non-Medical): No   Physical Activity: Not on file   Stress: Not on file   Social Connections: Not on file   Intimate Partner Violence: Not on file   Housing Stability: Low Risk  (10/2/2024)    Housing Stability Vital Sign     Unable to Pay for Housing in the Last Year: No     Number of Times Moved in the Last Year: 1     Homeless in the Last Year: No       Vitals:    03/18/25 1042   BP: 126/84   Pulse: 88   Temp: 97.9 °F (36.6 °C)   SpO2: 98%   Weight: 55.3 kg (122 lb)   Height: 1.626 m (5' 4\")       Physical Exam:  Physical Exam  Vitals and nursing note reviewed.   Constitutional:       General: She is not in acute distress.     Appearance: Normal appearance. She is well-developed and normal weight. She is not ill-appearing.   HENT:      Head: Normocephalic and atraumatic.      Right Ear: Hearing and external ear normal.

## 2025-03-18 NOTE — ASSESSMENT & PLAN NOTE
Orders:    HYDROcodone-acetaminophen (NORCO) 7.5-325 MG per tablet; Take 1 tablet by mouth every 8 hours as needed for Pain for up to 30 days. Intended supply: 30 days Max Daily Amount: 3 tablets    HYDROcodone-acetaminophen (NORCO) 7.5-325 MG per tablet; Take 1 tablet by mouth every 8 hours as needed for Pain for up to 30 days. Intended supply: 30 days Max Daily Amount: 3 tablets    HYDROcodone-acetaminophen (NORCO) 7.5-325 MG per tablet; Take 1 tablet by mouth every 8 hours as needed for Pain for up to 30 days. Intended supply: 30 days Max Daily Amount: 3 tablets

## 2025-03-18 NOTE — ASSESSMENT & PLAN NOTE
Chronic, at goal (stable), continue same.  Due for labs in September.     Orders:    amLODIPine (NORVASC) 10 MG tablet; Take 1 tablet by mouth nightly

## 2025-05-05 DIAGNOSIS — F31.9 BIPOLAR I DISORDER (HCC): ICD-10-CM

## 2025-05-05 RX ORDER — LAMOTRIGINE 150 MG/1
150 TABLET ORAL NIGHTLY
Qty: 90 TABLET | Refills: 1 | Status: SHIPPED | OUTPATIENT
Start: 2025-05-05

## 2025-05-05 NOTE — TELEPHONE ENCOUNTER
Name of Medication(s) Requested:  Requested Prescriptions     Pending Prescriptions Disp Refills    lamoTRIgine (LAMICTAL) 150 MG tablet 90 tablet 1     Sig: Take 1 tablet by mouth nightly       Medication is on current medication list Yes    Dosage and directions were verified? Yes    Quantity verified: 90 day supply     Pharmacy Verified?  Yes    Last Appointment:  3/18/2025    Future appts:  Future Appointments   Date Time Provider Department Center   6/10/2025 10:00 AM Chilo Jarrett DO [facility] [place] [facility] DE[facility]        (If no appt send self scheduling link. .REFILLAPPT)  Scheduling request sent?     [] Yes  [x] No    Does patient need updated?  [] Yes  [x] No

## 2025-06-10 ENCOUNTER — OFFICE VISIT (OUTPATIENT)
Dept: PRIMARY CARE CLINIC | Age: 60
End: 2025-06-10
Payer: COMMERCIAL

## 2025-06-10 VITALS
DIASTOLIC BLOOD PRESSURE: 80 MMHG | BODY MASS INDEX: 21 KG/M2 | WEIGHT: 123 LBS | OXYGEN SATURATION: 98 % | TEMPERATURE: 97.9 F | HEART RATE: 80 BPM | SYSTOLIC BLOOD PRESSURE: 144 MMHG | HEIGHT: 64 IN

## 2025-06-10 DIAGNOSIS — M48.062 SPINAL STENOSIS OF LUMBAR REGION WITH NEUROGENIC CLAUDICATION: ICD-10-CM

## 2025-06-10 DIAGNOSIS — F41.0 PANIC DISORDER WITHOUT AGORAPHOBIA: ICD-10-CM

## 2025-06-10 DIAGNOSIS — M50.90 CERVICAL DISC DISEASE: ICD-10-CM

## 2025-06-10 DIAGNOSIS — M51.362 DEGENERATION OF INTERVERTEBRAL DISC OF LUMBAR REGION WITH DISCOGENIC BACK PAIN AND LOWER EXTREMITY PAIN: ICD-10-CM

## 2025-06-10 DIAGNOSIS — M47.817 LUMBOSACRAL SPONDYLOSIS WITHOUT MYELOPATHY: ICD-10-CM

## 2025-06-10 DIAGNOSIS — F51.01 PRIMARY INSOMNIA: ICD-10-CM

## 2025-06-10 PROCEDURE — 3079F DIAST BP 80-89 MM HG: CPT | Performed by: FAMILY MEDICINE

## 2025-06-10 PROCEDURE — 99214 OFFICE O/P EST MOD 30 MIN: CPT | Performed by: FAMILY MEDICINE

## 2025-06-10 PROCEDURE — 3077F SYST BP >= 140 MM HG: CPT | Performed by: FAMILY MEDICINE

## 2025-06-10 RX ORDER — LIDOCAINE 50 MG/G
1 PATCH TOPICAL DAILY
Qty: 10 PATCH | Refills: 3 | Status: SHIPPED | OUTPATIENT
Start: 2025-06-10 | End: 2025-07-20

## 2025-06-10 RX ORDER — ZOLPIDEM TARTRATE 12.5 MG/1
12.5 TABLET, FILM COATED, EXTENDED RELEASE ORAL NIGHTLY PRN
Qty: 90 TABLET | Refills: 0 | Status: SHIPPED | OUTPATIENT
Start: 2025-06-10 | End: 2025-09-08

## 2025-06-10 RX ORDER — ALPRAZOLAM 0.5 MG
0.5 TABLET ORAL 3 TIMES DAILY PRN
Qty: 270 TABLET | Refills: 0 | Status: SHIPPED | OUTPATIENT
Start: 2025-08-05 | End: 2025-11-03

## 2025-06-10 RX ORDER — HYDROCODONE BITARTRATE AND ACETAMINOPHEN 7.5; 325 MG/1; MG/1
1 TABLET ORAL EVERY 8 HOURS PRN
Qty: 90 TABLET | Refills: 0 | Status: SHIPPED | OUTPATIENT
Start: 2025-06-10 | End: 2025-06-10 | Stop reason: SDUPTHER

## 2025-06-10 RX ORDER — ALPRAZOLAM 0.5 MG
0.5 TABLET ORAL 3 TIMES DAILY PRN
Qty: 270 TABLET | Refills: 0 | Status: SHIPPED | OUTPATIENT
Start: 2025-07-08 | End: 2025-10-06

## 2025-06-10 RX ORDER — HYDROCODONE BITARTRATE AND ACETAMINOPHEN 7.5; 325 MG/1; MG/1
1 TABLET ORAL EVERY 8 HOURS PRN
Qty: 90 TABLET | Refills: 0 | Status: SHIPPED | OUTPATIENT
Start: 2025-08-05 | End: 2025-09-04

## 2025-06-10 RX ORDER — ALPRAZOLAM 0.5 MG
0.5 TABLET ORAL 3 TIMES DAILY PRN
Qty: 270 TABLET | Refills: 0 | Status: SHIPPED | OUTPATIENT
Start: 2025-06-10 | End: 2025-06-10 | Stop reason: SDUPTHER

## 2025-06-10 RX ORDER — CARISOPRODOL 350 MG/1
350 TABLET ORAL 4 TIMES DAILY PRN
Qty: 120 TABLET | Refills: 2 | Status: SHIPPED | OUTPATIENT
Start: 2025-06-10 | End: 2025-09-08

## 2025-06-10 RX ORDER — HYDROCODONE BITARTRATE AND ACETAMINOPHEN 7.5; 325 MG/1; MG/1
1 TABLET ORAL EVERY 8 HOURS PRN
Qty: 90 TABLET | Refills: 0 | Status: SHIPPED | OUTPATIENT
Start: 2025-07-08 | End: 2025-08-07

## 2025-06-10 ASSESSMENT — ENCOUNTER SYMPTOMS
RESPIRATORY NEGATIVE: 1
EYES NEGATIVE: 1
GASTROINTESTINAL NEGATIVE: 1
ALLERGIC/IMMUNOLOGIC NEGATIVE: 1

## 2025-06-10 NOTE — PROGRESS NOTES
6/10/25     Kayleen La    : 1965 Sex: female   Age: 59 y.o.      Chief Complaint   Patient presents with    Hypertension       Prior to Admission medications    Medication Sig Start Date End Date Taking? Authorizing Provider   lamoTRIgine (LAMICTAL) 150 MG tablet Take 1 tablet by mouth nightly 25   Ana Ferris DO   sertraline (ZOLOFT) 50 MG tablet Take 1 tablet by mouth at bedtime 3/18/25   Ana Ferris DO   QUEtiapine (SEROQUEL) 100 MG tablet Take 1 tablet by mouth 2 times daily 3/18/25   Ana Ferris DO   amLODIPine (NORVASC) 10 MG tablet Take 1 tablet by mouth nightly 3/18/25   Ana Ferris DO   ondansetron (ZOFRAN-ODT) 4 MG disintegrating tablet Take 1 tablet by mouth every 8 hours as needed for Nausea or Vomiting 3/18/25   Ana Ferris DO   zolpidem (AMBIEN CR) 12.5 MG extended release tablet Take 1 tablet by mouth nightly as needed for Sleep for up to 90 days. Max Daily Amount: 12.5 mg 3/18/25 6/16/25  Ana Ferris DO   carisoprodol (SOMA) 350 MG tablet Take 1 tablet by mouth 4 times daily as needed for Muscle spasms for up to 90 days. Max Daily Amount: 1,400 mg 3/18/25 6/16/25  Ana Ferris DO   ALPRAZolam (XANAX) 0.5 MG tablet Take 1 tablet by mouth 3 times daily as needed for Sleep for up to 90 days. 3/18/25 6/16/25  Ana Ferris DO   HYDROcodone-acetaminophen (NORCO) 7.5-325 MG per tablet Take 1 tablet by mouth every 8 hours as needed for Pain for up to 30 days. Intended supply: 30 days Max Daily Amount: 3 tablets 25  Ana Ferris DO   topiramate (TOPAMAX) 25 MG tablet Take 2 tablets by mouth at bedtime 3/18/25   Ana Ferris DO   rizatriptan (MAXALT) 10 MG tablet Take 1 tablet by mouth once as needed for Migraine May repeat in 2 hours if needed 1/15/25 3/18/25  Ana Ferris, DO   estradiol (VIVELLE) 0.1 MG/24HR APPLY 1 PATCH TOPICALLY TO THE SKIN 2 TIMES A WEEK 25   Ana Ferris DO   baclofen (LIORESAL) 10 MG

## 2025-07-22 DIAGNOSIS — G43.709 CHRONIC MIGRAINE WITHOUT AURA WITHOUT STATUS MIGRAINOSUS, NOT INTRACTABLE: ICD-10-CM

## 2025-07-23 RX ORDER — TOPIRAMATE 25 MG/1
50 TABLET, FILM COATED ORAL NIGHTLY
Qty: 180 TABLET | Refills: 0 | Status: SHIPPED | OUTPATIENT
Start: 2025-07-23

## 2025-08-21 ENCOUNTER — OFFICE VISIT (OUTPATIENT)
Dept: FAMILY MEDICINE CLINIC | Age: 60
End: 2025-08-21
Payer: COMMERCIAL

## 2025-08-21 VITALS
HEART RATE: 76 BPM | OXYGEN SATURATION: 95 % | WEIGHT: 122 LBS | HEIGHT: 64 IN | BODY MASS INDEX: 20.83 KG/M2 | DIASTOLIC BLOOD PRESSURE: 82 MMHG | SYSTOLIC BLOOD PRESSURE: 138 MMHG | RESPIRATION RATE: 16 BRPM | TEMPERATURE: 98.4 F

## 2025-08-21 DIAGNOSIS — J01.90 ACUTE NON-RECURRENT SINUSITIS, UNSPECIFIED LOCATION: ICD-10-CM

## 2025-08-21 DIAGNOSIS — R09.82 POSTNASAL DRIP: ICD-10-CM

## 2025-08-21 DIAGNOSIS — R00.2 HEART PALPITATIONS: ICD-10-CM

## 2025-08-21 DIAGNOSIS — R05.9 COUGH, UNSPECIFIED TYPE: ICD-10-CM

## 2025-08-21 DIAGNOSIS — Z72.0 TOBACCO ABUSE: ICD-10-CM

## 2025-08-21 DIAGNOSIS — J06.9 ACUTE UPPER RESPIRATORY INFECTION, UNSPECIFIED: Primary | ICD-10-CM

## 2025-08-21 DIAGNOSIS — R52 GENERALIZED BODY ACHES: ICD-10-CM

## 2025-08-21 LAB
INFLUENZA A ANTIBODY: NEGATIVE
INFLUENZA B ANTIBODY: NEGATIVE
Lab: NORMAL
PERFORMING INSTRUMENT: NORMAL
QC PASS/FAIL: NORMAL
S PYO AG THROAT QL: NORMAL
SARS-COV-2, POC: NORMAL

## 2025-08-21 PROCEDURE — 3079F DIAST BP 80-89 MM HG: CPT | Performed by: PHYSICIAN ASSISTANT

## 2025-08-21 PROCEDURE — 87880 STREP A ASSAY W/OPTIC: CPT | Performed by: PHYSICIAN ASSISTANT

## 2025-08-21 PROCEDURE — 93000 ELECTROCARDIOGRAM COMPLETE: CPT | Performed by: PHYSICIAN ASSISTANT

## 2025-08-21 PROCEDURE — 87804 INFLUENZA ASSAY W/OPTIC: CPT | Performed by: PHYSICIAN ASSISTANT

## 2025-08-21 PROCEDURE — 3075F SYST BP GE 130 - 139MM HG: CPT | Performed by: PHYSICIAN ASSISTANT

## 2025-08-21 PROCEDURE — 99215 OFFICE O/P EST HI 40 MIN: CPT | Performed by: PHYSICIAN ASSISTANT

## 2025-08-21 PROCEDURE — 87426 SARSCOV CORONAVIRUS AG IA: CPT | Performed by: PHYSICIAN ASSISTANT

## 2025-08-21 RX ORDER — BENZONATATE 100 MG/1
100 CAPSULE ORAL 3 TIMES DAILY PRN
Qty: 21 CAPSULE | Refills: 0 | Status: SHIPPED | OUTPATIENT
Start: 2025-08-21 | End: 2025-08-28

## 2025-08-21 RX ORDER — PREDNISONE 10 MG/1
TABLET ORAL
Qty: 18 TABLET | Refills: 0 | Status: SHIPPED | OUTPATIENT
Start: 2025-08-21

## 2025-08-21 RX ORDER — DOXYCYCLINE HYCLATE 100 MG
100 TABLET ORAL 2 TIMES DAILY
Qty: 20 TABLET | Refills: 0 | Status: SHIPPED | OUTPATIENT
Start: 2025-08-21 | End: 2025-08-31

## 2025-08-21 RX ORDER — HYDROXYZINE HYDROCHLORIDE 10 MG/5ML
4 SYRUP ORAL EVERY 6 HOURS PRN
Qty: 20 TABLET | Refills: 0 | Status: SHIPPED | OUTPATIENT
Start: 2025-08-21

## 2025-08-26 ENCOUNTER — PATIENT MESSAGE (OUTPATIENT)
Dept: PRIMARY CARE CLINIC | Age: 60
End: 2025-08-26

## 2025-08-26 DIAGNOSIS — E55.9 VITAMIN D INSUFFICIENCY: ICD-10-CM

## 2025-08-26 DIAGNOSIS — R73.01 IMPAIRED FASTING GLUCOSE: ICD-10-CM

## 2025-08-26 DIAGNOSIS — I10 PRIMARY HYPERTENSION: ICD-10-CM

## 2025-08-28 DIAGNOSIS — R73.01 IMPAIRED FASTING GLUCOSE: ICD-10-CM

## 2025-08-28 DIAGNOSIS — E55.9 VITAMIN D INSUFFICIENCY: ICD-10-CM

## 2025-08-28 DIAGNOSIS — I10 PRIMARY HYPERTENSION: ICD-10-CM

## 2025-08-28 LAB
ALBUMIN: 4.3 G/DL (ref 3.5–5.2)
ALP BLD-CCNC: 80 U/L (ref 35–104)
ALT SERPL-CCNC: 16 U/L (ref 0–35)
ANION GAP SERPL CALCULATED.3IONS-SCNC: 14 MMOL/L (ref 7–16)
AST SERPL-CCNC: 29 U/L (ref 0–35)
BASOPHILS ABSOLUTE: 0.03 K/UL (ref 0–0.2)
BASOPHILS RELATIVE PERCENT: 1 % (ref 0–2)
BILIRUB SERPL-MCNC: <0.2 MG/DL (ref 0–1.2)
BILIRUBIN, URINE: NEGATIVE
BUN BLDV-MCNC: 15 MG/DL (ref 6–20)
CALCIUM SERPL-MCNC: 9.5 MG/DL (ref 8.6–10)
CHLORIDE BLD-SCNC: 99 MMOL/L (ref 98–107)
CHOLESTEROL, TOTAL: 218 MG/DL
CO2: 22 MMOL/L (ref 22–29)
COLOR, UA: YELLOW
COMMENT: ABNORMAL
CREAT SERPL-MCNC: 1 MG/DL (ref 0.5–1)
EOSINOPHILS ABSOLUTE: 0.07 K/UL (ref 0.05–0.5)
EOSINOPHILS RELATIVE PERCENT: 1 % (ref 0–6)
GFR, ESTIMATED: 68 ML/MIN/1.73M2
GLUCOSE BLD-MCNC: 95 MG/DL (ref 74–99)
GLUCOSE URINE: NEGATIVE MG/DL
HBA1C MFR BLD: 5.1 % (ref 4–5.6)
HCT VFR BLD CALC: 40.9 % (ref 34–48)
HDLC SERPL-MCNC: 105 MG/DL
HEMOGLOBIN: 13.4 G/DL (ref 11.5–15.5)
IMMATURE GRANULOCYTES %: 0 % (ref 0–5)
IMMATURE GRANULOCYTES ABSOLUTE: <0.03 K/UL (ref 0–0.58)
KETONES, URINE: NEGATIVE MG/DL
LDL CHOLESTEROL: 90 MG/DL
LEUKOCYTE ESTERASE, URINE: NEGATIVE
LYMPHOCYTES ABSOLUTE: 2.01 K/UL (ref 1.5–4)
LYMPHOCYTES RELATIVE PERCENT: 35 % (ref 20–42)
MCH RBC QN AUTO: 32.5 PG (ref 26–35)
MCHC RBC AUTO-ENTMCNC: 32.8 G/DL (ref 32–34.5)
MCV RBC AUTO: 99.3 FL (ref 80–99.9)
MONOCYTES ABSOLUTE: 0.65 K/UL (ref 0.1–0.95)
MONOCYTES RELATIVE PERCENT: 11 % (ref 2–12)
NEUTROPHILS ABSOLUTE: 3.02 K/UL (ref 1.8–7.3)
NEUTROPHILS RELATIVE PERCENT: 52 % (ref 43–80)
NITRITE, URINE: NEGATIVE
PDW BLD-RTO: 12.5 % (ref 11.5–15)
PH, URINE: 6.5 (ref 5–8)
PLATELET # BLD: 275 K/UL (ref 130–450)
PMV BLD AUTO: 11.2 FL (ref 7–12)
POTASSIUM SERPL-SCNC: 4.7 MMOL/L (ref 3.5–5.1)
PROTEIN UA: NEGATIVE MG/DL
RBC # BLD: 4.12 M/UL (ref 3.5–5.5)
SODIUM BLD-SCNC: 135 MMOL/L (ref 136–145)
SPECIFIC GRAVITY UA: <1.005 (ref 1–1.03)
TOTAL PROTEIN: 7.2 G/DL (ref 6.4–8.3)
TRIGL SERPL-MCNC: 113 MG/DL
TSH SERPL DL<=0.05 MIU/L-ACNC: 2.67 UIU/ML (ref 0.27–4.2)
TURBIDITY: CLEAR
URINE HGB: NEGATIVE
UROBILINOGEN, URINE: 0.2 EU/DL (ref 0–1)
VITAMIN D 25-HYDROXY: 38.8 NG/ML (ref 30–100)
VLDLC SERPL CALC-MCNC: 23 MG/DL
WBC # BLD: 5.8 K/UL (ref 4.5–11.5)

## (undated) DEVICE — PREMIUM WET SKIN PREP TRAY: Brand: MEDLINE INDUSTRIES, INC.

## (undated) DEVICE — HYPODERMIC SAFETY NEEDLE: Brand: MAGELLAN

## (undated) DEVICE — ELECTRODE PT RET AD L9FT HI MOIST COND ADH HYDRGEL CORDED

## (undated) DEVICE — ADHESIVE SKIN CLOSURE 0.7CC TOP MICROBIAL APPL DERMBND ADV

## (undated) DEVICE — NEEDLE HYPO 18GA L1.5IN PNK POLYPR HUB S STL REG BVL STR

## (undated) DEVICE — NEEDLE HYPO 25GA L1.5IN BLU POLYPR HUB S STL REG BVL STR

## (undated) DEVICE — 5.0MM PRECISION ROUND

## (undated) DEVICE — BANDAGE ADH W1XL3IN NAT FAB WVN FLX DURABLE N ADH PD SEAL

## (undated) DEVICE — SPONGE GZ W4XL4IN RAYON POLY FILL CVR W/ NONWOVEN FAB

## (undated) DEVICE — TUBING SUCT 12FR MAL ALUM SHFT FN CAP VENT UNIV CONN W/ OBT

## (undated) DEVICE — LUMBAR LAMINECTOMY: Brand: MEDLINE INDUSTRIES, INC.

## (undated) DEVICE — Device: Brand: PORTEX

## (undated) DEVICE — CONTROL SYRINGE LUER-LOCK TIP: Brand: MONOJECT

## (undated) DEVICE — GOWN,SIRUS,FABRNF,XL,20/CS: Brand: MEDLINE

## (undated) DEVICE — GAUZE,SPONGE,4"X4",8PLY,STRL,LF,10/TRAY: Brand: MEDLINE

## (undated) DEVICE — GLOVE SURG SZ 65 THK91MIL LTX FREE SYN POLYISOPRENE

## (undated) DEVICE — SOLUTION IRRIG 1000ML 0.9% SOD CHL USP POUR PLAS BTL

## (undated) DEVICE — 3M™ RED DOT™ MONITORING ELECTRODE WITH FOAM TAPE AND STICKY GEL 2560, 50/BAG, 20/CASE, 72/PLT: Brand: RED DOT™

## (undated) DEVICE — GOWN,SIRUS,FABRNF,L,20/CS: Brand: MEDLINE

## (undated) DEVICE — 4-PORT MANIFOLD: Brand: NEPTUNE 2

## (undated) DEVICE — GLOVE SURG 8.5 PF POLYMER WHT STRL SIGN LTX ESSENTIAL LTX

## (undated) DEVICE — 6 ML SYRINGE LUER-LOCK TIP: Brand: MONOJECT

## (undated) DEVICE — SYRINGE, LUER LOCK, 5ML: Brand: MEDLINE

## (undated) DEVICE — SOLUTION SURG PREP 26 CC PURPREP

## (undated) DEVICE — SYRINGE 20ML LL S/C 50

## (undated) DEVICE — DRAPE,REIN 53X77,STERILE: Brand: MEDLINE

## (undated) DEVICE — 12 ML SYRINGE,LUER-LOCK TIP: Brand: MONOJECT

## (undated) DEVICE — FORCEPS BX L240CM JAW DIA2.4MM ORNG L CAP W/ NDL DISP RAD

## (undated) DEVICE — GLOVE ORANGE PI 7 1/2   MSG9075

## (undated) DEVICE — KIT EVAC 400CC DIA1/8IN H PAT 12.5IN 3 SPR RND SHP PVC DRN

## (undated) DEVICE — GRADUATE TRIANG MEASURE 1000ML BLK PRNT

## (undated) DEVICE — SET INSTRUMENT LAP I

## (undated) DEVICE — NON-DEHP CATHETER EXTENSION SET, MALE LUER LOCK ADAPTER

## (undated) DEVICE — LIQUIBAND RAPID ADHESIVE 36/CS 0.8ML: Brand: MEDLINE

## (undated) DEVICE — TUBING, SUCTION, 3/16" X 12', STRAIGHT: Brand: MEDLINE

## (undated) DEVICE — TOWEL,OR,DSP,ST,BLUE,STD,6/PK,12PK/CS: Brand: MEDLINE

## (undated) DEVICE — NEEDLE HYPO 18GA L1.5IN PNK POLYPR HUB S STL THN WALL FILL

## (undated) DEVICE — GAUZE,SPONGE,4"X4",12PLY,STERILE,LF,2'S: Brand: MEDLINE

## (undated) DEVICE — COVER HNDL LT DISP

## (undated) DEVICE — GLOVE ORANGE PI 8   MSG9080

## (undated) DEVICE — DOUBLE BASIN SET: Brand: MEDLINE INDUSTRIES, INC.

## (undated) DEVICE — FORCEPS BX OVL CUP FEN DISPOSABLE CAP L 160CM RAD JAW 4

## (undated) DEVICE — SOLUTION IRRIG 1000ML STRL H2O USP PLAS POUR BTL

## (undated) DEVICE — GLOVE SURG SZ 7 L12IN FNGR THK94MIL TRNSLUC YEL LTX HYDRGEL

## (undated) DEVICE — PACK PROCEDURE SURG GEN CUST

## (undated) DEVICE — 3M(TM) MEDIPORE(TM) +PAD SOFT CLOTH ADHESIVE WOUND DRESSING 3569: Brand: 3M™ MEDIPORE™

## (undated) DEVICE — GLOVE SURG SZ 85 L12IN FNGR THK79MIL GRN LTX FREE

## (undated) DEVICE — 3M™ IOBAN™ 2 ANTIMICROBIAL INCISE DRAPE 6650EZ: Brand: IOBAN™ 2

## (undated) DEVICE — SOLUTION IV IRRIG POUR BRL 0.9% SODIUM CHL 2F7124

## (undated) DEVICE — BLADE ES L6IN ELASTOMERIC COAT EXT DURABLE BEND UPTO 90DEG

## (undated) DEVICE — JACKSON TABLE POSITIONER KIT: Brand: MEDLINE INDUSTRIES, INC.

## (undated) DEVICE — NEEDLE SPNL L3.5IN PNK HUB S STL REG WALL FIT STYL W/ QNCKE

## (undated) DEVICE — INTENDED FOR TISSUE SEPARATION, AND OTHER PROCEDURES THAT REQUIRE A SHARP SURGICAL BLADE TO PUNCTURE OR CUT.: Brand: BARD-PARKER ® STAINLESS STEEL BLADES

## (undated) DEVICE — CHLORAPREP 26ML ORANGE

## (undated) DEVICE — BLOCK BITE 60FR RUBBER ADLT DENTAL

## (undated) DEVICE — SNARE POLYP M W27MMXL240CM OVL STIFF DISP CAPTIVATOR